# Patient Record
Sex: MALE | Race: ASIAN | NOT HISPANIC OR LATINO | URBAN - METROPOLITAN AREA
[De-identification: names, ages, dates, MRNs, and addresses within clinical notes are randomized per-mention and may not be internally consistent; named-entity substitution may affect disease eponyms.]

---

## 2020-01-01 ENCOUNTER — INPATIENT (INPATIENT)
Facility: HOSPITAL | Age: 80
LOS: 17 days | End: 2020-06-22
Attending: INTERNAL MEDICINE | Admitting: INTERNAL MEDICINE
Payer: COMMERCIAL

## 2020-01-01 VITALS
DIASTOLIC BLOOD PRESSURE: 55 MMHG | HEIGHT: 67 IN | HEART RATE: 70 BPM | RESPIRATION RATE: 18 BRPM | TEMPERATURE: 97 F | OXYGEN SATURATION: 100 % | SYSTOLIC BLOOD PRESSURE: 116 MMHG | WEIGHT: 126.1 LBS

## 2020-01-01 VITALS — HEART RATE: 68 BPM

## 2020-01-01 DIAGNOSIS — M72.6 NECROTIZING FASCIITIS: ICD-10-CM

## 2020-01-01 DIAGNOSIS — Z95.810 PRESENCE OF AUTOMATIC (IMPLANTABLE) CARDIAC DEFIBRILLATOR: Chronic | ICD-10-CM

## 2020-01-01 DIAGNOSIS — Z95.1 PRESENCE OF AORTOCORONARY BYPASS GRAFT: Chronic | ICD-10-CM

## 2020-01-01 DIAGNOSIS — S98.131A COMPLETE TRAUMATIC AMPUTATION OF ONE RIGHT LESSER TOE, INITIAL ENCOUNTER: Chronic | ICD-10-CM

## 2020-01-01 DIAGNOSIS — Z95.9 PRESENCE OF CARDIAC AND VASCULAR IMPLANT AND GRAFT, UNSPECIFIED: Chronic | ICD-10-CM

## 2020-01-01 LAB
-  AMIKACIN: SIGNIFICANT CHANGE UP
-  AMOXICILLIN/CLAVULANIC ACID: SIGNIFICANT CHANGE UP
-  AMOXICILLIN/CLAVULANIC ACID: SIGNIFICANT CHANGE UP
-  AMPICILLIN/SULBACTAM: SIGNIFICANT CHANGE UP
-  AMPICILLIN: SIGNIFICANT CHANGE UP
-  AZTREONAM: SIGNIFICANT CHANGE UP
-  CANDIDA ALBICANS: SIGNIFICANT CHANGE UP
-  CANDIDA GLABRATA: SIGNIFICANT CHANGE UP
-  CANDIDA KRUSEI: SIGNIFICANT CHANGE UP
-  CANDIDA PARAPSILOSIS: SIGNIFICANT CHANGE UP
-  CANDIDA TROPICALIS: SIGNIFICANT CHANGE UP
-  CEFAZOLIN: SIGNIFICANT CHANGE UP
-  CEFEPIME: SIGNIFICANT CHANGE UP
-  CEFOXITIN: SIGNIFICANT CHANGE UP
-  CEFTRIAXONE: SIGNIFICANT CHANGE UP
-  CIPROFLOXACIN: SIGNIFICANT CHANGE UP
-  CLINDAMYCIN: SIGNIFICANT CHANGE UP
-  COAGULASE NEGATIVE STAPHYLOCOCCUS: SIGNIFICANT CHANGE UP
-  ERTAPENEM: SIGNIFICANT CHANGE UP
-  GENTAMICIN: SIGNIFICANT CHANGE UP
-  IMIPENEM: SIGNIFICANT CHANGE UP
-  K. PNEUMONIAE GROUP: SIGNIFICANT CHANGE UP
-  KPC RESISTANCE GENE: SIGNIFICANT CHANGE UP
-  LEVOFLOXACIN: SIGNIFICANT CHANGE UP
-  MEROPENEM: SIGNIFICANT CHANGE UP
-  PENICILLIN: SIGNIFICANT CHANGE UP
-  PIPERACILLIN/TAZOBACTAM: SIGNIFICANT CHANGE UP
-  STREPTOCOCCUS SP. (NOT GRP A, B OR S PNEUMONIAE): SIGNIFICANT CHANGE UP
-  TOBRAMYCIN: SIGNIFICANT CHANGE UP
-  TRIMETHOPRIM/SULFAMETHOXAZOLE: SIGNIFICANT CHANGE UP
-  VANCOMYCIN: SIGNIFICANT CHANGE UP
A BAUMANNII DNA SPEC QL NAA+PROBE: SIGNIFICANT CHANGE UP
A1C WITH ESTIMATED AVERAGE GLUCOSE RESULT: 10.4 % — HIGH (ref 4–5.6)
A1C WITH ESTIMATED AVERAGE GLUCOSE RESULT: 10.6 % — HIGH (ref 4–5.6)
ALBUMIN SERPL ELPH-MCNC: 1.7 G/DL — LOW (ref 3.5–5.2)
ALBUMIN SERPL ELPH-MCNC: 2 G/DL — LOW (ref 3.5–5.2)
ALBUMIN SERPL ELPH-MCNC: 2 G/DL — LOW (ref 3.5–5.2)
ALBUMIN SERPL ELPH-MCNC: 2.3 G/DL — LOW (ref 3.5–5.2)
ALBUMIN SERPL ELPH-MCNC: 2.4 G/DL — LOW (ref 3.5–5.2)
ALBUMIN SERPL ELPH-MCNC: 2.5 G/DL — LOW (ref 3.5–5.2)
ALBUMIN SERPL ELPH-MCNC: 2.6 G/DL — LOW (ref 3.5–5.2)
ALBUMIN SERPL ELPH-MCNC: 2.7 G/DL — LOW (ref 3.5–5.2)
ALBUMIN SERPL ELPH-MCNC: 2.8 G/DL — LOW (ref 3.5–5.2)
ALBUMIN SERPL ELPH-MCNC: 2.9 G/DL — LOW (ref 3.5–5.2)
ALBUMIN SERPL ELPH-MCNC: 3 G/DL — LOW (ref 3.5–5.2)
ALP SERPL-CCNC: 161 U/L — HIGH (ref 30–115)
ALP SERPL-CCNC: 165 U/L — HIGH (ref 30–115)
ALP SERPL-CCNC: 176 U/L — HIGH (ref 30–115)
ALP SERPL-CCNC: 187 U/L — HIGH (ref 30–115)
ALP SERPL-CCNC: 196 U/L — HIGH (ref 30–115)
ALP SERPL-CCNC: 199 U/L — HIGH (ref 30–115)
ALP SERPL-CCNC: 206 U/L — HIGH (ref 30–115)
ALP SERPL-CCNC: 208 U/L — HIGH (ref 30–115)
ALP SERPL-CCNC: 213 U/L — HIGH (ref 30–115)
ALP SERPL-CCNC: 214 U/L — HIGH (ref 30–115)
ALP SERPL-CCNC: 215 U/L — HIGH (ref 30–115)
ALP SERPL-CCNC: 216 U/L — HIGH (ref 30–115)
ALP SERPL-CCNC: 218 U/L — HIGH (ref 30–115)
ALP SERPL-CCNC: 219 U/L — HIGH (ref 30–115)
ALP SERPL-CCNC: 225 U/L — HIGH (ref 30–115)
ALP SERPL-CCNC: 225 U/L — HIGH (ref 30–115)
ALP SERPL-CCNC: 227 U/L — HIGH (ref 30–115)
ALP SERPL-CCNC: 230 U/L — HIGH (ref 30–115)
ALP SERPL-CCNC: 231 U/L — HIGH (ref 30–115)
ALP SERPL-CCNC: 232 U/L — HIGH (ref 30–115)
ALP SERPL-CCNC: 237 U/L — HIGH (ref 30–115)
ALP SERPL-CCNC: 240 U/L — HIGH (ref 30–115)
ALP SERPL-CCNC: 276 U/L — HIGH (ref 30–115)
ALT FLD-CCNC: 10 U/L — SIGNIFICANT CHANGE UP (ref 0–41)
ALT FLD-CCNC: 11 U/L — SIGNIFICANT CHANGE UP (ref 0–41)
ALT FLD-CCNC: 14 U/L — SIGNIFICANT CHANGE UP (ref 0–41)
ALT FLD-CCNC: 14 U/L — SIGNIFICANT CHANGE UP (ref 0–41)
ALT FLD-CCNC: 15 U/L — SIGNIFICANT CHANGE UP (ref 0–41)
ALT FLD-CCNC: 15 U/L — SIGNIFICANT CHANGE UP (ref 0–41)
ALT FLD-CCNC: 16 U/L — SIGNIFICANT CHANGE UP (ref 0–41)
ALT FLD-CCNC: 17 U/L — SIGNIFICANT CHANGE UP (ref 0–41)
ALT FLD-CCNC: 18 U/L — SIGNIFICANT CHANGE UP (ref 0–41)
ALT FLD-CCNC: 19 U/L — SIGNIFICANT CHANGE UP (ref 0–41)
ALT FLD-CCNC: 20 U/L — SIGNIFICANT CHANGE UP (ref 0–41)
ALT FLD-CCNC: 21 U/L — SIGNIFICANT CHANGE UP (ref 0–41)
ALT FLD-CCNC: 21 U/L — SIGNIFICANT CHANGE UP (ref 0–41)
ALT FLD-CCNC: 24 U/L — SIGNIFICANT CHANGE UP (ref 0–41)
ALT FLD-CCNC: 7 U/L — SIGNIFICANT CHANGE UP (ref 0–41)
ALT FLD-CCNC: 8 U/L — SIGNIFICANT CHANGE UP (ref 0–41)
ALT FLD-CCNC: <5 U/L — SIGNIFICANT CHANGE UP (ref 0–41)
ALT FLD-CCNC: <5 U/L — SIGNIFICANT CHANGE UP (ref 0–41)
ANION GAP SERPL CALC-SCNC: 14 MMOL/L — SIGNIFICANT CHANGE UP (ref 7–14)
ANION GAP SERPL CALC-SCNC: 15 MMOL/L — HIGH (ref 7–14)
ANION GAP SERPL CALC-SCNC: 16 MMOL/L — HIGH (ref 7–14)
ANION GAP SERPL CALC-SCNC: 17 MMOL/L — HIGH (ref 7–14)
ANION GAP SERPL CALC-SCNC: 18 MMOL/L — HIGH (ref 7–14)
ANION GAP SERPL CALC-SCNC: 20 MMOL/L — HIGH (ref 7–14)
ANION GAP SERPL CALC-SCNC: 20 MMOL/L — HIGH (ref 7–14)
ANION GAP SERPL CALC-SCNC: 22 MMOL/L — HIGH (ref 7–14)
ANION GAP SERPL CALC-SCNC: 25 MMOL/L — HIGH (ref 7–14)
ANISOCYTOSIS BLD QL: SIGNIFICANT CHANGE UP
ANISOCYTOSIS BLD QL: SLIGHT — SIGNIFICANT CHANGE UP
APTT BLD: 30.2 SEC — SIGNIFICANT CHANGE UP (ref 27–39.2)
APTT BLD: 32.8 SEC — SIGNIFICANT CHANGE UP (ref 27–39.2)
APTT BLD: 32.8 SEC — SIGNIFICANT CHANGE UP (ref 27–39.2)
APTT BLD: 32.9 SEC — SIGNIFICANT CHANGE UP (ref 27–39.2)
APTT BLD: 33 SEC — SIGNIFICANT CHANGE UP (ref 27–39.2)
APTT BLD: 34.6 SEC — SIGNIFICANT CHANGE UP (ref 27–39.2)
APTT BLD: 40.2 SEC — HIGH (ref 27–39.2)
APTT BLD: 52.9 SEC — HIGH (ref 27–39.2)
AST SERPL-CCNC: 26 U/L — SIGNIFICANT CHANGE UP (ref 0–41)
AST SERPL-CCNC: 26 U/L — SIGNIFICANT CHANGE UP (ref 0–41)
AST SERPL-CCNC: 29 U/L — SIGNIFICANT CHANGE UP (ref 0–41)
AST SERPL-CCNC: 29 U/L — SIGNIFICANT CHANGE UP (ref 0–41)
AST SERPL-CCNC: 30 U/L — SIGNIFICANT CHANGE UP (ref 0–41)
AST SERPL-CCNC: 32 U/L — SIGNIFICANT CHANGE UP (ref 0–41)
AST SERPL-CCNC: 33 U/L — SIGNIFICANT CHANGE UP (ref 0–41)
AST SERPL-CCNC: 33 U/L — SIGNIFICANT CHANGE UP (ref 0–41)
AST SERPL-CCNC: 37 U/L — SIGNIFICANT CHANGE UP (ref 0–41)
AST SERPL-CCNC: 40 U/L — SIGNIFICANT CHANGE UP (ref 0–41)
AST SERPL-CCNC: 41 U/L — SIGNIFICANT CHANGE UP (ref 0–41)
AST SERPL-CCNC: 43 U/L — HIGH (ref 0–41)
AST SERPL-CCNC: 43 U/L — HIGH (ref 0–41)
AST SERPL-CCNC: 45 U/L — HIGH (ref 0–41)
AST SERPL-CCNC: 46 U/L — HIGH (ref 0–41)
AST SERPL-CCNC: 49 U/L — HIGH (ref 0–41)
AST SERPL-CCNC: 51 U/L — HIGH (ref 0–41)
AST SERPL-CCNC: 52 U/L — HIGH (ref 0–41)
AST SERPL-CCNC: 52 U/L — HIGH (ref 0–41)
AST SERPL-CCNC: 55 U/L — HIGH (ref 0–41)
AST SERPL-CCNC: 61 U/L — HIGH (ref 0–41)
B-OH-BUTYR SERPL-SCNC: <0.2 MMOL/L — SIGNIFICANT CHANGE UP
BASE EXCESS BLDA CALC-SCNC: -0.9 MMOL/L — SIGNIFICANT CHANGE UP (ref -2–2)
BASE EXCESS BLDV CALC-SCNC: -2.5 MMOL/L — LOW (ref -2–2)
BASOPHILS # BLD AUTO: 0 K/UL — SIGNIFICANT CHANGE UP (ref 0–0.2)
BASOPHILS # BLD AUTO: 0 K/UL — SIGNIFICANT CHANGE UP (ref 0–0.2)
BASOPHILS # BLD AUTO: 0.01 K/UL — SIGNIFICANT CHANGE UP (ref 0–0.2)
BASOPHILS # BLD AUTO: 0.01 K/UL — SIGNIFICANT CHANGE UP (ref 0–0.2)
BASOPHILS # BLD AUTO: 0.02 K/UL — SIGNIFICANT CHANGE UP (ref 0–0.2)
BASOPHILS # BLD AUTO: 0.03 K/UL — SIGNIFICANT CHANGE UP (ref 0–0.2)
BASOPHILS # BLD AUTO: 0.04 K/UL — SIGNIFICANT CHANGE UP (ref 0–0.2)
BASOPHILS # BLD AUTO: 0.04 K/UL — SIGNIFICANT CHANGE UP (ref 0–0.2)
BASOPHILS # BLD AUTO: 0.05 K/UL — SIGNIFICANT CHANGE UP (ref 0–0.2)
BASOPHILS NFR BLD AUTO: 0 % — SIGNIFICANT CHANGE UP (ref 0–1)
BASOPHILS NFR BLD AUTO: 0 % — SIGNIFICANT CHANGE UP (ref 0–1)
BASOPHILS NFR BLD AUTO: 0.1 % — SIGNIFICANT CHANGE UP (ref 0–1)
BASOPHILS NFR BLD AUTO: 0.2 % — SIGNIFICANT CHANGE UP (ref 0–1)
BASOPHILS NFR BLD AUTO: 0.3 % — SIGNIFICANT CHANGE UP (ref 0–1)
BASOPHILS NFR BLD AUTO: 0.3 % — SIGNIFICANT CHANGE UP (ref 0–1)
BILIRUB SERPL-MCNC: 0.4 MG/DL — SIGNIFICANT CHANGE UP (ref 0.2–1.2)
BILIRUB SERPL-MCNC: 0.5 MG/DL — SIGNIFICANT CHANGE UP (ref 0.2–1.2)
BILIRUB SERPL-MCNC: 0.6 MG/DL — SIGNIFICANT CHANGE UP (ref 0.2–1.2)
BILIRUB SERPL-MCNC: 0.6 MG/DL — SIGNIFICANT CHANGE UP (ref 0.2–1.2)
BILIRUB SERPL-MCNC: 0.7 MG/DL — SIGNIFICANT CHANGE UP (ref 0.2–1.2)
BLD GP AB SCN SERPL QL: SIGNIFICANT CHANGE UP
BUN SERPL-MCNC: 100 MG/DL — CRITICAL HIGH (ref 10–20)
BUN SERPL-MCNC: 100 MG/DL — CRITICAL HIGH (ref 10–20)
BUN SERPL-MCNC: 101 MG/DL — CRITICAL HIGH (ref 10–20)
BUN SERPL-MCNC: 102 MG/DL — CRITICAL HIGH (ref 10–20)
BUN SERPL-MCNC: 102 MG/DL — CRITICAL HIGH (ref 10–20)
BUN SERPL-MCNC: 103 MG/DL — CRITICAL HIGH (ref 10–20)
BUN SERPL-MCNC: 104 MG/DL — CRITICAL HIGH (ref 10–20)
BUN SERPL-MCNC: 105 MG/DL — CRITICAL HIGH (ref 10–20)
BUN SERPL-MCNC: 105 MG/DL — CRITICAL HIGH (ref 10–20)
BUN SERPL-MCNC: 106 MG/DL — CRITICAL HIGH (ref 10–20)
BUN SERPL-MCNC: 107 MG/DL — CRITICAL HIGH (ref 10–20)
BUN SERPL-MCNC: 93 MG/DL — CRITICAL HIGH (ref 10–20)
BUN SERPL-MCNC: 94 MG/DL — CRITICAL HIGH (ref 10–20)
BUN SERPL-MCNC: 97 MG/DL — CRITICAL HIGH (ref 10–20)
BUN SERPL-MCNC: 98 MG/DL — CRITICAL HIGH (ref 10–20)
BUN SERPL-MCNC: 99 MG/DL — CRITICAL HIGH (ref 10–20)
BURR CELLS BLD QL SMEAR: PRESENT — SIGNIFICANT CHANGE UP
CA-I SERPL-SCNC: 1.14 MMOL/L — SIGNIFICANT CHANGE UP (ref 1.12–1.3)
CALCIUM SERPL-MCNC: 11 MG/DL — HIGH (ref 8.5–10.1)
CALCIUM SERPL-MCNC: 6.8 MG/DL — LOW (ref 8.5–10.1)
CALCIUM SERPL-MCNC: 6.8 MG/DL — LOW (ref 8.5–10.1)
CALCIUM SERPL-MCNC: 6.9 MG/DL — LOW (ref 8.5–10.1)
CALCIUM SERPL-MCNC: 7 MG/DL — LOW (ref 8.5–10.1)
CALCIUM SERPL-MCNC: 7.1 MG/DL — LOW (ref 8.4–10.5)
CALCIUM SERPL-MCNC: 7.1 MG/DL — LOW (ref 8.5–10.1)
CALCIUM SERPL-MCNC: 7.1 MG/DL — LOW (ref 8.5–10.1)
CALCIUM SERPL-MCNC: 7.2 MG/DL — LOW (ref 8.5–10.1)
CALCIUM SERPL-MCNC: 7.3 MG/DL — LOW (ref 8.5–10.1)
CALCIUM SERPL-MCNC: 7.4 MG/DL — LOW (ref 8.5–10.1)
CALCIUM SERPL-MCNC: 7.6 MG/DL — LOW (ref 8.5–10.1)
CALCIUM SERPL-MCNC: 7.7 MG/DL — LOW (ref 8.5–10.1)
CALCIUM SERPL-MCNC: 7.8 MG/DL — LOW (ref 8.5–10.1)
CALCIUM SERPL-MCNC: 8 MG/DL — LOW (ref 8.5–10.1)
CALCIUM SERPL-MCNC: 8.1 MG/DL — LOW (ref 8.5–10.1)
CALCIUM SERPL-MCNC: 8.2 MG/DL — LOW (ref 8.5–10.1)
CALCIUM SERPL-MCNC: 8.2 MG/DL — LOW (ref 8.5–10.1)
CHLORIDE SERPL-SCNC: 86 MMOL/L — LOW (ref 98–110)
CHLORIDE SERPL-SCNC: 87 MMOL/L — LOW (ref 98–110)
CHLORIDE SERPL-SCNC: 88 MMOL/L — LOW (ref 98–110)
CHLORIDE SERPL-SCNC: 89 MMOL/L — LOW (ref 98–110)
CHLORIDE SERPL-SCNC: 90 MMOL/L — LOW (ref 98–110)
CHLORIDE SERPL-SCNC: 90 MMOL/L — LOW (ref 98–110)
CHLORIDE SERPL-SCNC: 91 MMOL/L — LOW (ref 98–110)
CHLORIDE SERPL-SCNC: 92 MMOL/L — LOW (ref 98–110)
CHLORIDE SERPL-SCNC: 92 MMOL/L — LOW (ref 98–110)
CHLORIDE SERPL-SCNC: 93 MMOL/L — LOW (ref 98–110)
CHLORIDE SERPL-SCNC: 94 MMOL/L — LOW (ref 98–110)
CHLORIDE SERPL-SCNC: 95 MMOL/L — LOW (ref 98–110)
CHLORIDE SERPL-SCNC: 95 MMOL/L — LOW (ref 98–110)
CHLORIDE SERPL-SCNC: 96 MMOL/L — LOW (ref 98–110)
CHOLEST SERPL-MCNC: 51 MG/DL — LOW (ref 100–200)
CK MB CFR SERPL CALC: 3.5 NG/ML — SIGNIFICANT CHANGE UP (ref 0.6–6.3)
CK MB CFR SERPL CALC: 4.1 NG/ML — SIGNIFICANT CHANGE UP (ref 0.6–6.3)
CK MB CFR SERPL CALC: <1 NG/ML — SIGNIFICANT CHANGE UP (ref 0.6–6.3)
CK SERPL-CCNC: 111 U/L — SIGNIFICANT CHANGE UP (ref 0–225)
CK SERPL-CCNC: 119 U/L — SIGNIFICANT CHANGE UP (ref 0–225)
CK SERPL-CCNC: 124 U/L — SIGNIFICANT CHANGE UP (ref 0–225)
CK SERPL-CCNC: 164 U/L — SIGNIFICANT CHANGE UP (ref 0–225)
CK SERPL-CCNC: 40 U/L — SIGNIFICANT CHANGE UP (ref 0–225)
CK SERPL-CCNC: 91 U/L — SIGNIFICANT CHANGE UP (ref 0–225)
CO2 SERPL-SCNC: 18 MMOL/L — SIGNIFICANT CHANGE UP (ref 17–32)
CO2 SERPL-SCNC: 18 MMOL/L — SIGNIFICANT CHANGE UP (ref 17–32)
CO2 SERPL-SCNC: 19 MMOL/L — SIGNIFICANT CHANGE UP (ref 17–32)
CO2 SERPL-SCNC: 20 MMOL/L — SIGNIFICANT CHANGE UP (ref 17–32)
CO2 SERPL-SCNC: 21 MMOL/L — SIGNIFICANT CHANGE UP (ref 17–32)
CO2 SERPL-SCNC: 22 MMOL/L — SIGNIFICANT CHANGE UP (ref 17–32)
CO2 SERPL-SCNC: 23 MMOL/L — SIGNIFICANT CHANGE UP (ref 17–32)
CORTIS AM PEAK SERPL-MCNC: 17.9 UG/DL — SIGNIFICANT CHANGE UP (ref 6–18.4)
CORTIS AM PEAK SERPL-MCNC: 23.3 UG/DL — HIGH (ref 6–18.4)
CREAT SERPL-MCNC: 3 MG/DL — HIGH (ref 0.7–1.5)
CREAT SERPL-MCNC: 3 MG/DL — HIGH (ref 0.7–1.5)
CREAT SERPL-MCNC: 3.2 MG/DL — HIGH (ref 0.7–1.5)
CREAT SERPL-MCNC: 3.3 MG/DL — HIGH (ref 0.7–1.5)
CREAT SERPL-MCNC: 3.4 MG/DL — HIGH (ref 0.7–1.5)
CREAT SERPL-MCNC: 3.5 MG/DL — HIGH (ref 0.7–1.5)
CREAT SERPL-MCNC: 3.6 MG/DL — HIGH (ref 0.7–1.5)
CREAT SERPL-MCNC: 3.7 MG/DL — HIGH (ref 0.7–1.5)
CREAT SERPL-MCNC: 3.7 MG/DL — HIGH (ref 0.7–1.5)
CREAT SERPL-MCNC: 3.9 MG/DL — HIGH (ref 0.7–1.5)
CREAT SERPL-MCNC: 3.9 MG/DL — HIGH (ref 0.7–1.5)
CREAT SERPL-MCNC: 4.1 MG/DL — CRITICAL HIGH (ref 0.7–1.5)
CREAT SERPL-MCNC: 4.1 MG/DL — CRITICAL HIGH (ref 0.7–1.5)
CREAT SERPL-MCNC: 4.3 MG/DL — CRITICAL HIGH (ref 0.7–1.5)
CREAT SERPL-MCNC: 4.5 MG/DL — CRITICAL HIGH (ref 0.7–1.5)
CREAT SERPL-MCNC: 4.5 MG/DL — CRITICAL HIGH (ref 0.7–1.5)
CREAT SERPL-MCNC: 4.6 MG/DL — CRITICAL HIGH (ref 0.7–1.5)
CREAT SERPL-MCNC: 4.8 MG/DL — CRITICAL HIGH (ref 0.7–1.5)
CREAT SERPL-MCNC: 4.9 MG/DL — CRITICAL HIGH (ref 0.7–1.5)
CRP SERPL-MCNC: 23.7 MG/DL — HIGH (ref 0–0.4)
CULTURE RESULTS: SIGNIFICANT CHANGE UP
DIGOXIN SERPL-MCNC: 1 NG/ML — SIGNIFICANT CHANGE UP (ref 0.8–2)
DIGOXIN SERPL-MCNC: 1.2 NG/ML — SIGNIFICANT CHANGE UP (ref 0.8–2)
DIGOXIN SERPL-MCNC: 1.9 NG/ML — SIGNIFICANT CHANGE UP (ref 0.8–2)
DIGOXIN SERPL-MCNC: 2.1 NG/ML — HIGH (ref 0.8–2)
E CLOAC COMP DNA BLD POS QL NAA+PROBE: SIGNIFICANT CHANGE UP
E COLI DNA BLD POS QL NAA+NON-PROBE: SIGNIFICANT CHANGE UP
ENTEROCOC DNA BLD POS QL NAA+NON-PROBE: SIGNIFICANT CHANGE UP
ENTEROCOC DNA BLD POS QL NAA+NON-PROBE: SIGNIFICANT CHANGE UP
EOSINOPHIL # BLD AUTO: 0 K/UL — SIGNIFICANT CHANGE UP (ref 0–0.7)
EOSINOPHIL # BLD AUTO: 0.02 K/UL — SIGNIFICANT CHANGE UP (ref 0–0.7)
EOSINOPHIL # BLD AUTO: 0.06 K/UL — SIGNIFICANT CHANGE UP (ref 0–0.7)
EOSINOPHIL # BLD AUTO: 0.09 K/UL — SIGNIFICANT CHANGE UP (ref 0–0.7)
EOSINOPHIL # BLD AUTO: 0.1 K/UL — SIGNIFICANT CHANGE UP (ref 0–0.7)
EOSINOPHIL # BLD AUTO: 0.11 K/UL — SIGNIFICANT CHANGE UP (ref 0–0.7)
EOSINOPHIL # BLD AUTO: 0.15 K/UL — SIGNIFICANT CHANGE UP (ref 0–0.7)
EOSINOPHIL # BLD AUTO: 0.2 K/UL — SIGNIFICANT CHANGE UP (ref 0–0.7)
EOSINOPHIL # BLD AUTO: 0.2 K/UL — SIGNIFICANT CHANGE UP (ref 0–0.7)
EOSINOPHIL # BLD AUTO: 0.21 K/UL — SIGNIFICANT CHANGE UP (ref 0–0.7)
EOSINOPHIL # BLD AUTO: 0.22 K/UL — SIGNIFICANT CHANGE UP (ref 0–0.7)
EOSINOPHIL # BLD AUTO: 0.22 K/UL — SIGNIFICANT CHANGE UP (ref 0–0.7)
EOSINOPHIL # BLD AUTO: 0.23 K/UL — SIGNIFICANT CHANGE UP (ref 0–0.7)
EOSINOPHIL # BLD AUTO: 0.24 K/UL — SIGNIFICANT CHANGE UP (ref 0–0.7)
EOSINOPHIL # BLD AUTO: 0.24 K/UL — SIGNIFICANT CHANGE UP (ref 0–0.7)
EOSINOPHIL # BLD AUTO: 0.26 K/UL — SIGNIFICANT CHANGE UP (ref 0–0.7)
EOSINOPHIL # BLD AUTO: 0.29 K/UL — SIGNIFICANT CHANGE UP (ref 0–0.7)
EOSINOPHIL # BLD AUTO: 0.3 K/UL — SIGNIFICANT CHANGE UP (ref 0–0.7)
EOSINOPHIL # BLD AUTO: 0.31 K/UL — SIGNIFICANT CHANGE UP (ref 0–0.7)
EOSINOPHIL # BLD AUTO: 0.35 K/UL — SIGNIFICANT CHANGE UP (ref 0–0.7)
EOSINOPHIL # BLD AUTO: 0.48 K/UL — SIGNIFICANT CHANGE UP (ref 0–0.7)
EOSINOPHIL NFR BLD AUTO: 0 % — SIGNIFICANT CHANGE UP (ref 0–8)
EOSINOPHIL NFR BLD AUTO: 0.1 % — SIGNIFICANT CHANGE UP (ref 0–8)
EOSINOPHIL NFR BLD AUTO: 0.4 % — SIGNIFICANT CHANGE UP (ref 0–8)
EOSINOPHIL NFR BLD AUTO: 0.4 % — SIGNIFICANT CHANGE UP (ref 0–8)
EOSINOPHIL NFR BLD AUTO: 0.5 % — SIGNIFICANT CHANGE UP (ref 0–8)
EOSINOPHIL NFR BLD AUTO: 0.6 % — SIGNIFICANT CHANGE UP (ref 0–8)
EOSINOPHIL NFR BLD AUTO: 0.7 % — SIGNIFICANT CHANGE UP (ref 0–8)
EOSINOPHIL NFR BLD AUTO: 0.9 % — SIGNIFICANT CHANGE UP (ref 0–8)
EOSINOPHIL NFR BLD AUTO: 0.9 % — SIGNIFICANT CHANGE UP (ref 0–8)
EOSINOPHIL NFR BLD AUTO: 1 % — SIGNIFICANT CHANGE UP (ref 0–8)
EOSINOPHIL NFR BLD AUTO: 1.1 % — SIGNIFICANT CHANGE UP (ref 0–8)
EOSINOPHIL NFR BLD AUTO: 1.3 % — SIGNIFICANT CHANGE UP (ref 0–8)
EOSINOPHIL NFR BLD AUTO: 1.3 % — SIGNIFICANT CHANGE UP (ref 0–8)
EOSINOPHIL NFR BLD AUTO: 1.5 % — SIGNIFICANT CHANGE UP (ref 0–8)
EOSINOPHIL NFR BLD AUTO: 1.6 % — SIGNIFICANT CHANGE UP (ref 0–8)
EOSINOPHIL NFR BLD AUTO: 1.6 % — SIGNIFICANT CHANGE UP (ref 0–8)
EOSINOPHIL NFR BLD AUTO: 1.7 % — SIGNIFICANT CHANGE UP (ref 0–8)
EOSINOPHIL NFR BLD AUTO: 1.7 % — SIGNIFICANT CHANGE UP (ref 0–8)
EOSINOPHIL NFR BLD AUTO: 1.8 % — SIGNIFICANT CHANGE UP (ref 0–8)
EOSINOPHIL NFR BLD AUTO: 2 % — SIGNIFICANT CHANGE UP (ref 0–8)
EOSINOPHIL NFR BLD AUTO: 2.7 % — SIGNIFICANT CHANGE UP (ref 0–8)
ERYTHROCYTE [SEDIMENTATION RATE] IN BLOOD: 79 MM/HR — HIGH (ref 0–10)
ESTIMATED AVERAGE GLUCOSE: 252 MG/DL — HIGH (ref 68–114)
ESTIMATED AVERAGE GLUCOSE: 258 MG/DL — HIGH (ref 68–114)
FERRITIN SERPL-MCNC: 2690 NG/ML — HIGH (ref 30–400)
GAS PNL BLDV: 130 MMOL/L — LOW (ref 136–145)
GAS PNL BLDV: SIGNIFICANT CHANGE UP
GIANT PLATELETS BLD QL SMEAR: PRESENT — SIGNIFICANT CHANGE UP
GLUCOSE BLDC GLUCOMTR-MCNC: 100 MG/DL — HIGH (ref 70–99)
GLUCOSE BLDC GLUCOMTR-MCNC: 101 MG/DL — HIGH (ref 70–99)
GLUCOSE BLDC GLUCOMTR-MCNC: 105 MG/DL — HIGH (ref 70–99)
GLUCOSE BLDC GLUCOMTR-MCNC: 108 MG/DL — HIGH (ref 70–99)
GLUCOSE BLDC GLUCOMTR-MCNC: 109 MG/DL — HIGH (ref 70–99)
GLUCOSE BLDC GLUCOMTR-MCNC: 112 MG/DL — HIGH (ref 70–99)
GLUCOSE BLDC GLUCOMTR-MCNC: 120 MG/DL — HIGH (ref 70–99)
GLUCOSE BLDC GLUCOMTR-MCNC: 121 MG/DL — HIGH (ref 70–99)
GLUCOSE BLDC GLUCOMTR-MCNC: 123 MG/DL — HIGH (ref 70–99)
GLUCOSE BLDC GLUCOMTR-MCNC: 124 MG/DL — HIGH (ref 70–99)
GLUCOSE BLDC GLUCOMTR-MCNC: 127 MG/DL — HIGH (ref 70–99)
GLUCOSE BLDC GLUCOMTR-MCNC: 128 MG/DL — HIGH (ref 70–99)
GLUCOSE BLDC GLUCOMTR-MCNC: 129 MG/DL — HIGH (ref 70–99)
GLUCOSE BLDC GLUCOMTR-MCNC: 131 MG/DL — HIGH (ref 70–99)
GLUCOSE BLDC GLUCOMTR-MCNC: 131 MG/DL — HIGH (ref 70–99)
GLUCOSE BLDC GLUCOMTR-MCNC: 140 MG/DL — HIGH (ref 70–99)
GLUCOSE BLDC GLUCOMTR-MCNC: 142 MG/DL — HIGH (ref 70–99)
GLUCOSE BLDC GLUCOMTR-MCNC: 147 MG/DL — HIGH (ref 70–99)
GLUCOSE BLDC GLUCOMTR-MCNC: 147 MG/DL — HIGH (ref 70–99)
GLUCOSE BLDC GLUCOMTR-MCNC: 149 MG/DL — HIGH (ref 70–99)
GLUCOSE BLDC GLUCOMTR-MCNC: 150 MG/DL — HIGH (ref 70–99)
GLUCOSE BLDC GLUCOMTR-MCNC: 154 MG/DL — HIGH (ref 70–99)
GLUCOSE BLDC GLUCOMTR-MCNC: 154 MG/DL — HIGH (ref 70–99)
GLUCOSE BLDC GLUCOMTR-MCNC: 155 MG/DL — HIGH (ref 70–99)
GLUCOSE BLDC GLUCOMTR-MCNC: 157 MG/DL — HIGH (ref 70–99)
GLUCOSE BLDC GLUCOMTR-MCNC: 158 MG/DL — HIGH (ref 70–99)
GLUCOSE BLDC GLUCOMTR-MCNC: 166 MG/DL — HIGH (ref 70–99)
GLUCOSE BLDC GLUCOMTR-MCNC: 166 MG/DL — HIGH (ref 70–99)
GLUCOSE BLDC GLUCOMTR-MCNC: 167 MG/DL — HIGH (ref 70–99)
GLUCOSE BLDC GLUCOMTR-MCNC: 167 MG/DL — HIGH (ref 70–99)
GLUCOSE BLDC GLUCOMTR-MCNC: 169 MG/DL — HIGH (ref 70–99)
GLUCOSE BLDC GLUCOMTR-MCNC: 170 MG/DL — HIGH (ref 70–99)
GLUCOSE BLDC GLUCOMTR-MCNC: 172 MG/DL — HIGH (ref 70–99)
GLUCOSE BLDC GLUCOMTR-MCNC: 172 MG/DL — HIGH (ref 70–99)
GLUCOSE BLDC GLUCOMTR-MCNC: 173 MG/DL — HIGH (ref 70–99)
GLUCOSE BLDC GLUCOMTR-MCNC: 179 MG/DL — HIGH (ref 70–99)
GLUCOSE BLDC GLUCOMTR-MCNC: 180 MG/DL — HIGH (ref 70–99)
GLUCOSE BLDC GLUCOMTR-MCNC: 180 MG/DL — HIGH (ref 70–99)
GLUCOSE BLDC GLUCOMTR-MCNC: 182 MG/DL — HIGH (ref 70–99)
GLUCOSE BLDC GLUCOMTR-MCNC: 184 MG/DL — HIGH (ref 70–99)
GLUCOSE BLDC GLUCOMTR-MCNC: 184 MG/DL — HIGH (ref 70–99)
GLUCOSE BLDC GLUCOMTR-MCNC: 185 MG/DL — HIGH (ref 70–99)
GLUCOSE BLDC GLUCOMTR-MCNC: 186 MG/DL — HIGH (ref 70–99)
GLUCOSE BLDC GLUCOMTR-MCNC: 186 MG/DL — HIGH (ref 70–99)
GLUCOSE BLDC GLUCOMTR-MCNC: 187 MG/DL — HIGH (ref 70–99)
GLUCOSE BLDC GLUCOMTR-MCNC: 188 MG/DL — HIGH (ref 70–99)
GLUCOSE BLDC GLUCOMTR-MCNC: 188 MG/DL — HIGH (ref 70–99)
GLUCOSE BLDC GLUCOMTR-MCNC: 190 MG/DL — HIGH (ref 70–99)
GLUCOSE BLDC GLUCOMTR-MCNC: 191 MG/DL — HIGH (ref 70–99)
GLUCOSE BLDC GLUCOMTR-MCNC: 206 MG/DL — HIGH (ref 70–99)
GLUCOSE BLDC GLUCOMTR-MCNC: 215 MG/DL — HIGH (ref 70–99)
GLUCOSE BLDC GLUCOMTR-MCNC: 216 MG/DL — HIGH (ref 70–99)
GLUCOSE BLDC GLUCOMTR-MCNC: 218 MG/DL — HIGH (ref 70–99)
GLUCOSE BLDC GLUCOMTR-MCNC: 233 MG/DL — HIGH (ref 70–99)
GLUCOSE BLDC GLUCOMTR-MCNC: 237 MG/DL — HIGH (ref 70–99)
GLUCOSE BLDC GLUCOMTR-MCNC: 239 MG/DL — HIGH (ref 70–99)
GLUCOSE BLDC GLUCOMTR-MCNC: 240 MG/DL — HIGH (ref 70–99)
GLUCOSE BLDC GLUCOMTR-MCNC: 240 MG/DL — HIGH (ref 70–99)
GLUCOSE BLDC GLUCOMTR-MCNC: 244 MG/DL — HIGH (ref 70–99)
GLUCOSE BLDC GLUCOMTR-MCNC: 254 MG/DL — HIGH (ref 70–99)
GLUCOSE BLDC GLUCOMTR-MCNC: 255 MG/DL — HIGH (ref 70–99)
GLUCOSE BLDC GLUCOMTR-MCNC: 261 MG/DL — HIGH (ref 70–99)
GLUCOSE BLDC GLUCOMTR-MCNC: 262 MG/DL — HIGH (ref 70–99)
GLUCOSE BLDC GLUCOMTR-MCNC: 269 MG/DL — HIGH (ref 70–99)
GLUCOSE BLDC GLUCOMTR-MCNC: 278 MG/DL — HIGH (ref 70–99)
GLUCOSE BLDC GLUCOMTR-MCNC: 279 MG/DL — HIGH (ref 70–99)
GLUCOSE BLDC GLUCOMTR-MCNC: 280 MG/DL — HIGH (ref 70–99)
GLUCOSE BLDC GLUCOMTR-MCNC: 284 MG/DL — HIGH (ref 70–99)
GLUCOSE BLDC GLUCOMTR-MCNC: 294 MG/DL — HIGH (ref 70–99)
GLUCOSE BLDC GLUCOMTR-MCNC: 302 MG/DL — HIGH (ref 70–99)
GLUCOSE BLDC GLUCOMTR-MCNC: 330 MG/DL — HIGH (ref 70–99)
GLUCOSE BLDC GLUCOMTR-MCNC: 333 MG/DL — HIGH (ref 70–99)
GLUCOSE BLDC GLUCOMTR-MCNC: 336 MG/DL — HIGH (ref 70–99)
GLUCOSE BLDC GLUCOMTR-MCNC: 340 MG/DL — HIGH (ref 70–99)
GLUCOSE BLDC GLUCOMTR-MCNC: 341 MG/DL — HIGH (ref 70–99)
GLUCOSE BLDC GLUCOMTR-MCNC: 365 MG/DL — HIGH (ref 70–99)
GLUCOSE BLDC GLUCOMTR-MCNC: 404 MG/DL — HIGH (ref 70–99)
GLUCOSE BLDC GLUCOMTR-MCNC: 42 MG/DL — CRITICAL LOW (ref 70–99)
GLUCOSE BLDC GLUCOMTR-MCNC: 429 MG/DL — HIGH (ref 70–99)
GLUCOSE BLDC GLUCOMTR-MCNC: 43 MG/DL — CRITICAL LOW (ref 70–99)
GLUCOSE BLDC GLUCOMTR-MCNC: 497 MG/DL — CRITICAL HIGH (ref 70–99)
GLUCOSE BLDC GLUCOMTR-MCNC: 52 MG/DL — LOW (ref 70–99)
GLUCOSE BLDC GLUCOMTR-MCNC: 56 MG/DL — LOW (ref 70–99)
GLUCOSE BLDC GLUCOMTR-MCNC: 56 MG/DL — LOW (ref 70–99)
GLUCOSE BLDC GLUCOMTR-MCNC: 58 MG/DL — LOW (ref 70–99)
GLUCOSE BLDC GLUCOMTR-MCNC: 60 MG/DL — LOW (ref 70–99)
GLUCOSE BLDC GLUCOMTR-MCNC: 61 MG/DL — LOW (ref 70–99)
GLUCOSE BLDC GLUCOMTR-MCNC: 65 MG/DL — LOW (ref 70–99)
GLUCOSE BLDC GLUCOMTR-MCNC: 69 MG/DL — LOW (ref 70–99)
GLUCOSE BLDC GLUCOMTR-MCNC: 70 MG/DL — SIGNIFICANT CHANGE UP (ref 70–99)
GLUCOSE BLDC GLUCOMTR-MCNC: 70 MG/DL — SIGNIFICANT CHANGE UP (ref 70–99)
GLUCOSE BLDC GLUCOMTR-MCNC: 73 MG/DL — SIGNIFICANT CHANGE UP (ref 70–99)
GLUCOSE BLDC GLUCOMTR-MCNC: 73 MG/DL — SIGNIFICANT CHANGE UP (ref 70–99)
GLUCOSE BLDC GLUCOMTR-MCNC: 74 MG/DL — SIGNIFICANT CHANGE UP (ref 70–99)
GLUCOSE BLDC GLUCOMTR-MCNC: 74 MG/DL — SIGNIFICANT CHANGE UP (ref 70–99)
GLUCOSE BLDC GLUCOMTR-MCNC: 75 MG/DL — SIGNIFICANT CHANGE UP (ref 70–99)
GLUCOSE BLDC GLUCOMTR-MCNC: 76 MG/DL — SIGNIFICANT CHANGE UP (ref 70–99)
GLUCOSE BLDC GLUCOMTR-MCNC: 81 MG/DL — SIGNIFICANT CHANGE UP (ref 70–99)
GLUCOSE BLDC GLUCOMTR-MCNC: 85 MG/DL — SIGNIFICANT CHANGE UP (ref 70–99)
GLUCOSE BLDC GLUCOMTR-MCNC: 89 MG/DL — SIGNIFICANT CHANGE UP (ref 70–99)
GLUCOSE BLDC GLUCOMTR-MCNC: 91 MG/DL — SIGNIFICANT CHANGE UP (ref 70–99)
GLUCOSE BLDC GLUCOMTR-MCNC: 92 MG/DL — SIGNIFICANT CHANGE UP (ref 70–99)
GLUCOSE BLDC GLUCOMTR-MCNC: 94 MG/DL — SIGNIFICANT CHANGE UP (ref 70–99)
GLUCOSE BLDC GLUCOMTR-MCNC: 97 MG/DL — SIGNIFICANT CHANGE UP (ref 70–99)
GLUCOSE BLDC GLUCOMTR-MCNC: 99 MG/DL — SIGNIFICANT CHANGE UP (ref 70–99)
GLUCOSE SERPL-MCNC: 112 MG/DL — HIGH (ref 70–99)
GLUCOSE SERPL-MCNC: 123 MG/DL — HIGH (ref 70–99)
GLUCOSE SERPL-MCNC: 123 MG/DL — HIGH (ref 70–99)
GLUCOSE SERPL-MCNC: 130 MG/DL — HIGH (ref 70–99)
GLUCOSE SERPL-MCNC: 144 MG/DL — HIGH (ref 70–99)
GLUCOSE SERPL-MCNC: 160 MG/DL — HIGH (ref 70–99)
GLUCOSE SERPL-MCNC: 161 MG/DL — HIGH (ref 70–99)
GLUCOSE SERPL-MCNC: 162 MG/DL — HIGH (ref 70–99)
GLUCOSE SERPL-MCNC: 164 MG/DL — HIGH (ref 70–99)
GLUCOSE SERPL-MCNC: 166 MG/DL — HIGH (ref 70–99)
GLUCOSE SERPL-MCNC: 182 MG/DL — HIGH (ref 70–99)
GLUCOSE SERPL-MCNC: 182 MG/DL — HIGH (ref 70–99)
GLUCOSE SERPL-MCNC: 183 MG/DL — HIGH (ref 70–99)
GLUCOSE SERPL-MCNC: 189 MG/DL — HIGH (ref 70–99)
GLUCOSE SERPL-MCNC: 191 MG/DL — HIGH (ref 70–99)
GLUCOSE SERPL-MCNC: 201 MG/DL — HIGH (ref 70–99)
GLUCOSE SERPL-MCNC: 264 MG/DL — HIGH (ref 70–99)
GLUCOSE SERPL-MCNC: 322 MG/DL — HIGH (ref 70–99)
GLUCOSE SERPL-MCNC: 35 MG/DL — CRITICAL LOW (ref 70–99)
GLUCOSE SERPL-MCNC: 373 MG/DL — HIGH (ref 70–99)
GLUCOSE SERPL-MCNC: 401 MG/DL — HIGH (ref 70–99)
GLUCOSE SERPL-MCNC: 42 MG/DL — CRITICAL LOW (ref 70–99)
GLUCOSE SERPL-MCNC: 42 MG/DL — CRITICAL LOW (ref 70–99)
GLUCOSE SERPL-MCNC: 56 MG/DL — LOW (ref 70–99)
GLUCOSE SERPL-MCNC: 60 MG/DL — LOW (ref 70–99)
GLUCOSE SERPL-MCNC: 60 MG/DL — LOW (ref 70–99)
GLUCOSE SERPL-MCNC: 70 MG/DL — SIGNIFICANT CHANGE UP (ref 70–99)
GLUCOSE SERPL-MCNC: 75 MG/DL — SIGNIFICANT CHANGE UP (ref 70–99)
GLUCOSE SERPL-MCNC: 79 MG/DL — SIGNIFICANT CHANGE UP (ref 70–99)
GP B STREP DNA BLD POS QL NAA+NON-PROBE: SIGNIFICANT CHANGE UP
GP B STREP DNA BLD POS QL NAA+NON-PROBE: SIGNIFICANT CHANGE UP
GRAM STN FLD: SIGNIFICANT CHANGE UP
HAEM INFLU DNA BLD POS QL NAA+NON-PROBE: SIGNIFICANT CHANGE UP
HCO3 BLDA-SCNC: 22 MMOL/L — LOW (ref 23–27)
HCO3 BLDV-SCNC: 22 MMOL/L — SIGNIFICANT CHANGE UP (ref 22–29)
HCT VFR BLD CALC: 20.1 % — LOW (ref 42–52)
HCT VFR BLD CALC: 21 % — LOW (ref 42–52)
HCT VFR BLD CALC: 21.1 % — LOW (ref 42–52)
HCT VFR BLD CALC: 21.3 % — LOW (ref 42–52)
HCT VFR BLD CALC: 21.7 % — LOW (ref 42–52)
HCT VFR BLD CALC: 22.1 % — LOW (ref 42–52)
HCT VFR BLD CALC: 22.1 % — LOW (ref 42–52)
HCT VFR BLD CALC: 23 % — LOW (ref 42–52)
HCT VFR BLD CALC: 23.4 % — LOW (ref 42–52)
HCT VFR BLD CALC: 23.5 % — LOW (ref 42–52)
HCT VFR BLD CALC: 23.6 % — LOW (ref 42–52)
HCT VFR BLD CALC: 23.6 % — LOW (ref 42–52)
HCT VFR BLD CALC: 23.8 % — LOW (ref 42–52)
HCT VFR BLD CALC: 23.9 % — LOW (ref 42–52)
HCT VFR BLD CALC: 24.1 % — LOW (ref 42–52)
HCT VFR BLD CALC: 24.1 % — LOW (ref 42–52)
HCT VFR BLD CALC: 24.4 % — LOW (ref 42–52)
HCT VFR BLD CALC: 24.5 % — LOW (ref 42–52)
HCT VFR BLD CALC: 25.1 % — LOW (ref 42–52)
HCT VFR BLD CALC: 26 % — LOW (ref 42–52)
HCT VFR BLD CALC: 26.2 % — LOW (ref 42–52)
HCT VFR BLD CALC: 26.5 % — LOW (ref 42–52)
HCT VFR BLD CALC: 26.6 % — LOW (ref 42–52)
HCT VFR BLD CALC: 26.7 % — LOW (ref 42–52)
HCT VFR BLD CALC: 27 % — LOW (ref 42–52)
HCT VFR BLD CALC: 27.2 % — LOW (ref 42–52)
HCT VFR BLD CALC: 29.8 % — LOW (ref 42–52)
HCT VFR BLDA CALC: 26.5 % — LOW (ref 34–44)
HDLC SERPL-MCNC: 17 MG/DL — LOW
HGB BLD CALC-MCNC: 8.6 G/DL — LOW (ref 14–18)
HGB BLD-MCNC: 6.6 G/DL — CRITICAL LOW (ref 14–18)
HGB BLD-MCNC: 7.3 G/DL — LOW (ref 14–18)
HGB BLD-MCNC: 7.5 G/DL — LOW (ref 14–18)
HGB BLD-MCNC: 7.6 G/DL — LOW (ref 14–18)
HGB BLD-MCNC: 7.6 G/DL — LOW (ref 14–18)
HGB BLD-MCNC: 7.7 G/DL — LOW (ref 14–18)
HGB BLD-MCNC: 7.9 G/DL — LOW (ref 14–18)
HGB BLD-MCNC: 8 G/DL — LOW (ref 14–18)
HGB BLD-MCNC: 8.1 G/DL — LOW (ref 14–18)
HGB BLD-MCNC: 8.1 G/DL — LOW (ref 14–18)
HGB BLD-MCNC: 8.3 G/DL — LOW (ref 14–18)
HGB BLD-MCNC: 8.4 G/DL — LOW (ref 14–18)
HGB BLD-MCNC: 8.5 G/DL — LOW (ref 14–18)
HGB BLD-MCNC: 8.5 G/DL — LOW (ref 14–18)
HGB BLD-MCNC: 8.7 G/DL — LOW (ref 14–18)
HGB BLD-MCNC: 8.8 G/DL — LOW (ref 14–18)
HGB BLD-MCNC: 8.9 G/DL — LOW (ref 14–18)
HGB BLD-MCNC: 9 G/DL — LOW (ref 14–18)
HGB BLD-MCNC: 9.1 G/DL — LOW (ref 14–18)
HGB BLD-MCNC: 9.1 G/DL — LOW (ref 14–18)
HGB BLD-MCNC: 9.4 G/DL — LOW (ref 14–18)
HGB BLD-MCNC: 9.9 G/DL — LOW (ref 14–18)
HYPOCHROMIA BLD QL: SLIGHT — SIGNIFICANT CHANGE UP
IMM GRANULOCYTES NFR BLD AUTO: 0.3 % — SIGNIFICANT CHANGE UP (ref 0.1–0.3)
IMM GRANULOCYTES NFR BLD AUTO: 0.4 % — HIGH (ref 0.1–0.3)
IMM GRANULOCYTES NFR BLD AUTO: 0.4 % — HIGH (ref 0.1–0.3)
IMM GRANULOCYTES NFR BLD AUTO: 0.5 % — HIGH (ref 0.1–0.3)
IMM GRANULOCYTES NFR BLD AUTO: 0.5 % — HIGH (ref 0.1–0.3)
IMM GRANULOCYTES NFR BLD AUTO: 0.6 % — HIGH (ref 0.1–0.3)
IMM GRANULOCYTES NFR BLD AUTO: 0.6 % — HIGH (ref 0.1–0.3)
IMM GRANULOCYTES NFR BLD AUTO: 0.7 % — HIGH (ref 0.1–0.3)
IMM GRANULOCYTES NFR BLD AUTO: 0.8 % — HIGH (ref 0.1–0.3)
IMM GRANULOCYTES NFR BLD AUTO: 0.8 % — HIGH (ref 0.1–0.3)
IMM GRANULOCYTES NFR BLD AUTO: 0.9 % — HIGH (ref 0.1–0.3)
IMM GRANULOCYTES NFR BLD AUTO: 1 % — HIGH (ref 0.1–0.3)
IMM GRANULOCYTES NFR BLD AUTO: 1 % — HIGH (ref 0.1–0.3)
IMM GRANULOCYTES NFR BLD AUTO: 1.6 % — HIGH (ref 0.1–0.3)
IMM GRANULOCYTES NFR BLD AUTO: 1.7 % — HIGH (ref 0.1–0.3)
IMM GRANULOCYTES NFR BLD AUTO: 1.8 % — HIGH (ref 0.1–0.3)
IMM GRANULOCYTES NFR BLD AUTO: 2 % — HIGH (ref 0.1–0.3)
IMM GRANULOCYTES NFR BLD AUTO: 2.2 % — HIGH (ref 0.1–0.3)
IMM GRANULOCYTES NFR BLD AUTO: 2.3 % — HIGH (ref 0.1–0.3)
INR BLD: 1.21 RATIO — SIGNIFICANT CHANGE UP (ref 0.65–1.3)
INR BLD: 1.24 RATIO — SIGNIFICANT CHANGE UP (ref 0.65–1.3)
INR BLD: 1.31 RATIO — HIGH (ref 0.65–1.3)
INR BLD: 1.37 RATIO — HIGH (ref 0.65–1.3)
INR BLD: 1.4 RATIO — HIGH (ref 0.65–1.3)
INR BLD: 1.42 RATIO — HIGH (ref 0.65–1.3)
INR BLD: 1.49 RATIO — HIGH (ref 0.65–1.3)
INR BLD: 1.54 RATIO — HIGH (ref 0.65–1.3)
IRON SATN MFR SERPL: 26 % — SIGNIFICANT CHANGE UP (ref 15–50)
IRON SATN MFR SERPL: 38 UG/DL — SIGNIFICANT CHANGE UP (ref 35–150)
K OXYTOCA DNA BLD POS QL NAA+NON-PROBE: SIGNIFICANT CHANGE UP
L MONOCYTOG DNA BLD POS QL NAA+NON-PROBE: SIGNIFICANT CHANGE UP
LACTATE BLDV-MCNC: 2.5 MMOL/L — HIGH (ref 0.5–1.6)
LACTATE SERPL-SCNC: 1.7 MMOL/L — SIGNIFICANT CHANGE UP (ref 0.7–2)
LACTATE SERPL-SCNC: 2.1 MMOL/L — HIGH (ref 0.7–2)
LACTATE SERPL-SCNC: 2.4 MMOL/L — HIGH (ref 0.7–2)
LACTATE SERPL-SCNC: 8.5 MMOL/L — CRITICAL HIGH (ref 0.7–2)
LIPID PNL WITH DIRECT LDL SERPL: 14 MG/DL — SIGNIFICANT CHANGE UP (ref 4–129)
LYMPHOCYTES # BLD AUTO: 0 % — LOW (ref 20.5–51.1)
LYMPHOCYTES # BLD AUTO: 0 K/UL — LOW (ref 1.2–3.4)
LYMPHOCYTES # BLD AUTO: 0.44 K/UL — LOW (ref 1.2–3.4)
LYMPHOCYTES # BLD AUTO: 0.53 K/UL — LOW (ref 1.2–3.4)
LYMPHOCYTES # BLD AUTO: 0.59 K/UL — LOW (ref 1.2–3.4)
LYMPHOCYTES # BLD AUTO: 0.7 K/UL — LOW (ref 1.2–3.4)
LYMPHOCYTES # BLD AUTO: 0.74 K/UL — LOW (ref 1.2–3.4)
LYMPHOCYTES # BLD AUTO: 0.75 K/UL — LOW (ref 1.2–3.4)
LYMPHOCYTES # BLD AUTO: 0.78 K/UL — LOW (ref 1.2–3.4)
LYMPHOCYTES # BLD AUTO: 0.84 K/UL — LOW (ref 1.2–3.4)
LYMPHOCYTES # BLD AUTO: 0.89 K/UL — LOW (ref 1.2–3.4)
LYMPHOCYTES # BLD AUTO: 0.96 K/UL — LOW (ref 1.2–3.4)
LYMPHOCYTES # BLD AUTO: 0.99 K/UL — LOW (ref 1.2–3.4)
LYMPHOCYTES # BLD AUTO: 0.99 K/UL — LOW (ref 1.2–3.4)
LYMPHOCYTES # BLD AUTO: 1.01 K/UL — LOW (ref 1.2–3.4)
LYMPHOCYTES # BLD AUTO: 1.08 K/UL — LOW (ref 1.2–3.4)
LYMPHOCYTES # BLD AUTO: 1.12 K/UL — LOW (ref 1.2–3.4)
LYMPHOCYTES # BLD AUTO: 1.28 K/UL — SIGNIFICANT CHANGE UP (ref 1.2–3.4)
LYMPHOCYTES # BLD AUTO: 1.64 K/UL — SIGNIFICANT CHANGE UP (ref 1.2–3.4)
LYMPHOCYTES # BLD AUTO: 1.7 % — LOW (ref 20.5–51.1)
LYMPHOCYTES # BLD AUTO: 10 % — LOW (ref 20.5–51.1)
LYMPHOCYTES # BLD AUTO: 10.6 % — LOW (ref 20.5–51.1)
LYMPHOCYTES # BLD AUTO: 11.9 % — LOW (ref 20.5–51.1)
LYMPHOCYTES # BLD AUTO: 2.28 K/UL — SIGNIFICANT CHANGE UP (ref 1.2–3.4)
LYMPHOCYTES # BLD AUTO: 2.57 K/UL — SIGNIFICANT CHANGE UP (ref 1.2–3.4)
LYMPHOCYTES # BLD AUTO: 2.7 % — LOW (ref 20.5–51.1)
LYMPHOCYTES # BLD AUTO: 3.4 % — LOW (ref 20.5–51.1)
LYMPHOCYTES # BLD AUTO: 3.5 % — LOW (ref 20.5–51.1)
LYMPHOCYTES # BLD AUTO: 3.7 % — LOW (ref 20.5–51.1)
LYMPHOCYTES # BLD AUTO: 31.4 % — SIGNIFICANT CHANGE UP (ref 20.5–51.1)
LYMPHOCYTES # BLD AUTO: 4.2 % — LOW (ref 20.5–51.1)
LYMPHOCYTES # BLD AUTO: 4.49 K/UL — HIGH (ref 1.2–3.4)
LYMPHOCYTES # BLD AUTO: 4.5 % — LOW (ref 20.5–51.1)
LYMPHOCYTES # BLD AUTO: 4.5 % — LOW (ref 20.5–51.1)
LYMPHOCYTES # BLD AUTO: 4.6 % — LOW (ref 20.5–51.1)
LYMPHOCYTES # BLD AUTO: 4.8 % — LOW (ref 20.5–51.1)
LYMPHOCYTES # BLD AUTO: 5.3 % — LOW (ref 20.5–51.1)
LYMPHOCYTES # BLD AUTO: 5.3 % — LOW (ref 20.5–51.1)
LYMPHOCYTES # BLD AUTO: 5.8 % — LOW (ref 20.5–51.1)
LYMPHOCYTES # BLD AUTO: 6.1 % — LOW (ref 20.5–51.1)
LYMPHOCYTES # BLD AUTO: 6.5 % — LOW (ref 20.5–51.1)
LYMPHOCYTES # BLD AUTO: 7.1 % — LOW (ref 20.5–51.1)
LYMPHOCYTES # BLD AUTO: 7.2 % — LOW (ref 20.5–51.1)
LYMPHOCYTES # BLD AUTO: 7.7 % — LOW (ref 20.5–51.1)
MACROCYTES BLD QL: SIGNIFICANT CHANGE UP
MACROCYTES BLD QL: SLIGHT — SIGNIFICANT CHANGE UP
MAGNESIUM SERPL-MCNC: 2.3 MG/DL — SIGNIFICANT CHANGE UP (ref 1.8–2.4)
MAGNESIUM SERPL-MCNC: 2.3 MG/DL — SIGNIFICANT CHANGE UP (ref 1.8–2.4)
MAGNESIUM SERPL-MCNC: 2.4 MG/DL — SIGNIFICANT CHANGE UP (ref 1.8–2.4)
MAGNESIUM SERPL-MCNC: 2.5 MG/DL — HIGH (ref 1.8–2.4)
MAGNESIUM SERPL-MCNC: 2.6 MG/DL — HIGH (ref 1.8–2.4)
MANUAL SMEAR VERIFICATION: SIGNIFICANT CHANGE UP
MANUAL SMEAR VERIFICATION: SIGNIFICANT CHANGE UP
MCHC RBC-ENTMCNC: 29.2 PG — SIGNIFICANT CHANGE UP (ref 27–31)
MCHC RBC-ENTMCNC: 29.6 PG — SIGNIFICANT CHANGE UP (ref 27–31)
MCHC RBC-ENTMCNC: 29.7 PG — SIGNIFICANT CHANGE UP (ref 27–31)
MCHC RBC-ENTMCNC: 29.8 PG — SIGNIFICANT CHANGE UP (ref 27–31)
MCHC RBC-ENTMCNC: 29.9 PG — SIGNIFICANT CHANGE UP (ref 27–31)
MCHC RBC-ENTMCNC: 30 PG — SIGNIFICANT CHANGE UP (ref 27–31)
MCHC RBC-ENTMCNC: 30.2 PG — SIGNIFICANT CHANGE UP (ref 27–31)
MCHC RBC-ENTMCNC: 30.2 PG — SIGNIFICANT CHANGE UP (ref 27–31)
MCHC RBC-ENTMCNC: 30.3 PG — SIGNIFICANT CHANGE UP (ref 27–31)
MCHC RBC-ENTMCNC: 30.4 PG — SIGNIFICANT CHANGE UP (ref 27–31)
MCHC RBC-ENTMCNC: 30.6 PG — SIGNIFICANT CHANGE UP (ref 27–31)
MCHC RBC-ENTMCNC: 30.7 PG — SIGNIFICANT CHANGE UP (ref 27–31)
MCHC RBC-ENTMCNC: 30.7 PG — SIGNIFICANT CHANGE UP (ref 27–31)
MCHC RBC-ENTMCNC: 30.9 PG — SIGNIFICANT CHANGE UP (ref 27–31)
MCHC RBC-ENTMCNC: 31 PG — SIGNIFICANT CHANGE UP (ref 27–31)
MCHC RBC-ENTMCNC: 31 PG — SIGNIFICANT CHANGE UP (ref 27–31)
MCHC RBC-ENTMCNC: 31.1 PG — HIGH (ref 27–31)
MCHC RBC-ENTMCNC: 31.1 PG — HIGH (ref 27–31)
MCHC RBC-ENTMCNC: 31.3 G/DL — LOW (ref 32–37)
MCHC RBC-ENTMCNC: 31.3 PG — HIGH (ref 27–31)
MCHC RBC-ENTMCNC: 31.4 PG — HIGH (ref 27–31)
MCHC RBC-ENTMCNC: 31.5 PG — HIGH (ref 27–31)
MCHC RBC-ENTMCNC: 32.4 G/DL — SIGNIFICANT CHANGE UP (ref 32–37)
MCHC RBC-ENTMCNC: 32.8 G/DL — SIGNIFICANT CHANGE UP (ref 32–37)
MCHC RBC-ENTMCNC: 33 G/DL — SIGNIFICANT CHANGE UP (ref 32–37)
MCHC RBC-ENTMCNC: 33.1 G/DL — SIGNIFICANT CHANGE UP (ref 32–37)
MCHC RBC-ENTMCNC: 33.1 G/DL — SIGNIFICANT CHANGE UP (ref 32–37)
MCHC RBC-ENTMCNC: 33.2 G/DL — SIGNIFICANT CHANGE UP (ref 32–37)
MCHC RBC-ENTMCNC: 33.5 G/DL — SIGNIFICANT CHANGE UP (ref 32–37)
MCHC RBC-ENTMCNC: 33.5 G/DL — SIGNIFICANT CHANGE UP (ref 32–37)
MCHC RBC-ENTMCNC: 33.7 G/DL — SIGNIFICANT CHANGE UP (ref 32–37)
MCHC RBC-ENTMCNC: 33.9 G/DL — SIGNIFICANT CHANGE UP (ref 32–37)
MCHC RBC-ENTMCNC: 34 G/DL — SIGNIFICANT CHANGE UP (ref 32–37)
MCHC RBC-ENTMCNC: 34.2 G/DL — SIGNIFICANT CHANGE UP (ref 32–37)
MCHC RBC-ENTMCNC: 34.3 G/DL — SIGNIFICANT CHANGE UP (ref 32–37)
MCHC RBC-ENTMCNC: 34.3 G/DL — SIGNIFICANT CHANGE UP (ref 32–37)
MCHC RBC-ENTMCNC: 34.4 G/DL — SIGNIFICANT CHANGE UP (ref 32–37)
MCHC RBC-ENTMCNC: 34.6 G/DL — SIGNIFICANT CHANGE UP (ref 32–37)
MCHC RBC-ENTMCNC: 34.6 G/DL — SIGNIFICANT CHANGE UP (ref 32–37)
MCHC RBC-ENTMCNC: 34.7 G/DL — SIGNIFICANT CHANGE UP (ref 32–37)
MCHC RBC-ENTMCNC: 34.7 G/DL — SIGNIFICANT CHANGE UP (ref 32–37)
MCHC RBC-ENTMCNC: 34.8 G/DL — SIGNIFICANT CHANGE UP (ref 32–37)
MCHC RBC-ENTMCNC: 35 G/DL — SIGNIFICANT CHANGE UP (ref 32–37)
MCHC RBC-ENTMCNC: 35.3 G/DL — SIGNIFICANT CHANGE UP (ref 32–37)
MCHC RBC-ENTMCNC: 35.3 G/DL — SIGNIFICANT CHANGE UP (ref 32–37)
MCHC RBC-ENTMCNC: 35.5 G/DL — SIGNIFICANT CHANGE UP (ref 32–37)
MCV RBC AUTO: 86.2 FL — SIGNIFICANT CHANGE UP (ref 80–94)
MCV RBC AUTO: 87 FL — SIGNIFICANT CHANGE UP (ref 80–94)
MCV RBC AUTO: 87.6 FL — SIGNIFICANT CHANGE UP (ref 80–94)
MCV RBC AUTO: 87.7 FL — SIGNIFICANT CHANGE UP (ref 80–94)
MCV RBC AUTO: 87.7 FL — SIGNIFICANT CHANGE UP (ref 80–94)
MCV RBC AUTO: 87.8 FL — SIGNIFICANT CHANGE UP (ref 80–94)
MCV RBC AUTO: 88 FL — SIGNIFICANT CHANGE UP (ref 80–94)
MCV RBC AUTO: 88.3 FL — SIGNIFICANT CHANGE UP (ref 80–94)
MCV RBC AUTO: 88.6 FL — SIGNIFICANT CHANGE UP (ref 80–94)
MCV RBC AUTO: 88.9 FL — SIGNIFICANT CHANGE UP (ref 80–94)
MCV RBC AUTO: 89.4 FL — SIGNIFICANT CHANGE UP (ref 80–94)
MCV RBC AUTO: 89.5 FL — SIGNIFICANT CHANGE UP (ref 80–94)
MCV RBC AUTO: 89.6 FL — SIGNIFICANT CHANGE UP (ref 80–94)
MCV RBC AUTO: 89.6 FL — SIGNIFICANT CHANGE UP (ref 80–94)
MCV RBC AUTO: 90.1 FL — SIGNIFICANT CHANGE UP (ref 80–94)
MCV RBC AUTO: 90.1 FL — SIGNIFICANT CHANGE UP (ref 80–94)
MCV RBC AUTO: 90.3 FL — SIGNIFICANT CHANGE UP (ref 80–94)
MCV RBC AUTO: 90.6 FL — SIGNIFICANT CHANGE UP (ref 80–94)
MCV RBC AUTO: 90.9 FL — SIGNIFICANT CHANGE UP (ref 80–94)
MCV RBC AUTO: 92.1 FL — SIGNIFICANT CHANGE UP (ref 80–94)
MCV RBC AUTO: 92.3 FL — SIGNIFICANT CHANGE UP (ref 80–94)
MCV RBC AUTO: 92.7 FL — SIGNIFICANT CHANGE UP (ref 80–94)
MCV RBC AUTO: 93.5 FL — SIGNIFICANT CHANGE UP (ref 80–94)
MCV RBC AUTO: 94.1 FL — HIGH (ref 80–94)
MCV RBC AUTO: 95.1 FL — HIGH (ref 80–94)
METHOD TYPE: SIGNIFICANT CHANGE UP
MONOCYTES # BLD AUTO: 0.16 K/UL — SIGNIFICANT CHANGE UP (ref 0.1–0.6)
MONOCYTES # BLD AUTO: 0.43 K/UL — SIGNIFICANT CHANGE UP (ref 0.1–0.6)
MONOCYTES # BLD AUTO: 0.57 K/UL — SIGNIFICANT CHANGE UP (ref 0.1–0.6)
MONOCYTES # BLD AUTO: 0.58 K/UL — SIGNIFICANT CHANGE UP (ref 0.1–0.6)
MONOCYTES # BLD AUTO: 0.69 K/UL — HIGH (ref 0.1–0.6)
MONOCYTES # BLD AUTO: 0.75 K/UL — HIGH (ref 0.1–0.6)
MONOCYTES # BLD AUTO: 0.76 K/UL — HIGH (ref 0.1–0.6)
MONOCYTES # BLD AUTO: 0.85 K/UL — HIGH (ref 0.1–0.6)
MONOCYTES # BLD AUTO: 0.9 K/UL — HIGH (ref 0.1–0.6)
MONOCYTES # BLD AUTO: 0.9 K/UL — HIGH (ref 0.1–0.6)
MONOCYTES # BLD AUTO: 0.95 K/UL — HIGH (ref 0.1–0.6)
MONOCYTES # BLD AUTO: 1.06 K/UL — HIGH (ref 0.1–0.6)
MONOCYTES # BLD AUTO: 1.08 K/UL — HIGH (ref 0.1–0.6)
MONOCYTES # BLD AUTO: 1.13 K/UL — HIGH (ref 0.1–0.6)
MONOCYTES # BLD AUTO: 1.18 K/UL — HIGH (ref 0.1–0.6)
MONOCYTES # BLD AUTO: 1.26 K/UL — HIGH (ref 0.1–0.6)
MONOCYTES # BLD AUTO: 1.38 K/UL — HIGH (ref 0.1–0.6)
MONOCYTES # BLD AUTO: 1.41 K/UL — HIGH (ref 0.1–0.6)
MONOCYTES # BLD AUTO: 1.56 K/UL — HIGH (ref 0.1–0.6)
MONOCYTES # BLD AUTO: 1.83 K/UL — HIGH (ref 0.1–0.6)
MONOCYTES # BLD AUTO: 1.86 K/UL — HIGH (ref 0.1–0.6)
MONOCYTES # BLD AUTO: 1.87 K/UL — HIGH (ref 0.1–0.6)
MONOCYTES # BLD AUTO: 1.94 K/UL — HIGH (ref 0.1–0.6)
MONOCYTES # BLD AUTO: 2.01 K/UL — HIGH (ref 0.1–0.6)
MONOCYTES # BLD AUTO: 2.31 K/UL — HIGH (ref 0.1–0.6)
MONOCYTES NFR BLD AUTO: 1.7 % — SIGNIFICANT CHANGE UP (ref 1.7–9.3)
MONOCYTES NFR BLD AUTO: 1.9 % — SIGNIFICANT CHANGE UP (ref 1.7–9.3)
MONOCYTES NFR BLD AUTO: 10.1 % — HIGH (ref 1.7–9.3)
MONOCYTES NFR BLD AUTO: 11.6 % — HIGH (ref 1.7–9.3)
MONOCYTES NFR BLD AUTO: 12.9 % — HIGH (ref 1.7–9.3)
MONOCYTES NFR BLD AUTO: 3.4 % — SIGNIFICANT CHANGE UP (ref 1.7–9.3)
MONOCYTES NFR BLD AUTO: 3.8 % — SIGNIFICANT CHANGE UP (ref 1.7–9.3)
MONOCYTES NFR BLD AUTO: 4.5 % — SIGNIFICANT CHANGE UP (ref 1.7–9.3)
MONOCYTES NFR BLD AUTO: 4.7 % — SIGNIFICANT CHANGE UP (ref 1.7–9.3)
MONOCYTES NFR BLD AUTO: 4.9 % — SIGNIFICANT CHANGE UP (ref 1.7–9.3)
MONOCYTES NFR BLD AUTO: 5 % — SIGNIFICANT CHANGE UP (ref 1.7–9.3)
MONOCYTES NFR BLD AUTO: 5.1 % — SIGNIFICANT CHANGE UP (ref 1.7–9.3)
MONOCYTES NFR BLD AUTO: 5.6 % — SIGNIFICANT CHANGE UP (ref 1.7–9.3)
MONOCYTES NFR BLD AUTO: 5.9 % — SIGNIFICANT CHANGE UP (ref 1.7–9.3)
MONOCYTES NFR BLD AUTO: 6.1 % — SIGNIFICANT CHANGE UP (ref 1.7–9.3)
MONOCYTES NFR BLD AUTO: 6.3 % — SIGNIFICANT CHANGE UP (ref 1.7–9.3)
MONOCYTES NFR BLD AUTO: 6.4 % — SIGNIFICANT CHANGE UP (ref 1.7–9.3)
MONOCYTES NFR BLD AUTO: 6.9 % — SIGNIFICANT CHANGE UP (ref 1.7–9.3)
MONOCYTES NFR BLD AUTO: 7.4 % — SIGNIFICANT CHANGE UP (ref 1.7–9.3)
MONOCYTES NFR BLD AUTO: 8 % — SIGNIFICANT CHANGE UP (ref 1.7–9.3)
MONOCYTES NFR BLD AUTO: 8.5 % — SIGNIFICANT CHANGE UP (ref 1.7–9.3)
MONOCYTES NFR BLD AUTO: 8.6 % — SIGNIFICANT CHANGE UP (ref 1.7–9.3)
MONOCYTES NFR BLD AUTO: 9.1 % — SIGNIFICANT CHANGE UP (ref 1.7–9.3)
MONOCYTES NFR BLD AUTO: 9.4 % — HIGH (ref 1.7–9.3)
MONOCYTES NFR BLD AUTO: 9.5 % — HIGH (ref 1.7–9.3)
MRSA PCR RESULT.: POSITIVE
MRSA SPEC QL CULT: SIGNIFICANT CHANGE UP
MSSA DNA SPEC QL NAA+PROBE: SIGNIFICANT CHANGE UP
N MEN ISLT CULT: SIGNIFICANT CHANGE UP
NEUTROPHILS # BLD AUTO: 10.63 K/UL — HIGH (ref 1.4–6.5)
NEUTROPHILS # BLD AUTO: 13.16 K/UL — HIGH (ref 1.4–6.5)
NEUTROPHILS # BLD AUTO: 13.35 K/UL — HIGH (ref 1.4–6.5)
NEUTROPHILS # BLD AUTO: 13.61 K/UL — HIGH (ref 1.4–6.5)
NEUTROPHILS # BLD AUTO: 13.78 K/UL — HIGH (ref 1.4–6.5)
NEUTROPHILS # BLD AUTO: 14.07 K/UL — HIGH (ref 1.4–6.5)
NEUTROPHILS # BLD AUTO: 14.55 K/UL — HIGH (ref 1.4–6.5)
NEUTROPHILS # BLD AUTO: 15.28 K/UL — HIGH (ref 1.4–6.5)
NEUTROPHILS # BLD AUTO: 15.59 K/UL — HIGH (ref 1.4–6.5)
NEUTROPHILS # BLD AUTO: 16.72 K/UL — HIGH (ref 1.4–6.5)
NEUTROPHILS # BLD AUTO: 17.04 K/UL — HIGH (ref 1.4–6.5)
NEUTROPHILS # BLD AUTO: 17.17 K/UL — HIGH (ref 1.4–6.5)
NEUTROPHILS # BLD AUTO: 17.31 K/UL — HIGH (ref 1.4–6.5)
NEUTROPHILS # BLD AUTO: 17.46 K/UL — HIGH (ref 1.4–6.5)
NEUTROPHILS # BLD AUTO: 17.89 K/UL — HIGH (ref 1.4–6.5)
NEUTROPHILS # BLD AUTO: 18.6 K/UL — HIGH (ref 1.4–6.5)
NEUTROPHILS # BLD AUTO: 18.7 K/UL — HIGH (ref 1.4–6.5)
NEUTROPHILS # BLD AUTO: 19.98 K/UL — HIGH (ref 1.4–6.5)
NEUTROPHILS # BLD AUTO: 23.38 K/UL — HIGH (ref 1.4–6.5)
NEUTROPHILS # BLD AUTO: 25.11 K/UL — HIGH (ref 1.4–6.5)
NEUTROPHILS # BLD AUTO: 7.35 K/UL — HIGH (ref 1.4–6.5)
NEUTROPHILS # BLD AUTO: 7.7 K/UL — HIGH (ref 1.4–6.5)
NEUTROPHILS # BLD AUTO: 8.63 K/UL — HIGH (ref 1.4–6.5)
NEUTROPHILS # BLD AUTO: 9.13 K/UL — HIGH (ref 1.4–6.5)
NEUTROPHILS # BLD AUTO: 9.36 K/UL — HIGH (ref 1.4–6.5)
NEUTROPHILS NFR BLD AUTO: 60.3 % — SIGNIFICANT CHANGE UP (ref 42.2–75.2)
NEUTROPHILS NFR BLD AUTO: 71.8 % — SIGNIFICANT CHANGE UP (ref 42.2–75.2)
NEUTROPHILS NFR BLD AUTO: 76.1 % — HIGH (ref 42.2–75.2)
NEUTROPHILS NFR BLD AUTO: 77.1 % — HIGH (ref 42.2–75.2)
NEUTROPHILS NFR BLD AUTO: 80.7 % — HIGH (ref 42.2–75.2)
NEUTROPHILS NFR BLD AUTO: 80.8 % — HIGH (ref 42.2–75.2)
NEUTROPHILS NFR BLD AUTO: 81.3 % — HIGH (ref 42.2–75.2)
NEUTROPHILS NFR BLD AUTO: 82.5 % — HIGH (ref 42.2–75.2)
NEUTROPHILS NFR BLD AUTO: 82.8 % — HIGH (ref 42.2–75.2)
NEUTROPHILS NFR BLD AUTO: 84.3 % — HIGH (ref 42.2–75.2)
NEUTROPHILS NFR BLD AUTO: 84.7 % — HIGH (ref 42.2–75.2)
NEUTROPHILS NFR BLD AUTO: 85.6 % — HIGH (ref 42.2–75.2)
NEUTROPHILS NFR BLD AUTO: 85.6 % — HIGH (ref 42.2–75.2)
NEUTROPHILS NFR BLD AUTO: 86.3 % — HIGH (ref 42.2–75.2)
NEUTROPHILS NFR BLD AUTO: 86.7 % — HIGH (ref 42.2–75.2)
NEUTROPHILS NFR BLD AUTO: 86.9 % — HIGH (ref 42.2–75.2)
NEUTROPHILS NFR BLD AUTO: 87.5 % — HIGH (ref 42.2–75.2)
NEUTROPHILS NFR BLD AUTO: 87.9 % — HIGH (ref 42.2–75.2)
NEUTROPHILS NFR BLD AUTO: 88.3 % — HIGH (ref 42.2–75.2)
NEUTROPHILS NFR BLD AUTO: 88.5 % — HIGH (ref 42.2–75.2)
NEUTROPHILS NFR BLD AUTO: 90.2 % — HIGH (ref 42.2–75.2)
NEUTROPHILS NFR BLD AUTO: 91.3 % — HIGH (ref 42.2–75.2)
NEUTROPHILS NFR BLD AUTO: 91.3 % — HIGH (ref 42.2–75.2)
NEUTROPHILS NFR BLD AUTO: 91.7 % — HIGH (ref 42.2–75.2)
NEUTROPHILS NFR BLD AUTO: 98.3 % — HIGH (ref 42.2–75.2)
NRBC # BLD: 0 /100 WBCS — SIGNIFICANT CHANGE UP (ref 0–0)
NT-PROBNP SERPL-SCNC: HIGH PG/ML (ref 0–300)
ORGANISM # SPEC MICROSCOPIC CNT: SIGNIFICANT CHANGE UP
OSMOLALITY SERPL: 296 MOSMOL/KG — SIGNIFICANT CHANGE UP (ref 280–301)
OSMOLALITY SERPL: 308 MOSMOL/KG — HIGH (ref 280–301)
OSMOLALITY UR: 294 MOS/KG — SIGNIFICANT CHANGE UP (ref 50–1400)
P AERUGINOSA DNA BLD POS NAA+NON-PROBE: SIGNIFICANT CHANGE UP
PCO2 BLDA: 29 MMHG — LOW (ref 38–42)
PCO2 BLDV: 35 MMHG — LOW (ref 41–51)
PH BLDA: 7.49 — HIGH (ref 7.38–7.42)
PH BLDV: 7.4 — SIGNIFICANT CHANGE UP (ref 7.26–7.43)
PHOSPHATE SERPL-MCNC: 3.2 MG/DL — SIGNIFICANT CHANGE UP (ref 2.1–4.9)
PHOSPHATE SERPL-MCNC: 4.2 MG/DL — SIGNIFICANT CHANGE UP (ref 2.1–4.9)
PHOSPHATE SERPL-MCNC: 4.3 MG/DL — SIGNIFICANT CHANGE UP (ref 2.1–4.9)
PHOSPHATE SERPL-MCNC: 4.3 MG/DL — SIGNIFICANT CHANGE UP (ref 2.1–4.9)
PHOSPHATE SERPL-MCNC: 5 MG/DL — HIGH (ref 2.1–4.9)
PHOSPHATE SERPL-MCNC: 5.1 MG/DL — HIGH (ref 2.1–4.9)
PHOSPHATE SERPL-MCNC: 6.2 MG/DL — HIGH (ref 2.1–4.9)
PHOSPHATE SERPL-MCNC: 6.7 MG/DL — HIGH (ref 2.1–4.9)
PHOSPHATE SERPL-MCNC: 6.8 MG/DL — HIGH (ref 2.1–4.9)
PHOSPHATE SERPL-MCNC: 8.1 MG/DL — HIGH (ref 2.1–4.9)
PHOSPHATE SERPL-MCNC: 8.2 MG/DL — HIGH (ref 2.1–4.9)
PLAT MORPH BLD: NORMAL — SIGNIFICANT CHANGE UP
PLAT MORPH BLD: NORMAL — SIGNIFICANT CHANGE UP
PLATELET # BLD AUTO: 122 K/UL — LOW (ref 130–400)
PLATELET # BLD AUTO: 126 K/UL — LOW (ref 130–400)
PLATELET # BLD AUTO: 129 K/UL — LOW (ref 130–400)
PLATELET # BLD AUTO: 129 K/UL — LOW (ref 130–400)
PLATELET # BLD AUTO: 131 K/UL — SIGNIFICANT CHANGE UP (ref 130–400)
PLATELET # BLD AUTO: 134 K/UL — SIGNIFICANT CHANGE UP (ref 130–400)
PLATELET # BLD AUTO: 136 K/UL — SIGNIFICANT CHANGE UP (ref 130–400)
PLATELET # BLD AUTO: 137 K/UL — SIGNIFICANT CHANGE UP (ref 130–400)
PLATELET # BLD AUTO: 137 K/UL — SIGNIFICANT CHANGE UP (ref 130–400)
PLATELET # BLD AUTO: 140 K/UL — SIGNIFICANT CHANGE UP (ref 130–400)
PLATELET # BLD AUTO: 141 K/UL — SIGNIFICANT CHANGE UP (ref 130–400)
PLATELET # BLD AUTO: 142 K/UL — SIGNIFICANT CHANGE UP (ref 130–400)
PLATELET # BLD AUTO: 151 K/UL — SIGNIFICANT CHANGE UP (ref 130–400)
PLATELET # BLD AUTO: 152 K/UL — SIGNIFICANT CHANGE UP (ref 130–400)
PLATELET # BLD AUTO: 152 K/UL — SIGNIFICANT CHANGE UP (ref 130–400)
PLATELET # BLD AUTO: 155 K/UL — SIGNIFICANT CHANGE UP (ref 130–400)
PLATELET # BLD AUTO: 161 K/UL — SIGNIFICANT CHANGE UP (ref 130–400)
PLATELET # BLD AUTO: 161 K/UL — SIGNIFICANT CHANGE UP (ref 130–400)
PLATELET # BLD AUTO: 163 K/UL — SIGNIFICANT CHANGE UP (ref 130–400)
PLATELET # BLD AUTO: 164 K/UL — SIGNIFICANT CHANGE UP (ref 130–400)
PLATELET # BLD AUTO: 165 K/UL — SIGNIFICANT CHANGE UP (ref 130–400)
PLATELET # BLD AUTO: 169 K/UL — SIGNIFICANT CHANGE UP (ref 130–400)
PLATELET # BLD AUTO: 169 K/UL — SIGNIFICANT CHANGE UP (ref 130–400)
PLATELET # BLD AUTO: 171 K/UL — SIGNIFICANT CHANGE UP (ref 130–400)
PLATELET # BLD AUTO: 192 K/UL — SIGNIFICANT CHANGE UP (ref 130–400)
PLATELET # BLD AUTO: 198 K/UL — SIGNIFICANT CHANGE UP (ref 130–400)
PLATELET # BLD AUTO: 203 K/UL — SIGNIFICANT CHANGE UP (ref 130–400)
PO2 BLDA: 112 MMHG — HIGH (ref 78–95)
PO2 BLDV: 56 MMHG — HIGH (ref 20–40)
POIKILOCYTOSIS BLD QL AUTO: SLIGHT — SIGNIFICANT CHANGE UP
POLYCHROMASIA BLD QL SMEAR: SIGNIFICANT CHANGE UP
POLYCHROMASIA BLD QL SMEAR: SLIGHT — SIGNIFICANT CHANGE UP
POTASSIUM BLDV-SCNC: 4.4 MMOL/L — SIGNIFICANT CHANGE UP (ref 3.3–5.6)
POTASSIUM SERPL-MCNC: 3.9 MMOL/L — SIGNIFICANT CHANGE UP (ref 3.5–5)
POTASSIUM SERPL-MCNC: 3.9 MMOL/L — SIGNIFICANT CHANGE UP (ref 3.5–5)
POTASSIUM SERPL-MCNC: 4.4 MMOL/L — SIGNIFICANT CHANGE UP (ref 3.5–5)
POTASSIUM SERPL-MCNC: 4.4 MMOL/L — SIGNIFICANT CHANGE UP (ref 3.5–5)
POTASSIUM SERPL-MCNC: 4.5 MMOL/L — SIGNIFICANT CHANGE UP (ref 3.5–5)
POTASSIUM SERPL-MCNC: 4.5 MMOL/L — SIGNIFICANT CHANGE UP (ref 3.5–5)
POTASSIUM SERPL-MCNC: 4.6 MMOL/L — SIGNIFICANT CHANGE UP (ref 3.5–5)
POTASSIUM SERPL-MCNC: 4.6 MMOL/L — SIGNIFICANT CHANGE UP (ref 3.5–5)
POTASSIUM SERPL-MCNC: 4.8 MMOL/L — SIGNIFICANT CHANGE UP (ref 3.5–5)
POTASSIUM SERPL-MCNC: 5 MMOL/L — SIGNIFICANT CHANGE UP (ref 3.5–5)
POTASSIUM SERPL-MCNC: 5.1 MMOL/L — HIGH (ref 3.5–5)
POTASSIUM SERPL-MCNC: 5.2 MMOL/L — HIGH (ref 3.5–5)
POTASSIUM SERPL-MCNC: 5.3 MMOL/L — HIGH (ref 3.5–5)
POTASSIUM SERPL-MCNC: 5.3 MMOL/L — HIGH (ref 3.5–5)
POTASSIUM SERPL-MCNC: 5.4 MMOL/L — HIGH (ref 3.5–5)
POTASSIUM SERPL-MCNC: 5.5 MMOL/L — HIGH (ref 3.5–5)
POTASSIUM SERPL-MCNC: 5.6 MMOL/L — HIGH (ref 3.5–5)
POTASSIUM SERPL-MCNC: 5.6 MMOL/L — HIGH (ref 3.5–5)
POTASSIUM SERPL-MCNC: 5.7 MMOL/L — HIGH (ref 3.5–5)
POTASSIUM SERPL-MCNC: 5.8 MMOL/L — HIGH (ref 3.5–5)
POTASSIUM SERPL-MCNC: 5.9 MMOL/L — HIGH (ref 3.5–5)
POTASSIUM SERPL-MCNC: 6.1 MMOL/L — CRITICAL HIGH (ref 3.5–5)
POTASSIUM SERPL-MCNC: 6.3 MMOL/L — CRITICAL HIGH (ref 3.5–5)
POTASSIUM SERPL-SCNC: 3.9 MMOL/L — SIGNIFICANT CHANGE UP (ref 3.5–5)
POTASSIUM SERPL-SCNC: 3.9 MMOL/L — SIGNIFICANT CHANGE UP (ref 3.5–5)
POTASSIUM SERPL-SCNC: 4.4 MMOL/L — SIGNIFICANT CHANGE UP (ref 3.5–5)
POTASSIUM SERPL-SCNC: 4.4 MMOL/L — SIGNIFICANT CHANGE UP (ref 3.5–5)
POTASSIUM SERPL-SCNC: 4.5 MMOL/L — SIGNIFICANT CHANGE UP (ref 3.5–5)
POTASSIUM SERPL-SCNC: 4.5 MMOL/L — SIGNIFICANT CHANGE UP (ref 3.5–5)
POTASSIUM SERPL-SCNC: 4.6 MMOL/L — SIGNIFICANT CHANGE UP (ref 3.5–5)
POTASSIUM SERPL-SCNC: 4.6 MMOL/L — SIGNIFICANT CHANGE UP (ref 3.5–5)
POTASSIUM SERPL-SCNC: 4.8 MMOL/L — SIGNIFICANT CHANGE UP (ref 3.5–5)
POTASSIUM SERPL-SCNC: 5 MMOL/L — SIGNIFICANT CHANGE UP (ref 3.5–5)
POTASSIUM SERPL-SCNC: 5.1 MMOL/L — HIGH (ref 3.5–5)
POTASSIUM SERPL-SCNC: 5.2 MMOL/L — HIGH (ref 3.5–5)
POTASSIUM SERPL-SCNC: 5.3 MMOL/L — HIGH (ref 3.5–5)
POTASSIUM SERPL-SCNC: 5.3 MMOL/L — HIGH (ref 3.5–5)
POTASSIUM SERPL-SCNC: 5.4 MMOL/L — HIGH (ref 3.5–5)
POTASSIUM SERPL-SCNC: 5.5 MMOL/L — HIGH (ref 3.5–5)
POTASSIUM SERPL-SCNC: 5.6 MMOL/L — HIGH (ref 3.5–5)
POTASSIUM SERPL-SCNC: 5.6 MMOL/L — HIGH (ref 3.5–5)
POTASSIUM SERPL-SCNC: 5.7 MMOL/L — HIGH (ref 3.5–5)
POTASSIUM SERPL-SCNC: 5.8 MMOL/L — HIGH (ref 3.5–5)
POTASSIUM SERPL-SCNC: 5.9 MMOL/L — HIGH (ref 3.5–5)
POTASSIUM SERPL-SCNC: 6.1 MMOL/L — CRITICAL HIGH (ref 3.5–5)
POTASSIUM SERPL-SCNC: 6.3 MMOL/L — CRITICAL HIGH (ref 3.5–5)
PROT SERPL-MCNC: 4.4 G/DL — LOW (ref 6–8)
PROT SERPL-MCNC: 4.8 G/DL — LOW (ref 6–8)
PROT SERPL-MCNC: 4.9 G/DL — LOW (ref 6–8)
PROT SERPL-MCNC: 5.1 G/DL — LOW (ref 6–8)
PROT SERPL-MCNC: 5.2 G/DL — LOW (ref 6–8)
PROT SERPL-MCNC: 5.3 G/DL — LOW (ref 6–8)
PROT SERPL-MCNC: 5.4 G/DL — LOW (ref 6–8)
PROT SERPL-MCNC: 5.4 G/DL — LOW (ref 6–8)
PROT SERPL-MCNC: 5.5 G/DL — LOW (ref 6–8)
PROT SERPL-MCNC: 5.7 G/DL — LOW (ref 6–8)
PROT SERPL-MCNC: 5.8 G/DL — LOW (ref 6–8)
PROT SERPL-MCNC: 5.9 G/DL — LOW (ref 6–8)
PROT SERPL-MCNC: 6 G/DL — SIGNIFICANT CHANGE UP (ref 6–8)
PROT SERPL-MCNC: 6 G/DL — SIGNIFICANT CHANGE UP (ref 6–8)
PROT SERPL-MCNC: 6.1 G/DL — SIGNIFICANT CHANGE UP (ref 6–8)
PROT SERPL-MCNC: 6.2 G/DL — SIGNIFICANT CHANGE UP (ref 6–8)
PROT SERPL-MCNC: 6.4 G/DL — SIGNIFICANT CHANGE UP (ref 6–8)
PROTHROM AB SERPL-ACNC: 13.9 SEC — HIGH (ref 9.95–12.87)
PROTHROM AB SERPL-ACNC: 14.3 SEC — HIGH (ref 9.95–12.87)
PROTHROM AB SERPL-ACNC: 15.1 SEC — HIGH (ref 9.95–12.87)
PROTHROM AB SERPL-ACNC: 15.7 SEC — HIGH (ref 9.95–12.87)
PROTHROM AB SERPL-ACNC: 16.1 SEC — HIGH (ref 9.95–12.87)
PROTHROM AB SERPL-ACNC: 16.3 SEC — HIGH (ref 9.95–12.87)
PROTHROM AB SERPL-ACNC: 17.1 SEC — HIGH (ref 9.95–12.87)
PROTHROM AB SERPL-ACNC: 17.7 SEC — HIGH (ref 9.95–12.87)
PTH-INTACT FLD-MCNC: 187 PG/ML — HIGH (ref 15–65)
RBC # BLD: 2.26 M/UL — LOW (ref 4.7–6.1)
RBC # BLD: 2.32 M/UL — LOW (ref 4.7–6.1)
RBC # BLD: 2.33 M/UL — LOW (ref 4.7–6.1)
RBC # BLD: 2.45 M/UL — LOW (ref 4.7–6.1)
RBC # BLD: 2.47 M/UL — LOW (ref 4.7–6.1)
RBC # BLD: 2.54 M/UL — LOW (ref 4.7–6.1)
RBC # BLD: 2.62 M/UL — LOW (ref 4.7–6.1)
RBC # BLD: 2.62 M/UL — LOW (ref 4.7–6.1)
RBC # BLD: 2.64 M/UL — LOW (ref 4.7–6.1)
RBC # BLD: 2.65 M/UL — LOW (ref 4.7–6.1)
RBC # BLD: 2.65 M/UL — LOW (ref 4.7–6.1)
RBC # BLD: 2.67 M/UL — LOW (ref 4.7–6.1)
RBC # BLD: 2.68 M/UL — LOW (ref 4.7–6.1)
RBC # BLD: 2.71 M/UL — LOW (ref 4.7–6.1)
RBC # BLD: 2.74 M/UL — LOW (ref 4.7–6.1)
RBC # BLD: 2.75 M/UL — LOW (ref 4.7–6.1)
RBC # BLD: 2.8 M/UL — LOW (ref 4.7–6.1)
RBC # BLD: 2.86 M/UL — LOW (ref 4.7–6.1)
RBC # BLD: 2.87 M/UL — LOW (ref 4.7–6.1)
RBC # BLD: 2.87 M/UL — LOW (ref 4.7–6.1)
RBC # BLD: 2.9 M/UL — LOW (ref 4.7–6.1)
RBC # BLD: 2.98 M/UL — LOW (ref 4.7–6.1)
RBC # BLD: 2.99 M/UL — LOW (ref 4.7–6.1)
RBC # BLD: 3.02 M/UL — LOW (ref 4.7–6.1)
RBC # BLD: 3.23 M/UL — LOW (ref 4.7–6.1)
RBC # FLD: 14.5 % — SIGNIFICANT CHANGE UP (ref 11.5–14.5)
RBC # FLD: 14.6 % — HIGH (ref 11.5–14.5)
RBC # FLD: 14.7 % — HIGH (ref 11.5–14.5)
RBC # FLD: 14.8 % — HIGH (ref 11.5–14.5)
RBC # FLD: 14.9 % — HIGH (ref 11.5–14.5)
RBC # FLD: 15 % — HIGH (ref 11.5–14.5)
RBC # FLD: 15 % — HIGH (ref 11.5–14.5)
RBC # FLD: 15.1 % — HIGH (ref 11.5–14.5)
RBC # FLD: 15.3 % — HIGH (ref 11.5–14.5)
RBC # FLD: 15.3 % — HIGH (ref 11.5–14.5)
RBC # FLD: 15.4 % — HIGH (ref 11.5–14.5)
RBC # FLD: 15.4 % — HIGH (ref 11.5–14.5)
RBC # FLD: 15.5 % — HIGH (ref 11.5–14.5)
RBC # FLD: 15.6 % — HIGH (ref 11.5–14.5)
RBC # FLD: 15.7 % — HIGH (ref 11.5–14.5)
RBC BLD AUTO: ABNORMAL
RBC BLD AUTO: ABNORMAL
S MARCESCENS DNA BLD POS NAA+NON-PROBE: SIGNIFICANT CHANGE UP
S PNEUM DNA BLD POS QL NAA+NON-PROBE: SIGNIFICANT CHANGE UP
S PYO DNA BLD POS QL NAA+NON-PROBE: SIGNIFICANT CHANGE UP
SAO2 % BLDA: 99 % — HIGH (ref 94–98)
SAO2 % BLDV: 89 % — SIGNIFICANT CHANGE UP
SARS-COV-2 RNA SPEC QL NAA+PROBE: SIGNIFICANT CHANGE UP
SODIUM SERPL-SCNC: 123 MMOL/L — LOW (ref 135–146)
SODIUM SERPL-SCNC: 124 MMOL/L — LOW (ref 135–146)
SODIUM SERPL-SCNC: 125 MMOL/L — LOW (ref 135–146)
SODIUM SERPL-SCNC: 126 MMOL/L — LOW (ref 135–146)
SODIUM SERPL-SCNC: 127 MMOL/L — LOW (ref 135–146)
SODIUM SERPL-SCNC: 127 MMOL/L — LOW (ref 135–146)
SODIUM SERPL-SCNC: 128 MMOL/L — LOW (ref 135–146)
SODIUM SERPL-SCNC: 128 MMOL/L — LOW (ref 135–146)
SODIUM SERPL-SCNC: 129 MMOL/L — LOW (ref 135–146)
SODIUM SERPL-SCNC: 130 MMOL/L — LOW (ref 135–146)
SODIUM SERPL-SCNC: 130 MMOL/L — LOW (ref 135–146)
SODIUM SERPL-SCNC: 132 MMOL/L — LOW (ref 135–146)
SODIUM SERPL-SCNC: 133 MMOL/L — LOW (ref 135–146)
SODIUM UR-SCNC: 20 MMOL/L — SIGNIFICANT CHANGE UP
SPECIMEN SOURCE: SIGNIFICANT CHANGE UP
SURGICAL PATHOLOGY STUDY: SIGNIFICANT CHANGE UP
T3 SERPL-MCNC: 45 NG/DL — LOW (ref 80–200)
T4 AB SER-ACNC: 1.6 UG/DL — LOW (ref 4.6–12)
TIBC SERPL-MCNC: 149 UG/DL — LOW (ref 220–430)
TOTAL CHOLESTEROL/HDL RATIO MEASUREMENT: 3 RATIO — LOW (ref 4–5.5)
TRIGL SERPL-MCNC: 96 MG/DL — SIGNIFICANT CHANGE UP (ref 10–149)
TROPONIN T SERPL-MCNC: 0.12 NG/ML — CRITICAL HIGH
TROPONIN T SERPL-MCNC: 0.16 NG/ML — CRITICAL HIGH
TROPONIN T SERPL-MCNC: 0.17 NG/ML — CRITICAL HIGH
TROPONIN T SERPL-MCNC: <0.01 NG/ML — SIGNIFICANT CHANGE UP
TSH SERPL-MCNC: 0.83 UIU/ML — SIGNIFICANT CHANGE UP (ref 0.27–4.2)
TSH SERPL-MCNC: 4.48 UIU/ML — HIGH (ref 0.27–4.2)
UIBC SERPL-MCNC: 111 UG/DL — SIGNIFICANT CHANGE UP (ref 110–370)
VANCOMYCIN FLD-MCNC: 13.5 UG/ML — HIGH (ref 5–10)
VANCOMYCIN FLD-MCNC: 15.3 UG/ML — HIGH (ref 5–10)
VANCOMYCIN TROUGH SERPL-MCNC: 9.6 UG/ML — SIGNIFICANT CHANGE UP (ref 5–10)
VANCOMYCIN TROUGH SERPL-MCNC: 9.8 UG/ML — SIGNIFICANT CHANGE UP (ref 5–10)
WBC # BLD: 10.72 K/UL — SIGNIFICANT CHANGE UP (ref 4.8–10.8)
WBC # BLD: 10.78 K/UL — SIGNIFICANT CHANGE UP (ref 4.8–10.8)
WBC # BLD: 11.6 K/UL — HIGH (ref 4.8–10.8)
WBC # BLD: 12.88 K/UL — HIGH (ref 4.8–10.8)
WBC # BLD: 14.32 K/UL — HIGH (ref 4.8–10.8)
WBC # BLD: 15.18 K/UL — HIGH (ref 4.8–10.8)
WBC # BLD: 15.42 K/UL — HIGH (ref 4.8–10.8)
WBC # BLD: 15.72 K/UL — HIGH (ref 4.8–10.8)
WBC # BLD: 15.79 K/UL — HIGH (ref 4.8–10.8)
WBC # BLD: 16.18 K/UL — HIGH (ref 4.8–10.8)
WBC # BLD: 16.63 K/UL — HIGH (ref 4.8–10.8)
WBC # BLD: 16.67 K/UL — HIGH (ref 4.8–10.8)
WBC # BLD: 16.78 K/UL — HIGH (ref 4.8–10.8)
WBC # BLD: 18.03 K/UL — HIGH (ref 4.8–10.8)
WBC # BLD: 18.21 K/UL — HIGH (ref 4.8–10.8)
WBC # BLD: 19.24 K/UL — HIGH (ref 4.8–10.8)
WBC # BLD: 19.88 K/UL — HIGH (ref 4.8–10.8)
WBC # BLD: 19.94 K/UL — HIGH (ref 4.8–10.8)
WBC # BLD: 21.17 K/UL — HIGH (ref 4.8–10.8)
WBC # BLD: 21.21 K/UL — HIGH (ref 4.8–10.8)
WBC # BLD: 21.26 K/UL — HIGH (ref 4.8–10.8)
WBC # BLD: 22.16 K/UL — HIGH (ref 4.8–10.8)
WBC # BLD: 22.77 K/UL — HIGH (ref 4.8–10.8)
WBC # BLD: 24.15 K/UL — HIGH (ref 4.8–10.8)
WBC # BLD: 25.54 K/UL — HIGH (ref 4.8–10.8)
WBC # BLD: 25.61 K/UL — HIGH (ref 4.8–10.8)
WBC # BLD: 8.31 K/UL — SIGNIFICANT CHANGE UP (ref 4.8–10.8)
WBC # FLD AUTO: 10.72 K/UL — SIGNIFICANT CHANGE UP (ref 4.8–10.8)
WBC # FLD AUTO: 10.78 K/UL — SIGNIFICANT CHANGE UP (ref 4.8–10.8)
WBC # FLD AUTO: 11.6 K/UL — HIGH (ref 4.8–10.8)
WBC # FLD AUTO: 12.88 K/UL — HIGH (ref 4.8–10.8)
WBC # FLD AUTO: 14.32 K/UL — HIGH (ref 4.8–10.8)
WBC # FLD AUTO: 15.18 K/UL — HIGH (ref 4.8–10.8)
WBC # FLD AUTO: 15.42 K/UL — HIGH (ref 4.8–10.8)
WBC # FLD AUTO: 15.72 K/UL — HIGH (ref 4.8–10.8)
WBC # FLD AUTO: 15.79 K/UL — HIGH (ref 4.8–10.8)
WBC # FLD AUTO: 16.18 K/UL — HIGH (ref 4.8–10.8)
WBC # FLD AUTO: 16.63 K/UL — HIGH (ref 4.8–10.8)
WBC # FLD AUTO: 16.67 K/UL — HIGH (ref 4.8–10.8)
WBC # FLD AUTO: 16.78 K/UL — HIGH (ref 4.8–10.8)
WBC # FLD AUTO: 18.03 K/UL — HIGH (ref 4.8–10.8)
WBC # FLD AUTO: 18.21 K/UL — HIGH (ref 4.8–10.8)
WBC # FLD AUTO: 19.24 K/UL — HIGH (ref 4.8–10.8)
WBC # FLD AUTO: 19.88 K/UL — HIGH (ref 4.8–10.8)
WBC # FLD AUTO: 19.94 K/UL — HIGH (ref 4.8–10.8)
WBC # FLD AUTO: 21.17 K/UL — HIGH (ref 4.8–10.8)
WBC # FLD AUTO: 21.21 K/UL — HIGH (ref 4.8–10.8)
WBC # FLD AUTO: 21.26 K/UL — HIGH (ref 4.8–10.8)
WBC # FLD AUTO: 22.16 K/UL — HIGH (ref 4.8–10.8)
WBC # FLD AUTO: 22.77 K/UL — HIGH (ref 4.8–10.8)
WBC # FLD AUTO: 24.15 K/UL — HIGH (ref 4.8–10.8)
WBC # FLD AUTO: 25.54 K/UL — HIGH (ref 4.8–10.8)
WBC # FLD AUTO: 25.61 K/UL — HIGH (ref 4.8–10.8)
WBC # FLD AUTO: 8.31 K/UL — SIGNIFICANT CHANGE UP (ref 4.8–10.8)

## 2020-01-01 PROCEDURE — 71045 X-RAY EXAM CHEST 1 VIEW: CPT | Mod: 26

## 2020-01-01 PROCEDURE — 11106 INCAL BX SKN SINGLE LES: CPT | Mod: 58,59

## 2020-01-01 PROCEDURE — 28002 TREATMENT OF FOOT INFECTION: CPT | Mod: 59,58

## 2020-01-01 PROCEDURE — 93010 ELECTROCARDIOGRAM REPORT: CPT

## 2020-01-01 PROCEDURE — 97597 DBRDMT OPN WND 1ST 20 CM/<: CPT | Mod: 59

## 2020-01-01 PROCEDURE — 71045 X-RAY EXAM CHEST 1 VIEW: CPT | Mod: 26,77

## 2020-01-01 PROCEDURE — 88304 TISSUE EXAM BY PATHOLOGIST: CPT | Mod: 26

## 2020-01-01 PROCEDURE — 99231 SBSQ HOSP IP/OBS SF/LOW 25: CPT

## 2020-01-01 PROCEDURE — 99232 SBSQ HOSP IP/OBS MODERATE 35: CPT

## 2020-01-01 PROCEDURE — 99222 1ST HOSP IP/OBS MODERATE 55: CPT

## 2020-01-01 PROCEDURE — 99233 SBSQ HOSP IP/OBS HIGH 50: CPT

## 2020-01-01 PROCEDURE — 73630 X-RAY EXAM OF FOOT: CPT | Mod: 26,LT

## 2020-01-01 PROCEDURE — 70450 CT HEAD/BRAIN W/O DYE: CPT | Mod: 26

## 2020-01-01 PROCEDURE — 28122 PARTIAL REMOVAL OF FOOT BONE: CPT | Mod: 58,59

## 2020-01-01 PROCEDURE — 36573 INSJ PICC RS&I 5 YR+: CPT

## 2020-01-01 PROCEDURE — 97597 DBRDMT OPN WND 1ST 20 CM/<: CPT | Mod: 58,59

## 2020-01-01 PROCEDURE — 95822 EEG COMA OR SLEEP ONLY: CPT | Mod: 26

## 2020-01-01 PROCEDURE — 11043 DBRDMT MUSC&/FSCA 1ST 20/<: CPT | Mod: 58,59

## 2020-01-01 PROCEDURE — 99285 EMERGENCY DEPT VISIT HI MDM: CPT

## 2020-01-01 PROCEDURE — 99497 ADVNCD CARE PLAN 30 MIN: CPT | Mod: 25

## 2020-01-01 PROCEDURE — ZZZZZ: CPT

## 2020-01-01 PROCEDURE — 28003 TREATMENT OF FOOT INFECTION: CPT

## 2020-01-01 PROCEDURE — 28810 AMPUTATION TOE & METATARSAL: CPT | Mod: 58,59

## 2020-01-01 PROCEDURE — 88311 DECALCIFY TISSUE: CPT | Mod: 26

## 2020-01-01 PROCEDURE — 99291 CRITICAL CARE FIRST HOUR: CPT

## 2020-01-01 PROCEDURE — 20240 BONE BIOPSY OPEN SUPERFICIAL: CPT | Mod: 58,59

## 2020-01-01 PROCEDURE — 93925 LOWER EXTREMITY STUDY: CPT | Mod: 26

## 2020-01-01 PROCEDURE — 93306 TTE W/DOPPLER COMPLETE: CPT | Mod: 26

## 2020-01-01 PROCEDURE — 11106 INCAL BX SKN SINGLE LES: CPT

## 2020-01-01 PROCEDURE — 13160 SEC CLSR SURG WND/DEHSN XTN: CPT | Mod: 58,59

## 2020-01-01 PROCEDURE — 11044 DBRDMT BONE 1ST 20 SQ CM/<: CPT | Mod: XS

## 2020-01-01 PROCEDURE — 99231 SBSQ HOSP IP/OBS SF/LOW 25: CPT | Mod: 25

## 2020-01-01 PROCEDURE — 11046 DBRDMT MUSC&/FSCA EA ADDL: CPT | Mod: 58,59

## 2020-01-01 PROCEDURE — 76770 US EXAM ABDO BACK WALL COMP: CPT | Mod: 26

## 2020-01-01 PROCEDURE — 99222 1ST HOSP IP/OBS MODERATE 55: CPT | Mod: 57

## 2020-01-01 PROCEDURE — 74176 CT ABD & PELVIS W/O CONTRAST: CPT | Mod: 26

## 2020-01-01 RX ORDER — DIGOXIN 250 MCG
0.12 TABLET ORAL EVERY OTHER DAY
Refills: 0 | Status: DISCONTINUED | OUTPATIENT
Start: 2020-01-01 | End: 2020-01-01

## 2020-01-01 RX ORDER — GABAPENTIN 400 MG/1
200 CAPSULE ORAL DAILY
Refills: 0 | Status: DISCONTINUED | OUTPATIENT
Start: 2020-01-01 | End: 2020-01-01

## 2020-01-01 RX ORDER — FOLIC ACID 0.8 MG
1 TABLET ORAL DAILY
Refills: 0 | Status: DISCONTINUED | OUTPATIENT
Start: 2020-01-01 | End: 2020-01-01

## 2020-01-01 RX ORDER — DEXTROSE 50 % IN WATER 50 %
25 SYRINGE (ML) INTRAVENOUS ONCE
Refills: 0 | Status: DISCONTINUED | OUTPATIENT
Start: 2020-01-01 | End: 2020-01-01

## 2020-01-01 RX ORDER — ACETAMINOPHEN 500 MG
650 TABLET ORAL EVERY 6 HOURS
Refills: 0 | Status: DISCONTINUED | OUTPATIENT
Start: 2020-01-01 | End: 2020-01-01

## 2020-01-01 RX ORDER — CARVEDILOL PHOSPHATE 80 MG/1
3.12 CAPSULE, EXTENDED RELEASE ORAL EVERY 12 HOURS
Refills: 0 | Status: DISCONTINUED | OUTPATIENT
Start: 2020-01-01 | End: 2020-01-01

## 2020-01-01 RX ORDER — DEXTROSE 10 % IN WATER 10 %
250 INTRAVENOUS SOLUTION INTRAVENOUS ONCE
Refills: 0 | Status: COMPLETED | OUTPATIENT
Start: 2020-01-01 | End: 2020-01-01

## 2020-01-01 RX ORDER — ATORVASTATIN CALCIUM 80 MG/1
80 TABLET, FILM COATED ORAL AT BEDTIME
Refills: 0 | Status: DISCONTINUED | OUTPATIENT
Start: 2020-01-01 | End: 2020-01-01

## 2020-01-01 RX ORDER — DIGOXIN 250 MCG
0.12 TABLET ORAL DAILY
Refills: 0 | Status: DISCONTINUED | OUTPATIENT
Start: 2020-01-01 | End: 2020-01-01

## 2020-01-01 RX ORDER — SPIRONOLACTONE 25 MG/1
1 TABLET, FILM COATED ORAL
Qty: 0 | Refills: 0 | DISCHARGE

## 2020-01-01 RX ORDER — INSULIN LISPRO 100/ML
2 VIAL (ML) SUBCUTANEOUS ONCE
Refills: 0 | Status: COMPLETED | OUTPATIENT
Start: 2020-01-01 | End: 2020-01-01

## 2020-01-01 RX ORDER — FUROSEMIDE 40 MG
40 TABLET ORAL DAILY
Refills: 0 | Status: DISCONTINUED | OUTPATIENT
Start: 2020-01-01 | End: 2020-01-01

## 2020-01-01 RX ORDER — DEXTROSE 50 % IN WATER 50 %
25 SYRINGE (ML) INTRAVENOUS ONCE
Refills: 0 | Status: COMPLETED | OUTPATIENT
Start: 2020-01-01 | End: 2020-01-01

## 2020-01-01 RX ORDER — INSULIN GLARGINE 100 [IU]/ML
12 INJECTION, SOLUTION SUBCUTANEOUS AT BEDTIME
Refills: 0 | Status: DISCONTINUED | OUTPATIENT
Start: 2020-01-01 | End: 2020-01-01

## 2020-01-01 RX ORDER — IPRATROPIUM/ALBUTEROL SULFATE 18-103MCG
3 AEROSOL WITH ADAPTER (GRAM) INHALATION ONCE
Refills: 0 | Status: COMPLETED | OUTPATIENT
Start: 2020-01-01 | End: 2020-01-01

## 2020-01-01 RX ORDER — SODIUM ZIRCONIUM CYCLOSILICATE 10 G/10G
10 POWDER, FOR SUSPENSION ORAL
Refills: 0 | Status: DISCONTINUED | OUTPATIENT
Start: 2020-01-01 | End: 2020-01-01

## 2020-01-01 RX ORDER — INSULIN HUMAN 100 [IU]/ML
10 INJECTION, SOLUTION SUBCUTANEOUS ONCE
Refills: 0 | Status: COMPLETED | OUTPATIENT
Start: 2020-01-01 | End: 2020-01-01

## 2020-01-01 RX ORDER — HYDROMORPHONE HYDROCHLORIDE 2 MG/ML
0.5 INJECTION INTRAMUSCULAR; INTRAVENOUS; SUBCUTANEOUS
Refills: 0 | Status: DISCONTINUED | OUTPATIENT
Start: 2020-01-01 | End: 2020-01-01

## 2020-01-01 RX ORDER — SODIUM ZIRCONIUM CYCLOSILICATE 10 G/10G
5 POWDER, FOR SUSPENSION ORAL DAILY
Refills: 0 | Status: DISCONTINUED | OUTPATIENT
Start: 2020-01-01 | End: 2020-01-01

## 2020-01-01 RX ORDER — HYDROMORPHONE HYDROCHLORIDE 2 MG/ML
0.5 INJECTION INTRAMUSCULAR; INTRAVENOUS; SUBCUTANEOUS ONCE
Refills: 0 | Status: DISCONTINUED | OUTPATIENT
Start: 2020-01-01 | End: 2020-01-01

## 2020-01-01 RX ORDER — PANTOPRAZOLE SODIUM 20 MG/1
40 TABLET, DELAYED RELEASE ORAL
Refills: 0 | Status: DISCONTINUED | OUTPATIENT
Start: 2020-01-01 | End: 2020-01-01

## 2020-01-01 RX ORDER — INSULIN LISPRO 100/ML
VIAL (ML) SUBCUTANEOUS
Refills: 0 | Status: DISCONTINUED | OUTPATIENT
Start: 2020-01-01 | End: 2020-01-01

## 2020-01-01 RX ORDER — VANCOMYCIN HCL 1 G
750 VIAL (EA) INTRAVENOUS EVERY 24 HOURS
Refills: 0 | Status: DISCONTINUED | OUTPATIENT
Start: 2020-01-01 | End: 2020-01-01

## 2020-01-01 RX ORDER — PANTOPRAZOLE SODIUM 20 MG/1
40 TABLET, DELAYED RELEASE ORAL DAILY
Refills: 0 | Status: DISCONTINUED | OUTPATIENT
Start: 2020-01-01 | End: 2020-01-01

## 2020-01-01 RX ORDER — CALCITRIOL 0.5 UG/1
0.25 CAPSULE ORAL DAILY
Refills: 0 | Status: DISCONTINUED | OUTPATIENT
Start: 2020-01-01 | End: 2020-01-01

## 2020-01-01 RX ORDER — SODIUM CHLORIDE 9 MG/ML
1000 INJECTION, SOLUTION INTRAVENOUS
Refills: 0 | Status: DISCONTINUED | OUTPATIENT
Start: 2020-01-01 | End: 2020-01-01

## 2020-01-01 RX ORDER — SODIUM CHLORIDE 9 MG/ML
1000 INJECTION INTRAMUSCULAR; INTRAVENOUS; SUBCUTANEOUS
Refills: 0 | Status: DISCONTINUED | OUTPATIENT
Start: 2020-01-01 | End: 2020-01-01

## 2020-01-01 RX ORDER — INSULIN HUMAN 100 [IU]/ML
1 INJECTION, SOLUTION SUBCUTANEOUS
Qty: 100 | Refills: 0 | Status: DISCONTINUED | OUTPATIENT
Start: 2020-01-01 | End: 2020-01-01

## 2020-01-01 RX ORDER — CALCIUM ACETATE 667 MG
667 TABLET ORAL
Refills: 0 | Status: DISCONTINUED | OUTPATIENT
Start: 2020-01-01 | End: 2020-01-01

## 2020-01-01 RX ORDER — MIDODRINE HYDROCHLORIDE 2.5 MG/1
5 TABLET ORAL THREE TIMES A DAY
Refills: 0 | Status: DISCONTINUED | OUTPATIENT
Start: 2020-01-01 | End: 2020-01-01

## 2020-01-01 RX ORDER — VANCOMYCIN HCL 1 G
VIAL (EA) INTRAVENOUS
Refills: 0 | Status: DISCONTINUED | OUTPATIENT
Start: 2020-01-01 | End: 2020-01-01

## 2020-01-01 RX ORDER — FAMOTIDINE 10 MG/ML
1 INJECTION INTRAVENOUS
Qty: 0 | Refills: 0 | DISCHARGE

## 2020-01-01 RX ORDER — INSULIN GLARGINE 100 [IU]/ML
9 INJECTION, SOLUTION SUBCUTANEOUS EVERY MORNING
Refills: 0 | Status: DISCONTINUED | OUTPATIENT
Start: 2020-01-01 | End: 2020-01-01

## 2020-01-01 RX ORDER — SODIUM BICARBONATE 1 MEQ/ML
650 SYRINGE (ML) INTRAVENOUS EVERY 6 HOURS
Refills: 0 | Status: DISCONTINUED | OUTPATIENT
Start: 2020-01-01 | End: 2020-01-01

## 2020-01-01 RX ORDER — MORPHINE SULFATE 50 MG/1
4 CAPSULE, EXTENDED RELEASE ORAL ONCE
Refills: 0 | Status: DISCONTINUED | OUTPATIENT
Start: 2020-01-01 | End: 2020-01-01

## 2020-01-01 RX ORDER — SODIUM ZIRCONIUM CYCLOSILICATE 10 G/10G
5 POWDER, FOR SUSPENSION ORAL ONCE
Refills: 0 | Status: DISCONTINUED | OUTPATIENT
Start: 2020-01-01 | End: 2020-01-01

## 2020-01-01 RX ORDER — MORPHINE SULFATE 50 MG/1
2 CAPSULE, EXTENDED RELEASE ORAL
Refills: 0 | Status: DISCONTINUED | OUTPATIENT
Start: 2020-01-01 | End: 2020-01-01

## 2020-01-01 RX ORDER — VANCOMYCIN HCL 1 G
500 VIAL (EA) INTRAVENOUS ONCE
Refills: 0 | Status: COMPLETED | OUTPATIENT
Start: 2020-01-01 | End: 2020-01-01

## 2020-01-01 RX ORDER — DAPTOMYCIN 500 MG/10ML
360 INJECTION, POWDER, LYOPHILIZED, FOR SOLUTION INTRAVENOUS
Refills: 0 | Status: DISCONTINUED | OUTPATIENT
Start: 2020-01-01 | End: 2020-01-01

## 2020-01-01 RX ORDER — SCOPALAMINE 1 MG/3D
1 PATCH, EXTENDED RELEASE TRANSDERMAL ONCE
Refills: 0 | Status: DISCONTINUED | OUTPATIENT
Start: 2020-01-01 | End: 2020-01-01

## 2020-01-01 RX ORDER — DEXTROSE 50 % IN WATER 50 %
12.5 SYRINGE (ML) INTRAVENOUS ONCE
Refills: 0 | Status: DISCONTINUED | OUTPATIENT
Start: 2020-01-01 | End: 2020-01-01

## 2020-01-01 RX ORDER — LOSARTAN POTASSIUM 100 MG/1
12.5 TABLET, FILM COATED ORAL DAILY
Refills: 0 | Status: DISCONTINUED | OUTPATIENT
Start: 2020-01-01 | End: 2020-01-01

## 2020-01-01 RX ORDER — ASPIRIN/CALCIUM CARB/MAGNESIUM 324 MG
81 TABLET ORAL DAILY
Refills: 0 | Status: DISCONTINUED | OUTPATIENT
Start: 2020-01-01 | End: 2020-01-01

## 2020-01-01 RX ORDER — DEXTROSE 50 % IN WATER 50 %
15 SYRINGE (ML) INTRAVENOUS ONCE
Refills: 0 | Status: DISCONTINUED | OUTPATIENT
Start: 2020-01-01 | End: 2020-01-01

## 2020-01-01 RX ORDER — CHLORHEXIDINE GLUCONATE 213 G/1000ML
1 SOLUTION TOPICAL
Refills: 0 | Status: DISCONTINUED | OUTPATIENT
Start: 2020-01-01 | End: 2020-01-01

## 2020-01-01 RX ORDER — METRONIDAZOLE 500 MG
500 TABLET ORAL ONCE
Refills: 0 | Status: COMPLETED | OUTPATIENT
Start: 2020-01-01 | End: 2020-01-01

## 2020-01-01 RX ORDER — MIDAZOLAM HYDROCHLORIDE 1 MG/ML
0.02 INJECTION, SOLUTION INTRAMUSCULAR; INTRAVENOUS
Qty: 100 | Refills: 0 | Status: DISCONTINUED | OUTPATIENT
Start: 2020-01-01 | End: 2020-01-01

## 2020-01-01 RX ORDER — MEROPENEM 1 G/30ML
500 INJECTION INTRAVENOUS EVERY 12 HOURS
Refills: 0 | Status: DISCONTINUED | OUTPATIENT
Start: 2020-01-01 | End: 2020-01-01

## 2020-01-01 RX ORDER — GLUCAGON INJECTION, SOLUTION 0.5 MG/.1ML
1 INJECTION, SOLUTION SUBCUTANEOUS ONCE
Refills: 0 | Status: DISCONTINUED | OUTPATIENT
Start: 2020-01-01 | End: 2020-01-01

## 2020-01-01 RX ORDER — PIPERACILLIN AND TAZOBACTAM 4; .5 G/20ML; G/20ML
3.38 INJECTION, POWDER, LYOPHILIZED, FOR SOLUTION INTRAVENOUS ONCE
Refills: 0 | Status: COMPLETED | OUTPATIENT
Start: 2020-01-01 | End: 2020-01-01

## 2020-01-01 RX ORDER — MIDAZOLAM HYDROCHLORIDE 1 MG/ML
2 INJECTION, SOLUTION INTRAMUSCULAR; INTRAVENOUS ONCE
Refills: 0 | Status: DISCONTINUED | OUTPATIENT
Start: 2020-01-01 | End: 2020-01-01

## 2020-01-01 RX ORDER — VANCOMYCIN HCL 1 G
250 VIAL (EA) INTRAVENOUS
Refills: 0 | Status: DISCONTINUED | OUTPATIENT
Start: 2020-01-01 | End: 2020-01-01

## 2020-01-01 RX ORDER — SODIUM ZIRCONIUM CYCLOSILICATE 10 G/10G
5 POWDER, FOR SUSPENSION ORAL
Refills: 0 | Status: DISCONTINUED | OUTPATIENT
Start: 2020-01-01 | End: 2020-01-01

## 2020-01-01 RX ORDER — FUROSEMIDE 40 MG
40 TABLET ORAL ONCE
Refills: 0 | Status: COMPLETED | OUTPATIENT
Start: 2020-01-01 | End: 2020-01-01

## 2020-01-01 RX ORDER — SODIUM BICARBONATE 1 MEQ/ML
0.13 SYRINGE (ML) INTRAVENOUS
Qty: 150 | Refills: 0 | Status: DISCONTINUED | OUTPATIENT
Start: 2020-01-01 | End: 2020-01-01

## 2020-01-01 RX ORDER — CALCIUM ACETATE 667 MG
1334 TABLET ORAL
Refills: 0 | Status: DISCONTINUED | OUTPATIENT
Start: 2020-01-01 | End: 2020-01-01

## 2020-01-01 RX ORDER — ONDANSETRON 8 MG/1
4 TABLET, FILM COATED ORAL ONCE
Refills: 0 | Status: DISCONTINUED | OUTPATIENT
Start: 2020-01-01 | End: 2020-01-01

## 2020-01-01 RX ORDER — VANCOMYCIN HCL 1 G
500 VIAL (EA) INTRAVENOUS EVERY 24 HOURS
Refills: 0 | Status: DISCONTINUED | OUTPATIENT
Start: 2020-01-01 | End: 2020-01-01

## 2020-01-01 RX ORDER — SODIUM HYPOCHLORITE 0.125 %
1 SOLUTION, NON-ORAL MISCELLANEOUS DAILY
Refills: 0 | Status: DISCONTINUED | OUTPATIENT
Start: 2020-01-01 | End: 2020-01-01

## 2020-01-01 RX ORDER — MORPHINE SULFATE 50 MG/1
4 CAPSULE, EXTENDED RELEASE ORAL
Refills: 0 | Status: DISCONTINUED | OUTPATIENT
Start: 2020-01-01 | End: 2020-01-01

## 2020-01-01 RX ORDER — INSULIN LISPRO 100/ML
10 VIAL (ML) SUBCUTANEOUS ONCE
Refills: 0 | Status: DISCONTINUED | OUTPATIENT
Start: 2020-01-01 | End: 2020-01-01

## 2020-01-01 RX ORDER — SODIUM ZIRCONIUM CYCLOSILICATE 10 G/10G
10 POWDER, FOR SUSPENSION ORAL ONCE
Refills: 0 | Status: COMPLETED | OUTPATIENT
Start: 2020-01-01 | End: 2020-01-01

## 2020-01-01 RX ORDER — GABAPENTIN 400 MG/1
2 CAPSULE ORAL
Qty: 0 | Refills: 0 | DISCHARGE

## 2020-01-01 RX ORDER — BUMETANIDE 0.25 MG/ML
0.1 INJECTION INTRAMUSCULAR; INTRAVENOUS
Qty: 20 | Refills: 0 | Status: DISCONTINUED | OUTPATIENT
Start: 2020-01-01 | End: 2020-01-01

## 2020-01-01 RX ORDER — SODIUM BICARBONATE 1 MEQ/ML
650 SYRINGE (ML) INTRAVENOUS EVERY 8 HOURS
Refills: 0 | Status: DISCONTINUED | OUTPATIENT
Start: 2020-01-01 | End: 2020-01-01

## 2020-01-01 RX ORDER — HEPARIN SODIUM 5000 [USP'U]/ML
5000 INJECTION INTRAVENOUS; SUBCUTANEOUS EVERY 8 HOURS
Refills: 0 | Status: DISCONTINUED | OUTPATIENT
Start: 2020-01-01 | End: 2020-01-01

## 2020-01-01 RX ORDER — CEFEPIME 1 G/1
2000 INJECTION, POWDER, FOR SOLUTION INTRAMUSCULAR; INTRAVENOUS ONCE
Refills: 0 | Status: DISCONTINUED | OUTPATIENT
Start: 2020-01-01 | End: 2020-01-01

## 2020-01-01 RX ORDER — ASPIRIN/CALCIUM CARB/MAGNESIUM 324 MG
1 TABLET ORAL
Qty: 0 | Refills: 0 | DISCHARGE

## 2020-01-01 RX ORDER — BUMETANIDE 0.25 MG/ML
2 INJECTION INTRAMUSCULAR; INTRAVENOUS ONCE
Refills: 0 | Status: COMPLETED | OUTPATIENT
Start: 2020-01-01 | End: 2020-01-01

## 2020-01-01 RX ORDER — SPIRONOLACTONE 25 MG/1
50 TABLET, FILM COATED ORAL DAILY
Refills: 0 | Status: DISCONTINUED | OUTPATIENT
Start: 2020-01-01 | End: 2020-01-01

## 2020-01-01 RX ORDER — VANCOMYCIN HCL 1 G
1000 VIAL (EA) INTRAVENOUS ONCE
Refills: 0 | Status: COMPLETED | OUTPATIENT
Start: 2020-01-01 | End: 2020-01-01

## 2020-01-01 RX ORDER — SCOPALAMINE 1 MG/3D
1 PATCH, EXTENDED RELEASE TRANSDERMAL
Refills: 0 | Status: DISCONTINUED | OUTPATIENT
Start: 2020-01-01 | End: 2020-01-01

## 2020-01-01 RX ORDER — SODIUM ZIRCONIUM CYCLOSILICATE 10 G/10G
5 POWDER, FOR SUSPENSION ORAL ONCE
Refills: 0 | Status: COMPLETED | OUTPATIENT
Start: 2020-01-01 | End: 2020-01-01

## 2020-01-01 RX ORDER — FUROSEMIDE 40 MG
80 TABLET ORAL
Refills: 0 | Status: DISCONTINUED | OUTPATIENT
Start: 2020-01-01 | End: 2020-01-01

## 2020-01-01 RX ORDER — DEXMEDETOMIDINE HYDROCHLORIDE IN 0.9% SODIUM CHLORIDE 4 UG/ML
1.5 INJECTION INTRAVENOUS
Qty: 200 | Refills: 0 | Status: DISCONTINUED | OUTPATIENT
Start: 2020-01-01 | End: 2020-01-01

## 2020-01-01 RX ORDER — INSULIN HUMAN 100 [IU]/ML
1 INJECTION, SOLUTION SUBCUTANEOUS
Qty: 50 | Refills: 0 | Status: DISCONTINUED | OUTPATIENT
Start: 2020-01-01 | End: 2020-01-01

## 2020-01-01 RX ORDER — INSULIN GLARGINE 100 [IU]/ML
12 INJECTION, SOLUTION SUBCUTANEOUS ONCE
Refills: 0 | Status: COMPLETED | OUTPATIENT
Start: 2020-01-01 | End: 2020-01-01

## 2020-01-01 RX ORDER — THROMBIN (BOVINE) 10000 UNIT
20000 KIT TOPICAL ONCE
Refills: 0 | Status: COMPLETED | OUTPATIENT
Start: 2020-01-01 | End: 2020-01-01

## 2020-01-01 RX ORDER — INSULIN LISPRO 100/ML
3 VIAL (ML) SUBCUTANEOUS
Refills: 0 | Status: DISCONTINUED | OUTPATIENT
Start: 2020-01-01 | End: 2020-01-01

## 2020-01-01 RX ORDER — DEXTROSE 10 % IN WATER 10 %
250 INTRAVENOUS SOLUTION INTRAVENOUS
Refills: 0 | Status: COMPLETED | OUTPATIENT
Start: 2020-01-01 | End: 2020-01-01

## 2020-01-01 RX ORDER — SODIUM CHLORIDE 9 MG/ML
500 INJECTION, SOLUTION INTRAVENOUS ONCE
Refills: 0 | Status: COMPLETED | OUTPATIENT
Start: 2020-01-01 | End: 2020-01-01

## 2020-01-01 RX ORDER — FUROSEMIDE 40 MG
80 TABLET ORAL DAILY
Refills: 0 | Status: DISCONTINUED | OUTPATIENT
Start: 2020-01-01 | End: 2020-01-01

## 2020-01-01 RX ORDER — INSULIN LISPRO 100/ML
4 VIAL (ML) SUBCUTANEOUS
Refills: 0 | Status: DISCONTINUED | OUTPATIENT
Start: 2020-01-01 | End: 2020-01-01

## 2020-01-01 RX ORDER — BUMETANIDE 0.25 MG/ML
1 INJECTION INTRAMUSCULAR; INTRAVENOUS ONCE
Refills: 0 | Status: DISCONTINUED | OUTPATIENT
Start: 2020-01-01 | End: 2020-01-01

## 2020-01-01 RX ORDER — ATORVASTATIN CALCIUM 80 MG/1
1 TABLET, FILM COATED ORAL
Qty: 0 | Refills: 0 | DISCHARGE

## 2020-01-01 RX ORDER — FENTANYL CITRATE 50 UG/ML
0.5 INJECTION INTRAVENOUS
Qty: 2500 | Refills: 0 | Status: DISCONTINUED | OUTPATIENT
Start: 2020-01-01 | End: 2020-01-01

## 2020-01-01 RX ORDER — FOLIC ACID 0.8 MG
1 TABLET ORAL
Qty: 0 | Refills: 0 | DISCHARGE

## 2020-01-01 RX ORDER — DAPTOMYCIN 500 MG/10ML
250 INJECTION, POWDER, LYOPHILIZED, FOR SOLUTION INTRAVENOUS
Refills: 0 | Status: DISCONTINUED | OUTPATIENT
Start: 2020-01-01 | End: 2020-01-01

## 2020-01-01 RX ORDER — CHLORHEXIDINE GLUCONATE 213 G/1000ML
15 SOLUTION TOPICAL
Refills: 0 | Status: DISCONTINUED | OUTPATIENT
Start: 2020-01-01 | End: 2020-01-01

## 2020-01-01 RX ORDER — INSULIN GLARGINE 100 [IU]/ML
12 INJECTION, SOLUTION SUBCUTANEOUS EVERY MORNING
Refills: 0 | Status: DISCONTINUED | OUTPATIENT
Start: 2020-01-01 | End: 2020-01-01

## 2020-01-01 RX ORDER — SODIUM CHLORIDE 9 MG/ML
500 INJECTION INTRAMUSCULAR; INTRAVENOUS; SUBCUTANEOUS
Refills: 0 | Status: DISCONTINUED | OUTPATIENT
Start: 2020-01-01 | End: 2020-01-01

## 2020-01-01 RX ORDER — BUMETANIDE 0.25 MG/ML
1 INJECTION INTRAMUSCULAR; INTRAVENOUS
Qty: 20 | Refills: 0 | Status: DISCONTINUED | OUTPATIENT
Start: 2020-01-01 | End: 2020-01-01

## 2020-01-01 RX ORDER — MORPHINE SULFATE 50 MG/1
2 CAPSULE, EXTENDED RELEASE ORAL ONCE
Refills: 0 | Status: DISCONTINUED | OUTPATIENT
Start: 2020-01-01 | End: 2020-01-01

## 2020-01-01 RX ORDER — SODIUM CHLORIDE 9 MG/ML
150 INJECTION, SOLUTION INTRAVENOUS
Refills: 0 | Status: DISCONTINUED | OUTPATIENT
Start: 2020-01-01 | End: 2020-01-01

## 2020-01-01 RX ORDER — LOSARTAN POTASSIUM 100 MG/1
0.5 TABLET, FILM COATED ORAL
Qty: 0 | Refills: 0 | DISCHARGE

## 2020-01-01 RX ORDER — VANCOMYCIN HCL 1 G
750 VIAL (EA) INTRAVENOUS
Refills: 0 | Status: CANCELLED | OUTPATIENT
Start: 2020-01-01 | End: 2020-01-01

## 2020-01-01 RX ORDER — SITAGLIPTIN 50 MG/1
1 TABLET, FILM COATED ORAL
Qty: 0 | Refills: 0 | DISCHARGE

## 2020-01-01 RX ORDER — REPAGLINIDE 1 MG/1
4 TABLET ORAL
Qty: 0 | Refills: 0 | DISCHARGE

## 2020-01-01 RX ORDER — NOREPINEPHRINE BITARTRATE/D5W 8 MG/250ML
0.05 PLASTIC BAG, INJECTION (ML) INTRAVENOUS
Qty: 8 | Refills: 0 | Status: DISCONTINUED | OUTPATIENT
Start: 2020-01-01 | End: 2020-01-01

## 2020-01-01 RX ORDER — SODIUM HYPOCHLORITE 0.125 %
1 SOLUTION, NON-ORAL MISCELLANEOUS THREE TIMES A DAY
Refills: 0 | Status: DISCONTINUED | OUTPATIENT
Start: 2020-01-01 | End: 2020-01-01

## 2020-01-01 RX ORDER — INSULIN LISPRO 100/ML
10 VIAL (ML) SUBCUTANEOUS ONCE
Refills: 0 | Status: COMPLETED | OUTPATIENT
Start: 2020-01-01 | End: 2020-01-01

## 2020-01-01 RX ORDER — DIGOXIN 250 MCG
0 TABLET ORAL
Qty: 0 | Refills: 0 | DISCHARGE

## 2020-01-01 RX ORDER — FERROUS SULFATE 325(65) MG
1 TABLET ORAL
Qty: 0 | Refills: 0 | DISCHARGE

## 2020-01-01 RX ORDER — INSULIN LISPRO 100/ML
5 VIAL (ML) SUBCUTANEOUS ONCE
Refills: 0 | Status: COMPLETED | OUTPATIENT
Start: 2020-01-01 | End: 2020-01-01

## 2020-01-01 RX ADMIN — Medication 650 MILLIGRAM(S): at 11:40

## 2020-01-01 RX ADMIN — Medication 5 UNIT(S): at 21:38

## 2020-01-01 RX ADMIN — CARVEDILOL PHOSPHATE 3.12 MILLIGRAM(S): 80 CAPSULE, EXTENDED RELEASE ORAL at 17:17

## 2020-01-01 RX ADMIN — CARVEDILOL PHOSPHATE 3.12 MILLIGRAM(S): 80 CAPSULE, EXTENDED RELEASE ORAL at 05:43

## 2020-01-01 RX ADMIN — Medication 0.12 MILLIGRAM(S): at 12:52

## 2020-01-01 RX ADMIN — MIDODRINE HYDROCHLORIDE 5 MILLIGRAM(S): 2.5 TABLET ORAL at 06:00

## 2020-01-01 RX ADMIN — PANTOPRAZOLE SODIUM 40 MILLIGRAM(S): 20 TABLET, DELAYED RELEASE ORAL at 12:09

## 2020-01-01 RX ADMIN — PANTOPRAZOLE SODIUM 40 MILLIGRAM(S): 20 TABLET, DELAYED RELEASE ORAL at 05:56

## 2020-01-01 RX ADMIN — GABAPENTIN 200 MILLIGRAM(S): 400 CAPSULE ORAL at 12:04

## 2020-01-01 RX ADMIN — Medication 650 MILLIGRAM(S): at 06:47

## 2020-01-01 RX ADMIN — Medication 3 UNIT(S): at 08:04

## 2020-01-01 RX ADMIN — MEROPENEM 100 MILLIGRAM(S): 1 INJECTION INTRAVENOUS at 17:46

## 2020-01-01 RX ADMIN — Medication 0.12 MILLIGRAM(S): at 06:41

## 2020-01-01 RX ADMIN — CARVEDILOL PHOSPHATE 3.12 MILLIGRAM(S): 80 CAPSULE, EXTENDED RELEASE ORAL at 06:45

## 2020-01-01 RX ADMIN — Medication 3 UNIT(S): at 11:39

## 2020-01-01 RX ADMIN — MEROPENEM 100 MILLIGRAM(S): 1 INJECTION INTRAVENOUS at 06:45

## 2020-01-01 RX ADMIN — GABAPENTIN 200 MILLIGRAM(S): 400 CAPSULE ORAL at 21:33

## 2020-01-01 RX ADMIN — Medication 650 MILLIGRAM(S): at 05:08

## 2020-01-01 RX ADMIN — Medication 100 MILLIGRAM(S): at 22:04

## 2020-01-01 RX ADMIN — Medication 650 MILLIGRAM(S): at 19:36

## 2020-01-01 RX ADMIN — Medication 667 MILLIGRAM(S): at 08:02

## 2020-01-01 RX ADMIN — Medication 1 MILLIGRAM(S): at 12:48

## 2020-01-01 RX ADMIN — CARVEDILOL PHOSPHATE 3.12 MILLIGRAM(S): 80 CAPSULE, EXTENDED RELEASE ORAL at 17:34

## 2020-01-01 RX ADMIN — CARVEDILOL PHOSPHATE 3.12 MILLIGRAM(S): 80 CAPSULE, EXTENDED RELEASE ORAL at 17:33

## 2020-01-01 RX ADMIN — Medication 1 MILLIGRAM(S): at 12:38

## 2020-01-01 RX ADMIN — Medication 1000 MILLILITER(S): at 08:07

## 2020-01-01 RX ADMIN — MIDODRINE HYDROCHLORIDE 5 MILLIGRAM(S): 2.5 TABLET ORAL at 17:34

## 2020-01-01 RX ADMIN — INSULIN GLARGINE 9 UNIT(S): 100 INJECTION, SOLUTION SUBCUTANEOUS at 08:04

## 2020-01-01 RX ADMIN — MORPHINE SULFATE 2 MILLIGRAM(S): 50 CAPSULE, EXTENDED RELEASE ORAL at 15:19

## 2020-01-01 RX ADMIN — Medication 650 MILLIGRAM(S): at 17:37

## 2020-01-01 RX ADMIN — Medication 1 APPLICATION(S): at 06:46

## 2020-01-01 RX ADMIN — Medication 650 MILLIGRAM(S): at 17:06

## 2020-01-01 RX ADMIN — MIDODRINE HYDROCHLORIDE 5 MILLIGRAM(S): 2.5 TABLET ORAL at 17:04

## 2020-01-01 RX ADMIN — BUMETANIDE 5 MG/HR: 0.25 INJECTION INTRAMUSCULAR; INTRAVENOUS at 05:09

## 2020-01-01 RX ADMIN — SCOPALAMINE 1 PATCH: 1 PATCH, EXTENDED RELEASE TRANSDERMAL at 21:02

## 2020-01-01 RX ADMIN — HEPARIN SODIUM 5000 UNIT(S): 5000 INJECTION INTRAVENOUS; SUBCUTANEOUS at 15:23

## 2020-01-01 RX ADMIN — MIDODRINE HYDROCHLORIDE 5 MILLIGRAM(S): 2.5 TABLET ORAL at 12:48

## 2020-01-01 RX ADMIN — Medication 650 MILLIGRAM(S): at 23:43

## 2020-01-01 RX ADMIN — SODIUM CHLORIDE 50 MILLILITER(S): 9 INJECTION, SOLUTION INTRAVENOUS at 04:17

## 2020-01-01 RX ADMIN — Medication 650 MILLIGRAM(S): at 00:23

## 2020-01-01 RX ADMIN — ATORVASTATIN CALCIUM 80 MILLIGRAM(S): 80 TABLET, FILM COATED ORAL at 21:54

## 2020-01-01 RX ADMIN — Medication 25 MILLILITER(S): at 02:01

## 2020-01-01 RX ADMIN — SODIUM ZIRCONIUM CYCLOSILICATE 5 GRAM(S): 10 POWDER, FOR SUSPENSION ORAL at 20:30

## 2020-01-01 RX ADMIN — Medication 1 MILLIGRAM(S): at 12:04

## 2020-01-01 RX ADMIN — Medication 1334 MILLIGRAM(S): at 12:27

## 2020-01-01 RX ADMIN — Medication 650 MILLIGRAM(S): at 02:23

## 2020-01-01 RX ADMIN — SODIUM ZIRCONIUM CYCLOSILICATE 10 GRAM(S): 10 POWDER, FOR SUSPENSION ORAL at 05:22

## 2020-01-01 RX ADMIN — Medication 650 MILLIGRAM(S): at 05:18

## 2020-01-01 RX ADMIN — CALCITRIOL 0.25 MICROGRAM(S): 0.5 CAPSULE ORAL at 12:51

## 2020-01-01 RX ADMIN — HEPARIN SODIUM 5000 UNIT(S): 5000 INJECTION INTRAVENOUS; SUBCUTANEOUS at 05:56

## 2020-01-01 RX ADMIN — Medication 650 MILLIGRAM(S): at 05:09

## 2020-01-01 RX ADMIN — MEROPENEM 100 MILLIGRAM(S): 1 INJECTION INTRAVENOUS at 17:11

## 2020-01-01 RX ADMIN — DAPTOMYCIN 110 MILLIGRAM(S): 500 INJECTION, POWDER, LYOPHILIZED, FOR SOLUTION INTRAVENOUS at 22:19

## 2020-01-01 RX ADMIN — Medication 650 MILLIGRAM(S): at 05:40

## 2020-01-01 RX ADMIN — ATORVASTATIN CALCIUM 80 MILLIGRAM(S): 80 TABLET, FILM COATED ORAL at 21:56

## 2020-01-01 RX ADMIN — Medication 1334 MILLIGRAM(S): at 17:04

## 2020-01-01 RX ADMIN — Medication 81 MILLIGRAM(S): at 21:33

## 2020-01-01 RX ADMIN — HEPARIN SODIUM 5000 UNIT(S): 5000 INJECTION INTRAVENOUS; SUBCUTANEOUS at 00:08

## 2020-01-01 RX ADMIN — Medication 1334 MILLIGRAM(S): at 17:11

## 2020-01-01 RX ADMIN — MEROPENEM 100 MILLIGRAM(S): 1 INJECTION INTRAVENOUS at 17:19

## 2020-01-01 RX ADMIN — MEROPENEM 100 MILLIGRAM(S): 1 INJECTION INTRAVENOUS at 06:12

## 2020-01-01 RX ADMIN — HEPARIN SODIUM 5000 UNIT(S): 5000 INJECTION INTRAVENOUS; SUBCUTANEOUS at 21:06

## 2020-01-01 RX ADMIN — Medication 4 UNIT(S): at 15:31

## 2020-01-01 RX ADMIN — HEPARIN SODIUM 5000 UNIT(S): 5000 INJECTION INTRAVENOUS; SUBCUTANEOUS at 15:03

## 2020-01-01 RX ADMIN — Medication 0.12 MILLIGRAM(S): at 05:56

## 2020-01-01 RX ADMIN — Medication 2: at 12:30

## 2020-01-01 RX ADMIN — Medication 81 MILLIGRAM(S): at 17:59

## 2020-01-01 RX ADMIN — Medication 1 APPLICATION(S): at 17:34

## 2020-01-01 RX ADMIN — Medication 6: at 15:31

## 2020-01-01 RX ADMIN — PANTOPRAZOLE SODIUM 40 MILLIGRAM(S): 20 TABLET, DELAYED RELEASE ORAL at 18:02

## 2020-01-01 RX ADMIN — HEPARIN SODIUM 5000 UNIT(S): 5000 INJECTION INTRAVENOUS; SUBCUTANEOUS at 13:56

## 2020-01-01 RX ADMIN — CALCITRIOL 0.25 MICROGRAM(S): 0.5 CAPSULE ORAL at 12:20

## 2020-01-01 RX ADMIN — MORPHINE SULFATE 4 MILLIGRAM(S): 50 CAPSULE, EXTENDED RELEASE ORAL at 20:12

## 2020-01-01 RX ADMIN — Medication 2: at 08:02

## 2020-01-01 RX ADMIN — MIDODRINE HYDROCHLORIDE 5 MILLIGRAM(S): 2.5 TABLET ORAL at 18:02

## 2020-01-01 RX ADMIN — CHLORHEXIDINE GLUCONATE 1 APPLICATION(S): 213 SOLUTION TOPICAL at 06:24

## 2020-01-01 RX ADMIN — Medication 1 APPLICATION(S): at 00:12

## 2020-01-01 RX ADMIN — PANTOPRAZOLE SODIUM 40 MILLIGRAM(S): 20 TABLET, DELAYED RELEASE ORAL at 06:47

## 2020-01-01 RX ADMIN — Medication 650 MILLIGRAM(S): at 06:04

## 2020-01-01 RX ADMIN — HEPARIN SODIUM 5000 UNIT(S): 5000 INJECTION INTRAVENOUS; SUBCUTANEOUS at 06:27

## 2020-01-01 RX ADMIN — INSULIN GLARGINE 12 UNIT(S): 100 INJECTION, SOLUTION SUBCUTANEOUS at 07:44

## 2020-01-01 RX ADMIN — MORPHINE SULFATE 4 MILLIGRAM(S): 50 CAPSULE, EXTENDED RELEASE ORAL at 00:38

## 2020-01-01 RX ADMIN — HEPARIN SODIUM 5000 UNIT(S): 5000 INJECTION INTRAVENOUS; SUBCUTANEOUS at 05:57

## 2020-01-01 RX ADMIN — SODIUM CHLORIDE 50 MILLILITER(S): 9 INJECTION, SOLUTION INTRAVENOUS at 15:05

## 2020-01-01 RX ADMIN — Medication 2 UNIT(S): at 22:35

## 2020-01-01 RX ADMIN — Medication 650 MILLIGRAM(S): at 01:15

## 2020-01-01 RX ADMIN — Medication 30 MILLILITER(S): at 18:29

## 2020-01-01 RX ADMIN — ATORVASTATIN CALCIUM 80 MILLIGRAM(S): 80 TABLET, FILM COATED ORAL at 21:06

## 2020-01-01 RX ADMIN — Medication 2: at 10:09

## 2020-01-01 RX ADMIN — MEROPENEM 100 MILLIGRAM(S): 1 INJECTION INTRAVENOUS at 17:22

## 2020-01-01 RX ADMIN — MEROPENEM 100 MILLIGRAM(S): 1 INJECTION INTRAVENOUS at 06:28

## 2020-01-01 RX ADMIN — SODIUM ZIRCONIUM CYCLOSILICATE 5 GRAM(S): 10 POWDER, FOR SUSPENSION ORAL at 14:09

## 2020-01-01 RX ADMIN — HEPARIN SODIUM 5000 UNIT(S): 5000 INJECTION INTRAVENOUS; SUBCUTANEOUS at 06:44

## 2020-01-01 RX ADMIN — Medication 40 MILLIGRAM(S): at 06:04

## 2020-01-01 RX ADMIN — PANTOPRAZOLE SODIUM 40 MILLIGRAM(S): 20 TABLET, DELAYED RELEASE ORAL at 05:52

## 2020-01-01 RX ADMIN — Medication 100 MILLIGRAM(S): at 05:49

## 2020-01-01 RX ADMIN — CHLORHEXIDINE GLUCONATE 1 APPLICATION(S): 213 SOLUTION TOPICAL at 05:08

## 2020-01-01 RX ADMIN — PANTOPRAZOLE SODIUM 40 MILLIGRAM(S): 20 TABLET, DELAYED RELEASE ORAL at 06:12

## 2020-01-01 RX ADMIN — Medication 1 MILLIGRAM(S): at 12:29

## 2020-01-01 RX ADMIN — MEROPENEM 100 MILLIGRAM(S): 1 INJECTION INTRAVENOUS at 21:06

## 2020-01-01 RX ADMIN — CARVEDILOL PHOSPHATE 3.12 MILLIGRAM(S): 80 CAPSULE, EXTENDED RELEASE ORAL at 05:21

## 2020-01-01 RX ADMIN — Medication 650 MILLIGRAM(S): at 16:22

## 2020-01-01 RX ADMIN — CALCITRIOL 0.25 MICROGRAM(S): 0.5 CAPSULE ORAL at 12:27

## 2020-01-01 RX ADMIN — HEPARIN SODIUM 5000 UNIT(S): 5000 INJECTION INTRAVENOUS; SUBCUTANEOUS at 22:28

## 2020-01-01 RX ADMIN — PANTOPRAZOLE SODIUM 40 MILLIGRAM(S): 20 TABLET, DELAYED RELEASE ORAL at 06:40

## 2020-01-01 RX ADMIN — MEROPENEM 100 MILLIGRAM(S): 1 INJECTION INTRAVENOUS at 05:57

## 2020-01-01 RX ADMIN — BUMETANIDE 2 MILLIGRAM(S): 0.25 INJECTION INTRAMUSCULAR; INTRAVENOUS at 11:58

## 2020-01-01 RX ADMIN — ATORVASTATIN CALCIUM 80 MILLIGRAM(S): 80 TABLET, FILM COATED ORAL at 21:14

## 2020-01-01 RX ADMIN — MEROPENEM 100 MILLIGRAM(S): 1 INJECTION INTRAVENOUS at 18:06

## 2020-01-01 RX ADMIN — Medication 4: at 07:44

## 2020-01-01 RX ADMIN — Medication 81 MILLIGRAM(S): at 12:47

## 2020-01-01 RX ADMIN — Medication 1 MILLIGRAM(S): at 12:20

## 2020-01-01 RX ADMIN — CALCITRIOL 0.25 MICROGRAM(S): 0.5 CAPSULE ORAL at 12:35

## 2020-01-01 RX ADMIN — Medication 81 MILLIGRAM(S): at 12:07

## 2020-01-01 RX ADMIN — CHLORHEXIDINE GLUCONATE 1 APPLICATION(S): 213 SOLUTION TOPICAL at 05:57

## 2020-01-01 RX ADMIN — CHLORHEXIDINE GLUCONATE 15 MILLILITER(S): 213 SOLUTION TOPICAL at 18:18

## 2020-01-01 RX ADMIN — MIDODRINE HYDROCHLORIDE 5 MILLIGRAM(S): 2.5 TABLET ORAL at 17:33

## 2020-01-01 RX ADMIN — CHLORHEXIDINE GLUCONATE 15 MILLILITER(S): 213 SOLUTION TOPICAL at 05:44

## 2020-01-01 RX ADMIN — CHLORHEXIDINE GLUCONATE 1 APPLICATION(S): 213 SOLUTION TOPICAL at 09:02

## 2020-01-01 RX ADMIN — HEPARIN SODIUM 5000 UNIT(S): 5000 INJECTION INTRAVENOUS; SUBCUTANEOUS at 21:46

## 2020-01-01 RX ADMIN — GABAPENTIN 200 MILLIGRAM(S): 400 CAPSULE ORAL at 18:01

## 2020-01-01 RX ADMIN — MIDODRINE HYDROCHLORIDE 5 MILLIGRAM(S): 2.5 TABLET ORAL at 06:05

## 2020-01-01 RX ADMIN — GABAPENTIN 200 MILLIGRAM(S): 400 CAPSULE ORAL at 14:09

## 2020-01-01 RX ADMIN — HEPARIN SODIUM 5000 UNIT(S): 5000 INJECTION INTRAVENOUS; SUBCUTANEOUS at 14:07

## 2020-01-01 RX ADMIN — Medication 2: at 08:07

## 2020-01-01 RX ADMIN — SODIUM ZIRCONIUM CYCLOSILICATE 5 GRAM(S): 10 POWDER, FOR SUSPENSION ORAL at 17:37

## 2020-01-01 RX ADMIN — Medication 650 MILLIGRAM(S): at 00:09

## 2020-01-01 RX ADMIN — Medication 100 MILLIGRAM(S): at 14:17

## 2020-01-01 RX ADMIN — CARVEDILOL PHOSPHATE 3.12 MILLIGRAM(S): 80 CAPSULE, EXTENDED RELEASE ORAL at 05:01

## 2020-01-01 RX ADMIN — SODIUM ZIRCONIUM CYCLOSILICATE 10 GRAM(S): 10 POWDER, FOR SUSPENSION ORAL at 06:00

## 2020-01-01 RX ADMIN — Medication 650 MILLIGRAM(S): at 17:34

## 2020-01-01 RX ADMIN — HEPARIN SODIUM 5000 UNIT(S): 5000 INJECTION INTRAVENOUS; SUBCUTANEOUS at 21:57

## 2020-01-01 RX ADMIN — Medication 81 MILLIGRAM(S): at 12:31

## 2020-01-01 RX ADMIN — CALCITRIOL 0.25 MICROGRAM(S): 0.5 CAPSULE ORAL at 12:09

## 2020-01-01 RX ADMIN — SODIUM ZIRCONIUM CYCLOSILICATE 10 GRAM(S): 10 POWDER, FOR SUSPENSION ORAL at 12:04

## 2020-01-01 RX ADMIN — Medication 40 MILLIGRAM(S): at 11:40

## 2020-01-01 RX ADMIN — CALCITRIOL 0.25 MICROGRAM(S): 0.5 CAPSULE ORAL at 11:59

## 2020-01-01 RX ADMIN — HEPARIN SODIUM 5000 UNIT(S): 5000 INJECTION INTRAVENOUS; SUBCUTANEOUS at 21:22

## 2020-01-01 RX ADMIN — Medication 650 MILLIGRAM(S): at 06:11

## 2020-01-01 RX ADMIN — HEPARIN SODIUM 5000 UNIT(S): 5000 INJECTION INTRAVENOUS; SUBCUTANEOUS at 21:00

## 2020-01-01 RX ADMIN — MEROPENEM 100 MILLIGRAM(S): 1 INJECTION INTRAVENOUS at 05:56

## 2020-01-01 RX ADMIN — Medication 650 MILLIGRAM(S): at 18:18

## 2020-01-01 RX ADMIN — CHLORHEXIDINE GLUCONATE 15 MILLILITER(S): 213 SOLUTION TOPICAL at 05:09

## 2020-01-01 RX ADMIN — PANTOPRAZOLE SODIUM 40 MILLIGRAM(S): 20 TABLET, DELAYED RELEASE ORAL at 21:34

## 2020-01-01 RX ADMIN — HEPARIN SODIUM 5000 UNIT(S): 5000 INJECTION INTRAVENOUS; SUBCUTANEOUS at 21:35

## 2020-01-01 RX ADMIN — Medication 1334 MILLIGRAM(S): at 12:35

## 2020-01-01 RX ADMIN — Medication 81 MILLIGRAM(S): at 12:38

## 2020-01-01 RX ADMIN — MIDODRINE HYDROCHLORIDE 5 MILLIGRAM(S): 2.5 TABLET ORAL at 12:52

## 2020-01-01 RX ADMIN — Medication 667 MILLIGRAM(S): at 11:40

## 2020-01-01 RX ADMIN — PANTOPRAZOLE SODIUM 40 MILLIGRAM(S): 20 TABLET, DELAYED RELEASE ORAL at 14:09

## 2020-01-01 RX ADMIN — Medication 650 MILLIGRAM(S): at 23:26

## 2020-01-01 RX ADMIN — Medication 650 MILLIGRAM(S): at 05:57

## 2020-01-01 RX ADMIN — DAPTOMYCIN 110 MILLIGRAM(S): 500 INJECTION, POWDER, LYOPHILIZED, FOR SOLUTION INTRAVENOUS at 20:48

## 2020-01-01 RX ADMIN — Medication 650 MILLIGRAM(S): at 23:05

## 2020-01-01 RX ADMIN — MORPHINE SULFATE 4 MILLIGRAM(S): 50 CAPSULE, EXTENDED RELEASE ORAL at 21:07

## 2020-01-01 RX ADMIN — ATORVASTATIN CALCIUM 80 MILLIGRAM(S): 80 TABLET, FILM COATED ORAL at 21:49

## 2020-01-01 RX ADMIN — SODIUM ZIRCONIUM CYCLOSILICATE 10 GRAM(S): 10 POWDER, FOR SUSPENSION ORAL at 08:07

## 2020-01-01 RX ADMIN — Medication 667 MILLIGRAM(S): at 21:07

## 2020-01-01 RX ADMIN — Medication 3 MILLILITER(S): at 20:37

## 2020-01-01 RX ADMIN — Medication 1 APPLICATION(S): at 12:07

## 2020-01-01 RX ADMIN — Medication 1334 MILLIGRAM(S): at 17:33

## 2020-01-01 RX ADMIN — SPIRONOLACTONE 50 MILLIGRAM(S): 25 TABLET, FILM COATED ORAL at 06:41

## 2020-01-01 RX ADMIN — Medication 1334 MILLIGRAM(S): at 12:03

## 2020-01-01 RX ADMIN — HEPARIN SODIUM 5000 UNIT(S): 5000 INJECTION INTRAVENOUS; SUBCUTANEOUS at 05:09

## 2020-01-01 RX ADMIN — Medication 650 MILLIGRAM(S): at 12:20

## 2020-01-01 RX ADMIN — Medication 650 MILLIGRAM(S): at 01:46

## 2020-01-01 RX ADMIN — ATORVASTATIN CALCIUM 80 MILLIGRAM(S): 80 TABLET, FILM COATED ORAL at 22:17

## 2020-01-01 RX ADMIN — LOSARTAN POTASSIUM 12.5 MILLIGRAM(S): 100 TABLET, FILM COATED ORAL at 21:33

## 2020-01-01 RX ADMIN — Medication 650 MILLIGRAM(S): at 12:35

## 2020-01-01 RX ADMIN — DAPTOMYCIN 114.4 MILLIGRAM(S): 500 INJECTION, POWDER, LYOPHILIZED, FOR SOLUTION INTRAVENOUS at 16:00

## 2020-01-01 RX ADMIN — MEROPENEM 100 MILLIGRAM(S): 1 INJECTION INTRAVENOUS at 05:09

## 2020-01-01 RX ADMIN — Medication 650 MILLIGRAM(S): at 18:08

## 2020-01-01 RX ADMIN — ATORVASTATIN CALCIUM 80 MILLIGRAM(S): 80 TABLET, FILM COATED ORAL at 21:00

## 2020-01-01 RX ADMIN — Medication 81 MILLIGRAM(S): at 12:20

## 2020-01-01 RX ADMIN — GABAPENTIN 200 MILLIGRAM(S): 400 CAPSULE ORAL at 12:20

## 2020-01-01 RX ADMIN — MIDAZOLAM HYDROCHLORIDE 1.36 MG/KG/HR: 1 INJECTION, SOLUTION INTRAMUSCULAR; INTRAVENOUS at 05:45

## 2020-01-01 RX ADMIN — GABAPENTIN 200 MILLIGRAM(S): 400 CAPSULE ORAL at 12:29

## 2020-01-01 RX ADMIN — MEROPENEM 100 MILLIGRAM(S): 1 INJECTION INTRAVENOUS at 05:55

## 2020-01-01 RX ADMIN — Medication 81 MILLIGRAM(S): at 12:11

## 2020-01-01 RX ADMIN — MIDODRINE HYDROCHLORIDE 5 MILLIGRAM(S): 2.5 TABLET ORAL at 06:11

## 2020-01-01 RX ADMIN — Medication 40 MILLIGRAM(S): at 21:39

## 2020-01-01 RX ADMIN — Medication 1 MILLIGRAM(S): at 12:52

## 2020-01-01 RX ADMIN — CHLORHEXIDINE GLUCONATE 1 APPLICATION(S): 213 SOLUTION TOPICAL at 05:45

## 2020-01-01 RX ADMIN — GABAPENTIN 200 MILLIGRAM(S): 400 CAPSULE ORAL at 12:10

## 2020-01-01 RX ADMIN — SODIUM ZIRCONIUM CYCLOSILICATE 5 GRAM(S): 10 POWDER, FOR SUSPENSION ORAL at 11:25

## 2020-01-01 RX ADMIN — MORPHINE SULFATE 2 MILLIGRAM(S): 50 CAPSULE, EXTENDED RELEASE ORAL at 22:46

## 2020-01-01 RX ADMIN — Medication 1500 MILLILITER(S): at 06:12

## 2020-01-01 RX ADMIN — INSULIN HUMAN 10 UNIT(S): 100 INJECTION, SOLUTION SUBCUTANEOUS at 20:41

## 2020-01-01 RX ADMIN — MEROPENEM 100 MILLIGRAM(S): 1 INJECTION INTRAVENOUS at 05:08

## 2020-01-01 RX ADMIN — MEROPENEM 100 MILLIGRAM(S): 1 INJECTION INTRAVENOUS at 18:18

## 2020-01-01 RX ADMIN — Medication 650 MILLIGRAM(S): at 00:27

## 2020-01-01 RX ADMIN — MEROPENEM 100 MILLIGRAM(S): 1 INJECTION INTRAVENOUS at 17:38

## 2020-01-01 RX ADMIN — MEROPENEM 100 MILLIGRAM(S): 1 INJECTION INTRAVENOUS at 05:18

## 2020-01-01 RX ADMIN — CHLORHEXIDINE GLUCONATE 15 MILLILITER(S): 213 SOLUTION TOPICAL at 05:08

## 2020-01-01 RX ADMIN — Medication 3 UNIT(S): at 08:02

## 2020-01-01 RX ADMIN — Medication 650 MILLIGRAM(S): at 05:43

## 2020-01-01 RX ADMIN — Medication 1 MILLIGRAM(S): at 11:45

## 2020-01-01 RX ADMIN — CARVEDILOL PHOSPHATE 3.12 MILLIGRAM(S): 80 CAPSULE, EXTENDED RELEASE ORAL at 17:05

## 2020-01-01 RX ADMIN — Medication 1334 MILLIGRAM(S): at 17:34

## 2020-01-01 RX ADMIN — INSULIN HUMAN 10 UNIT(S): 100 INJECTION, SOLUTION SUBCUTANEOUS at 05:52

## 2020-01-01 RX ADMIN — Medication 650 MILLIGRAM(S): at 17:11

## 2020-01-01 RX ADMIN — Medication 1334 MILLIGRAM(S): at 12:52

## 2020-01-01 RX ADMIN — Medication 1 MILLIGRAM(S): at 12:34

## 2020-01-01 RX ADMIN — SPIRONOLACTONE 50 MILLIGRAM(S): 25 TABLET, FILM COATED ORAL at 05:56

## 2020-01-01 RX ADMIN — Medication 81 MILLIGRAM(S): at 12:09

## 2020-01-01 RX ADMIN — Medication 1 MILLIGRAM(S): at 11:59

## 2020-01-01 RX ADMIN — SODIUM ZIRCONIUM CYCLOSILICATE 10 GRAM(S): 10 POWDER, FOR SUSPENSION ORAL at 08:36

## 2020-01-01 RX ADMIN — CARVEDILOL PHOSPHATE 3.12 MILLIGRAM(S): 80 CAPSULE, EXTENDED RELEASE ORAL at 21:08

## 2020-01-01 RX ADMIN — Medication 650 MILLIGRAM(S): at 18:03

## 2020-01-01 RX ADMIN — CALCITRIOL 0.25 MICROGRAM(S): 0.5 CAPSULE ORAL at 12:10

## 2020-01-01 RX ADMIN — MIDAZOLAM HYDROCHLORIDE 2 MILLIGRAM(S): 1 INJECTION, SOLUTION INTRAMUSCULAR; INTRAVENOUS at 17:06

## 2020-01-01 RX ADMIN — INSULIN GLARGINE 9 UNIT(S): 100 INJECTION, SOLUTION SUBCUTANEOUS at 09:03

## 2020-01-01 RX ADMIN — ATORVASTATIN CALCIUM 80 MILLIGRAM(S): 80 TABLET, FILM COATED ORAL at 21:32

## 2020-01-01 RX ADMIN — Medication 650 MILLIGRAM(S): at 00:04

## 2020-01-01 RX ADMIN — Medication 1000 MILLILITER(S): at 12:04

## 2020-01-01 RX ADMIN — Medication 1334 MILLIGRAM(S): at 17:05

## 2020-01-01 RX ADMIN — Medication 650 MILLIGRAM(S): at 15:30

## 2020-01-01 RX ADMIN — Medication 650 MILLIGRAM(S): at 05:22

## 2020-01-01 RX ADMIN — Medication 2: at 12:02

## 2020-01-01 RX ADMIN — Medication 0.12 MILLIGRAM(S): at 12:28

## 2020-01-01 RX ADMIN — MEROPENEM 100 MILLIGRAM(S): 1 INJECTION INTRAVENOUS at 23:23

## 2020-01-01 RX ADMIN — GABAPENTIN 200 MILLIGRAM(S): 400 CAPSULE ORAL at 12:34

## 2020-01-01 RX ADMIN — INSULIN HUMAN 10 UNIT(S): 100 INJECTION, SOLUTION SUBCUTANEOUS at 22:03

## 2020-01-01 RX ADMIN — Medication 8: at 17:04

## 2020-01-01 RX ADMIN — Medication 3 UNIT(S): at 12:47

## 2020-01-01 RX ADMIN — Medication 100 MILLIGRAM(S): at 22:29

## 2020-01-01 RX ADMIN — Medication 4 UNIT(S): at 15:34

## 2020-01-01 RX ADMIN — Medication 3: at 11:25

## 2020-01-01 RX ADMIN — HEPARIN SODIUM 5000 UNIT(S): 5000 INJECTION INTRAVENOUS; SUBCUTANEOUS at 22:17

## 2020-01-01 RX ADMIN — CHLORHEXIDINE GLUCONATE 1 APPLICATION(S): 213 SOLUTION TOPICAL at 05:52

## 2020-01-01 RX ADMIN — Medication 650 MILLIGRAM(S): at 11:26

## 2020-01-01 RX ADMIN — MORPHINE SULFATE 2 MILLIGRAM(S): 50 CAPSULE, EXTENDED RELEASE ORAL at 01:15

## 2020-01-01 RX ADMIN — Medication 100 MILLIGRAM(S): at 21:00

## 2020-01-01 RX ADMIN — MIDODRINE HYDROCHLORIDE 5 MILLIGRAM(S): 2.5 TABLET ORAL at 06:45

## 2020-01-01 RX ADMIN — SODIUM ZIRCONIUM CYCLOSILICATE 10 GRAM(S): 10 POWDER, FOR SUSPENSION ORAL at 20:08

## 2020-01-01 RX ADMIN — DAPTOMYCIN 110 MILLIGRAM(S): 500 INJECTION, POWDER, LYOPHILIZED, FOR SOLUTION INTRAVENOUS at 21:45

## 2020-01-01 RX ADMIN — Medication 650 MILLIGRAM(S): at 12:48

## 2020-01-01 RX ADMIN — Medication 1 MILLIGRAM(S): at 18:00

## 2020-01-01 RX ADMIN — SPIRONOLACTONE 50 MILLIGRAM(S): 25 TABLET, FILM COATED ORAL at 21:34

## 2020-01-01 RX ADMIN — Medication 100 MILLIGRAM(S): at 16:43

## 2020-01-01 RX ADMIN — PANTOPRAZOLE SODIUM 40 MILLIGRAM(S): 20 TABLET, DELAYED RELEASE ORAL at 06:11

## 2020-01-01 RX ADMIN — HEPARIN SODIUM 5000 UNIT(S): 5000 INJECTION INTRAVENOUS; SUBCUTANEOUS at 13:36

## 2020-01-01 RX ADMIN — HEPARIN SODIUM 5000 UNIT(S): 5000 INJECTION INTRAVENOUS; SUBCUTANEOUS at 06:11

## 2020-01-01 RX ADMIN — ATORVASTATIN CALCIUM 80 MILLIGRAM(S): 80 TABLET, FILM COATED ORAL at 00:09

## 2020-01-01 RX ADMIN — Medication 25 MILLILITER(S): at 06:41

## 2020-01-01 RX ADMIN — HEPARIN SODIUM 5000 UNIT(S): 5000 INJECTION INTRAVENOUS; SUBCUTANEOUS at 06:05

## 2020-01-01 RX ADMIN — INSULIN GLARGINE 9 UNIT(S): 100 INJECTION, SOLUTION SUBCUTANEOUS at 08:06

## 2020-01-01 RX ADMIN — SODIUM ZIRCONIUM CYCLOSILICATE 10 GRAM(S): 10 POWDER, FOR SUSPENSION ORAL at 06:04

## 2020-01-01 RX ADMIN — MIDODRINE HYDROCHLORIDE 5 MILLIGRAM(S): 2.5 TABLET ORAL at 05:01

## 2020-01-01 RX ADMIN — MEROPENEM 100 MILLIGRAM(S): 1 INJECTION INTRAVENOUS at 17:37

## 2020-01-01 RX ADMIN — SODIUM ZIRCONIUM CYCLOSILICATE 10 GRAM(S): 10 POWDER, FOR SUSPENSION ORAL at 12:11

## 2020-01-01 RX ADMIN — Medication 1 APPLICATION(S): at 18:04

## 2020-01-01 RX ADMIN — MIDODRINE HYDROCHLORIDE 5 MILLIGRAM(S): 2.5 TABLET ORAL at 17:17

## 2020-01-01 RX ADMIN — Medication 0.12 MILLIGRAM(S): at 12:04

## 2020-01-01 RX ADMIN — GABAPENTIN 200 MILLIGRAM(S): 400 CAPSULE ORAL at 12:54

## 2020-01-01 RX ADMIN — MEROPENEM 100 MILLIGRAM(S): 1 INJECTION INTRAVENOUS at 05:50

## 2020-01-01 RX ADMIN — Medication 650 MILLIGRAM(S): at 17:25

## 2020-01-01 RX ADMIN — HYDROMORPHONE HYDROCHLORIDE 0.5 MILLIGRAM(S): 2 INJECTION INTRAMUSCULAR; INTRAVENOUS; SUBCUTANEOUS at 23:33

## 2020-01-01 RX ADMIN — MORPHINE SULFATE 2 MILLIGRAM(S): 50 CAPSULE, EXTENDED RELEASE ORAL at 18:56

## 2020-01-01 RX ADMIN — Medication 2: at 08:04

## 2020-01-01 RX ADMIN — Medication 40 MILLIGRAM(S): at 05:56

## 2020-01-01 RX ADMIN — Medication 100 MILLIGRAM(S): at 04:22

## 2020-01-01 RX ADMIN — Medication 650 MILLIGRAM(S): at 12:30

## 2020-01-01 RX ADMIN — ATORVASTATIN CALCIUM 80 MILLIGRAM(S): 80 TABLET, FILM COATED ORAL at 21:33

## 2020-01-01 RX ADMIN — Medication 650 MILLIGRAM(S): at 03:17

## 2020-01-01 RX ADMIN — MEROPENEM 100 MILLIGRAM(S): 1 INJECTION INTRAVENOUS at 05:00

## 2020-01-01 RX ADMIN — SODIUM ZIRCONIUM CYCLOSILICATE 10 GRAM(S): 10 POWDER, FOR SUSPENSION ORAL at 17:34

## 2020-01-01 RX ADMIN — CARVEDILOL PHOSPHATE 3.12 MILLIGRAM(S): 80 CAPSULE, EXTENDED RELEASE ORAL at 18:03

## 2020-01-01 RX ADMIN — Medication 650 MILLIGRAM(S): at 14:09

## 2020-01-01 RX ADMIN — Medication 650 MILLIGRAM(S): at 12:21

## 2020-01-01 RX ADMIN — Medication 650 MILLIGRAM(S): at 23:24

## 2020-01-01 RX ADMIN — Medication 1 MILLIGRAM(S): at 12:11

## 2020-01-01 RX ADMIN — Medication 40 MILLIGRAM(S): at 20:16

## 2020-01-01 RX ADMIN — Medication 650 MILLIGRAM(S): at 23:33

## 2020-01-01 RX ADMIN — SODIUM ZIRCONIUM CYCLOSILICATE 5 GRAM(S): 10 POWDER, FOR SUSPENSION ORAL at 05:56

## 2020-01-01 RX ADMIN — MIDODRINE HYDROCHLORIDE 5 MILLIGRAM(S): 2.5 TABLET ORAL at 21:08

## 2020-01-01 RX ADMIN — Medication 250 MILLIGRAM(S): at 18:44

## 2020-01-01 RX ADMIN — INSULIN HUMAN 10 UNIT(S): 100 INJECTION, SOLUTION SUBCUTANEOUS at 06:12

## 2020-01-01 RX ADMIN — CARVEDILOL PHOSPHATE 3.12 MILLIGRAM(S): 80 CAPSULE, EXTENDED RELEASE ORAL at 05:59

## 2020-01-01 RX ADMIN — Medication 100 MILLIGRAM(S): at 05:45

## 2020-01-01 RX ADMIN — Medication 100 MILLIGRAM(S): at 15:15

## 2020-01-01 RX ADMIN — HEPARIN SODIUM 5000 UNIT(S): 5000 INJECTION INTRAVENOUS; SUBCUTANEOUS at 21:34

## 2020-01-01 RX ADMIN — HEPARIN SODIUM 5000 UNIT(S): 5000 INJECTION INTRAVENOUS; SUBCUTANEOUS at 14:17

## 2020-01-01 RX ADMIN — CALCITRIOL 0.25 MICROGRAM(S): 0.5 CAPSULE ORAL at 12:47

## 2020-01-01 RX ADMIN — CHLORHEXIDINE GLUCONATE 1 APPLICATION(S): 213 SOLUTION TOPICAL at 06:47

## 2020-01-01 RX ADMIN — Medication 650 MILLIGRAM(S): at 12:04

## 2020-01-01 RX ADMIN — Medication 667 MILLIGRAM(S): at 12:05

## 2020-01-01 RX ADMIN — Medication 0.12 MILLIGRAM(S): at 12:31

## 2020-01-01 RX ADMIN — PANTOPRAZOLE SODIUM 40 MILLIGRAM(S): 20 TABLET, DELAYED RELEASE ORAL at 11:26

## 2020-01-01 RX ADMIN — Medication 667 MILLIGRAM(S): at 17:33

## 2020-01-01 RX ADMIN — HEPARIN SODIUM 5000 UNIT(S): 5000 INJECTION INTRAVENOUS; SUBCUTANEOUS at 05:39

## 2020-01-01 RX ADMIN — Medication 80 MILLIGRAM(S): at 06:30

## 2020-01-01 RX ADMIN — Medication 100 MILLIGRAM(S): at 15:22

## 2020-01-01 RX ADMIN — Medication 3 UNIT(S): at 12:02

## 2020-01-01 RX ADMIN — HEPARIN SODIUM 5000 UNIT(S): 5000 INJECTION INTRAVENOUS; SUBCUTANEOUS at 22:04

## 2020-01-01 RX ADMIN — SODIUM ZIRCONIUM CYCLOSILICATE 10 GRAM(S): 10 POWDER, FOR SUSPENSION ORAL at 09:02

## 2020-01-01 RX ADMIN — SODIUM ZIRCONIUM CYCLOSILICATE 5 GRAM(S): 10 POWDER, FOR SUSPENSION ORAL at 08:43

## 2020-01-01 RX ADMIN — SPIRONOLACTONE 50 MILLIGRAM(S): 25 TABLET, FILM COATED ORAL at 12:38

## 2020-01-01 RX ADMIN — Medication 0.12 MILLIGRAM(S): at 18:00

## 2020-01-01 RX ADMIN — MIDODRINE HYDROCHLORIDE 5 MILLIGRAM(S): 2.5 TABLET ORAL at 17:35

## 2020-01-01 RX ADMIN — CHLORHEXIDINE GLUCONATE 1 APPLICATION(S): 213 SOLUTION TOPICAL at 06:12

## 2020-01-01 RX ADMIN — PANTOPRAZOLE SODIUM 40 MILLIGRAM(S): 20 TABLET, DELAYED RELEASE ORAL at 12:00

## 2020-01-01 RX ADMIN — Medication 100 MILLIGRAM(S): at 21:32

## 2020-01-01 RX ADMIN — SODIUM ZIRCONIUM CYCLOSILICATE 10 GRAM(S): 10 POWDER, FOR SUSPENSION ORAL at 17:06

## 2020-01-01 RX ADMIN — HEPARIN SODIUM 5000 UNIT(S): 5000 INJECTION INTRAVENOUS; SUBCUTANEOUS at 05:22

## 2020-01-01 RX ADMIN — SODIUM ZIRCONIUM CYCLOSILICATE 5 GRAM(S): 10 POWDER, FOR SUSPENSION ORAL at 20:19

## 2020-01-01 RX ADMIN — Medication 81 MILLIGRAM(S): at 11:45

## 2020-01-01 RX ADMIN — HYDROMORPHONE HYDROCHLORIDE 0.5 MILLIGRAM(S): 2 INJECTION INTRAMUSCULAR; INTRAVENOUS; SUBCUTANEOUS at 03:46

## 2020-01-01 RX ADMIN — Medication 6: at 12:22

## 2020-01-01 RX ADMIN — FENTANYL CITRATE 3.39 MICROGRAM(S)/KG/HR: 50 INJECTION INTRAVENOUS at 05:46

## 2020-01-01 RX ADMIN — GABAPENTIN 200 MILLIGRAM(S): 400 CAPSULE ORAL at 12:48

## 2020-01-01 RX ADMIN — Medication 1334 MILLIGRAM(S): at 08:08

## 2020-01-01 RX ADMIN — Medication 100 MILLIGRAM(S): at 06:53

## 2020-01-01 RX ADMIN — Medication 20000 INTERNATIONAL UNIT(S): at 14:06

## 2020-01-01 RX ADMIN — MIDODRINE HYDROCHLORIDE 5 MILLIGRAM(S): 2.5 TABLET ORAL at 12:04

## 2020-01-01 RX ADMIN — MEROPENEM 100 MILLIGRAM(S): 1 INJECTION INTRAVENOUS at 17:48

## 2020-01-01 RX ADMIN — Medication 650 MILLIGRAM(S): at 21:04

## 2020-01-01 RX ADMIN — PANTOPRAZOLE SODIUM 40 MILLIGRAM(S): 20 TABLET, DELAYED RELEASE ORAL at 05:01

## 2020-01-01 RX ADMIN — MEROPENEM 100 MILLIGRAM(S): 1 INJECTION INTRAVENOUS at 12:39

## 2020-01-01 RX ADMIN — MEROPENEM 100 MILLIGRAM(S): 1 INJECTION INTRAVENOUS at 06:02

## 2020-01-01 RX ADMIN — CHLORHEXIDINE GLUCONATE 15 MILLILITER(S): 213 SOLUTION TOPICAL at 17:19

## 2020-01-01 RX ADMIN — MORPHINE SULFATE 2 MILLIGRAM(S): 50 CAPSULE, EXTENDED RELEASE ORAL at 16:29

## 2020-01-01 RX ADMIN — Medication 0.12 MILLIGRAM(S): at 21:34

## 2020-01-01 RX ADMIN — MEROPENEM 100 MILLIGRAM(S): 1 INJECTION INTRAVENOUS at 05:43

## 2020-01-01 RX ADMIN — Medication 650 MILLIGRAM(S): at 12:00

## 2020-01-01 RX ADMIN — Medication 6: at 17:09

## 2020-01-01 RX ADMIN — Medication 12: at 15:34

## 2020-01-01 RX ADMIN — Medication 1 MILLIGRAM(S): at 11:25

## 2020-01-01 RX ADMIN — Medication 80 MILLIGRAM(S): at 15:15

## 2020-01-01 RX ADMIN — Medication 81 MILLIGRAM(S): at 12:54

## 2020-01-01 RX ADMIN — Medication 650 MILLIGRAM(S): at 12:09

## 2020-01-01 RX ADMIN — Medication 6.36 MICROGRAM(S)/KG/MIN: at 05:46

## 2020-01-01 RX ADMIN — Medication 80 MILLIGRAM(S): at 08:56

## 2020-01-01 RX ADMIN — Medication 1 MILLIGRAM(S): at 14:09

## 2020-01-01 RX ADMIN — Medication 40 MILLIGRAM(S): at 06:38

## 2020-01-01 RX ADMIN — CARVEDILOL PHOSPHATE 3.12 MILLIGRAM(S): 80 CAPSULE, EXTENDED RELEASE ORAL at 05:45

## 2020-01-01 RX ADMIN — ATORVASTATIN CALCIUM 80 MILLIGRAM(S): 80 TABLET, FILM COATED ORAL at 23:26

## 2020-01-01 RX ADMIN — Medication 81 MILLIGRAM(S): at 11:42

## 2020-01-01 RX ADMIN — GABAPENTIN 200 MILLIGRAM(S): 400 CAPSULE ORAL at 11:40

## 2020-01-01 RX ADMIN — SODIUM ZIRCONIUM CYCLOSILICATE 10 GRAM(S): 10 POWDER, FOR SUSPENSION ORAL at 05:43

## 2020-01-01 RX ADMIN — Medication 100 MILLIGRAM(S): at 06:46

## 2020-01-01 RX ADMIN — Medication 81 MILLIGRAM(S): at 12:35

## 2020-01-01 RX ADMIN — MEROPENEM 100 MILLIGRAM(S): 1 INJECTION INTRAVENOUS at 05:39

## 2020-01-01 RX ADMIN — Medication 81 MILLIGRAM(S): at 11:27

## 2020-01-01 RX ADMIN — LOSARTAN POTASSIUM 12.5 MILLIGRAM(S): 100 TABLET, FILM COATED ORAL at 05:56

## 2020-01-01 RX ADMIN — HEPARIN SODIUM 5000 UNIT(S): 5000 INJECTION INTRAVENOUS; SUBCUTANEOUS at 21:14

## 2020-01-01 RX ADMIN — Medication 40 MILLIGRAM(S): at 06:45

## 2020-01-01 RX ADMIN — MIDODRINE HYDROCHLORIDE 5 MILLIGRAM(S): 2.5 TABLET ORAL at 05:45

## 2020-01-01 RX ADMIN — INSULIN HUMAN 10 UNIT(S): 100 INJECTION, SOLUTION SUBCUTANEOUS at 08:07

## 2020-01-01 RX ADMIN — Medication 1 MILLIGRAM(S): at 12:31

## 2020-01-01 RX ADMIN — Medication 1334 MILLIGRAM(S): at 17:25

## 2020-01-01 RX ADMIN — Medication 100 MILLIGRAM(S): at 14:03

## 2020-01-01 RX ADMIN — Medication 2: at 05:19

## 2020-01-01 RX ADMIN — Medication 1334 MILLIGRAM(S): at 12:20

## 2020-01-01 RX ADMIN — Medication 1 MILLIGRAM(S): at 21:33

## 2020-01-01 RX ADMIN — SODIUM CHLORIDE 50 MILLILITER(S): 9 INJECTION INTRAMUSCULAR; INTRAVENOUS; SUBCUTANEOUS at 11:30

## 2020-01-01 RX ADMIN — Medication 1 MILLIGRAM(S): at 11:27

## 2020-01-01 RX ADMIN — PANTOPRAZOLE SODIUM 40 MILLIGRAM(S): 20 TABLET, DELAYED RELEASE ORAL at 11:40

## 2020-01-01 RX ADMIN — PIPERACILLIN AND TAZOBACTAM 200 GRAM(S): 4; .5 INJECTION, POWDER, LYOPHILIZED, FOR SOLUTION INTRAVENOUS at 06:36

## 2020-01-01 RX ADMIN — MEROPENEM 100 MILLIGRAM(S): 1 INJECTION INTRAVENOUS at 17:07

## 2020-01-01 RX ADMIN — MIDODRINE HYDROCHLORIDE 5 MILLIGRAM(S): 2.5 TABLET ORAL at 12:30

## 2020-01-01 RX ADMIN — Medication 650 MILLIGRAM(S): at 17:19

## 2020-01-01 RX ADMIN — CHLORHEXIDINE GLUCONATE 1 APPLICATION(S): 213 SOLUTION TOPICAL at 21:32

## 2020-01-01 RX ADMIN — HEPARIN SODIUM 5000 UNIT(S): 5000 INJECTION INTRAVENOUS; SUBCUTANEOUS at 05:01

## 2020-01-01 RX ADMIN — MIDODRINE HYDROCHLORIDE 5 MILLIGRAM(S): 2.5 TABLET ORAL at 16:34

## 2020-01-01 RX ADMIN — SODIUM ZIRCONIUM CYCLOSILICATE 5 GRAM(S): 10 POWDER, FOR SUSPENSION ORAL at 05:46

## 2020-01-01 RX ADMIN — Medication 1 APPLICATION(S): at 14:04

## 2020-01-01 RX ADMIN — SODIUM ZIRCONIUM CYCLOSILICATE 10 GRAM(S): 10 POWDER, FOR SUSPENSION ORAL at 21:14

## 2020-01-01 RX ADMIN — Medication 650 MILLIGRAM(S): at 01:05

## 2020-01-01 RX ADMIN — Medication 3 UNIT(S): at 05:19

## 2020-01-01 RX ADMIN — HEPARIN SODIUM 5000 UNIT(S): 5000 INJECTION INTRAVENOUS; SUBCUTANEOUS at 06:38

## 2020-01-01 RX ADMIN — INSULIN GLARGINE 12 UNIT(S): 100 INJECTION, SOLUTION SUBCUTANEOUS at 18:22

## 2020-01-01 RX ADMIN — PANTOPRAZOLE SODIUM 40 MILLIGRAM(S): 20 TABLET, DELAYED RELEASE ORAL at 08:07

## 2020-01-01 RX ADMIN — INSULIN GLARGINE 12 UNIT(S): 100 INJECTION, SOLUTION SUBCUTANEOUS at 09:05

## 2020-01-01 RX ADMIN — HEPARIN SODIUM 5000 UNIT(S): 5000 INJECTION INTRAVENOUS; SUBCUTANEOUS at 15:29

## 2020-01-01 RX ADMIN — Medication 650 MILLIGRAM(S): at 05:45

## 2020-01-01 RX ADMIN — SODIUM ZIRCONIUM CYCLOSILICATE 10 GRAM(S): 10 POWDER, FOR SUSPENSION ORAL at 17:33

## 2020-01-01 RX ADMIN — MIDODRINE HYDROCHLORIDE 5 MILLIGRAM(S): 2.5 TABLET ORAL at 12:09

## 2020-01-01 RX ADMIN — GABAPENTIN 200 MILLIGRAM(S): 400 CAPSULE ORAL at 12:31

## 2020-01-01 RX ADMIN — Medication 500 MILLILITER(S): at 05:58

## 2020-01-01 RX ADMIN — Medication 0.12 MILLIGRAM(S): at 14:09

## 2020-01-01 RX ADMIN — Medication 250 MILLIGRAM(S): at 08:25

## 2020-01-01 RX ADMIN — HEPARIN SODIUM 5000 UNIT(S): 5000 INJECTION INTRAVENOUS; SUBCUTANEOUS at 14:28

## 2020-01-01 RX ADMIN — GABAPENTIN 200 MILLIGRAM(S): 400 CAPSULE ORAL at 11:46

## 2020-01-01 RX ADMIN — ATORVASTATIN CALCIUM 80 MILLIGRAM(S): 80 TABLET, FILM COATED ORAL at 21:22

## 2020-01-01 RX ADMIN — MORPHINE SULFATE 2 MILLIGRAM(S): 50 CAPSULE, EXTENDED RELEASE ORAL at 17:53

## 2020-01-01 RX ADMIN — Medication 650 MILLIGRAM(S): at 12:31

## 2020-01-01 RX ADMIN — CARVEDILOL PHOSPHATE 3.12 MILLIGRAM(S): 80 CAPSULE, EXTENDED RELEASE ORAL at 06:11

## 2020-01-01 RX ADMIN — Medication 40 MILLIGRAM(S): at 05:01

## 2020-01-01 RX ADMIN — SODIUM CHLORIDE 25 MILLILITER(S): 9 INJECTION INTRAMUSCULAR; INTRAVENOUS; SUBCUTANEOUS at 12:32

## 2020-01-01 RX ADMIN — HEPARIN SODIUM 5000 UNIT(S): 5000 INJECTION INTRAVENOUS; SUBCUTANEOUS at 14:09

## 2020-01-01 RX ADMIN — HEPARIN SODIUM 5000 UNIT(S): 5000 INJECTION INTRAVENOUS; SUBCUTANEOUS at 13:37

## 2020-01-01 RX ADMIN — ATORVASTATIN CALCIUM 80 MILLIGRAM(S): 80 TABLET, FILM COATED ORAL at 22:28

## 2020-01-01 RX ADMIN — Medication 1500 MILLILITER(S): at 22:03

## 2020-01-01 RX ADMIN — PANTOPRAZOLE SODIUM 40 MILLIGRAM(S): 20 TABLET, DELAYED RELEASE ORAL at 06:05

## 2020-01-01 RX ADMIN — MIDODRINE HYDROCHLORIDE 5 MILLIGRAM(S): 2.5 TABLET ORAL at 05:22

## 2020-01-01 RX ADMIN — SODIUM ZIRCONIUM CYCLOSILICATE 10 GRAM(S): 10 POWDER, FOR SUSPENSION ORAL at 17:05

## 2020-01-01 RX ADMIN — Medication 40 MILLIGRAM(S): at 05:45

## 2020-01-01 RX ADMIN — Medication 81 MILLIGRAM(S): at 12:28

## 2020-01-01 RX ADMIN — GABAPENTIN 200 MILLIGRAM(S): 400 CAPSULE ORAL at 23:08

## 2020-01-01 RX ADMIN — PANTOPRAZOLE SODIUM 40 MILLIGRAM(S): 20 TABLET, DELAYED RELEASE ORAL at 06:06

## 2020-01-01 RX ADMIN — PANTOPRAZOLE SODIUM 40 MILLIGRAM(S): 20 TABLET, DELAYED RELEASE ORAL at 05:45

## 2020-01-01 RX ADMIN — Medication 40 MILLIGRAM(S): at 21:45

## 2020-01-01 RX ADMIN — Medication 0.12 MILLIGRAM(S): at 12:38

## 2020-01-01 RX ADMIN — Medication 1 MILLIGRAM(S): at 12:09

## 2020-01-01 RX ADMIN — Medication 650 MILLIGRAM(S): at 00:54

## 2020-01-01 RX ADMIN — Medication 650 MILLIGRAM(S): at 05:59

## 2020-01-01 RX ADMIN — HEPARIN SODIUM 5000 UNIT(S): 5000 INJECTION INTRAVENOUS; SUBCUTANEOUS at 17:35

## 2020-01-01 RX ADMIN — MIDODRINE HYDROCHLORIDE 5 MILLIGRAM(S): 2.5 TABLET ORAL at 12:35

## 2020-01-01 RX ADMIN — Medication 650 MILLIGRAM(S): at 18:10

## 2020-01-01 RX ADMIN — CHLORHEXIDINE GLUCONATE 1 APPLICATION(S): 213 SOLUTION TOPICAL at 06:43

## 2020-01-01 RX ADMIN — Medication 100 MILLIGRAM(S): at 05:58

## 2020-01-01 RX ADMIN — Medication 40 MILLIGRAM(S): at 12:47

## 2020-01-01 RX ADMIN — Medication 650 MILLIGRAM(S): at 21:06

## 2020-01-01 RX ADMIN — ATORVASTATIN CALCIUM 80 MILLIGRAM(S): 80 TABLET, FILM COATED ORAL at 22:04

## 2020-01-01 RX ADMIN — MEROPENEM 100 MILLIGRAM(S): 1 INJECTION INTRAVENOUS at 17:21

## 2020-01-01 RX ADMIN — Medication 81 MILLIGRAM(S): at 14:09

## 2020-01-01 RX ADMIN — MEROPENEM 100 MILLIGRAM(S): 1 INJECTION INTRAVENOUS at 17:42

## 2020-01-01 RX ADMIN — MEROPENEM 100 MILLIGRAM(S): 1 INJECTION INTRAVENOUS at 06:05

## 2020-01-01 RX ADMIN — Medication 10 UNIT(S): at 16:28

## 2020-01-01 RX ADMIN — Medication 1 MILLIGRAM(S): at 11:40

## 2020-01-01 RX ADMIN — ATORVASTATIN CALCIUM 80 MILLIGRAM(S): 80 TABLET, FILM COATED ORAL at 22:24

## 2020-01-01 RX ADMIN — Medication 650 MILLIGRAM(S): at 17:05

## 2020-01-01 RX ADMIN — Medication 667 MILLIGRAM(S): at 08:06

## 2020-01-01 RX ADMIN — Medication 650 MILLIGRAM(S): at 05:01

## 2020-01-01 RX ADMIN — SODIUM ZIRCONIUM CYCLOSILICATE 10 GRAM(S): 10 POWDER, FOR SUSPENSION ORAL at 21:05

## 2020-01-01 RX ADMIN — ATORVASTATIN CALCIUM 80 MILLIGRAM(S): 80 TABLET, FILM COATED ORAL at 21:46

## 2020-01-01 RX ADMIN — Medication 500 MILLILITER(S): at 06:45

## 2020-01-01 RX ADMIN — Medication 1000 MILLILITER(S): at 04:52

## 2020-01-01 RX ADMIN — CHLORHEXIDINE GLUCONATE 1 APPLICATION(S): 213 SOLUTION TOPICAL at 08:22

## 2020-01-01 RX ADMIN — Medication 650 MILLIGRAM(S): at 12:52

## 2020-01-01 RX ADMIN — CALCITRIOL 0.25 MICROGRAM(S): 0.5 CAPSULE ORAL at 17:59

## 2020-01-01 RX ADMIN — CHLORHEXIDINE GLUCONATE 1 APPLICATION(S): 213 SOLUTION TOPICAL at 06:03

## 2020-01-01 RX ADMIN — CARVEDILOL PHOSPHATE 3.12 MILLIGRAM(S): 80 CAPSULE, EXTENDED RELEASE ORAL at 17:25

## 2020-01-01 RX ADMIN — MEROPENEM 100 MILLIGRAM(S): 1 INJECTION INTRAVENOUS at 17:24

## 2020-01-01 RX ADMIN — Medication 60 MEQ/KG/HR: at 05:58

## 2020-01-01 RX ADMIN — DAPTOMYCIN 110 MILLIGRAM(S): 500 INJECTION, POWDER, LYOPHILIZED, FOR SOLUTION INTRAVENOUS at 00:09

## 2020-01-01 RX ADMIN — CHLORHEXIDINE GLUCONATE 1 APPLICATION(S): 213 SOLUTION TOPICAL at 06:07

## 2020-01-01 RX ADMIN — MEROPENEM 100 MILLIGRAM(S): 1 INJECTION INTRAVENOUS at 17:27

## 2020-01-01 RX ADMIN — MEROPENEM 100 MILLIGRAM(S): 1 INJECTION INTRAVENOUS at 18:10

## 2020-01-01 RX ADMIN — LOSARTAN POTASSIUM 12.5 MILLIGRAM(S): 100 TABLET, FILM COATED ORAL at 06:40

## 2020-01-01 RX ADMIN — Medication 81 MILLIGRAM(S): at 11:41

## 2020-01-01 RX ADMIN — HEPARIN SODIUM 5000 UNIT(S): 5000 INJECTION INTRAVENOUS; SUBCUTANEOUS at 05:45

## 2020-01-01 RX ADMIN — Medication 1334 MILLIGRAM(S): at 12:09

## 2020-01-01 RX ADMIN — Medication 1334 MILLIGRAM(S): at 18:03

## 2020-01-01 RX ADMIN — Medication 1 APPLICATION(S): at 12:50

## 2020-01-01 RX ADMIN — CALCITRIOL 0.25 MICROGRAM(S): 0.5 CAPSULE ORAL at 11:27

## 2020-01-01 RX ADMIN — Medication 1 APPLICATION(S): at 11:46

## 2020-01-01 RX ADMIN — HEPARIN SODIUM 5000 UNIT(S): 5000 INJECTION INTRAVENOUS; SUBCUTANEOUS at 06:00

## 2020-01-01 RX ADMIN — Medication 650 MILLIGRAM(S): at 17:33

## 2020-01-01 RX ADMIN — Medication 1334 MILLIGRAM(S): at 12:48

## 2020-01-01 RX ADMIN — Medication 81 MILLIGRAM(S): at 11:59

## 2020-01-01 RX ADMIN — CHLORHEXIDINE GLUCONATE 1 APPLICATION(S): 213 SOLUTION TOPICAL at 08:07

## 2020-01-01 RX ADMIN — HEPARIN SODIUM 5000 UNIT(S): 5000 INJECTION INTRAVENOUS; SUBCUTANEOUS at 23:26

## 2020-01-01 RX ADMIN — Medication 3 UNIT(S): at 17:32

## 2020-01-01 RX ADMIN — MORPHINE SULFATE 2 MILLIGRAM(S): 50 CAPSULE, EXTENDED RELEASE ORAL at 21:07

## 2020-01-01 RX ADMIN — Medication 4 UNIT(S): at 11:45

## 2020-01-01 RX ADMIN — MIDODRINE HYDROCHLORIDE 5 MILLIGRAM(S): 2.5 TABLET ORAL at 17:06

## 2020-01-01 RX ADMIN — SODIUM CHLORIDE 250 MILLILITER(S): 9 INJECTION, SOLUTION INTRAVENOUS at 06:12

## 2020-01-01 RX ADMIN — Medication 40 MILLIGRAM(S): at 18:01

## 2020-01-01 RX ADMIN — BUMETANIDE 5 MG/HR: 0.25 INJECTION INTRAMUSCULAR; INTRAVENOUS at 12:00

## 2020-01-01 RX ADMIN — Medication 6.36 MICROGRAM(S)/KG/MIN: at 05:09

## 2020-01-01 RX ADMIN — Medication 650 MILLIGRAM(S): at 14:03

## 2020-01-01 RX ADMIN — SODIUM ZIRCONIUM CYCLOSILICATE 10 GRAM(S): 10 POWDER, FOR SUSPENSION ORAL at 20:25

## 2020-01-01 RX ADMIN — SODIUM ZIRCONIUM CYCLOSILICATE 10 GRAM(S): 10 POWDER, FOR SUSPENSION ORAL at 20:15

## 2020-06-04 NOTE — H&P ADULT - ASSESSMENT
80 M with PMHx of anemia, ascites 2/2 CHF, CHF s/p biventricular AICD, CKD, CAD s/p CABGx5 on ASA, CKD, DM s/p R big toe amputation, HLD, HTN, hypothyroidism, PAD s/p R femoral stent who presents with altered mental status x 1 day and worsening left foot DFU.    # Severe sepsis secondary to DFU of the L hallux   - s/p R hallux amputation previously   - podiatry following for possible emergent debridement   - f/u xray of the foot   - NPO   - IVF- light due to HFrEF  - check bcx  - ID eval  - c/w Clinda, Vanco, and Unasyn  - check PAD due to h/o poor blood flow in LLE     # DM   - Check hba1c  - check lipid profile   - FS, start insulin regimen     # MICHAEL on CKD   - monitor BMP  - baseline  - check ua / urine electrolytes + pr:cr   - ivf: IV NS @ slow rate   - nephro eval if no improvement    # HFrEF w/ BiVICD  - Check TTE   - strict Is and Os     # CAD s/p CABGx5  - on ASA    Diet: NPO  DVT ppx; hep SQ 5KU  GI ppx: PTX  Dispo: MICU 80 M with PMHx of anemia, ascites 2/2 CHF, CHF s/p biventricular AICD, CKD, CAD s/p CABGx5 on ASA, CKD, DM s/p R big toe amputation, HLD, HTN, hypothyroidism, PAD s/p R femoral stent who presents with altered mental status x 1 day and worsening left foot DFU.    # Severe sepsis likely secondary to DFU of the L hallux   - s/p R hallux amputation previously   - lactate elevated 2.5, WBC 19.88 with left shift   - podiatry following for possible emergent debridement   - f/u xray of the foot   - NPO   - IVF- light due to HFrEF  - check bcx, ucx, sputum cx   - ID eval  - c/w Clinda, Vanco, and Zosyn  - check PAD due to h/o poor blood flow in LLE    # DM   - Check hba1c  - check lipid profile   - FS, start insulin regimen     # MICHAEL on CKD   - monitor BMP  - baseline  - check ua / urine electrolytes + pr:cr   - ivf: IV NS @ slow rate   - nephro eval if no improvement    # HFrEF w/ BiVICD  - Check TTE   - strict Is and Os     # CAD s/p CABGx5  - on ASA    Diet: NPO  DVT ppx; hep SQ 5KU  GI ppx: PTX  Dispo: MICU 80 M with PMHx of anemia, ascites 2/2 CHF, CHF s/p biventricular AICD, CKD, CAD s/p CABGx5 on ASA, CKD, DM s/p R big toe amputation, HLD, HTN, hypothyroidism, PAD s/p R femoral stent who presents with altered mental status x 1 day and worsening left foot DFU.    # Severe sepsis likely secondary to DFU of the L hallux   - s/p R hallux amputation previously   - lactate elevated 2.5, WBC 19.88 with left shift   - podiatry following for possible emergent debridement   - f/u xray of the foot   - NPO   - IVF- light due to HFrEF  - check bcx, ucx, sputum cx   - ID eval  - c/w Clinda, Vanco, and Zosyn  - check PAD due to h/o poor blood flow in LLE    # DM   - Check hba1c  - check lipid profile   - FS, start insulin regimen     # MICHAEL on CKD   - monitor BMP  - baseline  - check ua / urine electrolytes + pr:cr   - ivf: IV NS @ slow rate   - nephro eval if no improvement    # Type II NSTEMI vs CKD  - trop 0.17  - trend Mynor  - ECG without ischemic changes  - trend ECG     # HFrEF w/ BiVentricular aicd  - Check TTE   - strict Is and Os   - BNP >70k    # CAD s/p CABGx5  - on ASA    Diet: NPO  DVT ppx; hep SQ 5KU  GI ppx: PTX  Dispo: MICU 80 M with PMHx of anemia, ascites 2/2 CHF, CHF s/p biventricular AICD, CKD, CAD s/p CABGx5 on ASA, CKD, DM s/p R big toe amputation, HLD, HTN, hypothyroidism, PAD s/p R femoral stent who presents with altered mental status x 1 day and worsening left foot DFU.    # Severe sepsis likely secondary to DFU of the L hallux   - s/p R hallux amputation previously   - lactate elevated 2.5, WBC 19.88 with left shift   - CXR b/l lung opacities and small pleural effusions  - podiatry following for possible emergent debridement   - f/u xray of the foot   - NPO   - IVF- light due to HFrEF  - check bcx, ucx, sputum cx   - ID eval  - c/w Clinda, Vanco, and Zosyn  - check PAD due to h/o poor blood flow in LLE  - s/p bedside debridement with podiatry   - needs cardiac and med clearance for OR. ALSO COVID swab result pending. ED was told cannot send STAT COVID for OR pts. podiatry cannot operate without COVID result.    # DM   - Check hba1c  - check lipid profile   - FS, start insulin regimen     # MICHAEL on CKD   - monitor BMP  - baseline ct b/w 1.8-2.2  - check ua / urine electrolytes + pr:cr   - ivf: IV NS @ slow rate   - nephro eval if no improvement    # Type II NSTEMI   - trop 0.17  - trend Mynor  - ECG without ischemic changes  - trend ECG     # HFrEF w/ BiVentricular AICD, h/o CHB  - TTE from [facility] from 2/2020 EF 20%, severe TR, akinetic apex, inf spetum, mid and apical anterior septum. Mild-mod MR, + ascites from CHF  - rpt TTE, daughter states pt has been forgetful, may not have not been taking meds  - 8 lb weight gain in 4 days   - strict Is and Os   - BNP >70k  - daughter states one of the pacemaker leads is stuck in tricuspid valve   - cards c/s for clearance to OR  - pt will need diuresis, he is fluid overloaded on exam     # CAD s/p CABGx5  - on ASA, statin    Diet: NPO  DVT ppx; hep SQ 5KU  GI ppx: PTX  Dispo: MICU

## 2020-06-04 NOTE — ED ADULT TRIAGE NOTE - CHIEF COMPLAINT QUOTE
81 yo m presents to ED with daughter for worsening AMS for multiple hours and fever. Patient has diabetic foot ulcer on left foot. was given tylenol at 2245   100.2 then given motrin 81 yo m presents to ED with daughter for worsening AMS for multiple hours and fever. Patient has diabetic foot ulcer on left foot.  PMH - CAD with quintuple bypass, DM, HTN, HLD, CHF(EF 15%) high triglycerides, hypothyroid. PVD, biventricular pacemaker.  Left great toe amputation, Stent placement in left leg.  Patient had debridement last Wednesday, discoloration was noted to travel to anterior portion of foot.   was given tylenol at 2245   100.2 then given motrin

## 2020-06-04 NOTE — CONSULT NOTE ADULT - SUBJECTIVE AND OBJECTIVE BOX
VASCULAR SURGERY CONSULT NOTE      HPI:  80 M with PMHx of anemia, ascites 2/2 CHF, CHF s/p biventricular AICD, CKD, CAD s/p CABGx5 on ASA, CKD, DM s/p R big toe amputation, HLD, HTN, hypothyroidism, PAD s/p R femoral stent who presents with altered mental status x 1 day and worsening left foot DFU. The pt's daughter states that he saw his L hallux which looked worse, and necrotic from the prior week. He was also forgetful. She checked his temp at home, it was 100.2F, so she gave him tylenol. She states the pt had been taking augmentin for his DFU for some time. She thinks he forgot to take his meds for several days due to the infection.   She also states, the pt was recently dx with HFrEF, biventricular aicd placed february 2020. She has noticed an 8 lb weight gain in the past 4 days.   No fever. No vomiting. No chills. No back pain. No abd pain. No neck stiffness. No abd pain.     In the ED: VS were stable. The pt states he took tylenol prior to coming in to the ED.  xray of the foot showed Subcutaneous gas, podiatry was c/s. Pt was given clindamycin, zosyn, vanco. Labs notable for WBC 19 with L shift, plt 126, BUN/Cr 94/3.2, trop 0.17, BNP >70k, lactate 2.5. (04 Jun 2020 05:39)        PAST MEDICAL & SURGICAL HISTORY:  Chronic kidney disease  Anemia  Ascites  PAD (peripheral artery disease)  Hyperlipidemia  Hypothyroidism  Hypertension  Coronary artery disease  CHF (congestive heart failure)  Diabetes mellitus  Biventricular ICD (implantable cardioverter-defibrillator) in place  Amputation of toe of right foot  S/P arterial stent  S/P CABG x 5    Byetta (Stomach Upset)  linezolid (Rash; Urticaria; Flushing)  melatonin (Other)    Home Medications:  aspirin 81 mg oral tablet: 1 tab(s) orally once a day (04 Jun 2020 07:05)  atorvastatin 80 mg oral tablet: 1 tab(s) orally once a day (04 Jun 2020 07:10)  Digitek 125 mcg (0.125 mg) oral tablet: orally every other day (at bedtime) (04 Jun 2020 07:10)  famotidine 10 mg oral tablet: 1 tab(s) orally once (at bedtime) (04 Jun 2020 07:11)  ferrous sulfate 325 mg (65 mg elemental iron) oral tablet: 1 tab(s) orally every other day (at bedtime) (04 Jun 2020 07:09)  folic acid 1 mg oral tablet: 1 tab(s) orally once a day (04 Jun 2020 07:06)  gabapentin 100 mg oral tablet: 2 tab(s) orally once a day (at bedtime) (04 Jun 2020 07:11)  Januvia 50 mg oral tablet: 1 tab(s) orally once a day (04 Jun 2020 07:04)  losartan 25 mg oral tablet: 0.5 tab(s) orally once a day (04 Jun 2020 07:05)  spironolactone 50 mg oral tablet: 1 tab(s) orally 2 times a day (04 Jun 2020 07:05)  torsemide 100 mg oral tablet: 1 tab(s) orally once a day (04 Jun 2020 07:05)    No permtinent family history of PVD    REVIEW OF SYSTEMS:  GENERAL:                                         negative  SKIN:                                                 see HPI  OPTHALMOLOGIC:                          negative  ENMT:                                               negative  RESPIRATORY AND THORAX:        see HPI  CARDIOVASCULAR:                         see HPI  GASTROINTESTINAL:                       negative  NEPHROLOGY:                                  negative  MUSCULOSKELETAL:                       negative  NEUROLOGIC:                                   negative  PSYCHIATRIC:                                    negative  HEMATOLOGY/LYMPHATICS:         negative  ENDOCRINE:                                   see HPI  ALLERGIC/IMMUNOLOGIC:            negative    12 point ROS otherwise normal except as stated in HPI    PHYSICAL EXAM  Vital Signs Last 24 Hrs  T(C): 35.1 (04 Jun 2020 09:22), Max: 36.2 (04 Jun 2020 03:40)  T(F): 95.2 (04 Jun 2020 09:00), Max: 97.1 (04 Jun 2020 03:40)  HR: 68 (04 Jun 2020 10:00) (68 - 70)  BP: 113/64 (04 Jun 2020 10:00) (105/67 - 116/66)  BP(mean): 83 (04 Jun 2020 10:00) (83 - 86)  RR: 15 (04 Jun 2020 09:22) (11 - 18)  SpO2: 100% (04 Jun 2020 10:00) (99% - 100%)    Appearance: Normal	  HEENT:   Normal oral mucosa, PERRL, EOMI	  Neck: Supple, - JVD;   Cardiovascular: Normal S1 S2, No JVD, No murmurs,   Respiratory: Lungs clear to auscultation, No Rales, Rhonchi, Wheezing	  Gastrointestinal:  Soft, Non-tender, positive BS	  Skin: No rashes, No ecchymoses, No cyanosis  Extremities: Normal range of motion, No clubbing, cyanosis or edema  Derm: hematoma intact left dorsal medial forefoot with erythema/edema dorsal/plantar left foot  ulceration left submet 1 dried blood, no clinical signs of infection at this time.  ulceration left posterior lateral heel, stable ulcer  Vascular: Dorsalis Pedis and Posterior Tibial pulses 0/4.  Capillary re-fill time less then 3 seconds digits 1-5 bilateral.  B/L feet cold to touc  Neurologic: Non-focal  Psychiatry: A & O x 3, Mood & affect appropriate      PULSES:  Femoral:  Popliteal:  Dorsal Pedal: Right palpable, left faint palpable  Posterior Tibial: right palpable, left dopplerable  Capillary:    MEDICATIONS:   MEDICATIONS  (STANDING):  aspirin enteric coated 81 milliGRAM(s) Oral daily  atorvastatin 80 milliGRAM(s) Oral at bedtime  chlorhexidine 4% Liquid 1 Application(s) Topical <User Schedule>  clindamycin IVPB 600 milliGRAM(s) IV Intermittent every 8 hours  dextrose 5%. 1000 milliLiter(s) (50 mL/Hr) IV Continuous <Continuous>  dextrose 50% Injectable 12.5 Gram(s) IV Push once  digoxin     Tablet 0.125 milliGRAM(s) Oral daily  folic acid 1 milliGRAM(s) Oral daily  gabapentin 200 milliGRAM(s) Oral daily  heparin   Injectable 5000 Unit(s) SubCutaneous every 8 hours  insulin lispro (HumaLOG) corrective regimen sliding scale   SubCutaneous three times a day before meals  insulin regular Infusion 1 Unit(s)/Hr (1 mL/Hr) IV Continuous <Continuous>  losartan 12.5 milliGRAM(s) Oral daily  meropenem  IVPB 500 milliGRAM(s) IV Intermittent every 12 hours  pantoprazole   Suspension 40 milliGRAM(s) Oral before breakfast  sodium chloride 0.9%. 500 milliLiter(s) (20 mL/Hr) IV Continuous <Continuous>  spironolactone 50 milliGRAM(s) Oral daily    MEDICATIONS  (PRN):  dextrose 40% Gel 15 Gram(s) Oral once PRN Blood Glucose LESS THAN 70 milliGRAM(s)/deciliter  glucagon  Injectable 1 milliGRAM(s) IntraMuscular once PRN Glucose LESS THAN 70 milligrams/deciliter      LAB/STUDIES:                        8.9    19.88 )-----------( 129      ( 04 Jun 2020 04:12 )             27.0     06-04    130<L>  |  93<L>  |  94<HH>  ----------------------------<  401<H>  4.6   |  19  |  3.2<H>    Ca    8.2<L>      04 Jun 2020 04:12    TPro  6.4  /  Alb  3.0<L>  /  TBili  0.6  /  DBili  x   /  AST  30  /  ALT  19  /  AlkPhos  240<H>  06-04    PT/INR - ( 04 Jun 2020 04:12 )   PT: 17.70 sec;   INR: 1.54 ratio         PTT - ( 04 Jun 2020 04:12 )  PTT:33.0 sec  LIVER FUNCTIONS - ( 04 Jun 2020 04:12 )  Alb: 3.0 g/dL / Pro: 6.4 g/dL / ALK PHOS: 240 U/L / ALT: 19 U/L / AST: 30 U/L / GGT: x               CARDIAC MARKERS ( 04 Jun 2020 04:12 )  x     / 0.17 ng/mL / x     / x     / x            IMAGING:  < from: VA Duplex Lower Extrem Arterial, Bilat (06.04.20 @ 09:16) >  The Bilateral common femoral, deep femoral, superficial femoral, popliteal, anterior tibial, posterior tibial and peroneal arteries were visualized.      There is no evidence of significant arterial occlusive disease or arterial occlusions in the bilateral lower extremities.    Impression:    Normal arterial flow in bilateral lower extremities.

## 2020-06-04 NOTE — CONSULT NOTE ADULT - ASSESSMENT
IMPRESSION:    Sepsis present on admission   LLE DFU / Nec fas?  HO HFREF  HO DM< and PVD    PLAN:    CNS:  no depressants     HEENT: Oral care    PULMONARY:  HOB @ 45 degrees.  Aspiration precautions     CARDIOVASCULAR:  I=O.  Avoid volume overload     GI: GI prophylaxis.  NPO.     RENAL:  Follow up lytes.  Correct as needed.  Renal eval     INFECTIOUS DISEASE: Follow up cultures.  Marcelo / Vanc renally dosed.  Clindamycin 600 mg Q8.  FU with ID     HEMATOLOGICAL:  DVT prophylaxis.  Vascular eval.      ENDOCRINE:  Follow up FS.  Insulin protocol if needed.    MUSCULOSKELETAL:  Burn and Podiatry eval     MICu for now     Prognosis guarded

## 2020-06-04 NOTE — CONSULT NOTE ADULT - ASSESSMENT
ASSESSMENT  80 M with PMHx of anemia, ascites 2/2 CHF, CHF s/p biventricular AICD, CKD, CAD s/p CABGx5 on ASA, CKD, DM s/p R great toe amputation, HLD, HTN, hypothyroidism, PAD s/p R femoral stent who presents with altered mental status x 1 day and worsening left foot DFU. Was on augmentin as outpatient     IMPRESSION  #    WBC 19  #Elevated trop/BNP  #Lactic acidosis Blood Gas Venous - Lactate: 2.5 mmoL/L (06-04-20 @ 04:51)  #Hyponatremia   #CXR Bilateral lung opacities, right greater than left. No pneumothorax.  #DM   #Abx allergy: linezolid (Rash; Urticaria; Flushing)    RECOMMENDATIONS  This is a preliminary incomplete pended note, all final recommendations to follow after interview and examination of the patient.     Spectra [id number] ASSESSMENT  80 M with PMHx of anemia, ascites 2/2 CHF, CHF s/p biventricular AICD, CKD, CAD s/p CABGx5 on ASA, CKD, DM s/p R great toe amputation, HLD, HTN, hypothyroidism, PAD s/p R femoral stent who presents with altered mental status x 1 day and worsening left foot DFU. Was on augmentin as outpatient     IMPRESSION  #Necrotizing L foot infection s/p bedside I&D   < from: Xray Foot AP + Lateral + Oblique, Left (06.04.20 @ 04:59) >  Subcutaneous emphysema at the first digit MTP joint. Findings concerning for necrotizing soft tissue infection.    WBC 19  #Elevated trop/BNP  #Lactic acidosis Blood Gas Venous - Lactate: 2.5 mmoL/L (06-04-20 @ 04:51)  #Hyponatremia   #CXR Bilateral lung opacities, right greater than left. No pneumothorax.  #DM   #Abx allergy: linezolid (Rash; Urticaria; Flushing)    RECOMMENDATIONS  - CHANGE cefepime 2g q24h IV & flagyl 500mg q8h IV  - HOLD Vanc and check random level in AM  - Clinda 900mg q8h IV   - Appreciate Podiatry consult- recommend CT  - ESR, CRP     Spectra [id number]

## 2020-06-04 NOTE — H&P ADULT - NSHPPHYSICALEXAM_GEN_ALL_CORE
GENERAL: No acute distress, well-developed  HEAD:  Atraumatic, Normocephalic  EYES: EOMI, PERRLA, conjunctiva and sclera clear  NECK: Supple, no lymphadenopathy, no JVD  CHEST/LUNG: CTAB; No wheezes, rales, or rhonchi  HEART: paced rhythm No murmurs, rubs, or gallops  ABDOMEN: Soft, slight tenderness in RUQ, distended; normal bowel sounds, no organomegaly  EXTREMITIES:  R hallux amputation, L hallux lateral aspect with eschar, No clubbing, cyanosis, 3+ pitting edema b/l   NEUROLOGY: A&O x 3, slow in question answer   SKIN: No rashes or lesions

## 2020-06-04 NOTE — CONSULT NOTE ADULT - SUBJECTIVE AND OBJECTIVE BOX
WILL VIEYRA  80y, Male  Allergy: Byetta (Stomach Upset)  linezolid (Rash; Urticaria; Flushing)  melatonin (Other)      CHIEF COMPLAINT: necrotizing fascitis (04 Jun 2020 05:39)      LOS      HPI:  80 M with PMHx of anemia, ascites 2/2 CHF, CHF s/p biventricular AICD, CKD, CAD s/p CABGx5 on ASA, CKD, DM s/p R great toe amputation, HLD, HTN, hypothyroidism, PAD s/p R femoral stent who presents with altered mental status x 1 day and worsening left foot DFU. The pt's daughter states that he saw his L hallux which looked worse, and necrotic from the prior week. He was also forgetful. She checked his temp at home, it was 100.2F, so she gave him tylenol. She states the pt had been taking augmentin for his DFU for some time. She thinks he forgot to take his meds for several days due to the infection.   She also states, the pt was recently dx with HFrEF, biventricular aicd placed february 2020. She has noticed an 8 lb weight gain in the past 4 days.   No fever. No vomiting. No chills. No back pain. No abd pain. No neck stiffness. No abd pain.     In the ED: VS were stable. The pt states he took tylenol prior to coming in to the ED.  xray of the foot showed Subcutaneous gas, podiatry was c/s. Pt was given clindamycin, zosyn, vanco. Labs notable for WBC 19 with L shift, plt 126, BUN/Cr 94/3.2, trop 0.17, BNP >70k, lactate 2.5. (04 Jun 2020 05:39)      INFECTIOUS DISEASE HISTORY:    PAST MEDICAL & SURGICAL HISTORY:  Chronic kidney disease  Anemia  Ascites  PAD (peripheral artery disease)  Hyperlipidemia  Hypothyroidism  Hypertension  Coronary artery disease  CHF (congestive heart failure)  Diabetes mellitus  Biventricular ICD (implantable cardioverter-defibrillator) in place  Amputation of toe of right foot  S/P arterial stent  S/P CABG x 5      FAMILY HISTORY      SOCIAL HISTORY  Social History:  lives with wife  [employer]   Denies EtOH, smoking and drug use (04 Jun 2020 05:39)        ROS      VITALS:  T(F): 95.8, Max: 97.1 (06-04-20 @ 03:40)  HR: 69  BP: 110/70  RR: 12Vital Signs Last 24 Hrs  T(C): 35.4 (04 Jun 2020 07:00), Max: 36.2 (04 Jun 2020 03:40)  T(F): 95.8 (04 Jun 2020 07:00), Max: 97.1 (04 Jun 2020 03:40)  HR: 69 (04 Jun 2020 07:00) (69 - 70)  BP: 110/70 (04 Jun 2020 07:00) (110/70 - 116/55)  BP(mean): 84 (04 Jun 2020 07:00) (84 - 84)  RR: 12 (04 Jun 2020 07:00) (12 - 18)  SpO2: 100% (04 Jun 2020 03:40) (100% - 100%)    PHYSICAL EXAM:  **    TESTS & MEASUREMENTS:                        8.9    19.88 )-----------( 129      ( 04 Jun 2020 04:12 )             27.0     06-04    130<L>  |  93<L>  |  94<HH>  ----------------------------<  401<H>  4.6   |  19  |  3.2<H>    Ca    8.2<L>      04 Jun 2020 04:12    TPro  6.4  /  Alb  3.0<L>  /  TBili  0.6  /  DBili  x   /  AST  30  /  ALT  19  /  AlkPhos  240<H>  06-04    eGFR if Non African American: 17 mL/min/1.73M2 (06-04-20 @ 04:12)  eGFR if African American: 20 mL/min/1.73M2 (06-04-20 @ 04:12)    LIVER FUNCTIONS - ( 04 Jun 2020 04:12 )  Alb: 3.0 g/dL / Pro: 6.4 g/dL / ALK PHOS: 240 U/L / ALT: 19 U/L / AST: 30 U/L / GGT: x                 Blood Gas Venous - Lactate: 2.5 mmoL/L (06-04-20 @ 04:51)      INFECTIOUS DISEASES TESTING      RADIOLOGY & ADDITIONAL TESTS:  I have personally reviewed the last Chest xray  CXR  Xray Chest 1 View AP/PA:   EXAM:  XR CHEST FRONTAL 1V            PROCEDURE DATE:  06/04/2020            INTERPRETATION:  Clinical History / Reason for exam: Sepsis    Comparison : Chest radiograph None.    Technique/Positioning: AP portable.    Findings:    Support devices: None.    Cardiac/mediastinum/hilum: Cardiomegaly with biventricular AICD/pacemaker.    Lung parenchyma/Pleura: Right greater than left opacities and small effusions. No pneumothorax    Skeleton/soft tissues: Unremarkable.    Impression:      Bilateral lung opacities, right greater than left. No pneumothorax.                  QUAN MARINA M.D., ATTENDING RADIOLOGIST  This document has been electronically signed. Jun 4 2020  5:40AM             (06-04-20 @ 04:59)      CT      CARDIOLOGY TESTING  12 Lead ECG:   Ventricular Rate 73 BPM    Atrial Rate 73 BPM    P-R Interval 218 ms    QRS Duration 148 ms    Q-T Interval 430 ms    QTC Calculation(Bezet) 473 ms    R Axis 249 degrees    T Axis 45 degrees    Diagnosis Line AV dual-paced rhythm with prolonged AV conduction with occasional Premature  ventricular complexes  Biventricular pacemaker detected  Abnormal ECG    Confirmed by Satya Peralta ([id number]) on 6/4/2020 6:55:11 AM (06-04-20 @ 04:33)      MEDICATIONS  aspirin enteric coated 81  atorvastatin 80  chlorhexidine 4% Liquid 1  clindamycin IVPB 900  dextrose 5%. 1000  dextrose 50% Injectable 12.5  digoxin     Tablet 0.125  folic acid 1  gabapentin 200  heparin   Injectable 5000  insulin lispro (HumaLOG) corrective regimen sliding scale   losartan 12.5  pantoprazole   Suspension 40  sodium chloride 0.9%. 500  spironolactone 50  torsemide 100  vancomycin  IVPB 1000      Weight  Weight (kg): 60.3 (06-04-20 @ 07:00)    ANTIBIOTICS:  clindamycin IVPB 900 milliGRAM(s) IV Intermittent every 8 hours  vancomycin  IVPB 1000 milliGRAM(s) IV Intermittent once      ALLERGIES:  Byetta (Stomach Upset)  linezolid (Rash; Urticaria; Flushing)  melatonin (Other) WILL VIEYRA  80y, Male  Allergy: Byetta (Stomach Upset)  linezolid (Rash; Urticaria; Flushing)  melatonin (Other)      CHIEF COMPLAINT: necrotizing fascitis (04 Jun 2020 05:39)      LOS      HPI:  80 M with PMHx of anemia, ascites 2/2 CHF, CHF s/p biventricular AICD, CKD, CAD s/p CABGx5 on ASA, CKD, DM s/p R great toe amputation, HLD, HTN, hypothyroidism, PAD s/p R femoral stent who presents with altered mental status x 1 day and worsening left foot DFU. The pt's daughter states that he saw his L hallux which looked worse, and necrotic from the prior week. He was also forgetful. She checked his temp at home, it was 100.2F, so she gave him tylenol. She states the pt had been taking augmentin for his DFU for some time. She thinks he forgot to take his meds for several days due to the infection.   She also states, the pt was recently dx with HFrEF, biventricular aicd placed february 2020. She has noticed an 8 lb weight gain in the past 4 days.   No fever. No vomiting. No chills. No back pain. No abd pain. No neck stiffness. No abd pain.     In the ED: VS were stable. The pt states he took tylenol prior to coming in to the ED.  xray of the foot showed Subcutaneous gas, podiatry was c/s. Pt was given clindamycin, zosyn, vanco. Labs notable for WBC 19 with L shift, plt 126, BUN/Cr 94/3.2, trop 0.17, BNP >70k, lactate 2.5. (04 Jun 2020 05:39)      INFECTIOUS DISEASE HISTORY:  pt denies fever, chills  no abx allergies    PAST MEDICAL & SURGICAL HISTORY:  Chronic kidney disease  Anemia  Ascites  PAD (peripheral artery disease)  Hyperlipidemia  Hypothyroidism  Hypertension  Coronary artery disease  CHF (congestive heart failure)  Diabetes mellitus  Biventricular ICD (implantable cardioverter-defibrillator) in place  Amputation of toe of right foot  S/P arterial stent  S/P CABG x 5      FAMILY HISTORY      SOCIAL HISTORY  Social History:  lives with wife  [employer]   Denies EtOH, smoking and drug use (04 Jun 2020 05:39)        ROS      VITALS:  T(F): 95.8, Max: 97.1 (06-04-20 @ 03:40)  HR: 69  BP: 110/70  RR: 12Vital Signs Last 24 Hrs  T(C): 35.4 (04 Jun 2020 07:00), Max: 36.2 (04 Jun 2020 03:40)  T(F): 95.8 (04 Jun 2020 07:00), Max: 97.1 (04 Jun 2020 03:40)  HR: 69 (04 Jun 2020 07:00) (69 - 70)  BP: 110/70 (04 Jun 2020 07:00) (110/70 - 116/55)  BP(mean): 84 (04 Jun 2020 07:00) (84 - 84)  RR: 12 (04 Jun 2020 07:00) (12 - 18)  SpO2: 100% (04 Jun 2020 03:40) (100% - 100%)    PHYSICAL EXAM:  Gen: Thin appearing M NAD, resting in bed  HEENT: Normocephalic, atraumatic  Neck: supple, no lymphadenopathy  CV: Regular rate & regular rhythm  Lungs: decreased BS at bases, no fremitus  Abdomen: Soft, BS present  Ext: hematoma L foot, ulcer L submet, no purulence  ulceration left posterior lateral heel  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    TESTS & MEASUREMENTS:                        8.9    19.88 )-----------( 129      ( 04 Jun 2020 04:12 )             27.0     06-04    130<L>  |  93<L>  |  94<HH>  ----------------------------<  401<H>  4.6   |  19  |  3.2<H>    Ca    8.2<L>      04 Jun 2020 04:12    TPro  6.4  /  Alb  3.0<L>  /  TBili  0.6  /  DBili  x   /  AST  30  /  ALT  19  /  AlkPhos  240<H>  06-04    eGFR if Non African American: 17 mL/min/1.73M2 (06-04-20 @ 04:12)  eGFR if African American: 20 mL/min/1.73M2 (06-04-20 @ 04:12)    LIVER FUNCTIONS - ( 04 Jun 2020 04:12 )  Alb: 3.0 g/dL / Pro: 6.4 g/dL / ALK PHOS: 240 U/L / ALT: 19 U/L / AST: 30 U/L / GGT: x                 Blood Gas Venous - Lactate: 2.5 mmoL/L (06-04-20 @ 04:51)      INFECTIOUS DISEASES TESTING      RADIOLOGY & ADDITIONAL TESTS:  I have personally reviewed the last Chest xray  CXR  Xray Chest 1 View AP/PA:   EXAM:  XR CHEST FRONTAL 1V            PROCEDURE DATE:  06/04/2020            INTERPRETATION:  Clinical History / Reason for exam: Sepsis    Comparison : Chest radiograph None.    Technique/Positioning: AP portable.    Findings:    Support devices: None.    Cardiac/mediastinum/hilum: Cardiomegaly with biventricular AICD/pacemaker.    Lung parenchyma/Pleura: Right greater than left opacities and small effusions. No pneumothorax    Skeleton/soft tissues: Unremarkable.    Impression:      Bilateral lung opacities, right greater than left. No pneumothorax.    QUAN MARINA M.D., ATTENDING RADIOLOGIST  This document has been electronically signed. Jun 4 2020  5:40AM           (06-04-20 @ 04:59)      CT      CARDIOLOGY TESTING  12 Lead ECG:   Ventricular Rate 73 BPM    Atrial Rate 73 BPM    P-R Interval 218 ms    QRS Duration 148 ms    Q-T Interval 430 ms    QTC Calculation(Bezet) 473 ms    R Axis 249 degrees    T Axis 45 degrees    Diagnosis Line AV dual-paced rhythm with prolonged AV conduction with occasional Premature  ventricular complexes  Biventricular pacemaker detected  Abnormal ECG    Confirmed by Satya Peralta ([id number]) on 6/4/2020 6:55:11 AM (06-04-20 @ 04:33)      MEDICATIONS  aspirin enteric coated 81  atorvastatin 80  chlorhexidine 4% Liquid 1  clindamycin IVPB 900  dextrose 5%. 1000  dextrose 50% Injectable 12.5  digoxin     Tablet 0.125  folic acid 1  gabapentin 200  heparin   Injectable 5000  insulin lispro (HumaLOG) corrective regimen sliding scale   losartan 12.5  pantoprazole   Suspension 40  sodium chloride 0.9%. 500  spironolactone 50  torsemide 100  vancomycin  IVPB 1000      Weight  Weight (kg): 60.3 (06-04-20 @ 07:00)    ANTIBIOTICS:  clindamycin IVPB 900 milliGRAM(s) IV Intermittent every 8 hours  vancomycin  IVPB 1000 milliGRAM(s) IV Intermittent once      ALLERGIES:  Byetta (Stomach Upset)  linezolid (Rash; Urticaria; Flushing)  melatonin (Other)

## 2020-06-04 NOTE — H&P ADULT - HISTORY OF PRESENT ILLNESS
80 M with PMHx of anemia, ascites 2/2 CHF, CHF s/p biventricular AICD, CKD, CAD s/p CABGx5 on ASA, CKD, DM s/p R big toe amputation, HLD, HTN, hypothyroidism, PAD s/p R femoral stent who presents with altered mental status x 1 day and worsening left foot DFU. No fever. No vomiting. No chills. No back pain. No abd pain. No neck stiffness. No abd pain.     In the ED: VS were stable. The pt states he took tylenol prior to coming in to the ED.  xray of the foot showed Subcutaneous gas, podiatry was c/s. Pt was given clindamycin, zosyn, vanco. Labs notable for WBC 19 with L shift, plt 126, BUN/Cr 94/3.2, trop 0.17, BNP >70k, lactate 2.5. 80 M with PMHx of anemia, ascites 2/2 CHF, CHF s/p biventricular AICD, CKD, CAD s/p CABGx5 on ASA, CKD, DM s/p R big toe amputation, HLD, HTN, hypothyroidism, PAD s/p R femoral stent who presents with altered mental status x 1 day and worsening left foot DFU. The pt's daughter states that he saw his L hallux which looked worse, and necrotic from the prior week. He was also forgetful. She checked his temp at home, it was 100.2F, so she gave him tylenol. She states the pt had been taking augmentin for his DFU for some time. She thinks he forgot to take his meds for several days due to the infection.   She also states, the pt was recently dx with HFrEF, biventricular aicd placed february 2020. She has noticed an 8 lb weight gain in the past 4 days.   No fever. No vomiting. No chills. No back pain. No abd pain. No neck stiffness. No abd pain.     In the ED: VS were stable. The pt states he took tylenol prior to coming in to the ED.  xray of the foot showed Subcutaneous gas, podiatry was c/s. Pt was given clindamycin, zosyn, vanco. Labs notable for WBC 19 with L shift, plt 126, BUN/Cr 94/3.2, trop 0.17, BNP >70k, lactate 2.5.

## 2020-06-04 NOTE — ED ADULT NURSE NOTE - PMH
Anemia    Ascites    CHF (congestive heart failure)    Chronic kidney disease    Coronary artery disease    Diabetes mellitus    Hyperlipidemia    Hypertension    Hypothyroidism    PAD (peripheral artery disease)

## 2020-06-04 NOTE — H&P ADULT - NSHPLABSRESULTS_GEN_ALL_CORE
Labs:      06-04    130<L>  |  93<L>  |  94<HH>  ----------------------------<  401<H>  4.6   |  19  |  3.2<H>    Ca    8.2<L>      04 Jun 2020 04:12    TPro  6.4  /  Alb  3.0<L>  /  TBili  0.6  /  DBili  x   /  AST  30  /  ALT  19  /  AlkPhos  240<H>  06-04      PT/INR - ( 04 Jun 2020 04:12 )   PT: 17.70 sec;   INR: 1.54 ratio         PTT - ( 04 Jun 2020 04:12 )  PTT:33.0 sec    CARDIAC MARKERS ( 04 Jun 2020 04:12 )  x     / 0.17 ng/mL / x     / x     / x            LIVER FUNCTIONS - ( 04 Jun 2020 04:12 )  Alb: 3.0 g/dL / Pro: 6.4 g/dL / ALK PHOS: 240 U/L / ALT: 19 U/L / AST: 30 U/L / GGT: x           < from: Xray Chest 1 View AP/PA (06.04.20 @ 04:59) >    Impression:      Bilaterallung opacities, right greater than left. No pneumothorax.      < end of copied text >

## 2020-06-04 NOTE — ED PROVIDER NOTE - OBJECTIVE STATEMENT
79 yo M with PMHx of anemia, ascites 2/2 CHF, CHF s/p biventricular AICD, CKD, CAD s/p CABGx5 on ASA, CKD, DM s/p R big toe amputation, HLD, HTN, hypothyroidism, PAD s/p R femoral stent who presents with altered mental status x 1 day. Per family, pt was not acting normally earlier today and had oral temp 100.2 after taking tylenol. Has L diabetic foot ulcer which was debrided last wk but since then has developed worsening redness and swelling to L foot extending to dorsum of foot. No cp, sob, cough, abd pain, dysuria, diarrhea. Follows at [facility]. 79 yo M with PMHx of anemia, ascites 2/2 CHF, CHF s/p biventricular AICD, CKD, CAD s/p CABGx5 on ASA, CKD, DM s/p R big toe amputation, HLD, HTN, hypothyroidism, PAD s/p R femoral stent who presents with altered mental status x 1 day. Per family, pt was not acting normally earlier today and had oral temp 100.2 measured after taking tylenol. Has L diabetic foot ulcer which was debrided last wk but since then has developed worsening redness and swelling to L foot extending to dorsum of foot. No cp, sob, cough, abd pain, dysuria, diarrhea.     PMD: Dr. Clemons (Dai)  Podiatry: Dr. Wang (Dai; [facility])

## 2020-06-04 NOTE — ED ADULT NURSE NOTE - CHIEF COMPLAINT QUOTE
81 yo m presents to ED with daughter for worsening AMS for multiple hours and fever. Patient has diabetic foot ulcer on left foot. was given tylenol at 2245   100.2 then given motrin

## 2020-06-04 NOTE — ED PROVIDER NOTE - PROGRESS NOTE DETAILS
TC TC: 81 yo M with PMHx of anemia, ascites 2/2 CHF, CHF s/p biventricular AICD, CKD, CAD s/p CABGx5 on ASA, CKD, DM s/p R big toe amputation, HLD, HTN, hypothyroidism, PAD s/p R femoral stent who presents with altered mental status, fever, and worsening diabetic foot ulcer. Here in ED, vitals wnl. A&Ox3. Ordered labs, ekg, xrays, urine. Given empiric cefepime, vancomycin, flagyl for foot. Will reassess. TC: Placed call for podiatry. TC: Subcutaneous gas seen on xray. Spoke with podiatry Dr. Mancini who will come see pt. Pt already received flagyl. Abx switched to clindamycin, zosyn, vanco. TC: Subcutaneous gas seen on xray. Spoke with podiatry Dr. Mancini who will come see pt. Pt already received flagyl. Abx switched to clindamycin, zosyn, vanco. Labs notable for WBC 19 with L shift, plt 126, BUN/Cr 94/3.2, trop 0.17, BNP >70k, lactate 2.5. Pt without any cp, no ischemic changes on ekg, elevated trop likely 2/2 demand ischemic vs MICHAEL. Family updated. TC: Spoke with ICU fellow Dr. Ascencio who approved pt to ICU. TC: L great toe with worsening discoloration. 2nd call placed for podiatry.

## 2020-06-04 NOTE — CHART NOTE - NSCHARTNOTEFT_GEN_A_CORE
podiatry    add on sx cancelled today  sx scheduled for tomorrow , exc dbx st b left foot  f/u cardiac clearance  d/c NPO, please feed the patient  pre op labs  optimization  npo MN  f/u w attending    spectra [phone]

## 2020-06-04 NOTE — ED PROVIDER NOTE - NS ED ROS FT
GEN:  + fever, no chills  NEURO:  no headache, no dizziness  ENT: no sore throat, no runny nose  CV:  no chest pain, no palpitations  RESP:  no sob, no cough  GI:  no nausea, no vomiting, no abdominal pain, no diarrhea  :  no dysuria  MSK:  no joint pain, + edema  SKIN:  + infection, no bruising  HEME: no hematochezia, no melena

## 2020-06-04 NOTE — H&P ADULT - NSICDXPASTMEDICALHX_GEN_ALL_CORE_FT
PAST MEDICAL HISTORY:  Anemia     Ascites     CHF (congestive heart failure)     Chronic kidney disease     Coronary artery disease     Diabetes mellitus     Hyperlipidemia     Hypertension     Hypothyroidism     PAD (peripheral artery disease)

## 2020-06-04 NOTE — H&P ADULT - NSICDXPASTSURGICALHX_GEN_ALL_CORE_FT
PAST SURGICAL HISTORY:  Amputation of toe of right foot     Biventricular ICD (implantable cardioverter-defibrillator) in place     S/P arterial stent     S/P CABG x 5

## 2020-06-04 NOTE — CONSULT NOTE ADULT - SUBJECTIVE AND OBJECTIVE BOX
PODIATRY CONSULT   WILL VIEYRA is a pleasant well-nourished, well developed 80y Male in no acute distress, alert awake, and oriented to person, place and time.   Patient is a 80y old  Male who presents with a chief complaint of necrotizing fascitis (04 Jun 2020 08:16)    HPI:  80 M with PMHx of anemia, ascites 2/2 CHF, CHF s/p biventricular AICD, CKD, CAD s/p CABGx5 on ASA, CKD, DM s/p R big toe amputation, HLD, HTN, hypothyroidism, PAD s/p R femoral stent who presents with altered mental status x 1 day and worsening left foot DFU. The pt's daughter states that he saw his L hallux which looked worse, and necrotic from the prior week. He was also forgetful. She checked his temp at home, it was 100.2F, so she gave him tylenol. She states the pt had been taking augmentin for his DFU for some time. She thinks he forgot to take his meds for several days due to the infection.   She also states, the pt was recently dx with HFrEF, biventricular aicd placed february 2020. She has noticed an 8 lb weight gain in the past 4 days.   No fever. No vomiting. No chills. No back pain. No abd pain. No neck stiffness. No abd pain.     In the ED: VS were stable. The pt states he took tylenol prior to coming in to the ED.  xray of the foot showed Subcutaneous gas, podiatry was c/s. Pt was given clindamycin, zosyn, vanco. Labs notable for WBC 19 with L shift, plt 126, BUN/Cr 94/3.2, trop 0.17, BNP >70k, lactate 2.5. (04 Jun 2020 05:39)    NECROTIZING FASCIITIS DIABETIC FOOT ULCER SEPSIS ELEVATED TROPONIN MICHAEL  Chronic kidney disease  Anemia  Ascites  PAD (peripheral artery disease)  Hyperlipidemia  Hypothyroidism  Hypertension  Coronary artery disease  CHF (congestive heart failure)  Diabetes mellitus      PMH: NECROTIZING FASCIITIS DIABETIC FOOT ULCER SEPSIS ELEVATED TROPONIN MICHAEL  Chronic kidney disease  Anemia  Ascites  PAD (peripheral artery disease)  Hyperlipidemia  Hypothyroidism  Hypertension  Coronary artery disease  CHF (congestive heart failure)  Diabetes mellitus    PSH: Biventricular ICD (implantable cardioverter-defibrillator) in place  Amputation of toe of right foot  S/P arterial stent  S/P CABG x 5    Medication clindamycin IVPB 900 milliGRAM(s) IV Intermittent every 8 hours    Allergy: Byetta (Stomach Upset)  linezolid (Rash; Urticaria; Flushing)  melatonin (Other)        Labs:                        8.9    19.88 )-----------( 129      ( 04 Jun 2020 04:12 )             27.0     PT/INR - ( 04 Jun 2020 04:12 )   PT: 17.70 sec;   INR: 1.54 ratio         PTT - ( 04 Jun 2020 04:12 )  PTT:33.0 sec  06-04    130<L>  |  93<L>  |  94<HH>  ----------------------------<  401<H>  4.6   |  19  |  3.2<H>    Ca    8.2<L>      04 Jun 2020 04:12    TPro  6.4  /  Alb  3.0<L>  /  TBili  0.6  /  DBili  x   /  AST  30  /  ALT  19  /  AlkPhos  240<H>  06-04    CARDIAC MARKERS ( 04 Jun 2020 04:12 )  x     / 0.17 ng/mL / x     / x     / x          REVIEW OF SYSTEMS:    CONSTITUTIONAL: No weakness, fevers or chills  EYES/ENT: No visual changes;  No vertigo or throat pain   NECK: No pain or stiffness  RESPIRATORY: No cough, wheezing, hemoptysis; No shortness of breath  CARDIOVASCULAR: No chest pain or palpitations  GASTROINTESTINAL: No abdominal or epigastric pain. No nausea, vomiting, or hematemesis; No diarrhea or constipation. No melena or hematochezia.  GENITOURINARY: No dysuria, frequency or hematuria  NEUROLOGICAL: No numbness or weakness  SKIN: No itching, burning, rashes, or lesions   All other review of systems is negative unless indicated below.    O:   Derm: hematoma intact left dorsal medial forefoot with erythema/edema dorsal/plantar left foot  ulceration left submet 1 dried blood, no clinical signs of infection at this time.  ulceration left posterior lateral heel, stable ulcer  Vascular: Dorsalis Pedis and Posterior Tibial pulses 0/4.  Capillary re-fill time less then 3 seconds digits 1-5 bilateral.  B/L feet cold to touch  Neuro: Protective sensation diminished to the level of the digits bilateral.  MSK: 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 healed.         Assessment and Plan:   hematoma/infection left foot  hx of amp R 1st ray  Chronic kidney disease  Anemia  Ascites  PAD   Hyperlipidemia  Hypothyroidism  Hypertension  Coronary artery disease  CHF  Diabetes mellitus    s/p bedside I&D opened 6/4    Chart reviewed and Patient evaluated  Discussed diagnosis and treatment with patient  bedside I&d performed, 3 cc of serous fluid noted with devitalized soft tissue left foot  Wound flush with normal saline  Applied betadine packing with dry sterile dressing  wound culture obtained left foot  xray reviewed pending report  recommend CT left foot  consult ID  consult vascular  repeat WBC  req medical clearance and or stratification  pending COVID test, pt will likely go to the OR tomorrow once we have the COVID result   sx tentatively booked tomorrow for exc dbx st bone left foot  NPO MN  pre op labs  optimization  f/u with attending    spectra [phone] PODIATRY CONSULT   WILL VIEYRA is a pleasant well-nourished, well developed 80y Male in no acute distress, alert awake, and oriented to person, place and time.   Patient is a 80y old  Male who presents with a chief complaint of necrotizing fascitis (04 Jun 2020 08:16)    HPI:  80 M with PMHx of anemia, ascites 2/2 CHF, CHF s/p biventricular AICD, CKD, CAD s/p CABGx5 on ASA, CKD, DM s/p R big toe amputation, HLD, HTN, hypothyroidism, PAD s/p R femoral stent who presents with altered mental status x 1 day and worsening left foot DFU. The pt's daughter states that he saw his L hallux which looked worse, and necrotic from the prior week. He was also forgetful. She checked his temp at home, it was 100.2F, so she gave him tylenol. She states the pt had been taking augmentin for his DFU for some time. She thinks he forgot to take his meds for several days due to the infection.   She also states, the pt was recently dx with HFrEF, biventricular aicd placed february 2020. She has noticed an 8 lb weight gain in the past 4 days.   No fever. No vomiting. No chills. No back pain. No abd pain. No neck stiffness. No abd pain.     In the ED: VS were stable. The pt states he took tylenol prior to coming in to the ED.  xray of the foot showed Subcutaneous gas, podiatry was c/s. Pt was given clindamycin, zosyn, vanco. Labs notable for WBC 19 with L shift, plt 126, BUN/Cr 94/3.2, trop 0.17, BNP >70k, lactate 2.5. (04 Jun 2020 05:39)    NECROTIZING FASCIITIS DIABETIC FOOT ULCER SEPSIS ELEVATED TROPONIN MICHAEL  Chronic kidney disease  Anemia  Ascites  PAD (peripheral artery disease)  Hyperlipidemia  Hypothyroidism  Hypertension  Coronary artery disease  CHF (congestive heart failure)  Diabetes mellitus      PMH: NECROTIZING FASCIITIS DIABETIC FOOT ULCER SEPSIS ELEVATED TROPONIN MICHAEL  Chronic kidney disease  Anemia  Ascites  PAD (peripheral artery disease)  Hyperlipidemia  Hypothyroidism  Hypertension  Coronary artery disease  CHF (congestive heart failure)  Diabetes mellitus    PSH: Biventricular ICD (implantable cardioverter-defibrillator) in place  Amputation of toe of right foot  S/P arterial stent  S/P CABG x 5    Medication clindamycin IVPB 900 milliGRAM(s) IV Intermittent every 8 hours    Allergy: Byetta (Stomach Upset)  linezolid (Rash; Urticaria; Flushing)  melatonin (Other)        Labs:                        8.9    19.88 )-----------( 129      ( 04 Jun 2020 04:12 )             27.0     PT/INR - ( 04 Jun 2020 04:12 )   PT: 17.70 sec;   INR: 1.54 ratio         PTT - ( 04 Jun 2020 04:12 )  PTT:33.0 sec  06-04    130<L>  |  93<L>  |  94<HH>  ----------------------------<  401<H>  4.6   |  19  |  3.2<H>    Ca    8.2<L>      04 Jun 2020 04:12    TPro  6.4  /  Alb  3.0<L>  /  TBili  0.6  /  DBili  x   /  AST  30  /  ALT  19  /  AlkPhos  240<H>  06-04    CARDIAC MARKERS ( 04 Jun 2020 04:12 )  x     / 0.17 ng/mL / x     / x     / x          REVIEW OF SYSTEMS:    CONSTITUTIONAL: No weakness, fevers or chills  EYES/ENT: No visual changes;  No vertigo or throat pain   NECK: No pain or stiffness  RESPIRATORY: No cough, wheezing, hemoptysis; No shortness of breath  CARDIOVASCULAR: No chest pain or palpitations  GASTROINTESTINAL: No abdominal or epigastric pain. No nausea, vomiting, or hematemesis; No diarrhea or constipation. No melena or hematochezia.  GENITOURINARY: No dysuria, frequency or hematuria  NEUROLOGICAL: No numbness or weakness  SKIN: No itching, burning, rashes, or lesions   All other review of systems is negative unless indicated below.    O:   Derm: hematoma intact left dorsal medial forefoot with erythema/edema dorsal/plantar left foot  ulceration left submet 1 dried blood, no clinical signs of infection at this time.  ulceration left posterior lateral heel, stable ulcer  Vascular: Dorsalis Pedis and Posterior Tibial pulses 0/4.  Capillary re-fill time less then 3 seconds digits 1-5 bilateral.  B/L feet cold to touch  Neuro: Protective sensation diminished to the level of the digits bilateral.  MSK: 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 healed.         Assessment and Plan:   hematoma/infection left foot  hx of amp R 1st ray  Chronic kidney disease  Anemia  Ascites  PAD   Hyperlipidemia  Hypothyroidism  Hypertension  Coronary artery disease  CHF  Diabetes mellitus    s/p bedside I&D opened 6/4    Chart reviewed and Patient evaluated  Discussed diagnosis and treatment with patient  bedside I&d performed, 3 cc of serous fluid noted with devitalized soft tissue left foot  Wound flush with normal saline  Applied betadine packing with dry sterile dressing  wound culture obtained left foot  xray reviewed pending report  recommend CT left foot  consult ID  consult vascular  repeat WBC  req medical clearance and or stratification  cont NPO, waiting for  cardio clearance   pending COVID test  sx tentatively booked tomorrow for exc dbx st bone left foot  NPO MN  pre op labs  optimization  f/u with attending    spectra [phone]

## 2020-06-04 NOTE — ED ADULT NURSE NOTE - OBJECTIVE STATEMENT
PMH - CAD with quintuple bypass, DM, HTN, HLD, CHF(EF 15%) high triglycerides, hypothyroid. PVD, biventricular pacemaker.  Left great toe amputation, Stent placement in left leg.    Patient had debridement last Wednesday, discoloration was noted to travel to anterior portion of foot.

## 2020-06-04 NOTE — ED PROVIDER NOTE - PSH
Amputation of toe of right foot    Biventricular ICD (implantable cardioverter-defibrillator) in place    S/P arterial stent    S/P CABG x 5

## 2020-06-04 NOTE — ED PROVIDER NOTE - CARE PLAN
Principal Discharge DX:	Necrotizing fasciitis  Secondary Diagnosis:	Diabetic foot ulcer  Secondary Diagnosis:	Sepsis  Secondary Diagnosis:	Elevated troponin  Secondary Diagnosis:	MICHAEL (acute kidney injury)

## 2020-06-04 NOTE — CONSULT NOTE ADULT - ASSESSMENT
ATTENDING ATTESTATION TO FOLLOW     ASSESSMENT AND PLAN     Severe sepsis due to nec fasc / DFU  HFrEF due to ischemic cardiomyopathy s/p AICD - In volume overload   NSTEMI   Anemia   MICHAEL on CKD     Recommendations   - Needs IV lasix for diuresis. Avoid volume overload   - Continue medical therapy with ARB and spironolactone   - Trend cardiac enzymes. Continue with aspirin and statin  - Consider adding a low dose beta blocker when volume status more optimized   - Keep Hb > 8  - Nec fasc / DFU care per ID and podiatry    ATTENDING ATTESTATION TO FOLLOW ATTENDING ATTESTATION TO FOLLOW     ASSESSMENT AND PLAN     Severe sepsis due to nec fasc / DFU  HFrEF due to ischemic cardiomyopathy s/p AICD - In volume overload   NSTEMI   Anemia   MICHAEL on CKD     Recommendations   - Needs IV lasix for diuresis. Avoid volume overload   - Check 2D echo   - Continue medical therapy with ARB and spironolactone   - Trend cardiac enzymes. Continue with aspirin and statin  - Consider adding a low dose beta blocker when volume status more optimized   - Keep Hb > 8  - Nec fasc / DFU care per ID and podiatry  - RCRI = 4 - Performs with METS < 4  - If patient to undergo local anesthesia - Would be moderate risk for low risk procedure   - If patient to undergo general anesthesia would be high risk for low risk procedure     ATTENDING ATTESTATION TO FOLLOW ATTENDING ATTESTATION TO FOLLOW     ASSESSMENT AND PLAN     Severe sepsis due to nec fasc / DFU  HFrEF due to ischemic cardiomyopathy s/p AICD - In volume overload   NSTEMI   Anemia   MICHAEL on CKD     Recommendations   - Needs IV lasix for diuresis. Avoid volume overload   - Check 2D echo   - Continue medical therapy with ARB and spironolactone   - Trend cardiac enzymes. Continue with aspirin and statin  - Consider adding a low dose beta blocker when volume status more optimized and BP tolerates   - Keep Hb > 8  - Nec fasc / DFU care per ID and podiatry  - RCRI = 4 - Performs with METS < 4  - If patient to undergo local anesthesia - Would be moderate risk for low risk procedure   - If patient to undergo general anesthesia would be high risk for low risk procedure     ATTENDING ATTESTATION TO FOLLOW ATTENDING ATTESTATION TO FOLLOW     ASSESSMENT AND PLAN     Severe sepsis due to nec fasc / DFU  HFrEF due to ischemic cardiomyopathy s/p AICD - Subacute decompensation with volume overload   Component of both right sided and left sided failure (bilateral pleural effusions, ascites and LE edema)   Elevated troponins - Due to MICHAEL plus demand ischemia in the setting of sepsis and volume overload   Anemia   MICHAEL on CKD     Recommendations   - Needs aggressive IV diuresis. Avoid volume overload   - Check 2D echo   - Continue medical therapy with ARB and spironolactone   - Continue with aspirin and statin  - After surgery and once volume status optimized consider adding a low dose beta blocker   - Keep Hb > 8  - Nec fasc / DFU care per ID and podiatry  - RCRI = 4 - Performs with METS < 4  - If patient to undergo local anesthesia - Would be high risk for low risk procedure     ATTENDING ATTESTATION TO FOLLOW ASSESSMENT AND PLAN     Severe sepsis due to nec fasc / DFU  HFrEF due to ischemic cardiomyopathy s/p AICD - Subacute decompensation with volume overload   Component of both right sided and left sided failure (bilateral pleural effusions, ascites and LE edema)   Elevated troponins - Due to MICHAEL plus demand ischemia in the setting of sepsis and volume overload   Anemia   MICHAEL on CKD (creatinine baseline 2.9-3.2)     Recommendations   - Needs aggressive IV diuresis. Can start with IV lasix 40 BID. Avoid volume overload   - Check 2D echo   - Continue medical therapy with ARB and spironolactone   - Continue with aspirin and statin  - After surgery and once volume status optimized consider adding a low dose beta blocker (carvedilol 3.125 BID)   - Keep Hb > 8  - Nec fasc / DFU care per ID and podiatry  - RCRI = 4 - Performs with METS < 4  - High risk patient for low risk procedure ASSESSMENT AND PLAN     Severe sepsis due to nec fasc / DFU  HFrEF due to ischemic cardiomyopathy s/p AICD - Subacute decompensation with volume overload   Component of both right sided and left sided failure (bilateral pleural effusions, ascites and LE edema)   Elevated troponins - Due to MICHAEL plus demand ischemia in the setting of sepsis and volume overload   Anemia   MICHAEL on CKD (creatinine baseline 2.9-3.2)   [facility] records reviewed. Echo 02/2020 - EF 20%, severe TR. PPM lead impinging on the tricuspid valve. Evaluated by CT surgery and EP, decision was made to manage medically, rather than extract the pacemaker lead.    Recommendations   - Needs aggressive IV diuresis. Can start with IV lasix 40 QD, increase as necessary. Avoid volume overload   - Continue medical therapy with ARB and spironolactone   - Continue with aspirin and statin  - Add a low dose beta blocker (carvedilol 3.125 BID) if BP tolerates  - Keep Hb > 8  - [facility] fasc / DFU care per ID and podiatry  - RCRI = 4 - Performs with METS < 4  - High risk patient going for low risk procedure - no acute cardiac contraindications.  - Close post-op monitoring

## 2020-06-04 NOTE — ED PROVIDER NOTE - ATTENDING CONTRIBUTION TO CARE
I personally evaluated the patient. I reviewed the Resident’s or Physician Assistant’s note (as assigned above), and agree with the findings and plan except as documented in my note.    81 yo M with PMHx of anemia, ascites 2/2 CHF, CHF s/p biventricular AICD, CKD, CAD s/p CABGx5 on ASA, CKD, DM s/p R big toe amputation, HLD, HTN, hypothyroidism, PAD s/p R femoral stent who presents with altered mental status x 1 day and worsening left foot DFU. No fever. No vomiting. No chills. No back pain. No abd pain. No neck stiffness. No abd pain.     CONSTITUTIONAL: NAD, slightly confused. AxOx1  SKIN: skin exam is warm and dry, no acute rash.  HEAD: Normocephalic; atraumatic.  EYES: PERRL, 3 mm bilateral, no nystagmus, EOM intact; conjunctiva and sclera clear.  ENT: No nasal discharge; airway clear.  NECK: Supple; non tender.+ full passive ROM in all directions. No JVD  CARD: S1, S2 normal; no murmurs, gallops, or rubs. Regular rate and rhythm. + Symmetric Strong Pulses  RESP: No wheezes, rales or rhonchi. Good air movement bilaterally  ABD: soft; non-distended; non-tender. No Rebound, No Gaurding, No signs of peritnitis, No CVA tenderness  EXT: evidence of gangrene over r medial foot. + DFU on the bottom of the foot.     Plan- blood cultures, labs, iv abx, podiatry consult, reassess

## 2020-06-04 NOTE — CONSULT NOTE ADULT - SUBJECTIVE AND OBJECTIVE BOX
Date of Admission: 06-04-20    CHIEF COMPLAINT: Nec Fasc     HISTORY OF PRESENT ILLNESS:     80 M with PMHx of anemia, ascites 2/2 CHF, CHF s/p biventricular AICD, CKD, CAD s/p CABGx5 on ASA, CKD, DM s/p R big toe amputation, HLD, HTN, hypothyroidism, PAD s/p R femoral stent who presents with altered mental status x 1 day and worsening left foot DFU. The pt's daughter states that he saw his L hallux which looked worse, and necrotic from the prior week. He was also forgetful. She checked his temp at home, it was 100.2F, so she gave him tylenol. She states the pt had been taking augmentin for his DFU for some time. She thinks he forgot to take his meds for several days due to the infection.   She also states, the pt was recently dx with HFrEF, biventricular aicd placed february 2020. She has noticed an 8 lb weight gain in the past 4 days.   No fever. No vomiting. No chills. No back pain. No abd pain. No neck stiffness. No abd pain.     In the ED: VS were stable. The pt states he took tylenol prior to coming in to the ED.  xray of the foot showed Subcutaneous gas, podiatry was c/s. Pt was given clindamycin, zosyn, vanco. Labs notable for WBC 19 with L shift, plt 126, BUN/Cr 94/3.2, trop 0.17, BNP >70k, lactate 2.5. The patient was admitted for severe sepsis secondary to nec fasc / DFU of the LLE     Cardiac history:   CABG x5 since 2011. Recent diagnosis of HFrEF 2/2020 EF 20% s/p AICD placement, severe TR, akinetic apex, inf spetum, mid and apical anterior septum. Mild-mod MR, + ascites from CHF.   Over the past few days the patient has been complaining of worsening shortness of breath on exertion, LE swelling and weight gain of 8 lbs in 4 days. Possibly non compliant to medications due to forgetfulness per the daughter.     PAST MEDICAL & SURGICAL HISTORY  Chronic kidney disease  Anemia  Ascites  PAD (peripheral artery disease)  Hyperlipidemia  Hypothyroidism  Hypertension  Coronary artery disease  CHF (congestive heart failure)  Diabetes mellitus  Biventricular ICD (implantable cardioverter-defibrillator) in place  Amputation of toe of right foot  S/P arterial stent  S/P CABG x 5    SOCIAL HISTORY:  [X]smoker  Alcohol: no  Drug: No    ROS:  Negative except as mentioned in HPI    ALLERGIES:  Byetta (Stomach Upset)  linezolid (Rash; Urticaria; Flushing)  melatonin (Other)      MEDICATIONS:  MEDICATIONS  (STANDING):  aspirin enteric coated 81 milliGRAM(s) Oral daily  atorvastatin 80 milliGRAM(s) Oral at bedtime  chlorhexidine 4% Liquid 1 Application(s) Topical <User Schedule>  clindamycin IVPB 600 milliGRAM(s) IV Intermittent every 8 hours  dextrose 5%. 1000 milliLiter(s) (50 mL/Hr) IV Continuous <Continuous>  dextrose 50% Injectable 12.5 Gram(s) IV Push once  digoxin     Tablet 0.125 milliGRAM(s) Oral daily  folic acid 1 milliGRAM(s) Oral daily  gabapentin 200 milliGRAM(s) Oral daily  heparin   Injectable 5000 Unit(s) SubCutaneous every 8 hours  insulin lispro (HumaLOG) corrective regimen sliding scale   SubCutaneous three times a day before meals  insulin regular Infusion 1 Unit(s)/Hr (1 mL/Hr) IV Continuous <Continuous>  losartan 12.5 milliGRAM(s) Oral daily  meropenem  IVPB 500 milliGRAM(s) IV Intermittent every 12 hours  pantoprazole   Suspension 40 milliGRAM(s) Oral before breakfast  sodium chloride 0.9%. 500 milliLiter(s) (20 mL/Hr) IV Continuous <Continuous>  spironolactone 50 milliGRAM(s) Oral daily    MEDICATIONS  (PRN):  dextrose 40% Gel 15 Gram(s) Oral once PRN Blood Glucose LESS THAN 70 milliGRAM(s)/deciliter  glucagon  Injectable 1 milliGRAM(s) IntraMuscular once PRN Glucose LESS THAN 70 milligrams/deciliter      HOME MEDICATIONS:  Home Medications:  aspirin 81 mg oral tablet: 1 tab(s) orally once a day (04 Jun 2020 07:05)  atorvastatin 80 mg oral tablet: 1 tab(s) orally once a day (04 Jun 2020 07:10)  Digitek 125 mcg (0.125 mg) oral tablet: orally every other day (at bedtime) (04 Jun 2020 07:10)  famotidine 10 mg oral tablet: 1 tab(s) orally once (at bedtime) (04 Jun 2020 07:11)  ferrous sulfate 325 mg (65 mg elemental iron) oral tablet: 1 tab(s) orally every other day (at bedtime) (04 Jun 2020 07:09)  folic acid 1 mg oral tablet: 1 tab(s) orally once a day (04 Jun 2020 07:06)  gabapentin 100 mg oral tablet: 2 tab(s) orally once a day (at bedtime) (04 Jun 2020 07:11)  Januvia 50 mg oral tablet: 1 tab(s) orally once a day (04 Jun 2020 07:04)  losartan 25 mg oral tablet: 0.5 tab(s) orally once a day (04 Jun 2020 07:05)  spironolactone 50 mg oral tablet: 1 tab(s) orally 2 times a day (04 Jun 2020 07:05)  torsemide 100 mg oral tablet: 1 tab(s) orally once a day (04 Jun 2020 07:05)      VITALS:   T(F): 95.2 (06-04 @ 09:00), Max: 97.1 (06-04 @ 03:40)  HR: 68 (06-04 @ 10:00) (68 - 70)  BP: 113/64 (06-04 @ 10:00) (105/67 - 116/66)  BP(mean): 83 (06-04 @ 10:00) (83 - 86)  RR: 15 (06-04 @ 09:22) (11 - 18)  SpO2: 100% (06-04 @ 10:00) (99% - 100%)    I&O's Summary    04 Jun 2020 07:01  -  04 Jun 2020 11:10  --------------------------------------------------------  IN: 42 mL / OUT: 0 mL / NET: 42 mL        PHYSICAL EXAM:  GENERAL: No acute distress, well-developed  NECK: Supple, no lymphadenopathy, n JVD  CHEST/LUNG: CTAB; No wheezes, rales, or rhonchi  HEART: paced rhythm No murmurs, rubs, or gallops  ABDOMEN: Soft, slight tenderness in RUQ, distended; normal bowel sounds, no organomegaly  EXTREMITIES:  R hallux amputation, L hallux lateral aspect with eschar, No clubbing, cyanosis, 3+ pitting edema b/l   NEUROLOGY: A&O x 3, slow in question answer   SKIN: No rashes or lesions    LABS:                        8.9    19.88 )-----------( 129      ( 04 Jun 2020 04:12 )             27.0     06-04    130<L>  |  93<L>  |  94<HH>  ----------------------------<  401<H>  4.6   |  19  |  3.2<H>    Ca    8.2<L>      04 Jun 2020 04:12    TPro  6.4  /  Alb  3.0<L>  /  TBili  0.6  /  DBili  x   /  AST  30  /  ALT  19  /  AlkPhos  240<H>  06-04    PT/INR - ( 04 Jun 2020 04:12 )   PT: 17.70 sec;   INR: 1.54 ratio         PTT - ( 04 Jun 2020 04:12 )  PTT:33.0 sec  Troponin T, Serum: 0.17 ng/mL <HH> (06-04-20 @ 04:12)    Troponin 0.17, CKMB --, CK --/ 06-04-20 @ 04:12    Serum Pro-Brain Natriuretic Peptide: >22385 pg/mL (06-04-20 @ 04:12)    Hemoglobin A1C   Thyroid    RADIOLOGY:  -CXR:  < from: Xray Chest 1 View AP/PA (06.04.20 @ 04:59) >  Impression:      Bilaterallung opacities, right greater than left. No pneumothorax.    ECG:    < from: 12 Lead ECG (06.04.20 @ 04:33) >  Diagnosis Line AV dual-paced rhythm with prolonged AV conduction with occasional Premature  ventricular complexes  Biventricular pacemaker detected  Abnormal ECG    TELEMETRY EVENTS: Date of Admission: 06-04-20    CHIEF COMPLAINT: Nec Fasc     HISTORY OF PRESENT ILLNESS:     80 M with PMHx of anemia, ascites 2/2 CHF, CHF s/p biventricular AICD, CKD, CAD s/p CABGx5 on ASA, CKD, DM s/p R big toe amputation, HLD, HTN, hypothyroidism, PAD s/p R femoral stent who presents with altered mental status x 1 day and worsening left foot DFU. The pt's daughter states that he saw his L hallux which looked worse, and necrotic from the prior week. He was also forgetful. She checked his temp at home, it was 100.2F, so she gave him tylenol. She states the pt had been taking augmentin for his DFU for some time. She thinks he forgot to take his meds for several days due to the infection.   She also states, the pt was recently dx with HFrEF, biventricular aicd placed february 2020. She has noticed an 8 lb weight gain in the past 4 days.   No fever. No vomiting. No chills. No back pain. No abd pain. No neck stiffness. No abd pain.     In the ED: VS were stable. The pt states he took tylenol prior to coming in to the ED.  xray of the foot showed Subcutaneous gas, podiatry was c/s. Pt was given clindamycin, zosyn, vanco. Labs notable for WBC 19 with L shift, plt 126, BUN/Cr 94/3.2, trop 0.17, BNP >70k, lactate 2.5. The patient was admitted for severe sepsis secondary to nec fasc / DFU of the LLE     Cardiac history:   CABG x5 since 2011. Recent diagnosis of HFrEF 2/2020 EF 20% s/p AICD placement, severe TR, akinetic apex, inf spetum, mid and apical anterior septum. Mild-mod MR, + ascites from CHF.   Over the past few days the patient has been complaining of worsening shortness of breath on exertion, LE swelling and weight gain of 8 lbs in 4 days. Possibly non compliant to medications due to forgetfulness per the daughter.     PAST MEDICAL & SURGICAL HISTORY  Chronic kidney disease  Anemia  Ascites  PAD (peripheral artery disease)  Hyperlipidemia  Hypothyroidism  Hypertension  Coronary artery disease  CHF (congestive heart failure)  Diabetes mellitus  Biventricular ICD (implantable cardioverter-defibrillator) in place  Amputation of toe of right foot  S/P arterial stent  S/P CABG x 5    SOCIAL HISTORY:  [X]smoker  Alcohol: no  Drug: No    ROS:  Negative except as mentioned in HPI    ALLERGIES:  Byetta (Stomach Upset)  linezolid (Rash; Urticaria; Flushing)  melatonin (Other)      MEDICATIONS:  MEDICATIONS  (STANDING):  aspirin enteric coated 81 milliGRAM(s) Oral daily  atorvastatin 80 milliGRAM(s) Oral at bedtime  chlorhexidine 4% Liquid 1 Application(s) Topical <User Schedule>  clindamycin IVPB 600 milliGRAM(s) IV Intermittent every 8 hours  dextrose 5%. 1000 milliLiter(s) (50 mL/Hr) IV Continuous <Continuous>  dextrose 50% Injectable 12.5 Gram(s) IV Push once  digoxin     Tablet 0.125 milliGRAM(s) Oral daily  folic acid 1 milliGRAM(s) Oral daily  gabapentin 200 milliGRAM(s) Oral daily  heparin   Injectable 5000 Unit(s) SubCutaneous every 8 hours  insulin lispro (HumaLOG) corrective regimen sliding scale   SubCutaneous three times a day before meals  insulin regular Infusion 1 Unit(s)/Hr (1 mL/Hr) IV Continuous <Continuous>  losartan 12.5 milliGRAM(s) Oral daily  meropenem  IVPB 500 milliGRAM(s) IV Intermittent every 12 hours  pantoprazole   Suspension 40 milliGRAM(s) Oral before breakfast  sodium chloride 0.9%. 500 milliLiter(s) (20 mL/Hr) IV Continuous <Continuous>  spironolactone 50 milliGRAM(s) Oral daily    MEDICATIONS  (PRN):  dextrose 40% Gel 15 Gram(s) Oral once PRN Blood Glucose LESS THAN 70 milliGRAM(s)/deciliter  glucagon  Injectable 1 milliGRAM(s) IntraMuscular once PRN Glucose LESS THAN 70 milligrams/deciliter      HOME MEDICATIONS:  Home Medications:  aspirin 81 mg oral tablet: 1 tab(s) orally once a day (04 Jun 2020 07:05)  atorvastatin 80 mg oral tablet: 1 tab(s) orally once a day (04 Jun 2020 07:10)  Digitek 125 mcg (0.125 mg) oral tablet: orally every other day (at bedtime) (04 Jun 2020 07:10)  famotidine 10 mg oral tablet: 1 tab(s) orally once (at bedtime) (04 Jun 2020 07:11)  ferrous sulfate 325 mg (65 mg elemental iron) oral tablet: 1 tab(s) orally every other day (at bedtime) (04 Jun 2020 07:09)  folic acid 1 mg oral tablet: 1 tab(s) orally once a day (04 Jun 2020 07:06)  gabapentin 100 mg oral tablet: 2 tab(s) orally once a day (at bedtime) (04 Jun 2020 07:11)  Januvia 50 mg oral tablet: 1 tab(s) orally once a day (04 Jun 2020 07:04)  losartan 25 mg oral tablet: 0.5 tab(s) orally once a day (04 Jun 2020 07:05)  spironolactone 50 mg oral tablet: 1 tab(s) orally 2 times a day (04 Jun 2020 07:05)  torsemide 100 mg oral tablet: 1 tab(s) orally once a day (04 Jun 2020 07:05)      VITALS:   T(F): 95.2 (06-04 @ 09:00), Max: 97.1 (06-04 @ 03:40)  HR: 68 (06-04 @ 10:00) (68 - 70)  BP: 113/64 (06-04 @ 10:00) (105/67 - 116/66)  BP(mean): 83 (06-04 @ 10:00) (83 - 86)  RR: 15 (06-04 @ 09:22) (11 - 18)  SpO2: 100% (06-04 @ 10:00) (99% - 100%)    I&O's Summary    04 Jun 2020 07:01  -  04 Jun 2020 11:10  --------------------------------------------------------  IN: 42 mL / OUT: 0 mL / NET: 42 mL        PHYSICAL EXAM:  GENERAL: No acute distress, well-developed  NECK: Supple, no lymphadenopathy, n JVD  CHEST/LUNG: CTAB; No wheezes, rales, or rhonchi  HEART: paced rhythm No murmurs, rubs, or gallops  ABDOMEN: Soft, slight tenderness in RUQ, distended; normal bowel sounds, no organomegaly  EXTREMITIES:  R hallux amputation, L hallux lateral aspect with eschar, No clubbing, cyanosis, 3+ pitting edema b/l   NEUROLOGY: A&O x 3, slow in question answer   SKIN: No rashes or lesions    LABS:                        8.9    19.88 )-----------( 129      ( 04 Jun 2020 04:12 )             27.0     06-04    130<L>  |  93<L>  |  94<HH>  ----------------------------<  401<H>  4.6   |  19  |  3.2<H>    Ca    8.2<L>      04 Jun 2020 04:12    TPro  6.4  /  Alb  3.0<L>  /  TBili  0.6  /  DBili  x   /  AST  30  /  ALT  19  /  AlkPhos  240<H>  06-04    PT/INR - ( 04 Jun 2020 04:12 )   PT: 17.70 sec;   INR: 1.54 ratio         PTT - ( 04 Jun 2020 04:12 )  PTT:33.0 sec  Troponin T, Serum: 0.17 ng/mL <HH> (06-04-20 @ 04:12)    Troponin 0.17, CKMB --, CK --/ 06-04-20 @ 04:12    Serum Pro-Brain Natriuretic Peptide: >62622 pg/mL (06-04-20 @ 04:12)    Hemoglobin A1C   Thyroid    RADIOLOGY:  -CXR:  < from: Xray Chest 1 View AP/PA (06.04.20 @ 04:59) >  Impression:      Bilaterallung opacities, right greater than left. No pneumothorax.    ECG:    < from: 12 Lead ECG (06.04.20 @ 04:33) >  Diagnosis Line AV dual-paced rhythm with prolonged AV conduction with occasional Premature  ventricular complexes  Biventricular pacemaker detected  Abnormal ECG    TELEMETRY EVENTS:    No events Date of Admission: 06-04-20    CHIEF COMPLAINT: Nec Fasc     HISTORY OF PRESENT ILLNESS:     80 M with PMHx of anemia, ascites 2/2 CHF, CHF s/p biventricular AICD, CKD, CAD s/p CABGx5 on ASA, CKD, DM s/p R big toe amputation, HLD, HTN, hypothyroidism, PAD s/p R femoral stent who presents with altered mental status x 1 day and worsening left foot DFU. The pt's daughter states that he saw his L hallux which looked worse, and necrotic from the prior week. He was also forgetful. She checked his temp at home, it was 100.2F, so she gave him tylenol. She states the pt had been taking augmentin for his DFU for some time. She thinks he forgot to take his meds for several days due to the infection.   She also states, the pt was recently dx with HFrEF, biventricular aicd placed february 2020. She has noticed an 8 lb weight gain in the past 4 days.   No fever. No vomiting. No chills. No back pain. No abd pain. No neck stiffness. No abd pain.     In the ED: VS were stable. The pt states he took tylenol prior to coming in to the ED.  xray of the foot showed Subcutaneous gas, podiatry was c/s. Pt was given clindamycin, zosyn, vanco. Labs notable for WBC 19 with L shift, plt 126, BUN/Cr 94/3.2, trop 0.17, BNP >70k, lactate 2.5. The patient was admitted for severe sepsis secondary to nec fasc / DFU of the LLE     Cardiac history:   CABG x5 since 2006 (quadruple in 2006 and again in 2011)   In 12/2019 the patient was found to be hypotensive in a routine podiatry visit. He was sent to [facility] where he was found to be in cardiogenic shock was started in bumex and lasix drips without response and required dobumatine and milrinone drips. Cath done showed possible occlusion of some of his grafts (daughter is not sure) not amenable to intervention and echo showed HFrEF EF 20% severe TR, akinetic apex, inf spetum, mid and apical anterior septum. Mild-mod MR, + ascites from CHF. This was complicated by MICHAEL without dialysis. He was discharged at that point on GDMT and digoxin however due to persistent low EF and symptoms he required AICD placement in 2/2020.   He was discharged on Digoxin every other day with break fridays and saturdays, initially was on Losartan 200 then decreased to 12.5 then stopped due to hyperkalemia. He also did not tolerate entresto due to recurrent hyperkalemia at the lower doses. He was on carvedilol 25 BID but was stopped due to hypotension but per the daughter, lower doses were never tried. He was also on Bumex 60 and 80 alternating every other day and aldactone 25 every other day as well as metolazone as needed. He normally follows with Dr. Clemons in [facility].   Over the past few days the patient has been complaining of worsening shortness of breath on exertion, LE swelling and weight gain of 8 lbs in 4 days. Possibly non compliant to medications due to forgetfulness per the daughter.     PAST MEDICAL & SURGICAL HISTORY  Chronic kidney disease  Anemia  Ascites  PAD (peripheral artery disease)  Hyperlipidemia  Hypothyroidism  Hypertension  Coronary artery disease  CHF (congestive heart failure)  Diabetes mellitus  Biventricular ICD (implantable cardioverter-defibrillator) in place  Amputation of toe of right foot  S/P arterial stent  S/P CABG x 5    SOCIAL HISTORY:  [X]smoker  Alcohol: no  Drug: No    ROS:  Negative except as mentioned in HPI    ALLERGIES:  Byetta (Stomach Upset)  linezolid (Rash; Urticaria; Flushing)  melatonin (Other)      MEDICATIONS:  MEDICATIONS  (STANDING):  aspirin enteric coated 81 milliGRAM(s) Oral daily  atorvastatin 80 milliGRAM(s) Oral at bedtime  chlorhexidine 4% Liquid 1 Application(s) Topical <User Schedule>  clindamycin IVPB 600 milliGRAM(s) IV Intermittent every 8 hours  dextrose 5%. 1000 milliLiter(s) (50 mL/Hr) IV Continuous <Continuous>  dextrose 50% Injectable 12.5 Gram(s) IV Push once  digoxin     Tablet 0.125 milliGRAM(s) Oral daily  folic acid 1 milliGRAM(s) Oral daily  gabapentin 200 milliGRAM(s) Oral daily  heparin   Injectable 5000 Unit(s) SubCutaneous every 8 hours  insulin lispro (HumaLOG) corrective regimen sliding scale   SubCutaneous three times a day before meals  insulin regular Infusion 1 Unit(s)/Hr (1 mL/Hr) IV Continuous <Continuous>  losartan 12.5 milliGRAM(s) Oral daily  meropenem  IVPB 500 milliGRAM(s) IV Intermittent every 12 hours  pantoprazole   Suspension 40 milliGRAM(s) Oral before breakfast  sodium chloride 0.9%. 500 milliLiter(s) (20 mL/Hr) IV Continuous <Continuous>  spironolactone 50 milliGRAM(s) Oral daily    MEDICATIONS  (PRN):  dextrose 40% Gel 15 Gram(s) Oral once PRN Blood Glucose LESS THAN 70 milliGRAM(s)/deciliter  glucagon  Injectable 1 milliGRAM(s) IntraMuscular once PRN Glucose LESS THAN 70 milligrams/deciliter      HOME MEDICATIONS:  Home Medications:  aspirin 81 mg oral tablet: 1 tab(s) orally once a day (04 Jun 2020 07:05)  atorvastatin 80 mg oral tablet: 1 tab(s) orally once a day (04 Jun 2020 07:10)  Digitek 125 mcg (0.125 mg) oral tablet: orally every other day (at bedtime) (04 Jun 2020 07:10)  famotidine 10 mg oral tablet: 1 tab(s) orally once (at bedtime) (04 Jun 2020 07:11)  ferrous sulfate 325 mg (65 mg elemental iron) oral tablet: 1 tab(s) orally every other day (at bedtime) (04 Jun 2020 07:09)  folic acid 1 mg oral tablet: 1 tab(s) orally once a day (04 Jun 2020 07:06)  gabapentin 100 mg oral tablet: 2 tab(s) orally once a day (at bedtime) (04 Jun 2020 07:11)  Januvia 50 mg oral tablet: 1 tab(s) orally once a day (04 Jun 2020 07:04)  losartan 25 mg oral tablet: 0.5 tab(s) orally once a day (04 Jun 2020 07:05)  spironolactone 50 mg oral tablet: 1 tab(s) orally 2 times a day (04 Jun 2020 07:05)  torsemide 100 mg oral tablet: 1 tab(s) orally once a day (04 Jun 2020 07:05)      VITALS:   T(F): 95.2 (06-04 @ 09:00), Max: 97.1 (06-04 @ 03:40)  HR: 68 (06-04 @ 10:00) (68 - 70)  BP: 113/64 (06-04 @ 10:00) (105/67 - 116/66)  BP(mean): 83 (06-04 @ 10:00) (83 - 86)  RR: 15 (06-04 @ 09:22) (11 - 18)  SpO2: 100% (06-04 @ 10:00) (99% - 100%)    I&O's Summary    04 Jun 2020 07:01  -  04 Jun 2020 11:10  --------------------------------------------------------  IN: 42 mL / OUT: 0 mL / NET: 42 mL        PHYSICAL EXAM:  GENERAL: No acute distress, well-developed  NECK: Supple, no lymphadenopathy, + JVD  CHEST/LUNG: CTAB; No wheezes, rales, or rhonchi  HEART: paced rhythm No murmurs, rubs, or gallops  ABDOMEN: Soft, slight tenderness in RUQ, distended; normal bowel sounds, no organomegaly  EXTREMITIES:  R hallux amputation, L hallux lateral aspect with eschar, No clubbing, cyanosis, 3+ pitting edema b/l   NEUROLOGY: A&O x 3, slow in question answer   SKIN: No rashes or lesions    LABS:                        8.9    19.88 )-----------( 129      ( 04 Jun 2020 04:12 )             27.0     06-04    130<L>  |  93<L>  |  94<HH>  ----------------------------<  401<H>  4.6   |  19  |  3.2<H>    Ca    8.2<L>      04 Jun 2020 04:12    TPro  6.4  /  Alb  3.0<L>  /  TBili  0.6  /  DBili  x   /  AST  30  /  ALT  19  /  AlkPhos  240<H>  06-04    PT/INR - ( 04 Jun 2020 04:12 )   PT: 17.70 sec;   INR: 1.54 ratio         PTT - ( 04 Jun 2020 04:12 )  PTT:33.0 sec  Troponin T, Serum: 0.17 ng/mL <HH> (06-04-20 @ 04:12)    Troponin 0.17, CKMB --, CK --/ 06-04-20 @ 04:12    Serum Pro-Brain Natriuretic Peptide: >92631 pg/mL (06-04-20 @ 04:12)    Hemoglobin A1C   Thyroid    RADIOLOGY:  -CXR:  < from: Xray Chest 1 View AP/PA (06.04.20 @ 04:59) >  Impression:      Bilaterallung opacities, right greater than left. No pneumothorax.    ECG:    < from: 12 Lead ECG (06.04.20 @ 04:33) >  Diagnosis Line AV dual-paced rhythm with prolonged AV conduction with occasional Premature  ventricular complexes  Biventricular pacemaker detected  Abnormal ECG    TELEMETRY EVENTS:    No events

## 2020-06-04 NOTE — ED PROVIDER NOTE - ADMIT DISPOSITION PRESENT ON ADMISSION SEPSIS Q1 - RE-EVALUATED PATIENT FLUID AND VITAL SIGNS
[facility]  [address] [id number] [phone]            Poli Freed Sundraffi   [address] [facility] [address]        2017    To the parents of Poli :    The results of Poli's recent [place] metabolic screen  Were Normal.    FOLLOW-UP:      in 7-10 days for Preventive Care visit    Please contact our office at [phone] to schedule the appointment with Dr. Hayden.    Sincerely,      Dr. Kourtney Hayden    
I have re-evaluated the patient's fluid status and reviewed vital signs. Clinical perfusion assessment was performed.

## 2020-06-04 NOTE — ED PROVIDER NOTE - PHYSICAL EXAMINATION
CONSTITUTIONAL: well developed, nontoxic appearing, in no acute distress, speaking in full sentences  SKIN: warm, dry, cap refill < 2 seconds  HEENT: normocephalic, atraumatic, no conjunctival erythema, moist mucous membranes, patent airway  NECK: supple  CV:  regular rate, regular rhythm, 2+ radial pulses bilaterally  RESP: no wheezes, no rales, no rhonchi, normal work of breathing  ABD: soft, nontender, nondistended, no rebound, no guarding, minimal ascites  MSK: normal ROM, no cyanosis, ulceration to bottom of L foot with surrounding erythema and dark bullous to medial aspect of foot, 2+ DP pulse on L, 2+ pitting edema to BLE, R great toe amputation  NEURO: alert, oriented, grossly unremarkable   PSYCH: cooperative, appropriate CONSTITUTIONAL: well developed, nontoxic appearing, in no acute distress, speaking in full sentences  SKIN: warm, dry, cap refill < 2 seconds  HEENT: normocephalic, atraumatic, no conjunctival erythema, moist mucous membranes, patent airway  NECK: supple  CV:  regular rate, regular rhythm, 2+ radial pulses bilaterally  RESP: no wheezes, no rales, no rhonchi, normal work of breathing  ABD: soft, nontender, nondistended, no rebound, no guarding, minimal ascites  MSK: normal ROM, no cyanosis, ulceration to bottom of L foot with surrounding erythema, dark bullous blister to medial aspect of foot with +crepitus, 2+ DP pulse on L, 2+ pitting edema to BLE, R great toe amputation  NEURO: alert, oriented, grossly unremarkable   PSYCH: cooperative, appropriate

## 2020-06-04 NOTE — CONSULT NOTE ADULT - ASSESSMENT
80 M with PMHx of anemia, ascites 2/2 CHF, CHF s/p biventricular AICD, CKD, CAD s/p CABGx5 on ASA, CKD, DM s/p R big toe amputation, HLD, HTN, hypothyroidism, PAD s/p R femoral stent who presents with altered mental status x 1 day and worsening left foot DFU, nec fasc  Pt will need debridement  Vascular surgery called to evaluate for underlying PAD. Pt has hx Right leg angiogram with angioplasty and ?stent  Art dplx evaluated  Proceed with debridement    will f/u post op    SPECTRA [id number]

## 2020-06-04 NOTE — CONSULT NOTE ADULT - SUBJECTIVE AND OBJECTIVE BOX
Patient is a 80y old  Male who presents with a chief complaint of necrotizing fascitis (04 Jun 2020 08:56)      HPI:  80 M with PMHx of anemia, ascites 2/2 CHF, CHF s/p biventricular AICD, CKD, CAD s/p CABGx5 on ASA, CKD, DM s/p R big toe amputation, HLD, HTN, hypothyroidism, PAD s/p R femoral stent who presents with altered mental status x 1 day and worsening left foot DFU. The pt's daughter states that he saw his L hallux which looked worse, and necrotic from the prior week. He was also forgetful. She checked his temp at home, it was 100.2F, so she gave him tylenol. She states the pt had been taking augmentin for his DFU for some time. She thinks he forgot to take his meds for several days due to the infection.   She also states, the pt was recently dx with HFrEF, biventricular aicd placed february 2020. She has noticed an 8 lb weight gain in the past 4 days.   No fever. No vomiting. No chills. No back pain. No abd pain. No neck stiffness. No abd pain.     In the ED: VS were stable. The pt states he took tylenol prior to coming in to the ED.  xray of the foot showed Subcutaneous gas, podiatry was c/s. Pt was given clindamycin, zosyn, vanco. Labs notable for WBC 19 with L shift, plt 126, BUN/Cr 94/3.2, trop 0.17, BNP >70k, lactate 2.5. (04 Jun 2020 05:39)      PAST MEDICAL & SURGICAL HISTORY:  Chronic kidney disease  Anemia  Ascites  PAD (peripheral artery disease)  Hyperlipidemia  Hypothyroidism  Hypertension  Coronary artery disease  CHF (congestive heart failure)  Diabetes mellitus  Biventricular ICD (implantable cardioverter-defibrillator) in place  Amputation of toe of right foot  S/P arterial stent  S/P CABG x 5      SOCIAL HX:   Smoking                         ETOH                            Other    FAMILY HISTORY:  :  No known cardiovacular family hisotry     Review Of Systems:     All ROS are negative except per HPI       Allergies    Byetta (Stomach Upset)  linezolid (Rash; Urticaria; Flushing)  melatonin (Other)    Intolerances          PHYSICAL EXAM    ICU Vital Signs Last 24 Hrs  T(C): 35.1 (04 Jun 2020 09:00), Max: 36.2 (04 Jun 2020 03:40)  T(F): 95.2 (04 Jun 2020 09:00), Max: 97.1 (04 Jun 2020 03:40)  HR: 70 (04 Jun 2020 09:00) (69 - 70)  BP: 105/67 (04 Jun 2020 09:00) (105/67 - 116/55)  BP(mean): 84 (04 Jun 2020 07:00) (84 - 84)  ABP: --  ABP(mean): --  RR: 15 (04 Jun 2020 09:00) (12 - 18)  SpO2: 100% (04 Jun 2020 09:00) (100% - 100%)      CONSTITUTIONAL:  Well nourished.  NAD    ENT:   Airway patent,   Mouth with normal mucosa.   No thrush    EYES:   pupils equal,   round and reactive to light.    CARDIAC:   Normal rate,   Regular rhythm.    Heart sounds S1, S2.   No edema      Vascular:   normal systolic impulse  no bruits    RESPIRATORY:   No wheezing   Normal chest expansion  No use of accessory muscles    GASTROINTESTINAL:  Abdomen soft   Non-tender,   No guarding,   + BS    GENITOURINARY  normal genitalia for sex  no edema    MUSCULOSKELETAL:   Range of motion is not limited,  Nno clubbing, cyanosis    NEUROLOGICAL:   Alert and oriented   No motor or sensory deficits.      SKIN:   Skin normal color for race,   Warm and dry.  LLE ulcer   No evidence of rash.    PSYCHIATRIC:   Normal mood and affect.   No apparent risk to self or others.    HEME LYMPH:   No cervical  lymphadenopathy.  No inguinal lymphadenopathy              LABS:                          8.9    19.88 )-----------( 129      ( 04 Jun 2020 04:12 )             27.0                                               06-04    130<L>  |  93<L>  |  94<HH>  ----------------------------<  401<H>  4.6   |  19  |  3.2<H>    Ca    8.2<L>      04 Jun 2020 04:12    TPro  6.4  /  Alb  3.0<L>  /  TBili  0.6  /  DBili  x   /  AST  30  /  ALT  19  /  AlkPhos  240<H>  06-04      PT/INR - ( 04 Jun 2020 04:12 )   PT: 17.70 sec;   INR: 1.54 ratio         PTT - ( 04 Jun 2020 04:12 )  PTT:33.0 sec                                           CARDIAC MARKERS ( 04 Jun 2020 04:12 )  x     / 0.17 ng/mL / x     / x     / x                                                LIVER FUNCTIONS - ( 04 Jun 2020 04:12 )  Alb: 3.0 g/dL / Pro: 6.4 g/dL / ALK PHOS: 240 U/L / ALT: 19 U/L / AST: 30 U/L / GGT: x                                                                                                                                       X-Rays reviewed:                                                                                    ECHO    CXR interpreted by me:  Enlarged heart.  right effusions and atelectasis     MEDICATIONS  (STANDING):  aspirin enteric coated 81 milliGRAM(s) Oral daily  atorvastatin 80 milliGRAM(s) Oral at bedtime  chlorhexidine 4% Liquid 1 Application(s) Topical <User Schedule>  clindamycin IVPB 900 milliGRAM(s) IV Intermittent every 8 hours  dextrose 5%. 1000 milliLiter(s) (50 mL/Hr) IV Continuous <Continuous>  dextrose 50% Injectable 12.5 Gram(s) IV Push once  digoxin     Tablet 0.125 milliGRAM(s) Oral daily  folic acid 1 milliGRAM(s) Oral daily  gabapentin 200 milliGRAM(s) Oral daily  heparin   Injectable 5000 Unit(s) SubCutaneous every 8 hours  insulin lispro (HumaLOG) corrective regimen sliding scale   SubCutaneous three times a day before meals  losartan 12.5 milliGRAM(s) Oral daily  pantoprazole   Suspension 40 milliGRAM(s) Oral before breakfast  sodium chloride 0.9%. 500 milliLiter(s) (20 mL/Hr) IV Continuous <Continuous>  spironolactone 50 milliGRAM(s) Oral daily  torsemide 100 milliGRAM(s) Oral daily    MEDICATIONS  (PRN):  dextrose 40% Gel 15 Gram(s) Oral once PRN Blood Glucose LESS THAN 70 milliGRAM(s)/deciliter  glucagon  Injectable 1 milliGRAM(s) IntraMuscular once PRN Glucose LESS THAN 70 milligrams/deciliter

## 2020-06-05 NOTE — CHART NOTE - NSCHARTNOTEFT_GEN_A_CORE
PACU ANESTHESIA ADMISSION NOTE      Procedure: Deep dissection for foot infection  Pulsed lavage with suction  Incision and debridement of foot    Post op diagnosis:      __x__  Patent Airway    _x___  Full return of protective reflexes    __x__  Full recovery from anesthesia / back to baseline status    Vitals:  T(C): 97.6 F  HR: 82  BP: 126/60  RR: 20  SpO2: 98%    Mental Status:  _x___ Awake   __x___ Alert   _____ Drowsy   _____ Sedated    Nausea/Vomiting:  __x__ NO  ______Yes,   See Post - Op Orders          Pain Scale (0-10):  __0___    Treatment: _x___ None    ____ See Post - Op/PCA Orders    Post - Operative Fluids:   ____ Oral   __x__ See Post - Op Orders    Plan: Discharge:   ____Home       _____Floor     __x___Critical Care    _____  Other:_________________    Comments: uneventful anesthesia course no complications. Vitals stable. Pt transferred to PACU, report given to ICU team, patient stable for transfer

## 2020-06-05 NOTE — PRE-ANESTHESIA EVALUATION ADULT - NSANTHADDINFOFT_GEN_ALL_CORE
After Visit Summary   7/26/2018    Ashli Gamboa    MRN: [id number]           Patient Information     Date Of Birth          1979        Visit Information        Provider Department      7/26/2018 2:20 PM Sam Schmidt MD [facility]        Today's Diagnoses     Achlorhydria, gastric    -  1       Follow-ups after your visit        Follow-up notes from your care team     Return in about 1 year (around 7/26/2019).      Your next 10 appointments already scheduled     Sep 17, 2018  4:30 PM CDT   SHORT with Sabas Patel MD   [facility]    [address] [address] [phone]              Who to contact     Please call your clinic at [phone] to:    Ask questions about your health    Make or cancel appointments    Discuss your medicines    Learn about your test results    Speak to your doctor            Additional Information About Your Visit        [employer] Information     [employer] gives you secure access to your electronic health record. If you see a primary care provider, you can also send messages to your care team and make appointments. If you have questions, please call your primary care clinic.  If you do not have a primary care provider, please call [phone] and they will assist you.      [employer] is an electronic gateway that provides easy, online access to your medical records. With [employer], you can request a clinic appointment, read your test results, renew a prescription or communicate with your care team.     To access your existing account, please contact your [facility] or call [phone] for assistance.        Care EveryWhere ID     This is your Care EveryWhere ID. This could be used by other organizations to access your [place] medical records  [id number]        Your Vitals Were     Pulse Temperature Height Pulse  "Oximetry BMI (Body Mass Index)       70 98.7  F (37.1  C) (Oral) 5' 4.02\" 99% 23.28 kg/m2        Blood Pressure from Last 3 Encounters:   07/26/18 116/73   06/08/18 104/64   04/12/18 126/76    Weight from Last 3 Encounters:   07/26/18 135 lb 11.2 oz   07/09/18 138 lb   06/08/18 140 lb 6.4 oz               Primary Care Provider Office Phone # Fax #    Karena Mo, APRN [facility] [phone] [address] [facility] [address]        Equal Access to Services     SHILA OLMSTEAD : Hanna Wong, wacarine lamb, pasquale sorensenmada sung, lynne warren . So [facility] [phone].    ATENCIÓN: Si habla español, tiene a flynn disposición servicios gratuitos de asistencia lingüística. Llame al [phone].    We comply with applicable federal civil rights laws and [place] laws. We do not discriminate on the basis of race, color, national origin, age, disability, sex, sexual orientation, or gender identity.            Thank you!     Thank you for choosing [facility]  for your care. Our goal is always to provide you with excellent care. Hearing back from our patients is one way we can continue to improve our services. Please take a few minutes to complete the written survey that you may receive in the mail after your visit with us. Thank you!             Your Updated Medication List - Protect others around you: Learn how to safely use, store and throw away your medicines at www.disposemymeds.org.          This list is accurate as of 7/26/18 11:59 PM.  Always use your most recent med list.                   Brand Name Dispense Instructions for use Diagnosis    buPROPion 300 MG 24 hr tablet    WELLBUTRIN XL    90 tablet    Take 1 tablet (300 mg) by mouth every morning    Recurrent major depressive disorder, in partial remission (H)       DANDELION ROOT PO      Take 400 mg by mouth daily        etonogestrel-ethinyl estradiol 0.12-0.015 " MG/24HR vaginal ring    NUVARING    4 each    Place 1 each vaginally every 21 days    Endometriosis       MULTIPLE VITAMIN PO      Take 1 patch by mouth daily PatchMD - Multivitamin Patch        TOPAMAX PO      Take 100 mg by mouth 2 times daily        vitamin B complex with vitamin C Tabs tablet      Take 1 tablet by mouth daily           risks, benefits, alternatives discussed with the patient and he is agreeable to the plan risks, benefits, alternatives discussed with the patient and he is agreeable to the plan. After discussion with podiatry team, they would like to attempt the case under straight local, and if the patient is not tolerating the procedure, the anesthesia team will provide anesthesia as necessary

## 2020-06-05 NOTE — CONSULT NOTE ADULT - ASSESSMENT
81 y/o male presents worsening left foot DFU. Burn team consulted for evaluation of left foot wound.  - Pt went to OR today with podiatry for debridement of left foot   - no further recommendation   - f/u with podiatry 81 y/o male presents worsening left foot DFU. Burn team consulted for evaluation of left foot wound.  - Pt seen prior to  OR today with podiatry for debridement of left foot   - no further recommendation   - f/u with podiatry

## 2020-06-05 NOTE — PROGRESS NOTE ADULT - SUBJECTIVE AND OBJECTIVE BOX
WILL VIEYRA  80y, Male  Allergy: Byetta (Stomach Upset)  linezolid (Rash; Urticaria; Flushing)  melatonin (Other)      LOS  1d    CHIEF COMPLAINT: necrotizing fascitis (04 Jun 2020 11:56)      INTERVAL EVENTS/HPI  - No acute events overnight, 6/4 BCX with GBS and unID'ed GNR  - T(F): , Max: 98.9 (06-05-20 @ 08:00)  - Tolerating medication  - WBC Count: 21.21 (06-05-20 @ 04:14)  WBC Count: 19.24 (06-04-20 @ 11:12)  - Creatinine, Serum: 3.0 (06-05-20 @ 04:14)  Creatinine, Serum: 3.2 (06-04-20 @ 04:12)       ROS  General: Denies rigors, nightsweats  HEENT: Denies headache, rhinorrhea, sore throat, eye pain  CV: Denies CP, palpitations  PULM: Denies wheezing, hemoptysis  GI: Denies hematemesis, hematochezia, melena  : Denies discharge, hematuria  MSK: Denies arthralgias, myalgias  SKIN: Denies rash, lesions  NEURO: Denies paresthesias, weakness  PSYCH: Denies depression, anxiety    VITALS:  T(F): 98.9, Max: 98.9 (06-05-20 @ 08:00)  HR: 74  BP: 93/45  RR: 16Vital Signs Last 24 Hrs  T(C): 37.2 (05 Jun 2020 08:00), Max: 37.2 (05 Jun 2020 08:00)  T(F): 98.9 (05 Jun 2020 08:00), Max: 98.9 (05 Jun 2020 08:00)  HR: 74 (05 Jun 2020 06:00) (50 - 74)  BP: 93/45 (05 Jun 2020 05:00) (86/61 - 121/63)  BP(mean): 68 (05 Jun 2020 05:00) (52 - 96)  RR: 16 (05 Jun 2020 06:00) (9 - 27)  SpO2: 99% (05 Jun 2020 06:00) (95% - 100%)    PHYSICAL EXAM:  Gen: Thin appearing M NAD, resting in bed  HEENT: Normocephalic, atraumatic  Neck: supple, no lymphadenopathy  CV: Regular rate & regular rhythm  Lungs: decreased BS at bases, no fremitus  Abdomen: Soft, BS present  Ext: hematoma L foot, ulcer L submet, no purulence  ulceration left posterior lateral heel  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    FH: Non-contributory  Social Hx: Non-contributory    TESTS & MEASUREMENTS:                        9.9    21.21 )-----------( 161      ( 05 Jun 2020 04:14 )             29.8     06-05    133<L>  |  96<L>  |  93<HH>  ----------------------------<  70  4.6   |  22  |  3.0<H>    Ca    8.1<L>      05 Jun 2020 04:14  Phos  3.2     06-04  Mg     2.4     06-05    TPro  6.1  /  Alb  2.8<L>  /  TBili  0.7  /  DBili  x   /  AST  29  /  ALT  18  /  AlkPhos  215<H>  06-05    eGFR if Non African American: 19 mL/min/1.73M2 (06-05-20 @ 04:14)  eGFR if African American: 22 mL/min/1.73M2 (06-05-20 @ 04:14)    LIVER FUNCTIONS - ( 05 Jun 2020 04:14 )  Alb: 2.8 g/dL / Pro: 6.1 g/dL / ALK PHOS: 215 U/L / ALT: 18 U/L / AST: 29 U/L / GGT: x               Culture - Blood (collected 06-04-20 @ 04:12)  Source: .Blood Blood-Peripheral  Gram Stain (06-04-20 @ 22:09):    Growth in anaerobic bottle: Gram Negative Rods  Preliminary Report (06-04-20 @ 22:09):    Growth in anaerobic bottle: Gram Negative Rods    "Due to technical problems, Proteus sp. will Not be reported as part of    the BCID panel until further notice"    ***Blood Panel PCR results on this specimen are available    approximately 3 hours after the Gram stain result.***    Gram stain, PCR, and/or culture results may not always    correspond due to difference in methodologies.    ************************************************************    This PCR assay was performed using [employer].    The following targets are tested for: Enterococcus,    vancomycin resistant enterococci, Listeria monocytogenes,    coagulase negative staphylococci, S. aureus,    methicillin resistant S. aureus, Streptococcus agalactiae    (Group B), S. pneumoniae, S. pyogenes (Group A),    Acinetobacter baumannii, Enterobacter cloacae, E. coli,    Klebsiella oxytoca, K. pneumoniae, Proteus sp.,    Serratia marcescens, Haemophilus influenzae,    Neisseria meningitidis, Pseudomonas aeruginosa, Candida    albicans, C. glabrata, C krusei, C parapsilosis,    C. tropicalis and the KPC resistance gene.  Organism: Blood Culture PCR (06-04-20 @ 23:44)  Organism: Blood Culture PCR (06-04-20 @ 23:44)      -  Acinetobacter baumanii: Nondet      -  Candida albicans: Nondet      -  Candida glabrata: Nondet      -  Candida krusei: Nondet      -  Candida parapsilosis: Nondet      -  Candida tropicalis: Nondet      -  Coagulase negative Staphylococcus: Nondet      -  Enterobacter cloacae complex: Nondet      -  Enterococcus species: Nondet      -  Escherichia coli: Nondet      -  Haemophilus influenzae: Nondet      -  Klebsiella oxytoca: Nondet      -  Klebsiella pneumoniae: Nondet      -  Listeria monocytogenes: Nondet      -  Methicillin resistant Staphylococcus aureus (MRSA): Nondet      -  Multidrug (KPC pos) resistant organism: Nondet      -  Neisseria meningitidis: Nondet      -  Pseudomonas aeruginosa: Nondet      -  Serratia marcescens: Nondet      -  Staphylococcus aureus: Nondet      -  Streptococcus agalactiae (Group B): Nondet      -  Streptococcus pneumoniae: Nondet      -  Streptococcus pyogenes (Group A): Nondet      -  Streptococcus sp. (Not Grp A, B or S pneumoniae): Nondet      -  Vancomycin resistant Enterococcus sp.: Nondet      Method Type: PCR    Culture - Blood (collected 06-04-20 @ 04:12)  Source: .Blood Blood-Peripheral  Gram Stain (06-04-20 @ 19:00):    Growth in anaerobic bottle: Gram positive cocci in pairs  Preliminary Report (06-04-20 @ 19:01):    Growth in anaerobic bottle: Gram positive cocci in pairs    "Due to technical problems, Proteus sp. will Not be reported as part of    the BCID panel until further notice"    ***Blood Panel PCR results on this specimen are available    approximately 3 hours after the Gram stain result.***    Gram stain, PCR, and/or culture results may not always    correspond due to difference in methodologies.    ************************************************************    This PCR assay was performed using [employer].    The following targets are tested for: Enterococcus,    vancomycin resistant enterococci, Listeria monocytogenes,    coagulase negative staphylococci, S. aureus,    methicillin resistant S. aureus, Streptococcus agalactiae    (Group B), S. pneumoniae, S. pyogenes (Group A),    Acinetobacter baumannii, Enterobacter cloacae, E. coli,    Klebsiella oxytoca, K. pneumoniae, Proteus sp.,    Serratia marcescens, Haemophilus influenzae,    Neisseria meningitidis, Pseudomonas aeruginosa, Candida    albicans, C. glabrata, C krusei, C parapsilosis,    C. tropicalis and the KPC resistance gene.  Organism: Blood Culture PCR (06-04-20 @ 20:35)  Organism: Blood Culture PCR (06-04-20 @ 20:35)      -  Streptococcus agalactiae (Group B): Detec      Method Type: PCR        Lactate, Blood: 2.1 mmol/L (06-04-20 @ 11:12)  Blood Gas Venous - Lactate: 2.5 mmoL/L (06-04-20 @ 04:51)      INFECTIOUS DISEASES TESTING  COVID-19 PCR: NotDetec (06-04-20 @ 05:27)      INFLAMMATORY MARKERS  Sedimentation Rate, Erythrocyte: 79 mm/Hr (06-05-20 @ 04:14)      RADIOLOGY & ADDITIONAL TESTS:  I have personally reviewed the last available Chest xray  CXR  Xray Chest 1 View AP/PA:   EXAM:  XR CHEST FRONTAL 1V            PROCEDURE DATE:  06/04/2020            INTERPRETATION:  Clinical History / Reason for exam: Sepsis    Comparison : Chest radiograph None.    Technique/Positioning: AP portable.    Findings:    Support devices: None.    Cardiac/mediastinum/hilum: Cardiomegaly with biventricular AICD/pacemaker.    Lung parenchyma/Pleura: Right greater than left opacities and small effusions. No pneumothorax    Skeleton/soft tissues: Unremarkable.    Impression:      Bilaterallung opacities, right greater than left. No pneumothorax.                  QUAN MARINA M.D., ATTENDING RADIOLOGIST  This document has been electronically signed. Jun 4 2020  5:40AM             (06-04-20 @ 04:59)      CT      CARDIOLOGY TESTING  Transthoracic Echocardiogram:    EXAM:  2-D ECHO (TTE) COMPLETE        PROCEDURE DATE:  06/04/2020      INTERPRETATION:  REPORT:    TRANSTHORACIC ECHOCARDIOGRAM REPORT         Patient Name:   WILL VIEYRA Accession #: [id number]  Medical Rec #:  [id number]       Height:      66.0in 167.6 cm  YOB: 1940        Weight:      132.0 lb 59.87 kg  Patient Age:    80 years        BSA:         1.68 m²  Patient Gender: M               BP:          118/65 mmHg       Date of Exam:        6/4/2020 12:04:28 PM  Referring Physician: [id number] STEFFANIE MAR  Sonographer:         Mami Simpson  Reading Physician:   Satya Martinez M.D.    Procedure:     2D Echo/Doppler/Color Doppler Complete.  Indications:   R07.9 - Chest Pain, unspecified  Diagnosis:     Chest pain, unspecified - R07.9  Study Details: Technically suboptimal study.         Summary:   1. Left ventricular ejection fraction, by visual estimation, is <20%.   2. Spectral Doppler shows impaired relaxation pattern of left ventricular myocardial filling (Grade I diastolic dysfunction).   3. Mitral annular calcification.   4. Moderate tricuspid regurgitation.   5. Estimated pulmonary artery systolic pressure is 37.6 mmHg assuming a right atrial pressure of 10 mmHg, which is consistent with borderline pulmonary hypertension.    PHYSICIAN INTERPRETATION:  Left Ventricle: The left ventricular internal cavity size is mildly increased. Left ventricular wall thickness is normal. Left ventricular ejection fraction, by visual estimation, is <20%. Spectral Doppler shows impaired relaxation pattern of left ventricular myocardial filling (Grade I diastolic dysfunction).  Right Ventricle: Normal right ventricular size and function.  Left Atrium: Mildly enlarged left atrium.  Right Atrium: Mildly enlarged right atrium.  Pericardium: There is no evidence of pericardial effusion.  Mitral Valve: Structurally normal mitral valve, with normal leaflet excursion. There is mitral annular calcification.  Tricuspid Valve: Structurally normal tricuspid valve, with normal leaflet excursion. Moderate tricuspid regurgitation is visualized. Estimated pulmonary artery systolic pressure is 37.6 mmHg assuming a right atrial pressure of 10 mmHg, which is consistent with borderline pulmonary hypertension.  Aortic Valve: The aortic valve was not well visualized. The aortic valve is trileaflet. No evidence of aortic stenosis. No evidence of aortic valve regurgitation is seen.  Aorta: The aortic root is normal in size and structure.  Venous: The inferior vena cava was normal sized, with respiratory size variation less than 50%, consistent with elevated right atrial pressure.  Additional Comments: A pacer wire is visualized in the right ventricle.       2D AND M-MODE MEASUREMENTS (normal ranges within parentheses):  Left Ventricle:                  Normal   Aorta/Left Atrium:             Normal  IVSd (2D):              1.05 cm (0.7-1.1) AoV Cusp Separation: 1.81 cm (1.5-2.6)  LVPWd (2D):             0.96 cm (0.7-1.1) Left Atrium (Mmode): 4.07 cm (1.9-4.0)  LVIDd (2D):             4.86 cm (3.4-5.7)  LVIDs (2D):             4.18 cm  LV FS (2D):             14.0 %   (>25%)  Relative Wall Thickness  0.39    (<0.42)    SPECTRAL DOPPLER ANALYSIS:  LV DIASTOLIC FUNCTION:  MV Peak E: 0.86 m/s Decel Time: 219 msec  MV Peak A: 0.84 m/s  E/A Ratio: 1.03    Aortic Valve:  AoV VMax:    1.15 m/s  AoV Pk Grad: 5.3 mmHg    LVOT Vmax: 0.60 m/s  LVOT VTI:  0.12 m    Mitral Valve:  MV P1/2 Time: 63.54 msec  MV Area, PHT: 3.46 cm²    Tricuspid Valve and PA/RV Systolic Pressure: TR Max Velocity: 2.63 m/s RA Pressure: 10 mmHg RVSP/PASP: 37.6 mmHg       [id number] Satya Martinez M.D., Electronically signed on 6/4/2020 at 1:53:11 PM         *** Final ***                    SATYA MARTINEZ MD  This document has been electronically signed. Jun 4 2020 12:04PM             (06-04-20 @ 12:04)  12 Lead ECG:   Ventricular Rate 73 BPM    Atrial Rate 73 BPM    P-R Interval 218 ms    QRS Duration 148 ms    Q-T Interval 430 ms    QTC Calculation(Bezet) 473 ms    R Axis 249 degrees    T Axis 45 degrees    Diagnosis Line AV dual-paced rhythm with prolonged AV conduction with occasional Premature  ventricular complexes  Biventricular pacemaker detected  Abnormal ECG    Confirmed by Satya Martinez ([id number]) on 6/4/2020 6:55:11 AM (06-04-20 @ 04:33)      MEDICATIONS  aspirin enteric coated 81  atorvastatin 80  chlorhexidine 4% Liquid 1  clindamycin IVPB 600  dextrose 5%. 1000  dextrose 50% Injectable 12.5  digoxin     Tablet 0.125  folic acid 1  furosemide   Injectable 40  gabapentin 200  heparin   Injectable 5000  insulin lispro (HumaLOG) corrective regimen sliding scale   insulin regular Infusion 1  losartan 12.5  meropenem  IVPB 500  pantoprazole   Suspension 40  sodium chloride 0.9%. 500  spironolactone 50      WEIGHT  Weight (kg): 60.3 (06-04-20 @ 09:22)  Creatinine, Serum: 3.0 mg/dL (06-05-20 @ 04:14)      ANTIBIOTICS:  clindamycin IVPB 600 milliGRAM(s) IV Intermittent every 8 hours  meropenem  IVPB 500 milliGRAM(s) IV Intermittent every 12 hours      All available historical records have been reviewed

## 2020-06-05 NOTE — PROGRESS NOTE ADULT - ASSESSMENT
C/w medical therapy  C/w antibiotics  Surgery today for wound debridement  C/w lasix - can change to PO tomorrow, if stable  Avoid fluid overload  Outpatient f/u with Dr. Clemons

## 2020-06-05 NOTE — PROGRESS NOTE ADULT - SUBJECTIVE AND OBJECTIVE BOX
Patient is a 80y old  Male who presents with a chief complaint of necrotizing fascitis (05 Jun 2020 09:03)    HPI:  80 M with PMHx of anemia, ascites 2/2 CHF, CHF s/p biventricular AICD, CKD, CAD s/p CABGx5 on ASA, CKD, DM s/p R big toe amputation, HLD, HTN, hypothyroidism, PAD s/p R femoral stent who presents with altered mental status x 1 day and worsening left foot DFU. The pt's daughter states that he saw his L hallux which looked worse, and necrotic from the prior week. He was also forgetful. She checked his temp at home, it was 100.2F, so she gave him tylenol. She states the pt had been taking augmentin for his DFU for some time. She thinks he forgot to take his meds for several days due to the infection.   She also states, the pt was recently dx with HFrEF, biventricular aicd placed february 2020. She has noticed an 8 lb weight gain in the past 4 days.   No fever. No vomiting. No chills. No back pain. No abd pain. No neck stiffness. No abd pain.     In the ED: VS were stable. The pt states he took tylenol prior to coming in to the ED.  xray of the foot showed Subcutaneous gas, podiatry was c/s. Pt was given clindamycin, zosyn, vanco. Labs notable for WBC 19 with L shift, plt 126, BUN/Cr 94/3.2, trop 0.17, BNP >70k, lactate 2.5. (04 Jun 2020 05:39)      SUBJ:  Patient seen and examined. No events overnight. Off pressors. Plan for foot surgery today.      MEDICATIONS  (STANDING):  aspirin enteric coated 81 milliGRAM(s) Oral daily  atorvastatin 80 milliGRAM(s) Oral at bedtime  chlorhexidine 4% Liquid 1 Application(s) Topical <User Schedule>  clindamycin IVPB 600 milliGRAM(s) IV Intermittent every 8 hours  dextrose 5%. 1000 milliLiter(s) (50 mL/Hr) IV Continuous <Continuous>  dextrose 50% Injectable 12.5 Gram(s) IV Push once  digoxin     Tablet 0.125 milliGRAM(s) Oral daily  folic acid 1 milliGRAM(s) Oral daily  furosemide   Injectable 40 milliGRAM(s) IV Push daily  gabapentin 200 milliGRAM(s) Oral daily  heparin   Injectable 5000 Unit(s) SubCutaneous every 8 hours  insulin lispro (HumaLOG) corrective regimen sliding scale   SubCutaneous three times a day before meals  insulin regular Infusion 1 Unit(s)/Hr (1 mL/Hr) IV Continuous <Continuous>  losartan 12.5 milliGRAM(s) Oral daily  meropenem  IVPB 500 milliGRAM(s) IV Intermittent every 12 hours  pantoprazole   Suspension 40 milliGRAM(s) Oral before breakfast  sodium chloride 0.9%. 500 milliLiter(s) (20 mL/Hr) IV Continuous <Continuous>  spironolactone 50 milliGRAM(s) Oral daily    MEDICATIONS  (PRN):  acetaminophen   Tablet .. 650 milliGRAM(s) Oral every 6 hours PRN Mild Pain (1 - 3)  dextrose 40% Gel 15 Gram(s) Oral once PRN Blood Glucose LESS THAN 70 milliGRAM(s)/deciliter  glucagon  Injectable 1 milliGRAM(s) IntraMuscular once PRN Glucose LESS THAN 70 milligrams/deciliter            Vital Signs Last 24 Hrs  T(C): 37.2 (05 Jun 2020 08:00), Max: 37.2 (05 Jun 2020 08:00)  T(F): 98.9 (05 Jun 2020 08:00), Max: 98.9 (05 Jun 2020 08:00)  HR: 72 (05 Jun 2020 08:00) (50 - 74)  BP: 86/47 (05 Jun 2020 08:00) (86/47 - 121/63)  BP(mean): 59 (05 Jun 2020 08:00) (52 - 96)  RR: 29 (05 Jun 2020 08:00) (9 - 29)  SpO2: 98% (05 Jun 2020 08:00) (95% - 100%)      PHYSICAL EXAM:    GEN: AAO, NAD  HEENT: NC/AT  Neck: No JVD  CV: Reg, S1-S2,   Lungs: CTAB  Abd: Soft, non-tender  Ext: dressing in place      I&O's Summary    04 Jun 2020 07:01  -  05 Jun 2020 07:00  --------------------------------------------------------  IN: 522 mL / OUT: 0 mL / NET: 522 mL    	      TTE:  < from: Transthoracic Echocardiogram (06.04.20 @ 12:04) >    Summary:   1. Left ventricular ejection fraction, by visual estimation, is <20%.   2. Spectral Doppler shows impaired relaxation pattern of left ventricular myocardial filling (Grade I diastolic dysfunction).   3. Mitral annular calcification.   4. Moderate tricuspid regurgitation.   5. Estimated pulmonary artery systolic pressure is 37.6 mmHg assuming a right atrial pressure of 10 mmHg, which is consistent with borderline pulmonary hypertension.    < end of copied text >      LABS:                        9.9    21.21 )-----------( 161      ( 05 Jun 2020 04:14 )             29.8     06-05    133<L>  |  96<L>  |  93<HH>  ----------------------------<  70  4.6   |  22  |  3.0<H>    Ca    8.1<L>      05 Jun 2020 04:14  Phos  3.2     06-04  Mg     2.4     06-05    TPro  6.1  /  Alb  2.8<L>  /  TBili  0.7  /  DBili  x   /  AST  29  /  ALT  18  /  AlkPhos  215<H>  06-05    CARDIAC MARKERS ( 04 Jun 2020 11:12 )  x     / 0.12 ng/mL / 40 U/L / x     / 3.5 ng/mL  CARDIAC MARKERS ( 04 Jun 2020 04:12 )  x     / 0.17 ng/mL / x     / x     / x          PT/INR - ( 05 Jun 2020 04:14 )   PT: 17.10 sec;   INR: 1.49 ratio         PTT - ( 05 Jun 2020 04:14 )  PTT:32.8 sec      BNP  RADIOLOGY & ADDITIONAL STUDIES:      IMPRESSION AND PLAN:

## 2020-06-05 NOTE — PROGRESS NOTE ADULT - ASSESSMENT
ASSESSMENT  80 M with PMHx of anemia, ascites 2/2 CHF, CHF s/p biventricular AICD, CKD, CAD s/p CABGx5 on ASA, CKD, DM s/p R great toe amputation, HLD, HTN, hypothyroidism, PAD s/p R femoral stent who presents with altered mental status x 1 day and worsening left foot DFU. Was on augmentin as outpatient     IMPRESSION  #Polymicrobial bacteremia secondary to Necrotizing L foot infection s/p bedside I&D     6/4 BCX with GBS and unID'ed GNR  < from: Xray Foot AP + Lateral + Oblique, Left (06.04.20 @ 04:59) >Subcutaneous emphysema at the first digit MTP joint. Findings concerning for necrotizing soft tissue infection.  #Elevated trop/BNP  #Lactic acidosis Blood Gas Venous - Lactate: 2.5 mmoL/L (06-04-20 @ 04:51)  #Hyponatremia   #CXR Bilateral lung opacities, right greater than left. No pneumothorax.  #DM   #Abx allergy: linezolid (Rash; Urticaria; Flushing)    RECOMMENDATIONS  - Needs OR debridement  - f/u GNR in BCX  - Repeat BCX   - Marcelo 500mg q12h IV  - Clinda 900mg q8h IV   - Hold further Vanc dosing  - Appreciate Podiatry/Vascular consult    Spectra [id number]

## 2020-06-05 NOTE — PROGRESS NOTE ADULT - SUBJECTIVE AND OBJECTIVE BOX
Patient is a 80y old  Male who presents with a chief complaint of necrotizing fascitis (05 Jun 2020 08:22)        Over Night Events:  Off pressors.  For debridement today         ROS:     All ROS are negative except HPI         PHYSICAL EXAM    ICU Vital Signs Last 24 Hrs  T(C): 37.2 (05 Jun 2020 08:00), Max: 37.2 (05 Jun 2020 08:00)  T(F): 98.9 (05 Jun 2020 08:00), Max: 98.9 (05 Jun 2020 08:00)  HR: 72 (05 Jun 2020 08:00) (50 - 74)  BP: 86/47 (05 Jun 2020 08:00) (86/47 - 121/63)  BP(mean): 59 (05 Jun 2020 08:00) (52 - 96)  ABP: --  ABP(mean): --  RR: 29 (05 Jun 2020 08:00) (9 - 29)  SpO2: 98% (05 Jun 2020 08:00) (95% - 100%)      CONSTITUTIONAL:  Well nourished.  NAD    ENT:   Airway patent,   Mouth with normal mucosa.   No thrush    EYES:   Pupils equal,   Round and reactive to light.    CARDIAC:   Normal rate,   Regular rhythm.    No edema      Vascular:  Normal systolic impulse  No Carotid bruits    RESPIRATORY:   No wheezing  Bilateral BS  Normal chest expansion  Not tachypneic,  No use of accessory muscles    GASTROINTESTINAL:  Abdomen soft,   Non-tender,   No guarding,   + BS    MUSCULOSKELETAL:   Range of motion is not limited,  No clubbing, cyanosis    NEUROLOGICAL:   Alert and oriented   No motor  deficits.    SKIN:   Skin normal color for race,   Warm and dry   LLE erythema and ulcer    No evidence of rash.    PSYCHIATRIC:   Normal mood and affect.   No apparent risk to self or others.    HEMATOLOGICAL:  No cervical  lymphadenopathy.  no inguinal lymphadenopathy      06-04-20 @ 07:01  -  06-05-20 @ 07:00  --------------------------------------------------------  IN:    insulin regular Infusion: 32 mL    IV PiggyBack: 450 mL    sodium chloride 0.9%.: 40 mL  Total IN: 522 mL    OUT:  Total OUT: 0 mL    Total NET: 522 mL          LABS:                            9.9    21.21 )-----------( 161      ( 05 Jun 2020 04:14 )             29.8                     9.9    21.21 )-----------( 161      ( 06-05 @ 04:14 )             29.8                8.5    19.24 )-----------( 126      ( 06-04 @ 11:12 )             27.2                8.9    19.88 )-----------( 129      ( 06-04 @ 04:12 )             27.0                                              06-05    133<L>  |  96<L>  |  93<HH>  ----------------------------<  70  4.6   |  22  |  3.0<H>    05 Jun 2020 04:14    133<L>  |  96<L>  |  93<HH>  ----------------------------<  70     4.6     |  22     |  3.0<H>  04 Jun 2020 04:12    130<L>  |  93<L>  |  94<HH>  ----------------------------<  401<H>  4.6     |  19     |  3.2<H>    Ca    8.1<L>      05 Jun 2020 04:14  Ca    8.2<L>      04 Jun 2020 04:12  Phos  3.2       04 Jun 2020 11:12  Mg     2.4       05 Jun 2020 04:14  Mg     2.5<H>     04 Jun 2020 11:12    TPro  6.1    /  Alb  2.8<L>  /  TBili  0.7    /  DBili  x      /  AST  29     /  ALT  18     /  AlkPhos  215<H>  05 Jun 2020 04:14  TPro  6.4    /  Alb  3.0<L>  /  TBili  0.6    /  DBili  x      /  AST  30     /  ALT  19     /  AlkPhos  240<H>  04 Jun 2020 04:12      Ca    8.1<L>      05 Jun 2020 04:14  Phos  3.2     06-04  Mg     2.4     06-05    TPro  6.1  /  Alb  2.8<L>  /  TBili  0.7  /  DBili  x   /  AST  29  /  ALT  18  /  AlkPhos  215<H>  06-05      PT/INR - ( 05 Jun 2020 04:14 )   PT: 17.10 sec;   INR: 1.49 ratio         PTT - ( 05 Jun 2020 04:14 )  PTT:32.8 sec                                           CARDIAC MARKERS ( 04 Jun 2020 11:12 )  x     / 0.12 ng/mL / 40 U/L / x     / 3.5 ng/mL  CARDIAC MARKERS ( 04 Jun 2020 04:12 )  x     / 0.17 ng/mL / x     / x     / x                                                LIVER FUNCTIONS - ( 05 Jun 2020 04:14 )  Alb: 2.8 g/dL / Pro: 6.1 g/dL / ALK PHOS: 215 U/L / ALT: 18 U/L / AST: 29 U/L / GGT: x                                                  Culture - Blood (collected 04 Jun 2020 04:12)  Source: .Blood Blood-Peripheral  Gram Stain (04 Jun 2020 22:09):    Growth in anaerobic bottle: Gram Negative Rods  Preliminary Report (04 Jun 2020 22:09):    Growth in anaerobic bottle: Gram Negative Rods    "Due to technical problems, Proteus sp. will Not be reported as part of    the BCID panel until further notice"    ***Blood Panel PCR results on this specimen are available    approximately 3 hours after the Gram stain result.***    Gram stain, PCR, and/or culture results may not always    correspond due to difference in methodologies.    ************************************************************    This PCR assay was performed using "[employer].    The following targets are tested for: Enterococcus,    vancomycin resistant enterococci, Listeria monocytogenes,    coagulase negative staphylococci, S. aureus,    methicillin resistant S. aureus, Streptococcus agalactiae    (Group B), S. pneumoniae, S. pyogenes (Group A),    Acinetobacter baumannii, Enterobacter cloacae, E. coli,    Klebsiella oxytoca, K. pneumoniae, Proteus sp.,    Serratia marcescens, Haemophilus influenzae,    Neisseria meningitidis, Pseudomonas aeruginosa, Candida    albicans, C. glabrata, C krusei, C parapsilosis,    C. tropicalis and the KPC resistance gene.  Organism: Blood Culture PCR (04 Jun 2020 23:44)  Organism: Blood Culture PCR (04 Jun 2020 23:44)    Culture - Blood (collected 04 Jun 2020 04:12)  Source: .Blood Blood-Peripheral  Gram Stain (04 Jun 2020 19:00):    Growth in anaerobic bottle: Gram positive cocci in pairs  Preliminary Report (04 Jun 2020 19:01):    Growth in anaerobic bottle: Gram positive cocci in pairs    "Due to technical problems, Proteus sp. will Not be reported as part of    the BCID panel until further notice"    ***Blood Panel PCR results on this specimen are available    approximately 3 hours after the Gram stain result.***    Gram stain, PCR, and/or culture results may not always    correspond due to difference in methodologies.    ************************************************************    This PCR assay was performed using "[employer].    The following targets are tested for: Enterococcus,    vancomycin resistant enterococci, Listeria monocytogenes,    coagulase negative staphylococci, S. aureus,    methicillin resistant S. aureus, Streptococcus agalactiae    (Group B), S. pneumoniae, S. pyogenes (Group A),    Acinetobacter baumannii, Enterobacter cloacae, E. coli,    Klebsiella oxytoca, K. pneumoniae, Proteus sp.,    Serratia marcescens, Haemophilus influenzae,    Neisseria meningitidis, Pseudomonas aeruginosa, Candida    albicans, C. glabrata, C krusei, C parapsilosis,    C. tropicalis and the KPC resistance gene.  Organism: Blood Culture PCR (04 Jun 2020 20:35)  Organism: Blood Culture PCR (04 Jun 2020 20:35)                                                                                           MEDICATIONS  (STANDING):  aspirin enteric coated 81 milliGRAM(s) Oral daily  atorvastatin 80 milliGRAM(s) Oral at bedtime  chlorhexidine 4% Liquid 1 Application(s) Topical <User Schedule>  clindamycin IVPB 600 milliGRAM(s) IV Intermittent every 8 hours  dextrose 5%. 1000 milliLiter(s) (50 mL/Hr) IV Continuous <Continuous>  dextrose 50% Injectable 12.5 Gram(s) IV Push once  digoxin     Tablet 0.125 milliGRAM(s) Oral daily  folic acid 1 milliGRAM(s) Oral daily  furosemide   Injectable 40 milliGRAM(s) IV Push daily  gabapentin 200 milliGRAM(s) Oral daily  heparin   Injectable 5000 Unit(s) SubCutaneous every 8 hours  insulin lispro (HumaLOG) corrective regimen sliding scale   SubCutaneous three times a day before meals  insulin regular Infusion 1 Unit(s)/Hr (1 mL/Hr) IV Continuous <Continuous>  losartan 12.5 milliGRAM(s) Oral daily  meropenem  IVPB 500 milliGRAM(s) IV Intermittent every 12 hours  pantoprazole   Suspension 40 milliGRAM(s) Oral before breakfast  sodium chloride 0.9%. 500 milliLiter(s) (20 mL/Hr) IV Continuous <Continuous>  spironolactone 50 milliGRAM(s) Oral daily    MEDICATIONS  (PRN):  acetaminophen   Tablet .. 650 milliGRAM(s) Oral every 6 hours PRN Mild Pain (1 - 3)  dextrose 40% Gel 15 Gram(s) Oral once PRN Blood Glucose LESS THAN 70 milliGRAM(s)/deciliter  glucagon  Injectable 1 milliGRAM(s) IntraMuscular once PRN Glucose LESS THAN 70 milligrams/deciliter      New X-rays reviewed:                                                                                  ECHO    CXR interpreted by me:

## 2020-06-05 NOTE — CONSULT NOTE ADULT - SUBJECTIVE AND OBJECTIVE BOX
80 M with PMHx of anemia, ascites 2/2 CHF, CHF s/p biventricular AICD, CKD, CAD s/p CABGx5 on ASA, CKD, DM s/p R big toe amputation, HLD, HTN, hypothyroidism, PAD s/p R femoral stent who presents with altered mental status x 1 day and worsening left foot DFU. Burn team consulted for evaluation of left foot wound.     PAST MEDICAL & SURGICAL HISTORY:  Chronic kidney disease  Anemia  Ascites  PAD (peripheral artery disease)  Hyperlipidemia  Hypothyroidism  Hypertension  Coronary artery disease  CHF (congestive heart failure)  Diabetes mellitus  Biventricular ICD (implantable cardioverter-defibrillator) in place  Amputation of toe of right foot  S/P arterial stent  S/P CABG x 5    PHYSICAL EXAM:  GENERAL: NAD, well-developed  HEAD:  Atraumatic, Normocephalic  wound: [id number] ulcerating wound to medial anterior dorsum of left foot. Mild edema and erythema noted. No mucopurulent drainage or foul odor noted. 80 M with PMHx of anemia, ascites 2/2 CHF, CHF s/p biventricular AICD, CKD, CAD s/p CABGx5 on ASA, CKD, DM s/p R big toe amputation, HLD, HTN, hypothyroidism, PAD s/p R femoral stent who presents with altered mental status x 1 day and worsening left foot DFU.     Burn team consulted for evaluation of left foot wound.     PAST MEDICAL & SURGICAL HISTORY:  Chronic kidney disease  Anemia  Ascites  PAD (peripheral artery disease)  Hyperlipidemia  Hypothyroidism  Hypertension  Coronary artery disease  CHF (congestive heart failure)  Diabetes mellitus  Biventricular ICD (implantable cardioverter-defibrillator) in place  Amputation of toe of right foot  S/P arterial stent  S/P CABG x 5      PHYSICAL EXAM:  GENERAL: NAD, well-developed  HEAD:  Atraumatic, Normocephalic  wound: [id number] -  full thickness necrotic wound with gray black skin discoloration- distal medial dorsum of foot/baseof great toe anterior dorsum of left foot.   Mild edema and erythema noted..   ulcer of heel/hindfoot with yellow exudate - chronic appearance

## 2020-06-05 NOTE — PROGRESS NOTE ADULT - ASSESSMENT
IMPRESSION:    Sepsis present on admission   LLE DFU / Nec fas?  Polymicrobial bacteremia   HO HFREF  HO DM< and PVD    PLAN:    CNS:  no depressants     HEENT: Oral care    PULMONARY:  HOB @ 45 degrees.  Aspiration precautions     CARDIOVASCULAR:  I=O.  Avoid volume overload     GI: GI prophylaxis.  NPO.     RENAL:  Follow up lytes.  Correct as needed.  Renal eval     INFECTIOUS DISEASE: Follow up cultures.  Marcelo / Vanc renally dosed.  Clindamycin 600 mg Q8.  FU with ID     HEMATOLOGICAL:  DVT prophylaxis.      ENDOCRINE:  Follow up FS.  Insulin protocol if needed.    MUSCULOSKELETAL:  Burn and Podiatry FU     MICu for now     Prognosis guarded

## 2020-06-05 NOTE — PROGRESS NOTE ADULT - ASSESSMENT
80 M with PMHx of anemia, ascites 2/2 CHF, CHF s/p biventricular AICD, CKD, CAD s/p CABGx5 on ASA, CKD, DM s/p R great toe amputation, HLD, HTN, hypothyroidism, PAD s/p R femoral stent who presents with altered mental status x 1 day and worsening left foot DFU. Was on augmentin as outpatient       # #Polymicrobial bacteremia secondary to necrotizing L foot infection   - s/p bedside I&D  - Vancomycin 750mg IV q48, Clindamycin 600mg IV q8, and Meropenem 500mg IV q12   - Blood cultures positive for GBS, GNR  - Follow-up Blood cultures daily  - OR for debridement today   - Follow-up burn and vascular   - Arterial Duplex with normal flow, EF = <20%     # DM   - Check A1C   - check lipid profile   - FS, start insulin regimen     # MICHAEL on CKD  - Baseline cr 1.8-2.2, 3.0 this AM     - Follow-up with Nephrology     # Type II NSTEMI   - trop 0.17  - EKG without ischemic changes    # HFrEF w/ BiVentricular AICD, h/o CHB  - TTE from [facility] from 2/2020 EF 20%, severe TR, akinetic apex, inf spetum, mid and apical anterior septum. Mild-mod MR, + ascites from CHF. Now EF <20%   - strict Is and Os   - BNP >70k  - Needs Coreg 3.325 and Lasix IV 40mg daily when BP can tolerate     # CAD s/p CABGx5  - on ASA, statin    Diet: NPO  DVT ppx; Heparin SubQ  GI ppx: PTX  Dispo: MICU

## 2020-06-05 NOTE — PROGRESS NOTE ADULT - SUBJECTIVE AND OBJECTIVE BOX
WILL VIEYRA 80y Male    HPI / INTERVAL HISTORY:  Patient seen and examined at bedside. No acute events overnight.       OBJECTIVE:  VITAL SIGNS:  ICU Vital Signs Last 24 Hrs  T(C): 37.2 (05 Jun 2020 10:12), Max: 37.2 (05 Jun 2020 08:00)  T(F): 98.9 (05 Jun 2020 10:12), Max: 98.9 (05 Jun 2020 08:00)  HR: 72 (05 Jun 2020 10:12) (50 - 74)  BP: 92/44 (05 Jun 2020 10:12) (72/41 - 121/63)  BP(mean): 51 (05 Jun 2020 09:00) (51 - 96)  ABP: --  ABP(mean): --  RR: 20 (05 Jun 2020 10:12) (9 - 36)  SpO2: 98% (05 Jun 2020 10:12) (95% - 100%)        06-04 @ 07:01  -  06-05 @ 07:00  --------------------------------------------------------  IN: 522 mL / OUT: 0 mL / NET: 522 mL      CAPILLARY BLOOD GLUCOSE      POCT Blood Glucose.: 89 mg/dL (05 Jun 2020 07:38)      PHYSICAL EXAM:  Gen: NAD  HEENT: NC/AT; PERRL, anicteric sclera  Neck: supple, no JVD  Resp:  Bilateral breath sounds; no wheezes, rales or rhonchi  Cardiovasc: S1S2 normal; RRR  GI: soft, nondistended, nontender; +BS  Extr: warm, well-perfused, no LE edema  Skin: normal color, LLE has ascending erythema   Neuro: A&O, no tremors     LABS:                        9.9    21.21 )-----------( 161      ( 05 Jun 2020 04:14 )             29.8     06-05    133<L>  |  96<L>  |  93<HH>  ----------------------------<  70  4.6   |  22  |  3.0<H>    Ca    8.1<L>      05 Jun 2020 04:14  Phos  3.2     06-04  Mg     2.4     06-05    TPro  6.1  /  Alb  2.8<L>  /  TBili  0.7  /  DBili  x   /  AST  29  /  ALT  18  /  AlkPhos  215<H>  06-05    LIVER FUNCTIONS - ( 05 Jun 2020 04:14 )  Alb: 2.8 g/dL / Pro: 6.1 g/dL / ALK PHOS: 215 U/L / ALT: 18 U/L / AST: 29 U/L / GGT: x           PT/INR - ( 05 Jun 2020 04:14 )   PT: 17.10 sec;   INR: 1.49 ratio         PTT - ( 05 Jun 2020 04:14 )  PTT:32.8 sec    CARDIAC MARKERS ( 04 Jun 2020 11:12 )  x     / 0.12 ng/mL / 40 U/L / x     / 3.5 ng/mL  CARDIAC MARKERS ( 04 Jun 2020 04:12 )  x     / 0.17 ng/mL / x     / x     / x            06-04-20 @ 07:01  -  06-05-20 @ 07:00  --------------------------------------------------------  IN: 522 mL / OUT: 0 mL / NET: 522 mL            RADIOLOGY & ADDITIONAL TESTS:       MEDICATIONS:  acetaminophen   Tablet .. 650 milliGRAM(s) Oral every 6 hours PRN  aspirin enteric coated 81 milliGRAM(s) Oral daily  atorvastatin 80 milliGRAM(s) Oral at bedtime  chlorhexidine 4% Liquid 1 Application(s) Topical <User Schedule>  clindamycin IVPB 600 milliGRAM(s) IV Intermittent every 8 hours  dextrose 40% Gel 15 Gram(s) Oral once PRN  dextrose 5%. 1000 milliLiter(s) IV Continuous <Continuous>  dextrose 50% Injectable 12.5 Gram(s) IV Push once  digoxin     Tablet 0.125 milliGRAM(s) Oral daily  folic acid 1 milliGRAM(s) Oral daily  furosemide   Injectable 40 milliGRAM(s) IV Push daily  gabapentin 200 milliGRAM(s) Oral daily  glucagon  Injectable 1 milliGRAM(s) IntraMuscular once PRN  heparin   Injectable 5000 Unit(s) SubCutaneous every 8 hours  insulin lispro (HumaLOG) corrective regimen sliding scale   SubCutaneous three times a day before meals  insulin regular Infusion 1 Unit(s)/Hr IV Continuous <Continuous>  losartan 12.5 milliGRAM(s) Oral daily  meropenem  IVPB 500 milliGRAM(s) IV Intermittent every 12 hours  pantoprazole   Suspension 40 milliGRAM(s) Oral before breakfast  sodium chloride 0.9%. 500 milliLiter(s) IV Continuous <Continuous>  spironolactone 50 milliGRAM(s) Oral daily      ALLERGIES:  Byetta (Stomach Upset)  linezolid (Rash; Urticaria; Flushing)  melatonin (Other)

## 2020-06-06 NOTE — PROGRESS NOTE ADULT - ASSESSMENT
IMPRESSION:    Sepsis present on admission   LLE DFU / Nec fas?  Polymicrobial bacteremia   HO HFREF  HO DM< and PVD    PLAN:    CNS:  no depressants     HEENT: Oral care    PULMONARY:  HOB @ 45 degrees.  Aspiration precautions     CARDIOVASCULAR:  I=O.  Avoid volume overload     GI: GI prophylaxis.  NPO.     RENAL:  Follow up lytes.  Correct as needed.  Renal eval     INFECTIOUS DISEASE: Follow up cultures.  Marcelo / Vanc renally dosed.  Clindamycin 600 mg Q8.  FU with ID     HEMATOLOGICAL:  DVT prophylaxis.      ENDOCRINE:  Follow up FS.  Insulin protocol if needed.    MUSCULOSKELETAL:  Burn and Podiatry FU     MICU for now     Prognosis guarded IMPRESSION:    Sepsis present on admission  G-ve bacteremia   LLE DFU / Nec fas?  Polymicrobial bacteremia   HO HFREF  HO DM and PVD    PLAN:    CNS:  no depressants     HEENT: Oral care    PULMONARY:  HOB @ 45 degrees.  Aspiration precautions     CARDIOVASCULAR:  I=O.  Avoid volume overload     GI: GI prophylaxis.  NPO.     RENAL:  Follow up lytes.  Correct as needed.  Renal eval     INFECTIOUS DISEASE: Follow up cultures.  Marcelo / Vanc renally dosed.  Clindamycin 600 mg Q8.  FU with ID. F/U sensitivities     HEMATOLOGICAL:  DVT prophylaxis.      ENDOCRINE:  Follow up FS.  Insulin protocol if needed.    MUSCULOSKELETAL:  Burn and Podiatry FU     MICU for now     Prognosis guarded IMPRESSION:    Sepsis present on admission  G-ve bacteremia   LLE DFU / Nec fas?  Polymicrobial bacteremia   HO HFREF  HO DM and PVD    PLAN:    CNS:  no depressants     HEENT: Oral care    PULMONARY:  HOB @ 45 degrees.  Aspiration precautions . Repeat CXR    CARDIOVASCULAR:  I=O.  Avoid volume overload     GI: GI prophylaxis.  NPO.     RENAL:  Follow up lytes.  Correct as needed.  Renal eval     INFECTIOUS DISEASE: Follow up cultures.  Marcelo / Vanc renally dosed.  Clindamycin 600 mg Q8.  FU with ID. F/U sensitivities     HEMATOLOGICAL:  DVT prophylaxis.      ENDOCRINE:  Follow up FS.  Insulin protocol if needed.    MUSCULOSKELETAL:  Burn and Podiatry FU     MICU for now     Prognosis guarded IMPRESSION:    Sepsis present on admission  G-ve bacteremia   LLE DFU / Nec fas? sp debridement  Polymicrobial bacteremia   HO HFREF ( less than 20%)  HO DM and PVD  Renal failure  hyperk on aldactone/ dig    PLAN:    CNS:  no depressants     HEENT: Oral care    PULMONARY:  HOB @ 45 degrees.  Aspiration precautions . Repeat CXR    CARDIOVASCULAR:  I=O.  Avoid volume overload keep , check dig level, hold aladactone, keep iv lasix    GI: GI prophylaxis.  feeding    RENAL:  Follow up lytes.  Correct as needed.  Renal eval     INFECTIOUS DISEASE: Follow up cultures.  Marcelo/ CLINDA  600 mg Q8.  FU with ID. F/U sensitivities     HEMATOLOGICAL:  DVT prophylaxis.      ENDOCRINE:  Follow up FS.  Insulin protocol if needed.    MUSCULOSKELETAL:  Burn and Podiatry FU     MICU for now     Prognosis guarded

## 2020-06-06 NOTE — PROGRESS NOTE ADULT - SUBJECTIVE AND OBJECTIVE BOX
Patient is a 80y old  Male who presents with a chief complaint of necrotizing fascitis (05 Jun 2020 16:52)        Over Night Events:        ALLERGIC/IMMUNOLOGIC:   No active allergic or immunologic issues        PHYSICAL EXAM    ICU Vital Signs Last 24 Hrs  T(C): 36.3 (06 Jun 2020 00:00), Max: 37.2 (05 Jun 2020 08:00)  T(F): 97.4 (06 Jun 2020 00:00), Max: 98.9 (05 Jun 2020 08:00)  HR: 68 (06 Jun 2020 07:00) (68 - 79)  BP: 116/53 (06 Jun 2020 07:00) (72/41 - 132/67)  BP(mean): 72 (06 Jun 2020 07:00) (51 - 94)  RR: 13 (06 Jun 2020 07:00) (11 - 36)  SpO2: 97% (06 Jun 2020 07:00) (95% - 100%)      CONSTITUTIONAL:   Ill appearing.  Well nourished.  NAD    ENT:   Airway patent,   Mouth with normal mucosa.   No thrush    EYES:   Pupils equal,   Round and reactive to light.    CARDIAC:   Normal rate,   Regular rhythm.    No edema      Vascular:  Normal systolic impulse  No Carotid bruits    RESPIRATORY:   No wheezing  Bilateral BS  Normal chest expansion  Not tachypneic,  No use of accessory muscles    GASTROINTESTINAL:  Abdomen soft,   Non-tender,   No guarding,   + BS    GENITOURINARY  normal genitalia for sex  no edema    MUSCULOSKELETAL:   Range of motion is not limited,  No muscle or joint tenderness  No clubbing, cyanosis    NEUROLOGICAL:   Alert and oriented   No motor  deficits.  pertinent DTRs normal    SKIN:   Skin normal color for race,   Warm and dry and intact.   No evidence of rash.    PSYCHIATRIC:   Normal mood and affect.   No apparent risk to self or others.    HEME LYMPH:   No splenomegaly.  No cervical  lymphadenopathy.  no inguinal lymphadenopathy      06-05-20 @ 07:01  -  06-06-20 @ 07:00  --------------------------------------------------------  IN:    IV PiggyBack: 200 mL    lactated ringers.: 50 mL    Oral Fluid: 100 mL  Total IN: 350 mL    OUT:    Voided: 700 mL  Total OUT: 700 mL    Total NET: -350 mL          LABS:                            8.8    18.21 )-----------( 165      ( 06 Jun 2020 05:22 )             26.6                                               06-06    132<L>  |  94<L>  |  99<HH>  ----------------------------<  322<H>  5.8<H>   |  20  |  3.0<H>    Ca    8.2<L>      06 Jun 2020 05:22  Phos  3.2     06-04  Mg     2.5     06-06    TPro  6.0  /  Alb  2.7<L>  /  TBili  0.6  /  DBili  x   /  AST  29  /  ALT  18  /  AlkPhos  196<H>  06-06      PT/INR - ( 05 Jun 2020 04:14 )   PT: 17.10 sec;   INR: 1.49 ratio         PTT - ( 05 Jun 2020 04:14 )  PTT:32.8 sec                                           CARDIAC MARKERS ( 04 Jun 2020 11:12 )  x     / 0.12 ng/mL / 40 U/L / x     / 3.5 ng/mL                                            LIVER FUNCTIONS - ( 06 Jun 2020 05:22 )  Alb: 2.7 g/dL / Pro: 6.0 g/dL / ALK PHOS: 196 U/L / ALT: 18 U/L / AST: 29 U/L / GGT: x                                                  Culture - Abscess with Gram Stain (collected 04 Jun 2020 07:47)  Source: .Abscess left foot  Preliminary Report (05 Jun 2020 18:53):    Numerous Escherichia coli    Numerous Proteus mirabilis    Culture - Blood (collected 04 Jun 2020 04:12)  Source: .Blood Blood-Peripheral  Gram Stain (04 Jun 2020 22:09):    Growth in anaerobic bottle: Gram Negative Rods  Preliminary Report (05 Jun 2020 21:16):    Growth in anaerobic bottle: Proteus mirabilis    "Due to technical problems, Proteus sp. will Not be reported as part of    the BCID panel until further notice"    ***Blood Panel PCR results on this specimen are available    approximately 3 hours after theGram stain result.***    Gram stain, PCR, and/or culture results may not always    correspond due to difference in methodologies.    ************************************************************    This PCR assay was performed using [employer].    The following targets are tested for: Enterococcus,    vancomycin resistant enterococci, Listeria monocytogenes,    coagulase negative staphylococci, S. aureus,    methicillin resistant S. aureus, Streptococcus agalactiae    (Group B), S. pneumoniae, S. pyogenes (Group A),    Acinetobacter baumannii, Enterobacter cloacae, E. coli,    Klebsiella oxytoca, K. pneumoniae, Proteus sp.,    Serratia marcescens, Haemophilus influenzae,    Neisseria meningitidis, Pseudomonas aeruginosa, Candida    albicans, C. glabrata, C krusei,C parapsilosis,    C. tropicalis and the KPC resistance gene.  Organism: Blood Culture PCR  Proteus mirabilis (05 Jun 2020 21:15)  Organism: Proteus mirabilis (05 Jun 2020 21:15)  Organism: Blood Culture PCR (04 Jun 2020 23:44)    Culture - Blood (collected 04 Jun 2020 04:12)  Source: .Blood Blood-Peripheral  Gram Stain (05 Jun 2020 21:20):    Growth in anaerobic bottle: Gram positive cocci in pairs    Growth in aerobic bottle: Gram Negative Rods  Preliminary Report (05 Jun 2020 23:04):    Growth in anaerobic bottle: Streptococcus agalactiae (Group B)    Growth in aerobic bottle: Gram Negative Rods    "Due to technical problems, Proteus sp. will Not be reported as part of    the BCID panel until further notice"    ***Blood Panel PCR results on this specimen are available    approximately 3 hours after the Gram stain result.***    Gram stain, PCR, and/or culture results may not always    correspond due to difference in methodologies.    ************************************************************    This PCR assay was performed using [employer].    The following targets are tested for: Enterococcus,    vancomycin resistant enterococci, Listeria monocytogenes,    coagulase negative staphylococci, S. aureus,    methicillin resistant S. aureus, Streptococcus agalactiae    (Group B), S. pneumoniae, S. pyogenes (Group A),    Acinetobacter baumannii, Enterobacter cloacae, E. coli,    Klebsiella oxytoca, K. pneumoniae, Proteus sp.,    Serratia marcescens, Haemophilus influenzae,    Neisseria meningitidis, Pseudomonas aeruginosa, Candida    albicans, C. glabrata, C krusei, C parapsilosis,    C. tropicalis and the KPC resistance gene.  Organism: Blood Culture PCR (04 Jun 2020 20:35)  Organism: Blood Culture PCR (04 Jun 2020 20:35)                                                                                           MEDICATIONS  (STANDING):  aspirin enteric coated 81 milliGRAM(s) Oral daily  atorvastatin 80 milliGRAM(s) Oral at bedtime  chlorhexidine 4% Liquid 1 Application(s) Topical <User Schedule>  clindamycin IVPB 900 milliGRAM(s) IV Intermittent every 8 hours  dextrose 5%. 1000 milliLiter(s) (50 mL/Hr) IV Continuous <Continuous>  dextrose 50% Injectable 12.5 Gram(s) IV Push once  digoxin     Tablet 0.125 milliGRAM(s) Oral daily  folic acid 1 milliGRAM(s) Oral daily  furosemide   Injectable 40 milliGRAM(s) IV Push daily  gabapentin 200 milliGRAM(s) Oral daily  heparin   Injectable 5000 Unit(s) SubCutaneous every 8 hours  insulin lispro (HumaLOG) corrective regimen sliding scale   SubCutaneous three times a day before meals  insulin regular Infusion 1 Unit(s)/Hr (1 mL/Hr) IV Continuous <Continuous>  meropenem  IVPB 500 milliGRAM(s) IV Intermittent every 12 hours  pantoprazole   Suspension 40 milliGRAM(s) Oral before breakfast  sodium chloride 0.9%. 500 milliLiter(s) (20 mL/Hr) IV Continuous <Continuous>  sodium zirconium cyclosilicate 5 Gram(s) Oral once  spironolactone 50 milliGRAM(s) Oral daily  vancomycin  IVPB 750 milliGRAM(s) IV Intermittent every 24 hours    MEDICATIONS  (PRN):  acetaminophen   Tablet .. 650 milliGRAM(s) Oral every 6 hours PRN Mild Pain (1 - 3)  dextrose 40% Gel 15 Gram(s) Oral once PRN Blood Glucose LESS THAN 70 milliGRAM(s)/deciliter  glucagon  Injectable 1 milliGRAM(s) IntraMuscular once PRN Glucose LESS THAN 70 milligrams/deciliter Patient is a 80y old  Male who presents with a chief complaint of necrotizing fascitis (05 Jun 2020 16:52)        Over Night Events: s/p debridement    ALLERGIC/IMMUNOLOGIC:   No active allergic or immunologic issues    PHYSICAL EXAM    ICU Vital Signs Last 24 Hrs  T(C): 36.3 (06 Jun 2020 00:00), Max: 37.2 (05 Jun 2020 08:00)  T(F): 97.4 (06 Jun 2020 00:00), Max: 98.9 (05 Jun 2020 08:00)  HR: 68 (06 Jun 2020 07:00) (68 - 79)  BP: 116/53 (06 Jun 2020 07:00) (72/41 - 132/67)  BP(mean): 72 (06 Jun 2020 07:00) (51 - 94)  RR: 13 (06 Jun 2020 07:00) (11 - 36)  SpO2: 97% (06 Jun 2020 07:00) (95% - 100%)      CONSTITUTIONAL:  Well nourished.  NAD    ENT:   Airway patent,   Mouth with normal mucosa.   No thrush    EYES:   Pupils equal,   Round and reactive to light.    CARDIAC:   Normal rate,   Regular rhythm.    No edema    Vascular:  Normal systolic impulse  No Carotid bruits    RESPIRATORY:   No wheezing  Bilateral BS  Normal chest expansion  Not tachypneic,  No use of accessory muscles    GASTROINTESTINAL:  Abdomen soft,   Non-tender,   No guarding,   + BS    GENITOURINARY  normal genitalia for sex  no edema    MUSCULOSKELETAL:   Range of motion is not limited,  No muscle or joint tenderness  No clubbing, cyanosis    NEUROLOGICAL:   Alert and oriented   No motor  deficits.  pertinent DTRs normal    SKIN:   Skin normal color for race,   Warm and dry and intact.   No evidence of rash.    PSYCHIATRIC:   Normal mood and affect.   No apparent risk to self or others.    HEME LYMPH:   No splenomegaly.  No cervical  lymphadenopathy.  no inguinal lymphadenopathy      06-05-20 @ 07:01  -  06-06-20 @ 07:00  --------------------------------------------------------  IN:    IV PiggyBack: 200 mL    lactated ringers.: 50 mL    Oral Fluid: 100 mL  Total IN: 350 mL    OUT:    Voided: 700 mL  Total OUT: 700 mL    Total NET: -350 mL      LABS:                        8.8    18.21 )-----------( 165      ( 06 Jun 2020 05:22 )             26.6                                               06-06    132<L>  |  94<L>  |  99<HH>  ----------------------------<  322<H>  5.8<H>   |  20  |  3.0<H>    Ca    8.2<L>      06 Jun 2020 05:22  Phos  3.2     06-04  Mg     2.5     06-06    TPro  6.0  /  Alb  2.7<L>  /  TBili  0.6  /  DBili  x   /  AST  29  /  ALT  18  /  AlkPhos  196<H>  06-06      PT/INR - ( 05 Jun 2020 04:14 )   PT: 17.10 sec;   INR: 1.49 ratio         PTT - ( 05 Jun 2020 04:14 )  PTT:32.8 sec                                           CARDIAC MARKERS ( 04 Jun 2020 11:12 )  x     / 0.12 ng/mL / 40 U/L / x     / 3.5 ng/mL                                            LIVER FUNCTIONS - ( 06 Jun 2020 05:22 )  Alb: 2.7 g/dL / Pro: 6.0 g/dL / ALK PHOS: 196 U/L / ALT: 18 U/L / AST: 29 U/L / GGT: x                                                  Culture - Abscess with Gram Stain (collected 04 Jun 2020 07:47)  Source: .Abscess left foot  Preliminary Report (05 Jun 2020 18:53):    Numerous Escherichia coli    Numerous Proteus mirabilis    Culture - Blood (collected 04 Jun 2020 04:12)  Source: .Blood Blood-Peripheral  Gram Stain (04 Jun 2020 22:09):    Growth in anaerobic bottle: Gram Negative Rods  Preliminary Report (05 Jun 2020 21:16):    Growth in anaerobic bottle: Proteus mirabilis    "Due to technical problems, Proteus sp. will Not be reported as part of    the BCID panel until further notice"    ***Blood Panel PCR results on this specimen are available    approximately 3 hours after theGram stain result.***    Gram stain, PCR, and/or culture results may not always    correspond due to difference in methodologies.    ************************************************************    This PCR assay was performed using [employer].    The following targets are tested for: Enterococcus,    vancomycin resistant enterococci, Listeria monocytogenes,    coagulase negative staphylococci, S. aureus,    methicillin resistant S. aureus, Streptococcus agalactiae    (Group B), S. pneumoniae, S. pyogenes (Group A),    Acinetobacter baumannii, Enterobacter cloacae, E. coli,    Klebsiella oxytoca, K. pneumoniae, Proteus sp.,    Serratia marcescens, Haemophilus influenzae,    Neisseria meningitidis, Pseudomonas aeruginosa, Candida    albicans, C. glabrata, C krusei,C parapsilosis,    C. tropicalis and the KPC resistance gene.  Organism: Blood Culture PCR  Proteus mirabilis (05 Jun 2020 21:15)  Organism: Proteus mirabilis (05 Jun 2020 21:15)  Organism: Blood Culture PCR (04 Jun 2020 23:44)    Culture - Blood (collected 04 Jun 2020 04:12)  Source: .Blood Blood-Peripheral  Gram Stain (05 Jun 2020 21:20):    Growth in anaerobic bottle: Gram positive cocci in pairs    Growth in aerobic bottle: Gram Negative Rods  Preliminary Report (05 Jun 2020 23:04):    Growth in anaerobic bottle: Streptococcus agalactiae (Group B)    Growth in aerobic bottle: Gram Negative Rods    "Due to technical problems, Proteus sp. will Not be reported as part of    the BCID panel until further notice"    ***Blood Panel PCR results on this specimen are available    approximately 3 hours after the Gram stain result.***    Gram stain, PCR, and/or culture results may not always    correspond due to difference in methodologies.    ************************************************************    This PCR assay was performed using [employer].    The following targets are tested for: Enterococcus,    vancomycin resistant enterococci, Listeria monocytogenes,    coagulase negative staphylococci, S. aureus,    methicillin resistant S. aureus, Streptococcus agalactiae    (Group B), S. pneumoniae, S. pyogenes (Group A),    Acinetobacter baumannii, Enterobacter cloacae, E. coli,    Klebsiella oxytoca, K. pneumoniae, Proteus sp.,    Serratia marcescens, Haemophilus influenzae,    Neisseria meningitidis, Pseudomonas aeruginosa, Candida    albicans, C. glabrata, C krusei, C parapsilosis,    C. tropicalis and the KPC resistance gene.  Organism: Blood Culture PCR (04 Jun 2020 20:35)  Organism: Blood Culture PCR (04 Jun 2020 20:35)                                                                                           MEDICATIONS  (STANDING):  aspirin enteric coated 81 milliGRAM(s) Oral daily  atorvastatin 80 milliGRAM(s) Oral at bedtime  chlorhexidine 4% Liquid 1 Application(s) Topical <User Schedule>  clindamycin IVPB 900 milliGRAM(s) IV Intermittent every 8 hours  dextrose 5%. 1000 milliLiter(s) (50 mL/Hr) IV Continuous <Continuous>  dextrose 50% Injectable 12.5 Gram(s) IV Push once  digoxin     Tablet 0.125 milliGRAM(s) Oral daily  folic acid 1 milliGRAM(s) Oral daily  furosemide   Injectable 40 milliGRAM(s) IV Push daily  gabapentin 200 milliGRAM(s) Oral daily  heparin   Injectable 5000 Unit(s) SubCutaneous every 8 hours  insulin lispro (HumaLOG) corrective regimen sliding scale   SubCutaneous three times a day before meals  insulin regular Infusion 1 Unit(s)/Hr (1 mL/Hr) IV Continuous <Continuous>  meropenem  IVPB 500 milliGRAM(s) IV Intermittent every 12 hours  pantoprazole   Suspension 40 milliGRAM(s) Oral before breakfast  sodium chloride 0.9%. 500 milliLiter(s) (20 mL/Hr) IV Continuous <Continuous>  sodium zirconium cyclosilicate 5 Gram(s) Oral once  spironolactone 50 milliGRAM(s) Oral daily  vancomycin  IVPB 750 milliGRAM(s) IV Intermittent every 24 hours    MEDICATIONS  (PRN):  acetaminophen   Tablet .. 650 milliGRAM(s) Oral every 6 hours PRN Mild Pain (1 - 3)  dextrose 40% Gel 15 Gram(s) Oral once PRN Blood Glucose LESS THAN 70 milliGRAM(s)/deciliter  glucagon  Injectable 1 milliGRAM(s) IntraMuscular once PRN Glucose LESS THAN 70 milligrams/deciliter Patient is a 80y old  Male who presents with a chief complaint of necrotizing fascitis (05 Jun 2020 16:52)        Over Night Events: s/p debridement, no pressors      PHYSICAL EXAM    ICU Vital Signs Last 24 Hrs  T(C): 36.3 (06 Jun 2020 00:00), Max: 37.2 (05 Jun 2020 08:00)  T(F): 97.4 (06 Jun 2020 00:00), Max: 98.9 (05 Jun 2020 08:00)  HR: 68 (06 Jun 2020 07:00) (68 - 79)  BP: 116/53 (06 Jun 2020 07:00) (72/41 - 132/67)  BP(mean): 72 (06 Jun 2020 07:00) (51 - 94)  RR: 13 (06 Jun 2020 07:00) (11 - 36)  SpO2: 97% (06 Jun 2020 07:00) (95% - 100%)      CONSTITUTIONAL:  ill looking    ENT:   Airway patent,   Mouth with normal mucosa.   No thrush    EYES:   Pupils equal,   Round and reactive to light.    CARDIAC:     RAMONA 2/6      RESPIRATORY:   No wheezing  Bilateral BS  Normal chest expansion  Not tachypneic,  No use of accessory muscles    GASTROINTESTINAL:  Abdomen soft,   Non-tender,   No guarding,   + BS      MUSCULOSKELETAL:   foot dressing    NEUROLOGICAL:   Alert and oriented   No motor  deficits.  pertinent DTRs normal      06-05-20 @ 07:01  -  06-06-20 @ 07:00  --------------------------------------------------------  IN:    IV PiggyBack: 200 mL    lactated ringers.: 50 mL    Oral Fluid: 100 mL  Total IN: 350 mL    OUT:    Voided: 700 mL  Total OUT: 700 mL    Total NET: -350 mL      LABS:                        8.8    18.21 )-----------( 165      ( 06 Jun 2020 05:22 )             26.6                                               06-06    132<L>  |  94<L>  |  99<HH>  ----------------------------<  322<H>  5.8<H>   |  20  |  3.0<H>    Ca    8.2<L>      06 Jun 2020 05:22  Phos  3.2     06-04  Mg     2.5     06-06    TPro  6.0  /  Alb  2.7<L>  /  TBili  0.6  /  DBili  x   /  AST  29  /  ALT  18  /  AlkPhos  196<H>  06-06      PT/INR - ( 05 Jun 2020 04:14 )   PT: 17.10 sec;   INR: 1.49 ratio         PTT - ( 05 Jun 2020 04:14 )  PTT:32.8 sec                                           CARDIAC MARKERS ( 04 Jun 2020 11:12 )  x     / 0.12 ng/mL / 40 U/L / x     / 3.5 ng/mL                                            LIVER FUNCTIONS - ( 06 Jun 2020 05:22 )  Alb: 2.7 g/dL / Pro: 6.0 g/dL / ALK PHOS: 196 U/L / ALT: 18 U/L / AST: 29 U/L / GGT: x                                                  Culture - Abscess with Gram Stain (collected 04 Jun 2020 07:47)  Source: .Abscess left foot  Preliminary Report (05 Jun 2020 18:53):    Numerous Escherichia coli    Numerous Proteus mirabilis    Culture - Blood (collected 04 Jun 2020 04:12)  Source: .Blood Blood-Peripheral  Gram Stain (04 Jun 2020 22:09):    Growth in anaerobic bottle: Gram Negative Rods  Preliminary Report (05 Jun 2020 21:16):    Growth in anaerobic bottle: Proteus mirabilis    "Due to technical problems, Proteus sp. will Not be reported as part of    the BCID panel until further notice"    ***Blood Panel PCR results on this specimen are available    approximately 3 hours after theGram stain result.***    Gram stain, PCR, and/or culture results may not always    correspond due to difference in methodologies.    ************************************************************    This PCR assay was performed using [employer]    The following targets are tested for: Enterococcus,    vancomycin resistant enterococci, Listeria monocytogenes,    coagulase negative staphylococci, S. aureus,    methicillin resistant S. aureus, Streptococcus agalactiae    (Group B), S. pneumoniae, S. pyogenes (Group A),    Acinetobacter baumannii, Enterobacter cloacae, E. coli,    Klebsiella oxytoca, K. pneumoniae, Proteus sp.,    Serratia marcescens, Haemophilus influenzae,    Neisseria meningitidis, Pseudomonas aeruginosa, Candida    albicans, C. glabrata, C krusei,C parapsilosis,    C. tropicalis and the KPC resistance gene.  Organism: Blood Culture PCR  Proteus mirabilis (05 Jun 2020 21:15)  Organism: Proteus mirabilis (05 Jun 2020 21:15)  Organism: Blood Culture PCR (04 Jun 2020 23:44)    Culture - Blood (collected 04 Jun 2020 04:12)  Source: .Blood Blood-Peripheral  Gram Stain (05 Jun 2020 21:20):    Growth in anaerobic bottle: Gram positive cocci in pairs    Growth in aerobic bottle: Gram Negative Rods  Preliminary Report (05 Jun 2020 23:04):    Growth in anaerobic bottle: Streptococcus agalactiae (Group B)    Growth in aerobic bottle: Gram Negative Rods    "Due to technical problems, Proteus sp. will Not be reported as part of    the BCID panel until further notice"    ***Blood Panel PCR results on this specimen are available    approximately 3 hours after the Gram stain result.***    Gram stain, PCR, and/or culture results may not always    correspond due to difference in methodologies.    ************************************************************    This PCR assay was performed using [employer]    The following targets are tested for: Enterococcus,    vancomycin resistant enterococci, Listeria monocytogenes,    coagulase negative staphylococci, S. aureus,    methicillin resistant S. aureus, Streptococcus agalactiae    (Group B), S. pneumoniae, S. pyogenes (Group A),    Acinetobacter baumannii, Enterobacter cloacae, E. coli,    Klebsiella oxytoca, K. pneumoniae, Proteus sp.,    Serratia marcescens, Haemophilus influenzae,    Neisseria meningitidis, Pseudomonas aeruginosa, Candida    albicans, C. glabrata, C krusei, C parapsilosis,    C. tropicalis and the KPC resistance gene.  Organism: Blood Culture PCR (04 Jun 2020 20:35)  Organism: Blood Culture PCR (04 Jun 2020 20:35)                                                                                           MEDICATIONS  (STANDING):  aspirin enteric coated 81 milliGRAM(s) Oral daily  atorvastatin 80 milliGRAM(s) Oral at bedtime  chlorhexidine 4% Liquid 1 Application(s) Topical <User Schedule>  clindamycin IVPB 900 milliGRAM(s) IV Intermittent every 8 hours  dextrose 5%. 1000 milliLiter(s) (50 mL/Hr) IV Continuous <Continuous>  dextrose 50% Injectable 12.5 Gram(s) IV Push once  digoxin     Tablet 0.125 milliGRAM(s) Oral daily  folic acid 1 milliGRAM(s) Oral daily  furosemide   Injectable 40 milliGRAM(s) IV Push daily  gabapentin 200 milliGRAM(s) Oral daily  heparin   Injectable 5000 Unit(s) SubCutaneous every 8 hours  insulin lispro (HumaLOG) corrective regimen sliding scale   SubCutaneous three times a day before meals  insulin regular Infusion 1 Unit(s)/Hr (1 mL/Hr) IV Continuous <Continuous>  meropenem  IVPB 500 milliGRAM(s) IV Intermittent every 12 hours  pantoprazole   Suspension 40 milliGRAM(s) Oral before breakfast  sodium chloride 0.9%. 500 milliLiter(s) (20 mL/Hr) IV Continuous <Continuous>  sodium zirconium cyclosilicate 5 Gram(s) Oral once  spironolactone 50 milliGRAM(s) Oral daily  vancomycin  IVPB 750 milliGRAM(s) IV Intermittent every 24 hours    MEDICATIONS  (PRN):  acetaminophen   Tablet .. 650 milliGRAM(s) Oral every 6 hours PRN Mild Pain (1 - 3)  dextrose 40% Gel 15 Gram(s) Oral once PRN Blood Glucose LESS THAN 70 milliGRAM(s)/deciliter  glucagon  Injectable 1 milliGRAM(s) IntraMuscular once PRN Glucose LESS THAN 70 milligrams/deciliter

## 2020-06-06 NOTE — PROGRESS NOTE ADULT - SUBJECTIVE AND OBJECTIVE BOX
WILL VIEYRA 80y Male  MRN#: [id number]   Hospital Day: 2d    SUBJECTIVE  Patient is a 80y old Male who presents with a chief complaint of necrotizing fascitis (06 Jun 2020 11:45)  Currently admitted to medicine with the primary diagnosis of Necrotizing fasciitis    INTERVAL HPI AND OVERNIGHT EVENTS:  Patient was examined and seen at bedside.     No acute overnight events. Patient denies chest pain, sob, fevers, rigors, abdominal pain, RLE pain.     OBJECTIVE  PAST MEDICAL & SURGICAL HISTORY  Chronic kidney disease  Anemia  Ascites  PAD (peripheral artery disease)  Hyperlipidemia  Hypothyroidism  Hypertension  Coronary artery disease  CHF (congestive heart failure)  Diabetes mellitus  Biventricular ICD (implantable cardioverter-defibrillator) in place  Amputation of toe of right foot  S/P arterial stent  S/P CABG x 5    ALLERGIES:  Byetta (Stomach Upset)  linezolid (Rash; Urticaria; Flushing)  melatonin (Other)    MEDICATIONS:  STANDING MEDICATIONS  aspirin enteric coated 81 milliGRAM(s) Oral daily  atorvastatin 80 milliGRAM(s) Oral at bedtime  chlorhexidine 4% Liquid 1 Application(s) Topical <User Schedule>  clindamycin IVPB 900 milliGRAM(s) IV Intermittent every 8 hours  Dakins Solution - 1/2 Strength 1 Application(s) Topical daily  dextrose 5%. 1000 milliLiter(s) IV Continuous <Continuous>  dextrose 5%. 1000 milliLiter(s) IV Continuous <Continuous>  dextrose 50% Injectable 12.5 Gram(s) IV Push once  dextrose 50% Injectable 25 Gram(s) IV Push once  dextrose 50% Injectable 25 Gram(s) IV Push once  folic acid 1 milliGRAM(s) Oral daily  gabapentin 200 milliGRAM(s) Oral daily  heparin   Injectable 5000 Unit(s) SubCutaneous every 8 hours  insulin glargine Injectable (LANTUS) 12 Unit(s) SubCutaneous once  insulin glargine Injectable (LANTUS) 12 Unit(s) SubCutaneous every morning  insulin lispro (HumaLOG) corrective regimen sliding scale   SubCutaneous three times a day before meals  insulin lispro Injectable (HumaLOG) 4 Unit(s) SubCutaneous three times a day before meals  meropenem  IVPB 500 milliGRAM(s) IV Intermittent every 12 hours  pantoprazole   Suspension 40 milliGRAM(s) Oral before breakfast    PRN MEDICATIONS  acetaminophen   Tablet .. 650 milliGRAM(s) Oral every 6 hours PRN  dextrose 40% Gel 15 Gram(s) Oral once PRN  glucagon  Injectable 1 milliGRAM(s) IntraMuscular once PRN  glucagon  Injectable 1 milliGRAM(s) IntraMuscular once PRN      VITAL SIGNS: Last 24 Hours  T(C): 36.7 (06 Jun 2020 12:00), Max: 36.7 (06 Jun 2020 12:00)  T(F): 98 (06 Jun 2020 12:00), Max: 98 (06 Jun 2020 12:00)  HR: 68 (06 Jun 2020 16:00) (68 - 78)  BP: 104/55 (06 Jun 2020 16:00) (90/47 - 125/61)  BP(mean): 76 (06 Jun 2020 16:00) (62 - 94)  RR: 13 (06 Jun 2020 16:00) (11 - 32)  SpO2: 99% (06 Jun 2020 16:00) (95% - 100%)    LABS:                        8.8    18.21 )-----------( 165      ( 06 Jun 2020 05:22 )             26.6     06-06    132<L>  |  94<L>  |  99<HH>  ----------------------------<  322<H>  5.8<H>   |  20  |  3.0<H>    Ca    8.2<L>      06 Jun 2020 05:22  Phos  5.1     06-06  Mg     2.5     06-06    TPro  6.0  /  Alb  2.7<L>  /  TBili  0.6  /  DBili  x   /  AST  29  /  ALT  18  /  AlkPhos  196<H>  06-06    PT/INR - ( 05 Jun 2020 04:14 )   PT: 17.10 sec;   INR: 1.49 ratio         PTT - ( 05 Jun 2020 04:14 )  PTT:32.8 sec          Culture - Abscess with Gram Stain (collected 04 Jun 2020 07:47)  Source: .Abscess left foot  Preliminary Report (05 Jun 2020 18:53):    Numerous Escherichia coli    Numerous Proteus mirabilis    Culture - Blood (collected 04 Jun 2020 04:12)  Source: .Blood Blood-Peripheral  Gram Stain (04 Jun 2020 22:09):    Growth in anaerobic bottle: Gram Negative Rods  Final Report (06 Jun 2020 16:11):    Growth in anaerobic bottle: Proteus mirabilis ESBL    "Due to technical problems, Proteus sp. will Not be reported as part of    the BCID panel until further notice"    ***Blood Panel PCR results on this specimen are available    approximately 3 hours after the Gram stain result.***    Gram stain, PCR, and/or culture results may not always    correspond due to difference in methodologies.    ************************************************************    This PCR assay was performed using [employer].    The following targets are tested for: Enterococcus,    vancomycin resistant enterococci, Listeria monocytogenes,    coagulase negative staphylococci, S. aureus,    methicillin resistant S. aureus, Streptococcus agalactiae    (Group B), S. pneumoniae, S. pyogenes (Group A),    Acinetobacter baumannii, Enterobacter cloacae, E. coli,    Klebsiella oxytoca, K. pneumoniae, Proteus sp.,    Serratia marcescens, Haemophilus influenzae,    Neisseria meningitidis, Pseudomonas aeruginosa, Candida    albicans, C. glabrata, C krusei, C parapsilosis,    C. tropicalis and the KPC resistance gene.  Organism: Blood Culture PCR  Gram Negative Rods (06 Jun 2020 16:11)  Organism: Gram Negative Rods (06 Jun 2020 16:11)  Organism: Blood Culture PCR (06 Jun 2020 16:11)    Culture - Blood (collected 04 Jun 2020 04:12)  Source: .Blood Blood-Peripheral  Gram Stain (05 Jun 2020 21:20):    Growth in anaerobic bottle: Gram positive cocci in pairs    Growth in aerobic bottle: Gram Negative Rods  Final Report (06 Jun 2020 17:01):    Growth in anaerobic bottle: Streptococcus agalactiae (Group B)    Growth in aerobic bottle: Proteus mirabilis ESBL    See previous culture [id number]    "Due to technical problems, Proteus sp. will Not be reported as part of    the BCID panel until further notice"    ***Blood Panel PCR results on this specimen are available    approximately 3 hours after the Gram stain result.***    Gram stain, PCR, and/or culture results may not always    correspond due to difference in methodologies.    ************************************************************    This PCR assay was performed using [employer].    The following targets are tested for: Enterococcus,    vancomycin resistant enterococci, Listeria monocytogenes,    coagulase negative staphylococci, S. aureus,    methicillin resistant S. aureus, Streptococcus agalactiae    (Group B), S. pneumoniae, S. pyogenes (Group A),    Acinetobacter baumannii, Enterobacter cloacae, E. coli,    Klebsiella oxytoca, K. pneumoniae, Proteus sp.,    Serratia marcescens, Haemophilus influenzae,    Neisseria meningitidis, Pseudomonas aeruginosa, Candida    albicans, C. glabrata, C krusei, C parapsilosis,    C. tropicalis and the KPC resistance gene.  Organism: Blood Culture PCR  Streptococcus agalactiae (Group B)  Streptococcus agalactiae (Group B) (06 Jun 2020 17:01)  Organism: Streptococcus agalactiae (Group B) (06 Jun 2020 17:01)  Organism: Streptococcus agalactiae (Group B) (06 Jun 2020 17:01)  Organism: Blood Culture PCR (06 Jun 2020 17:01)          RADIOLOGY  < from: Xray Chest 1 View- PORTABLE-Urgent (06.06.20 @ 12:53) >  IMPRESSION:      Bilateral opacities/edema and bilateral effusions, improved on the right and slightly worsened on the left.    < end of copied text >  < from: Xray Foot AP + Lateral + Oblique, Left (06.04.20 @ 04:59) >  IMPRESSION:    Subcutaneous emphysema at the first digit MTP joint. Findings concerning for necrotizing soft tissue infection.      Dr. Sonido Gilliam discussed preliminary findings with BANG ARANDA [phone] on 6/4/2020 9:05 AM with readback.    < end of copied text >        PHYSICAL EXAM:  CONSTITUTIONAL: No acute distress, well-developed, well-groomed, AAOx3  HEAD: Atraumatic, normocephalic  EYES: EOM intact, PERRLA, conjunctiva and sclera clear  PULMONARY: Clear to auscultation bilaterally; no wheezes, rales, or rhonchi  CARDIOVASCULAR: Regular rate and rhythm; no murmurs, rubs, or gallops  GASTROINTESTINAL: Soft, non-tender, non-distended; bowel sounds present  MUSCULOSKELETAL: LLE wrapped in kerlex.   NEUROLOGY: non-focal    ASSESSMENT & PLAN  80 M with PMHx of anemia, ascites 2/2 CHF, CHF s/p biventricular AICD, CKD, CAD s/p CABGx5 on ASA, CKD, DM s/p R great toe amputation, HLD, HTN, hypothyroidism, PAD s/p R femoral stent who presents with altered mental status x 1 day and worsening left foot DFU. Was on augmentin as outpatient     #Polymicrobial bacteremia secondary to necrotizing L foot infection   Xray of left foot demosntrates SQ emphysema of first digit MTP joint; concerning for soft tissue infection   s/p bedside I&D  s/p deep dissection of left foot by podiatry 6/5/2020   OR Sx positive for numerous E. Coli and Proteus   BCx positive for Proteus ESBL and GBS  Holding Vancomycin in setting of MICHAEL   c/w Clindamycin 600mg IV q8, and Meropenem 500mg IV q12   Burn has no further recommendations as pateint is being followed by podiatry  Podiatry: Wound care orders: Flush w/ Dakins and apply xeroform packing/DSD/ABD/Kerlix/Light ace; Pending debridement of LLE 6/8/2020; pre-op labs ordered for tomorrow evening. NPO after midnight tomorrow.   BCx in AM and daily   Arterial Duplex with normal flow, EF = <20%         # DM   FS uncontrolled   Lantus 12U given now; 4U Lispro; Please recalculate insulin needs over 24 hours and adjust regimen.   A1C: 10.6  Monitor FS     # MICHAEL on CKD  - Baseline cr 1.8-2.2, 3.0 this AM     - K+ 5.8  - Will repeat BMP at 4pm and give lokalema 5mg PO q12 if K>5.5 as per nephro   - Dig level sent for 4pm still pending   - Repeat BMP in AM     # Type II NSTEMI   - trop 0.17  - EKG without ischemic changes    # HFrEF w/ BiVentricular AICD, h/o CHB  - TTE from [facility] from 2/2020 EF 20%, severe TR, akinetic apex, inf spetum, mid and apical anterior septum. Mild-mod MR, + ascites from CHF. Now EF <20%   - strict Is and Os   - BNP >70k  - Needs Coreg 3.325 and Lasix IV 40mg daily when BP can tolerate   - Aldactone discontinued as patient's BMP showed a K+ of 5.8; Will continue the IV lasix   - Will repeat BMP at 4pm and will start lokalema 5mg PO q12 if over 5.5.   - CXR: improved opacities on right, worsened on left.     # CAD s/p CABGx5  - on ASA, statin    Diet: DASH; Carb Cnsistent.   DVT ppx; Heparin SubQ  GI ppx: PTX  Dispo: MICU

## 2020-06-06 NOTE — PROGRESS NOTE ADULT - SUBJECTIVE AND OBJECTIVE BOX
PROGRESS NOTE   Pt seen @ bedside during AM rounds. S/p Excisional Debridement soft tissue, left foot (DOS: 6/5/20).  Dressing +Clean, +Dry, +Intact. Pt. denies any recent n/f/v/c/sob. Pt. denies any other pedal complaints at this time.       Vital Signs Last 24 Hrs  T(C): 36.4 (06 Jun 2020 08:00), Max: 36.7 (05 Jun 2020 13:31)  T(F): 97.5 (06 Jun 2020 08:00), Max: 98 (05 Jun 2020 13:31)  HR: 70 (06 Jun 2020 09:00) (68 - 79)  BP: 114/56 (06 Jun 2020 09:00) (90/51 - 132/67)  BP(mean): 78 (06 Jun 2020 09:00) (53 - 94)  RR: 15 (06 Jun 2020 09:00) (11 - 33)  SpO2: 98% (06 Jun 2020 09:00) (95% - 100%)                          8.8    18.21 )-----------( 165      ( 06 Jun 2020 05:22 )             26.6               06-06    132<L>  |  94<L>  |  99<HH>  ----------------------------<  322<H>  5.8<H>   |  20  |  3.0<H>    Ca    8.2<L>      06 Jun 2020 05:22  Phos  3.2     06-04  Mg     2.5     06-06    TPro  6.0  /  Alb  2.7<L>  /  TBili  0.6  /  DBili  x   /  AST  29  /  ALT  18  /  AlkPhos  196<H>  06-06      PHYSICAL EXAM  Left LE Focused:  DERM: Wound base granulated with bone and tendon exposure, No signs of active bleeding. Coagulated blood noted at the wound base.   Vasc: DP and PT pulses are mildly diminished    Assessment:  S/p Excisional Debridement soft tissue, left foot (DOS: 6/5/20)    Plan:  Pt. seen and evaluated  Discussed treatment and condition w/ patient  Open surgical site flushed with normal saline  Applied xeroform packing/DSD/ABD/Kerlix/Light ACE Bandage wrap  Wound care orders: as stated above  Will cont. to monitor surgical site  Pt. scheduled for sx on Monday, 6/8 at 9:00am for Exc Dbx of st/b, left foot  Please optimize all pre-surgical labs prior to sx  Npo Mn on Sunday, 6/7  Discussed plan w/ Attending PROGRESS NOTE   Pt seen @ bedside during AM rounds. S/p Excisional Debridement soft tissue, left foot (DOS: 6/5/20).  Dressing +Clean, +Dry, +Intact. Pt. denies any recent n/f/v/c/sob. Pt. denies any other pedal complaints at this time.       Vital Signs Last 24 Hrs  T(C): 36.4 (06 Jun 2020 08:00), Max: 36.7 (05 Jun 2020 13:31)  T(F): 97.5 (06 Jun 2020 08:00), Max: 98 (05 Jun 2020 13:31)  HR: 70 (06 Jun 2020 09:00) (68 - 79)  BP: 114/56 (06 Jun 2020 09:00) (90/51 - 132/67)  BP(mean): 78 (06 Jun 2020 09:00) (53 - 94)  RR: 15 (06 Jun 2020 09:00) (11 - 33)  SpO2: 98% (06 Jun 2020 09:00) (95% - 100%)                          8.8    18.21 )-----------( 165      ( 06 Jun 2020 05:22 )             26.6               06-06    132<L>  |  94<L>  |  99<HH>  ----------------------------<  322<H>  5.8<H>   |  20  |  3.0<H>    Ca    8.2<L>      06 Jun 2020 05:22  Phos  3.2     06-04  Mg     2.5     06-06    TPro  6.0  /  Alb  2.7<L>  /  TBili  0.6  /  DBili  x   /  AST  29  /  ALT  18  /  AlkPhos  196<H>  06-06      PHYSICAL EXAM  Left LE Focused:  DERM: Wound base granulated with bone and tendon exposure, No signs of active bleeding. Coagulated blood noted at the wound base.   Vasc: DP and PT pulses are mildly diminished    Assessment:  S/p Excisional Debridement soft tissue, left foot (DOS: 6/5/20)    Plan:  Pt. seen and evaluated  Discussed treatment and condition w/ patient  Open surgical site flushed with normal saline  Applied xeroform packing/DSD/ABD/Kerlix/Light ACE Bandage wrap, daily  Wound care orders: as stated above  Will cont. to monitor surgical site  Pt. scheduled for sx on Monday, 6/8 at 9:00am for Exc Dbx of st/b, left foot  Please optimize all pre-surgical labs prior to sx  Npo Mn on Sunday, 6/7  Discussed plan w/ Attending PROGRESS NOTE   Pt seen @ bedside during AM rounds. S/p Excisional Debridement soft tissue, left foot (DOS: 6/5/20).  Dressing +Clean, +Dry, +Intact. Pt. denies any recent n/f/v/c/sob. Pt. denies any other pedal complaints at this time.       Vital Signs Last 24 Hrs  T(C): 36.4 (06 Jun 2020 08:00), Max: 36.7 (05 Jun 2020 13:31)  T(F): 97.5 (06 Jun 2020 08:00), Max: 98 (05 Jun 2020 13:31)  HR: 70 (06 Jun 2020 09:00) (68 - 79)  BP: 114/56 (06 Jun 2020 09:00) (90/51 - 132/67)  BP(mean): 78 (06 Jun 2020 09:00) (53 - 94)  RR: 15 (06 Jun 2020 09:00) (11 - 33)  SpO2: 98% (06 Jun 2020 09:00) (95% - 100%)                          8.8    18.21 )-----------( 165      ( 06 Jun 2020 05:22 )             26.6               06-06    132<L>  |  94<L>  |  99<HH>  ----------------------------<  322<H>  5.8<H>   |  20  |  3.0<H>    Ca    8.2<L>      06 Jun 2020 05:22  Phos  3.2     06-04  Mg     2.5     06-06    TPro  6.0  /  Alb  2.7<L>  /  TBili  0.6  /  DBili  x   /  AST  29  /  ALT  18  /  AlkPhos  196<H>  06-06      PHYSICAL EXAM  Left LE Focused:  DERM: Wound base granulated with bone and tendon exposure, No signs of active bleeding. Coagulated blood noted at the wound base.   Vasc: DP and PT pulses are mildly diminished    Assessment:  S/p Excisional Debridement soft tissue, left foot (DOS: 6/5/20)    Plan:  Pt. seen and evaluated  Discussed treatment and condition w/ patient  Open surgical site flushed with normal saline  Applied xeroform packing/DSD/ABD/Kerlix/Light ACE Bandage wrap  Wound care orders: Flush w/ Dakins and apply xeroform packing/DSD/ABD/Kerlix/Light ace bandage wrap, daily  Will cont. to monitor surgical site  Pt. scheduled for sx on Monday, 6/8 at 9:00am for Exc Dbx of st/b, left foot  Please optimize all pre-surgical labs prior to sx  Npo Mn on Sunday, 6/7  Discussed plan w/ Attending

## 2020-06-06 NOTE — CONSULT NOTE ADULT - ASSESSMENT
Sepsis / bacteremia / necrotising fascitis / MICHAEL/ stage 4 ckd Sepsis / bacteremia / necrotising fascitis / MICHAEL/ stage 4 ckd:  # ? ATN in nature   # creatinine stable  # non oliguric   # k noted, d/c aldactone , repeat k if remains  > 5.5 start lokelma 5 q 12  # check dig level  # hold lasix  # check ph level  # podiatry notes appreciated , OR again on monday   # no acute indication for RRT   # will follow

## 2020-06-06 NOTE — CHART NOTE - NSCHARTNOTEFT_GEN_A_CORE
Home med rec shows patient is taking digoxin 0.125mg every OTHER day, while in hospital he received .125mg on 6/4, .125mg x2 on 6/5, then .125mg on 6/6. Will check dig level today. Order corrected to every OTHER day. Home med rec shows patient is taking digoxin 0.125mg every OTHER day, while in hospital he received .125mg on 6/4, .125mg x2 on 6/5, then .125mg on 6/6. Will check dig level today. Order corrected to every OTHER day. Next dose 6/8 am.

## 2020-06-07 NOTE — PROGRESS NOTE ADULT - SUBJECTIVE AND OBJECTIVE BOX
Patient is a 80y old  Male who presents with a chief complaint of necrotizing fascitis (06 Jun 2020 17:31)        Over Night Events: No events overnight        ROS:  See HPI    PHYSICAL EXAM    ICU Vital Signs Last 24 Hrs  T(C): 35.5 (07 Jun 2020 08:00), Max: 38 (07 Jun 2020 02:00)  T(F): 95.9 (07 Jun 2020 08:00), Max: 100.4 (07 Jun 2020 02:00)  HR: 70 (07 Jun 2020 08:00) (68 - 70)  BP: 97/53 (07 Jun 2020 08:00) (81/42 - 117/60)  BP(mean): 61 (07 Jun 2020 08:00) (59 - 92)  RR: 19 (07 Jun 2020 08:00) (11 - 31)  SpO2: 99% (07 Jun 2020 08:00) (92% - 99%)      General: NAD  HEENT: AYDEE             Lymph Nodes: No cervical LN   Lungs: Bilateral BS  Cardiovascular: Regular   Abdomen: Soft, Positive BS  Extremities: No clubbing   Skin: Warm  Neurological: Non focal       06-06-20 @ 07:01  -  06-07-20 @ 07:00  --------------------------------------------------------  IN:    IV PiggyBack: 500 mL    Oral Fluid: 200 mL  Total IN: 700 mL    OUT:    Voided: 750 mL  Total OUT: 750 mL    Total NET: -50 mL          LABS:                            8.0    22.77 )-----------( 164      ( 07 Jun 2020 05:20 )             23.6                                               06-07    129<L>  |  95<L>  |  100<HH>  ----------------------------<  42<LL>  4.8   |  19  |  3.5<H>    Ca    7.7<L>      07 Jun 2020 05:20  Phos  4.3     06-07  Mg     2.4     06-07    TPro  5.8<L>  /  Alb  2.7<L>  /  TBili  0.7  /  DBili  x   /  AST  37  /  ALT  18  /  AlkPhos  218<H>  06-07      PT/INR - ( 07 Jun 2020 05:20 )   PT: 16.10 sec;   INR: 1.40 ratio         PTT - ( 07 Jun 2020 05:20 )  PTT:32.8 sec                                             LIVER FUNCTIONS - ( 07 Jun 2020 05:20 )  Alb: 2.7 g/dL / Pro: 5.8 g/dL / ALK PHOS: 218 U/L / ALT: 18 U/L / AST: 37 U/L / GGT: x                                                  Culture - Surgical Swab (collected 05 Jun 2020 19:00)  Source: .Surgical Swab None  Preliminary Report (06 Jun 2020 21:44):    Moderate Escherichia coli    Moderate Proteus mirabilis    Culture - Surgical Swab (collected 05 Jun 2020 19:00)  Source: .Surgical Swab None  Preliminary Report (06 Jun 2020 21:47):    Few Escherichia coli    Few Proteus mirabilis    See previous culture [id number]  Organism: Escherichia coli  Proteus mirabilis (06 Jun 2020 21:46)  Organism: Proteus mirabilis (06 Jun 2020 21:46)  Organism: Escherichia coli (06 Jun 2020 21:46)                                                                                           MEDICATIONS  (STANDING):  aspirin enteric coated 81 milliGRAM(s) Oral daily  atorvastatin 80 milliGRAM(s) Oral at bedtime  chlorhexidine 4% Liquid 1 Application(s) Topical <User Schedule>  clindamycin IVPB 900 milliGRAM(s) IV Intermittent every 8 hours  Dakins Solution - 1/2 Strength 1 Application(s) Topical daily  dextrose 5%. 1000 milliLiter(s) (50 mL/Hr) IV Continuous <Continuous>  dextrose 5%. 1000 milliLiter(s) (50 mL/Hr) IV Continuous <Continuous>  dextrose 50% Injectable 12.5 Gram(s) IV Push once  dextrose 50% Injectable 25 Gram(s) IV Push once  dextrose 50% Injectable 25 Gram(s) IV Push once  folic acid 1 milliGRAM(s) Oral daily  gabapentin 200 milliGRAM(s) Oral daily  heparin   Injectable 5000 Unit(s) SubCutaneous every 8 hours  insulin glargine Injectable (LANTUS) 12 Unit(s) SubCutaneous every morning  insulin lispro (HumaLOG) corrective regimen sliding scale   SubCutaneous three times a day before meals  insulin lispro Injectable (HumaLOG) 4 Unit(s) SubCutaneous three times a day before meals  meropenem  IVPB 500 milliGRAM(s) IV Intermittent every 12 hours  pantoprazole   Suspension 40 milliGRAM(s) Oral before breakfast    MEDICATIONS  (PRN):  acetaminophen   Tablet .. 650 milliGRAM(s) Oral every 6 hours PRN Mild Pain (1 - 3)  dextrose 40% Gel 15 Gram(s) Oral once PRN Blood Glucose LESS THAN 70 milliGRAM(s)/deciliter  glucagon  Injectable 1 milliGRAM(s) IntraMuscular once PRN Glucose LESS THAN 70 milligrams/deciliter  glucagon  Injectable 1 milliGRAM(s) IntraMuscular once PRN Glucose LESS THAN 70 milligrams/deciliter      Xrays: bilateral infiltrates                                                                                    ECHO Patient is a 80y old  Male who presents with a chief complaint of necrotizing fascitis (06 Jun 2020 17:31)        Over Night Events: No events overnight, no pressors        ROS:  See HPI    PHYSICAL EXAM    ICU Vital Signs Last 24 Hrs  T(C): 35.5 (07 Jun 2020 08:00), Max: 38 (07 Jun 2020 02:00)  T(F): 95.9 (07 Jun 2020 08:00), Max: 100.4 (07 Jun 2020 02:00)  HR: 70 (07 Jun 2020 08:00) (68 - 70)  BP: 97/53 (07 Jun 2020 08:00) (81/42 - 117/60)  BP(mean): 61 (07 Jun 2020 08:00) (59 - 92)  RR: 19 (07 Jun 2020 08:00) (11 - 31)  SpO2: 99% (07 Jun 2020 08:00) (92% - 99%)      General: NAD  HEENT: AYDEE             Lymph Nodes: No cervical LN   Lungs: Bilateral BS, dec l side  Cardiovascular: RAMONA 3/6  Abdomen: Soft, Positive BS  Extremities: No clubbing   Skin: Warm, FOOT ULCER  Neurological: Non focal       06-06-20 @ 07:01  -  06-07-20 @ 07:00  --------------------------------------------------------  IN:    IV PiggyBack: 500 mL    Oral Fluid: 200 mL  Total IN: 700 mL    OUT:    Voided: 750 mL  Total OUT: 750 mL    Total NET: -50 mL          LABS:                            8.0    22.77 )-----------( 164      ( 07 Jun 2020 05:20 )             23.6                                               06-07    129<L>  |  95<L>  |  100<HH>  ----------------------------<  42<LL>  4.8   |  19  |  3.5<H>    Ca    7.7<L>      07 Jun 2020 05:20  Phos  4.3     06-07  Mg     2.4     06-07    TPro  5.8<L>  /  Alb  2.7<L>  /  TBili  0.7  /  DBili  x   /  AST  37  /  ALT  18  /  AlkPhos  218<H>  06-07      PT/INR - ( 07 Jun 2020 05:20 )   PT: 16.10 sec;   INR: 1.40 ratio         PTT - ( 07 Jun 2020 05:20 )  PTT:32.8 sec                                             LIVER FUNCTIONS - ( 07 Jun 2020 05:20 )  Alb: 2.7 g/dL / Pro: 5.8 g/dL / ALK PHOS: 218 U/L / ALT: 18 U/L / AST: 37 U/L / GGT: x                                                  Culture - Surgical Swab (collected 05 Jun 2020 19:00)  Source: .Surgical Swab None  Preliminary Report (06 Jun 2020 21:44):    Moderate Escherichia coli    Moderate Proteus mirabilis    Culture - Surgical Swab (collected 05 Jun 2020 19:00)  Source: .Surgical Swab None  Preliminary Report (06 Jun 2020 21:47):    Few Escherichia coli    Few Proteus mirabilis    See previous culture [id number]  Organism: Escherichia coli  Proteus mirabilis (06 Jun 2020 21:46)  Organism: Proteus mirabilis (06 Jun 2020 21:46)  Organism: Escherichia coli (06 Jun 2020 21:46)                                                                                           MEDICATIONS  (STANDING):  aspirin enteric coated 81 milliGRAM(s) Oral daily  atorvastatin 80 milliGRAM(s) Oral at bedtime  chlorhexidine 4% Liquid 1 Application(s) Topical <User Schedule>  clindamycin IVPB 900 milliGRAM(s) IV Intermittent every 8 hours  Dakins Solution - 1/2 Strength 1 Application(s) Topical daily  dextrose 5%. 1000 milliLiter(s) (50 mL/Hr) IV Continuous <Continuous>  dextrose 5%. 1000 milliLiter(s) (50 mL/Hr) IV Continuous <Continuous>  dextrose 50% Injectable 12.5 Gram(s) IV Push once  dextrose 50% Injectable 25 Gram(s) IV Push once  dextrose 50% Injectable 25 Gram(s) IV Push once  folic acid 1 milliGRAM(s) Oral daily  gabapentin 200 milliGRAM(s) Oral daily  heparin   Injectable 5000 Unit(s) SubCutaneous every 8 hours  insulin glargine Injectable (LANTUS) 12 Unit(s) SubCutaneous every morning  insulin lispro (HumaLOG) corrective regimen sliding scale   SubCutaneous three times a day before meals  insulin lispro Injectable (HumaLOG) 4 Unit(s) SubCutaneous three times a day before meals  meropenem  IVPB 500 milliGRAM(s) IV Intermittent every 12 hours  pantoprazole   Suspension 40 milliGRAM(s) Oral before breakfast    MEDICATIONS  (PRN):  acetaminophen   Tablet .. 650 milliGRAM(s) Oral every 6 hours PRN Mild Pain (1 - 3)  dextrose 40% Gel 15 Gram(s) Oral once PRN Blood Glucose LESS THAN 70 milliGRAM(s)/deciliter  glucagon  Injectable 1 milliGRAM(s) IntraMuscular once PRN Glucose LESS THAN 70 milligrams/deciliter  glucagon  Injectable 1 milliGRAM(s) IntraMuscular once PRN Glucose LESS THAN 70 milligrams/deciliter      Xrays: bilateral infiltrates

## 2020-06-07 NOTE — PROGRESS NOTE ADULT - ASSESSMENT
Sepsis / bacteremia / necrotising fascitis / MICHAEL/ stage 4 ckd:  # ? ATN in nature   # creatinine up from yesterday. trend creatinine  # non oliguric  # k noted, off aldactone , repeat k improved.  # dig level noted, WNL  # now off lasix  # phos at goal. no need for binders.  low bicarb with elevated AG noted. check blood gas for acid base disturbance. start po sodium bicarb 650 mg q 8 hr.  # podiatry notes appreciated , OR again on monday   # on clindamycin and roxanna. renal dose adjustment.   # no acute indication for RRT   # will follow Sepsis / bacteremia / necrotising fascitis / MICHAEL/ stage 4 ckd:  # ? ATN in nature   # creatinine up from yesterday. trend creatinine  # hyponatremia noted, Na level stable. restrict free water intake. check serum osmolality, urine osmolality, urine Na. trend Na level.  # non oliguric  # k noted, off aldactone , repeat k improved.  # dig level noted, WNL  # now off lasix  # phos at goal. no need for binders.  low bicarb with elevated AG noted. check blood gas for acid base disturbance. start po sodium bicarb 650 mg q 8 hr.  # podiatry notes appreciated , OR again on monday   # on clindamycin and roxanna. renal dose adjustment.   # no acute indication for RRT   # will follow

## 2020-06-07 NOTE — PROGRESS NOTE ADULT - SUBJECTIVE AND OBJECTIVE BOX
Podiatry Progress Note    Subjective:   WILL VIEYRA is a pleasant well-nourished, well developed 80y Male in no acute distress, alert awake, and oriented to person, place and time.  Patient is a 80y old  Male who presents with a chief complaint of necrotizing fascitis (06 Jun 2020 17:31)      PAST MEDICAL & SURGICAL HISTORY:  Chronic kidney disease  Anemia  Ascites  PAD (peripheral artery disease)  Hyperlipidemia  Hypothyroidism  Hypertension  Coronary artery disease  CHF (congestive heart failure)  Diabetes mellitus  Biventricular ICD (implantable cardioverter-defibrillator) in place  Amputation of toe of right foot  S/P arterial stent  S/P CABG x 5       Objective:  Vital Signs Last 24 Hrs  T(C): 35.5 (07 Jun 2020 08:00), Max: 38 (07 Jun 2020 02:00)  T(F): 95.9 (07 Jun 2020 08:00), Max: 100.4 (07 Jun 2020 02:00)  HR: 70 (07 Jun 2020 08:00) (68 - 70)  BP: 97/53 (07 Jun 2020 08:00) (81/42 - 117/60)  BP(mean): 61 (07 Jun 2020 08:00) (59 - 92)  RR: 19 (07 Jun 2020 08:00) (11 - 31)  SpO2: 99% (07 Jun 2020 08:00) (92% - 99%)                        8.0    22.77 )-----------( 164      ( 07 Jun 2020 05:20 )             23.6                 06-07    129<L>  |  95<L>  |  100<HH>  ----------------------------<  42<LL>  4.8   |  19  |  3.5<H>    Ca    7.7<L>      07 Jun 2020 05:20  Phos  4.3     06-07  Mg     2.4     06-07    TPro  5.8<L>  /  Alb  2.7<L>  /  TBili  0.7  /  DBili  x   /  AST  37  /  ALT  18  /  AlkPhos  218<H>  06-07    Physical Exam; Left Lower Extremity Focused:  DERM:   Open Surgical Wound on the Left Medial Aspect; Wound base granulated with bone and tendon exposure,   No Active Bleeding or Drainage Noted;   Coagulated blood noted at the wound base.     Vasc: DP and PT pulses are mildly diminished  Neuro; Protective Sensation Diminished; Left Foot;     Assessment:  S/p Excisional Debridement soft tissue, left foot (DOS: 6/5/20)  Open Sx Wound Left Hallux; Bone and Tendon Exposed;     Plan:  Patient was seen and evaluated. All Questions and concerns were addressed and answered;   Discussed treatment and condition w/ patient  Open Wound Flushed w/ Normal Saline; Applied Xeroform / DSD / Kerlix / Light Ace Wrap  Patient Scheduled for Sx Monday 6/8 @ 9:00AM for Further Debridement; Left Foot;   NPO @ Midnight Sunday 6/7; Pre-Op Labs; Optimization;   Discussed Plan w/ Dr. Clayton     Podiatry ; Podiatry Progress Note    Subjective:   WILL VIEYRA is a pleasant well-nourished, well developed 80y Male in no acute distress, alert awake, and oriented to person, place and time.  Patient is a 80y old  Male who presents with a chief complaint of necrotizing fascitis (06 Jun 2020 17:31)      PAST MEDICAL & SURGICAL HISTORY:  Chronic kidney disease  Anemia  Ascites  PAD (peripheral artery disease)  Hyperlipidemia  Hypothyroidism  Hypertension  Coronary artery disease  CHF (congestive heart failure)  Diabetes mellitus  Biventricular ICD (implantable cardioverter-defibrillator) in place  Amputation of toe of right foot  S/P arterial stent  S/P CABG x 5       Objective:  Vital Signs Last 24 Hrs  T(C): 35.5 (07 Jun 2020 08:00), Max: 38 (07 Jun 2020 02:00)  T(F): 95.9 (07 Jun 2020 08:00), Max: 100.4 (07 Jun 2020 02:00)  HR: 70 (07 Jun 2020 08:00) (68 - 70)  BP: 97/53 (07 Jun 2020 08:00) (81/42 - 117/60)  BP(mean): 61 (07 Jun 2020 08:00) (59 - 92)  RR: 19 (07 Jun 2020 08:00) (11 - 31)  SpO2: 99% (07 Jun 2020 08:00) (92% - 99%)                        8.0    22.77 )-----------( 164      ( 07 Jun 2020 05:20 )             23.6                 06-07    129<L>  |  95<L>  |  100<HH>  ----------------------------<  42<LL>  4.8   |  19  |  3.5<H>    Ca    7.7<L>      07 Jun 2020 05:20  Phos  4.3     06-07  Mg     2.4     06-07    TPro  5.8<L>  /  Alb  2.7<L>  /  TBili  0.7  /  DBili  x   /  AST  37  /  ALT  18  /  AlkPhos  218<H>  06-07    Physical Exam; Left Lower Extremity Focused:  DERM:   Open Surgical Wound on the Left Medial Aspect; Wound base granulated with bone and tendon exposure,   No Active Bleeding or Drainage Noted;   Coagulated blood noted at the wound base.     Vasc: DP and PT pulses are mildly diminished  Neuro; Protective Sensation Diminished; Left Foot;     Assessment:  S/p Excisional Debridement soft tissue, left foot (DOS: 6/5/20)  Open Sx Wound Left Hallux; Bone and Tendon Exposed;     Plan:  Patient was seen and evaluated. All Questions and concerns were addressed and answered;   Discussed treatment and condition w/ patient  Open Wound Flushed w/ Normal Saline; Applied Xeroform / DSD / Kerlix / Light Ace Wrap  Patient Scheduled for Sx Monday 6/8 @ 9:00AM for Further Debridement; Left Foot;   NPO @ Midnight Sunday 6/7; Pre-Op Labs; Optimization; (Hemoglobin Currently @ Borderline 8.0)   Discussed Plan w/ Dr. Clayton     Podiatry ;

## 2020-06-07 NOTE — PROGRESS NOTE ADULT - ASSESSMENT
IMPRESSION:    Sepsis present on admission  G-ve bacteremia   LLE DFU / Nec fas? sp debridement  Polymicrobial bacteremia   HO HFr EF ( less than 20%)  HO DM and PVD  Renal failure  hyperk on aldactone/ dig    PLAN:    CNS:  no depressants     HEENT: Oral care    PULMONARY:  HOB @ 45 degrees.  Aspiration precautions . Repeat CXR    CARDIOVASCULAR:  I=O.  Avoid volume overload keep , check dig level, hold aladactone, keep iv lasix    GI: GI prophylaxis.  feeding    RENAL:  Follow up lytes.  Correct as needed.  Renal eval     INFECTIOUS DISEASE: Follow up cultures.  Marcelo/ CLINDA  600 mg Q8.  FU with ID. F/U sensitivities     HEMATOLOGICAL:  DVT prophylaxis.      ENDOCRINE:  Follow up FS.  Insulin protocol if needed.    MUSCULOSKELETAL:  Burn and Podiatry FU     Downgrade to tele    Prognosis guarded IMPRESSION:    Sepsis present on admission  G-ve bacteremia   LLE DFU / Nec fas? sp debridement  Polymicrobial bacteremia   HO HFr EF ( less than 20%)  HO DM and PVD  Renal failure  hyperk on aldactone/ dig    PLAN:    CNS:  no depressants     HEENT: Oral care    PULMONARY:  HOB @ 45 degrees.  Aspiration precautions . Repeat CXR    CARDIOVASCULAR:  I=O.  Avoid volume overload keep , check dig level, hold aladactone, keep iv lasix    GI: GI prophylaxis.  feeding    RENAL:  Follow up lytes.  Correct as needed.  Renal eval     INFECTIOUS DISEASE: Follow up cultures. abx per ID    HEMATOLOGICAL:  DVT prophylaxis.      ENDOCRINE:  Follow up FS.  Insulin protocol if needed.    MUSCULOSKELETAL:  Burn and Podiatry FUP    Prognosis guarded

## 2020-06-07 NOTE — PROGRESS NOTE ADULT - SUBJECTIVE AND OBJECTIVE BOX
Nephrology progress note    Patient is seen and examined, events over the last 24 h noted .    Allergies:  Byetta (Stomach Upset)  linezolid (Rash; Urticaria; Flushing)  melatonin (Other)    Hospital Medications:   MEDICATIONS  (STANDING):  aspirin enteric coated 81 milliGRAM(s) Oral daily  atorvastatin 80 milliGRAM(s) Oral at bedtime  chlorhexidine 4% Liquid 1 Application(s) Topical <User Schedule>  clindamycin IVPB 900 milliGRAM(s) IV Intermittent every 8 hours  Dakins Solution - 1/2 Strength 1 Application(s) Topical daily  dextrose 5%. 1000 milliLiter(s) (50 mL/Hr) IV Continuous <Continuous>  dextrose 5%. 1000 milliLiter(s) (50 mL/Hr) IV Continuous <Continuous>  dextrose 50% Injectable 12.5 Gram(s) IV Push once  dextrose 50% Injectable 25 Gram(s) IV Push once  dextrose 50% Injectable 25 Gram(s) IV Push once  folic acid 1 milliGRAM(s) Oral daily  gabapentin 200 milliGRAM(s) Oral daily  heparin   Injectable 5000 Unit(s) SubCutaneous every 8 hours  insulin glargine Injectable (LANTUS) 12 Unit(s) SubCutaneous every morning  insulin lispro (HumaLOG) corrective regimen sliding scale   SubCutaneous three times a day before meals  insulin lispro Injectable (HumaLOG) 4 Unit(s) SubCutaneous three times a day before meals  meropenem  IVPB 500 milliGRAM(s) IV Intermittent every 12 hours  pantoprazole   Suspension 40 milliGRAM(s) Oral before breakfast        VITALS:  T(F): 95.9 (06-07-20 @ 08:00), Max: 100.4 (06-07-20 @ 02:00)  HR: 68 (06-07-20 @ 09:00)  BP: 98/51 (06-07-20 @ 09:00)  RR: 19 (06-07-20 @ 09:00)  SpO2: 99% (06-07-20 @ 09:00)  Wt(kg): --    06-05 @ 07:01  -  06-06 @ 07:00  --------------------------------------------------------  IN: 350 mL / OUT: 700 mL / NET: -350 mL    06-06 @ 07:01  -  06-07 @ 07:00  --------------------------------------------------------  IN: 700 mL / OUT: 750 mL / NET: -50 mL    06-07 @ 07:01  -  06-07 @ 10:17  --------------------------------------------------------  IN: 0 mL / OUT: 350 mL / NET: -350 mL          PHYSICAL EXAM:  Constitutional: NAD  Respiratory: CTAB, no wheezes, rales or rhonchi  Cardiovascular: S1, S2, RRR  Gastrointestinal: BS+, soft, NT/ND  Extremities: No cyanosis or clubbing. No peripheral edema  :  No arana.   Skin: No rashes    LABS:  06-07    129<L>  |  95<L>  |  100<HH>  ----------------------------<  42<LL>  4.8   |  19  |  3.5<H>    Creatinine Trend: 3.5<--, 3.2<--, 3.2<--, 3.0<--, 3.0<--, 3.2<--    SODIUM TREND:  Sodium 129 [06-07 @ 05:20]  Sodium 129 [06-07 @ 00:54]  Sodium 128 [06-06 @ 17:06]  Sodium 132 [06-06 @ 05:22]  Sodium 133 [06-05 @ 04:14]  Sodium 130 [06-04 @ 04:12]    Ca    7.7<L>      07 Jun 2020 05:20  Phos  4.3     06-07  Mg     2.4     06-07    TPro  5.8<L>  /  Alb  2.7<L>  /  TBili  0.7  /  DBili      /  AST  37  /  ALT  18  /  AlkPhos  218<H>  06-07                          8.0    22.77 )-----------( 164      ( 07 Jun 2020 05:20 )             23.6     Digoxin Level, Serum: 1.9 ng/mL (06.06.20 @ 17:06)      Urine Studies:      RADIOLOGY & ADDITIONAL STUDIES:

## 2020-06-08 NOTE — PROGRESS NOTE ADULT - SUBJECTIVE AND OBJECTIVE BOX
OVERNIGHT EVENTS: events noted, no drips, for debridement?    Vital Signs Last 24 Hrs  T(C): 36 (08 Jun 2020 08:00), Max: 36.9 (08 Jun 2020 05:00)  T(F): 96.8 (08 Jun 2020 08:00), Max: 98.5 (08 Jun 2020 05:00)  HR: 68 (08 Jun 2020 08:00) (68 - 68)  BP: 103/55 (08 Jun 2020 08:00) (92/53 - 138/73)  BP(mean): 73 (08 Jun 2020 08:00) (59 - 112)  RR: 11 (08 Jun 2020 08:00) (10 - 33)  SpO2: 100% (08 Jun 2020 08:00) (95% - 100%)    PHYSICAL EXAMINATION:    GENERAL: comfortable    HEENT: Head is normocephalic and atraumatic. Extraocular muscles are intact. Mucous membranes are moist.    NECK: Supple.    LUNGS: dec bs both bases    HEART: Regular rate and rhythm without murmur.    ABDOMEN: Soft, nontender, and nondistended.      EXTREMITIES: Without any cyanosis, clubbing, rash, lesions or edema.    NEUROLOGIC: Grossly intact.    SKIN: No ulceration or induration present.      LABS:                        7.6    24.15 )-----------( 163      ( 08 Jun 2020 04:54 )             22.1     06-08    126<L>  |  92<L>  |  101<HH>  ----------------------------<  56<L>  5.5<H>   |  20  |  3.3<H>    Ca    7.6<L>      08 Jun 2020 04:54  Phos  5.0     06-07  Mg     2.4     06-08    TPro  5.8<L>  /  Alb  2.5<L>  /  TBili  0.4  /  DBili  x   /  AST  40  /  ALT  18  /  AlkPhos  213<H>  06-08    PT/INR - ( 07 Jun 2020 05:20 )   PT: 16.10 sec;   INR: 1.40 ratio         PTT - ( 07 Jun 2020 05:20 )  PTT:32.8 sec                      06-07-20 @ 07:01  -  06-08-20 @ 07:00  --------------------------------------------------------  IN: 750 mL / OUT: 650 mL / NET: 100 mL        MICROBIOLOGY:  Culture Results:   No growth to date. (06-06 @ 05:22)  Culture Results:   Few Escherichia coli  Few Proteus mirabilis ESBL  Few Methicillin resistant Staphylococcus aureus  See previous culture [id number] (06-05 @ 19:00)  Culture Results:   Moderate Escherichia coli  Moderate Proteus mirabilis ESBL  Few Methicillin resistant Staphylococcus aureus (06-05 @ 19:00)      MEDICATIONS  (STANDING):  aspirin enteric coated 81 milliGRAM(s) Oral daily  atorvastatin 80 milliGRAM(s) Oral at bedtime  chlorhexidine 4% Liquid 1 Application(s) Topical <User Schedule>  clindamycin IVPB 900 milliGRAM(s) IV Intermittent every 8 hours  Dakins Solution - 1/2 Strength 1 Application(s) Topical daily  dextrose 5%. 1000 milliLiter(s) (50 mL/Hr) IV Continuous <Continuous>  dextrose 5%. 1000 milliLiter(s) (50 mL/Hr) IV Continuous <Continuous>  dextrose 50% Injectable 12.5 Gram(s) IV Push once  dextrose 50% Injectable 25 Gram(s) IV Push once  dextrose 50% Injectable 25 Gram(s) IV Push once  digoxin     Tablet 0.125 milliGRAM(s) Oral every other day  folic acid 1 milliGRAM(s) Oral daily  gabapentin 200 milliGRAM(s) Oral daily  heparin   Injectable 5000 Unit(s) SubCutaneous every 8 hours  insulin glargine Injectable (LANTUS) 12 Unit(s) SubCutaneous every morning  insulin lispro (HumaLOG) corrective regimen sliding scale   SubCutaneous three times a day before meals  insulin lispro Injectable (HumaLOG) 4 Unit(s) SubCutaneous three times a day before meals  meropenem  IVPB 500 milliGRAM(s) IV Intermittent every 12 hours  pantoprazole   Suspension 40 milliGRAM(s) Oral before breakfast    MEDICATIONS  (PRN):  acetaminophen   Tablet .. 650 milliGRAM(s) Oral every 6 hours PRN Mild Pain (1 - 3)  aluminum hydroxide/magnesium hydroxide/simethicone Suspension 30 milliLiter(s) Oral every 6 hours PRN Dyspepsia  dextrose 40% Gel 15 Gram(s) Oral once PRN Blood Glucose LESS THAN 70 milliGRAM(s)/deciliter  glucagon  Injectable 1 milliGRAM(s) IntraMuscular once PRN Glucose LESS THAN 70 milligrams/deciliter  glucagon  Injectable 1 milliGRAM(s) IntraMuscular once PRN Glucose LESS THAN 70 milligrams/deciliter      RADIOLOGY & ADDITIONAL STUDIES:

## 2020-06-08 NOTE — PROGRESS NOTE ADULT - SUBJECTIVE AND OBJECTIVE BOX
24 h events notes   no distress   lying comfortable         PAST HISTORY  --------------------------------------------------------------------------------  No significant changes to PMH, PSH, FHx, SHx, unless otherwise noted    ALLERGIES & MEDICATIONS  --------------------------------------------------------------------------------  Allergies    Byetta (Stomach Upset)  linezolid (Rash; Urticaria; Flushing)  melatonin (Other)    Intolerances      Standing Inpatient Medications  aspirin enteric coated 81 milliGRAM(s) Oral daily  atorvastatin 80 milliGRAM(s) Oral at bedtime  chlorhexidine 4% Liquid 1 Application(s) Topical <User Schedule>  Dakins Solution - 1/2 Strength 1 Application(s) Topical daily  dextrose 5%. 1000 milliLiter(s) IV Continuous <Continuous>  dextrose 5%. 1000 milliLiter(s) IV Continuous <Continuous>  dextrose 50% Injectable 12.5 Gram(s) IV Push once  dextrose 50% Injectable 25 Gram(s) IV Push once  dextrose 50% Injectable 25 Gram(s) IV Push once  digoxin     Tablet 0.125 milliGRAM(s) Oral every other day  folic acid 1 milliGRAM(s) Oral daily  gabapentin 200 milliGRAM(s) Oral daily  heparin   Injectable 5000 Unit(s) SubCutaneous every 8 hours  insulin glargine Injectable (LANTUS) 12 Unit(s) SubCutaneous every morning  insulin lispro (HumaLOG) corrective regimen sliding scale   SubCutaneous three times a day before meals  insulin lispro Injectable (HumaLOG) 4 Unit(s) SubCutaneous three times a day before meals  meropenem  IVPB 500 milliGRAM(s) IV Intermittent every 12 hours  pantoprazole   Suspension 40 milliGRAM(s) Oral before breakfast  sodium bicarbonate 650 milliGRAM(s) Oral every 8 hours    PRN Inpatient Medications  acetaminophen   Tablet .. 650 milliGRAM(s) Oral every 6 hours PRN  aluminum hydroxide/magnesium hydroxide/simethicone Suspension 30 milliLiter(s) Oral every 6 hours PRN  dextrose 40% Gel 15 Gram(s) Oral once PRN  glucagon  Injectable 1 milliGRAM(s) IntraMuscular once PRN  glucagon  Injectable 1 milliGRAM(s) IntraMuscular once PRN        VITALS/PHYSICAL EXAM  --------------------------------------------------------------------------------  T(C): 36 (06-08-20 @ 08:00), Max: 36.9 (06-08-20 @ 05:00)  HR: 68 (06-08-20 @ 08:00) (68 - 68)  BP: 103/55 (06-08-20 @ 08:00) (92/53 - 138/73)  RR: 11 (06-08-20 @ 08:00) (10 - 33)  SpO2: 100% (06-08-20 @ 08:00) (95% - 100%)  Wt(kg): --  Height (cm): 167 (06-08-20 @ 06:51)  Weight (kg): 60.3 (06-08-20 @ 06:51)  BMI (kg/m2): 21.6 (06-08-20 @ 06:51)  BSA (m2): 1.68 (06-08-20 @ 06:51)      06-07-20 @ 07:01  -  06-08-20 @ 07:00  --------------------------------------------------------  IN: 750 mL / OUT: 650 mL / NET: 100 mL      Physical Exam:  	Gen: NAD  	Pulm:  decrease  BS B/L  	CV: S1S2; no rub  	Abd: +distended  	LE:  no edema      LABS/STUDIES  --------------------------------------------------------------------------------              7.6    24.15 >-----------<  163      [06-08-20 @ 04:54]              22.1     126  |  92  |  101  ----------------------------<  56      [06-08-20 @ 04:54]  5.5   |  20  |  3.3        Ca     7.6     [06-08-20 @ 04:54]      Mg     2.4     [06-08-20 @ 04:54]      Phos  5.0     [06-07-20 @ 20:37]    TPro  5.8  /  Alb  2.5  /  TBili  0.4  /  DBili  x   /  AST  40  /  ALT  18  /  AlkPhos  213  [06-08-20 @ 04:54]    PT/INR: PT 16.10, INR 1.40       [06-07-20 @ 05:20]  PTT: 32.8       [06-07-20 @ 05:20]      Creatinine Trend:  SCr 3.3 [06-08 @ 04:54]  SCr 3.2 [06-08 @ 00:21]  SCr 3.3 [06-07 @ 20:37]  SCr 3.5 [06-07 @ 05:20]  SCr 3.2 [06-07 @ 00:54] 24 h events notes   no distress   lying comfortable         PAST HISTORY  --------------------------------------------------------------------------------  No significant changes to PMH, PSH, FHx, SHx, unless otherwise noted    ALLERGIES & MEDICATIONS  --------------------------------------------------------------------------------  Allergies    Byetta (Stomach Upset)  linezolid (Rash; Urticaria; Flushing)  melatonin (Other)    Intolerances      Standing Inpatient Medications  aspirin enteric coated 81 milliGRAM(s) Oral daily  atorvastatin 80 milliGRAM(s) Oral at bedtime  chlorhexidine 4% Liquid 1 Application(s) Topical <User Schedule>  Dakins Solution - 1/2 Strength 1 Application(s) Topical daily  dextrose 5%. 1000 milliLiter(s) IV Continuous <Continuous>  dextrose 5%. 1000 milliLiter(s) IV Continuous <Continuous>  dextrose 50% Injectable 12.5 Gram(s) IV Push once  dextrose 50% Injectable 25 Gram(s) IV Push once  dextrose 50% Injectable 25 Gram(s) IV Push once  digoxin     Tablet 0.125 milliGRAM(s) Oral every other day  folic acid 1 milliGRAM(s) Oral daily  gabapentin 200 milliGRAM(s) Oral daily  heparin   Injectable 5000 Unit(s) SubCutaneous every 8 hours  insulin glargine Injectable (LANTUS) 12 Unit(s) SubCutaneous every morning  insulin lispro (HumaLOG) corrective regimen sliding scale   SubCutaneous three times a day before meals  insulin lispro Injectable (HumaLOG) 4 Unit(s) SubCutaneous three times a day before meals  meropenem  IVPB 500 milliGRAM(s) IV Intermittent every 12 hours  pantoprazole   Suspension 40 milliGRAM(s) Oral before breakfast  sodium bicarbonate 650 milliGRAM(s) Oral every 8 hours    PRN Inpatient Medications  acetaminophen   Tablet .. 650 milliGRAM(s) Oral every 6 hours PRN  aluminum hydroxide/magnesium hydroxide/simethicone Suspension 30 milliLiter(s) Oral every 6 hours PRN  dextrose 40% Gel 15 Gram(s) Oral once PRN  glucagon  Injectable 1 milliGRAM(s) IntraMuscular once PRN  glucagon  Injectable 1 milliGRAM(s) IntraMuscular once PRN        VITALS/PHYSICAL EXAM  --------------------------------------------------------------------------------  T(C): 36 (06-08-20 @ 08:00), Max: 36.9 (06-08-20 @ 05:00)  HR: 68 (06-08-20 @ 08:00) (68 - 68)  BP: 103/55 (06-08-20 @ 08:00) (92/53 - 138/73)  RR: 11 (06-08-20 @ 08:00) (10 - 33)  SpO2: 100% (06-08-20 @ 08:00) (95% - 100%)  Wt(kg): --  Height (cm): 167 (06-08-20 @ 06:51)  Weight (kg): 60.3 (06-08-20 @ 06:51)  BMI (kg/m2): 21.6 (06-08-20 @ 06:51)  BSA (m2): 1.68 (06-08-20 @ 06:51)      06-07-20 @ 07:01  -  06-08-20 @ 07:00  --------------------------------------------------------  IN: 750 mL / OUT: 650 mL / NET: 100 mL      Physical Exam:  	Gen: NAD  	Pulm:  decrease  BS B/L  	CV: S1S2; no rub  	Abd: +distended        LABS/STUDIES  --------------------------------------------------------------------------------              7.6    24.15 >-----------<  163      [06-08-20 @ 04:54]              22.1     126  |  92  |  101  ----------------------------<  56      [06-08-20 @ 04:54]  5.5   |  20  |  3.3        Ca     7.6     [06-08-20 @ 04:54]      Mg     2.4     [06-08-20 @ 04:54]      Phos  5.0     [06-07-20 @ 20:37]    TPro  5.8  /  Alb  2.5  /  TBili  0.4  /  DBili  x   /  AST  40  /  ALT  18  /  AlkPhos  213  [06-08-20 @ 04:54]    PT/INR: PT 16.10, INR 1.40       [06-07-20 @ 05:20]  PTT: 32.8       [06-07-20 @ 05:20]      Creatinine Trend:  SCr 3.3 [06-08 @ 04:54]  SCr 3.2 [06-08 @ 00:21]  SCr 3.3 [06-07 @ 20:37]  SCr 3.5 [06-07 @ 05:20]  SCr 3.2 [06-07 @ 00:54]

## 2020-06-08 NOTE — PROGRESS NOTE ADULT - ASSESSMENT
ASSESSMENT  80 M with PMHx of anemia, ascites 2/2 CHF, CHF s/p biventricular AICD, CKD, CAD s/p CABGx5 on ASA, CKD, DM s/p R great toe amputation, HLD, HTN, hypothyroidism, PAD s/p R femoral stent who presents with altered mental status x 1 day and worsening left foot DFU. Was on augmentin as outpatient     IMPRESSION  #Polymicrobial bacteremia secondary to Necrotizing L foot infection s/p bedside I&D     6/4 BCX with GBS and Proteus ESBL    WCX with MRSA (R Clinda), ESBL proteus, Ecoli  < from: Xray Foot AP + Lateral + Oblique, Left (06.04.20 @ 04:59) >Subcutaneous emphysema at the first digit MTP joint. Findings concerning for necrotizing soft tissue infection.  #Elevated trop/BNP  #Lactic acidosis Blood Gas Venous - Lactate: 2.5 mmoL/L (06-04-20 @ 04:51)  #Hyponatremia   #CXR Bilateral lung opacities, right greater than left. No pneumothorax.  #DM   #Abx allergy: linezolid (Rash; Urticaria; Flushing)    RECOMMENDATIONS  - Needs OR debridement  - D/C Clinda  - Dapto 6mg/kg q48h IV for MRSA (R Clinda, allergy to linezolid, avoid vanc given CrCl)  - Add on baseline CPK and check weekly while on Dapto   - Meropenem 500mg q12h IV   - Appreciate Podiatry/Vascular consult  This is a preliminary incomplete pended note, all final recommendations to follow after interview and examination of the patient.     Spectra [id number] ASSESSMENT  80 M with PMHx of anemia, ascites 2/2 CHF, CHF s/p biventricular AICD, CKD, CAD s/p CABGx5 on ASA, CKD, DM s/p R great toe amputation, HLD, HTN, hypothyroidism, PAD s/p R femoral stent who presents with altered mental status x 1 day and worsening left foot DFU. Was on augmentin as outpatient     IMPRESSION  #Polymicrobial bacteremia secondary to Necrotizing L foot infection s/p bedside I&D     6/4 BCX with GBS and Proteus ESBL    WCX with MRSA (R Clinda), ESBL proteus, Ecoli  < from: Xray Foot AP + Lateral + Oblique, Left (06.04.20 @ 04:59) >Subcutaneous emphysema at the first digit MTP joint. Findings concerning for necrotizing soft tissue infection.  #Elevated trop/BNP  #Lactic acidosis Blood Gas Venous - Lactate: 2.5 mmoL/L (06-04-20 @ 04:51)  #Hyponatremia   #CXR Bilateral lung opacities, right greater than left. No pneumothorax.  #DM   #Abx allergy: linezolid (Rash; Urticaria; Flushing)    RECOMMENDATIONS  - Needs OR debridement  - D/C Clinda  - Dapto 6mg/kg q48h IV for MRSA (R Clinda, allergy to linezolid, avoid vanc given CrCl)  - Add on baseline CPK and check weekly while on Dapto   - Meropenem 500mg q12h IV   - Appreciate Podiatry/Vascular consult      Spectra [id number]

## 2020-06-08 NOTE — PROGRESS NOTE ADULT - SUBJECTIVE AND OBJECTIVE BOX
WILL VIEYRA 80y Male  MRN#: [id number]   Hospital Day: 4d    SUBJECTIVE  Patient is a 80y old Male who presents with a chief complaint of necrotizing fascitis (08 Jun 2020 08:32)  Currently admitted to medicine with the primary diagnosis of Necrotizing fasciitis    INTERVAL HPI AND OVERNIGHT EVENTS:  Patient was examined and seen at bedside.     Patient denies sob, chest pain, abdominal pain.  Complains of right knee pain.     OBJECTIVE  PAST MEDICAL & SURGICAL HISTORY  Chronic kidney disease  Anemia  Ascites  PAD (peripheral artery disease)  Hyperlipidemia  Hypothyroidism  Hypertension  Coronary artery disease  CHF (congestive heart failure)  Diabetes mellitus  Biventricular ICD (implantable cardioverter-defibrillator) in place  Amputation of toe of right foot  S/P arterial stent  S/P CABG x 5    ALLERGIES:  Byetta (Stomach Upset)  linezolid (Rash; Urticaria; Flushing)  melatonin (Other)    MEDICATIONS:  STANDING MEDICATIONS    PRN MEDICATIONS      VITAL SIGNS: Last 24 Hours  T(C): 36 (08 Jun 2020 09:18), Max: 36.9 (08 Jun 2020 05:00)  T(F): 97.8 (08 Jun 2020 09:12), Max: 98.5 (08 Jun 2020 05:00)  HR: 68 (08 Jun 2020 09:18) (68 - 69)  BP: 103/55 (08 Jun 2020 09:18) (59/- - 138/73)  BP(mean): 73 (08 Jun 2020 09:18) (59 - 112)  RR: 11 (08 Jun 2020 09:18) (10 - 33)  SpO2: 100% (08 Jun 2020 09:18) (95% - 100%)    LABS:                        7.6    24.15 )-----------( 163      ( 08 Jun 2020 04:54 )             22.1     06-08    126<L>  |  92<L>  |  101<HH>  ----------------------------<  56<L>  5.5<H>   |  20  |  3.3<H>    Ca    7.6<L>      08 Jun 2020 04:54  Phos  5.0     06-07  Mg     2.4     06-08    TPro  5.8<L>  /  Alb  2.5<L>  /  TBili  0.4  /  DBili  x   /  AST  40  /  ALT  18  /  AlkPhos  213<H>  06-08    PT/INR - ( 07 Jun 2020 05:20 )   PT: 16.10 sec;   INR: 1.40 ratio         PTT - ( 07 Jun 2020 05:20 )  PTT:32.8 sec          Culture - Blood (collected 06 Jun 2020 05:22)  Source: .Blood None  Preliminary Report (07 Jun 2020 10:00):    No growth to date.    Culture - Surgical Swab (collected 05 Jun 2020 19:00)  Source: .Surgical Swab None  Preliminary Report (07 Jun 2020 17:10):    Moderate Escherichia coli    Moderate Proteus mirabilis ESBL    Few Methicillin resistant Staphylococcus aureus  Organism: Escherichia coli  Proteus mirabilis ESBL  Methicillin resistant Staphylococcus aureus (07 Jun 2020 17:08)  Organism: Methicillin resistant Staphylococcus aureus (07 Jun 2020 17:08)  Organism: Proteus mirabilis ESBL (07 Jun 2020 17:08)  Organism: Escherichia coli (07 Jun 2020 17:08)    Culture - Surgical Swab (collected 05 Jun 2020 19:00)  Source: .Surgical Swab None  Preliminary Report (07 Jun 2020 17:13):    Few Escherichia coli    Few Proteus mirabilis ESBL    Few Methicillin resistant Staphylococcus aureus    See previous culture [id number]  Organism: Escherichia coli  Proteus mirabilis ESBL (07 Jun 2020 17:12)  Organism: Proteus mirabilis ESBL (07 Jun 2020 17:12)  Organism: Escherichia coli (06 Jun 2020 21:46)          RADIOLOGY:      PHYSICAL EXAM:  CONSTITUTIONAL: No acute distress, well-developed, well-groomed, AAOx3  HEAD: Atraumatic, normocephalic  EYES: EOM intact, PERRLA, conjunctiva and sclera clear  ENT: Supple, no masses, no thyromegaly, no bruits, no JVD; moist mucous membranes  PULMONARY: Clear to auscultation bilaterally; no wheezes, rales, or rhonchi  CARDIOVASCULAR: Regular rate and rhythm; no murmurs, rubs, or gallops  GASTROINTESTINAL: Soft, non-tender, non-distended; bowel sounds present  MUSCULOSKELETAL: 2+ peripheral pulses; no clubbing, no cyanosis, no edema  NEUROLOGY: non-focal  SKIN: No rashes or lesions; warm and dry    ASSESSMENT & PLAN    RADIOLOGY  < from: Xray Chest 1 View- PORTABLE-Urgent (06.06.20 @ 12:53) >  IMPRESSION:      Bilateral opacities/edema and bilateral effusions, improved on the right and slightly worsened on the left.    < end of copied text >  < from: Xray Foot AP + Lateral + Oblique, Left (06.04.20 @ 04:59) >  IMPRESSION:    Subcutaneous emphysema at the first digit MTP joint. Findings concerning for necrotizing soft tissue infection.      Dr. Sonido Gilliam discussed preliminary findings with BANG ARANDA [phone] on 6/4/2020 9:05 AM with readback.    < end of copied text >        PHYSICAL EXAM:  CONSTITUTIONAL: No acute distress, well-developed, well-groomed, AAOx3  HEAD: Atraumatic, normocephalic  EYES: EOM intact, PERRLA, conjunctiva and sclera clear  PULMONARY: Decreased breath sounds in anterior lung fields.   CARDIOVASCULAR: Regular rate and rhythm; no murmurs, rubs, or gallops  GASTROINTESTINAL: Soft, non-tender, non-distended; bowel sounds present  MUSCULOSKELETAL: LLE wrapped in kerlex. No ascending erythema noted on LLE.   NEUROLOGY: non-focal    ASSESSMENT & PLAN  80 M with PMHx of anemia, ascites 2/2 CHF, CHF s/p biventricular AICD, CKD, CAD s/p CABGx5 on ASA, CKD, DM s/p R great toe amputation, HLD, HTN, hypothyroidism, PAD s/p R femoral stent who presents with altered mental status x 1 day and worsening left foot DFU. Was on augmentin as outpatient     #Polymicrobial bacteremia secondary to necrotizing L foot infection   Xray of left foot demosntrates SQ emphysema of first digit MTP joint; concerning for soft tissue infection   s/p bedside I&D  s/p deep dissection of left foot by podiatry 6/5/2020   OR Sx positive for numerous E. Coli and Proteus   BCx positive for Proteus ESBL and GBS  ID: Start Daptomycin 6mg/kg [id number]; DC Clindamycin. Get baseline CPK before starting and start meropenem 500mg IV q12.   Burn has no further recommendations as pateint is being followed by podiatry  Podiatry: Wound care orders: Flush w/ Dakins and apply xeroform packing/DSD/ABD/Kerlix/Light ace; Pending debridement of LLE.  Patient undergoing debridement by podiatry this AM.   BCx in AM; 6/6/2020 BCx negative.   Arterial Duplex with normal flow, EF = <20%         # DM   FS better controlled  Lantus decreased from 12U to 9U qAM   c/w Lispro 4U TID qac   Monitor Fs.   A1C: 10.6      # MICHAEL on CKD  Start lokalema 5mg Po q24 as per nephro.   Baseline cr 1.8-2.2, 3.0 this AM     K+ 5.5  Dig level 1.9 yesterday.  Repeat Dig level this AM.    Phos 6.0-->Will start Phoslo   Repeat BMP in AM     #Hyponatremia  Downtrending to 126   Mike, UOsm, Serum osmolality to be ordered  BMP in AM   likely 2/2 fluid overload  May need to restart IV lasix 40mg daily as it was d/c'd as patient washypotensive.     # Type II NSTEMI   - trop 0.17  - EKG without ischemic changes    # HFrEF w/ BiVentricular AICD, h/o CHB  - TTE from [facility] from 2/2020 EF 20%, severe TR, akinetic apex, inf spetum, mid and apical anterior septum. Mild-mod MR, + ascites from CHF. Now EF <20%   - strict Is and Os   - BNP >70k  - Needs Coreg 3.325 and Lasix IV 40mg daily when BP can tolerate   - Aldactone discontinued as patient's BMP showed a K+ of 5.8; can start IV lasix 40mg daily once BP is more tolerable.   - CXR: improved opacities on right, worsened on left.     # CAD s/p CABGx5  - on ASA, statin    Diet: DASH; Carb Cnsistent.   DVT ppx; Heparin SubQ  GI ppx: PTX  Dispo: MICU Monitoring. WILL VIEYRA 80y Male  MRN#: [id number]   Hospital Day: 4d    SUBJECTIVE  Patient is a 80y old Male who presents with a chief complaint of necrotizing fascitis (08 Jun 2020 08:32)  Currently admitted to medicine with the primary diagnosis of Necrotizing fasciitis    INTERVAL HPI AND OVERNIGHT EVENTS:  Patient was examined and seen at bedside.     Patient denies sob, chest pain, abdominal pain.  Complains of right knee pain.     OBJECTIVE  PAST MEDICAL & SURGICAL HISTORY  Chronic kidney disease  Anemia  Ascites  PAD (peripheral artery disease)  Hyperlipidemia  Hypothyroidism  Hypertension  Coronary artery disease  CHF (congestive heart failure)  Diabetes mellitus  Biventricular ICD (implantable cardioverter-defibrillator) in place  Amputation of toe of right foot  S/P arterial stent  S/P CABG x 5    ALLERGIES:  Byetta (Stomach Upset)  linezolid (Rash; Urticaria; Flushing)  melatonin (Other)    MEDICATIONS:  STANDING MEDICATIONS    PRN MEDICATIONS      VITAL SIGNS: Last 24 Hours  T(C): 36 (08 Jun 2020 09:18), Max: 36.9 (08 Jun 2020 05:00)  T(F): 97.8 (08 Jun 2020 09:12), Max: 98.5 (08 Jun 2020 05:00)  HR: 68 (08 Jun 2020 09:18) (68 - 69)  BP: 103/55 (08 Jun 2020 09:18) (59/- - 138/73)  BP(mean): 73 (08 Jun 2020 09:18) (59 - 112)  RR: 11 (08 Jun 2020 09:18) (10 - 33)  SpO2: 100% (08 Jun 2020 09:18) (95% - 100%)    LABS:                        7.6    24.15 )-----------( 163      ( 08 Jun 2020 04:54 )             22.1     06-08    126<L>  |  92<L>  |  101<HH>  ----------------------------<  56<L>  5.5<H>   |  20  |  3.3<H>    Ca    7.6<L>      08 Jun 2020 04:54  Phos  5.0     06-07  Mg     2.4     06-08    TPro  5.8<L>  /  Alb  2.5<L>  /  TBili  0.4  /  DBili  x   /  AST  40  /  ALT  18  /  AlkPhos  213<H>  06-08    PT/INR - ( 07 Jun 2020 05:20 )   PT: 16.10 sec;   INR: 1.40 ratio         PTT - ( 07 Jun 2020 05:20 )  PTT:32.8 sec          Culture - Blood (collected 06 Jun 2020 05:22)  Source: .Blood None  Preliminary Report (07 Jun 2020 10:00):    No growth to date.    Culture - Surgical Swab (collected 05 Jun 2020 19:00)  Source: .Surgical Swab None  Preliminary Report (07 Jun 2020 17:10):    Moderate Escherichia coli    Moderate Proteus mirabilis ESBL    Few Methicillin resistant Staphylococcus aureus  Organism: Escherichia coli  Proteus mirabilis ESBL  Methicillin resistant Staphylococcus aureus (07 Jun 2020 17:08)  Organism: Methicillin resistant Staphylococcus aureus (07 Jun 2020 17:08)  Organism: Proteus mirabilis ESBL (07 Jun 2020 17:08)  Organism: Escherichia coli (07 Jun 2020 17:08)    Culture - Surgical Swab (collected 05 Jun 2020 19:00)  Source: .Surgical Swab None  Preliminary Report (07 Jun 2020 17:13):    Few Escherichia coli    Few Proteus mirabilis ESBL    Few Methicillin resistant Staphylococcus aureus    See previous culture [id number]  Organism: Escherichia coli  Proteus mirabilis ESBL (07 Jun 2020 17:12)  Organism: Proteus mirabilis ESBL (07 Jun 2020 17:12)  Organism: Escherichia coli (06 Jun 2020 21:46)          RADIOLOGY:      PHYSICAL EXAM:  CONSTITUTIONAL: No acute distress, well-developed, well-groomed, AAOx3  HEAD: Atraumatic, normocephalic  EYES: EOM intact, PERRLA, conjunctiva and sclera clear  ENT: Supple, no masses, no thyromegaly, no bruits, no JVD; moist mucous membranes  PULMONARY: Clear to auscultation bilaterally; no wheezes, rales, or rhonchi  CARDIOVASCULAR: Regular rate and rhythm; no murmurs, rubs, or gallops  GASTROINTESTINAL: Soft, non-tender, non-distended; bowel sounds present  MUSCULOSKELETAL: 2+ peripheral pulses; no clubbing, no cyanosis, no edema  NEUROLOGY: non-focal  SKIN: No rashes or lesions; warm and dry    ASSESSMENT & PLAN    RADIOLOGY  < from: Xray Chest 1 View- PORTABLE-Urgent (06.06.20 @ 12:53) >  IMPRESSION:      Bilateral opacities/edema and bilateral effusions, improved on the right and slightly worsened on the left.    < end of copied text >  < from: Xray Foot AP + Lateral + Oblique, Left (06.04.20 @ 04:59) >  IMPRESSION:    Subcutaneous emphysema at the first digit MTP joint. Findings concerning for necrotizing soft tissue infection.      Dr. Sonido Gilliam discussed preliminary findings with BANG ARANDA [phone] on 6/4/2020 9:05 AM with readback.    < end of copied text >        PHYSICAL EXAM:  CONSTITUTIONAL: No acute distress, well-developed, well-groomed, AAOx3  HEAD: Atraumatic, normocephalic  EYES: EOM intact, PERRLA, conjunctiva and sclera clear  PULMONARY: Decreased breath sounds in anterior lung fields.   CARDIOVASCULAR: Regular rate and rhythm; no murmurs, rubs, or gallops  GASTROINTESTINAL: Soft, non-tender, non-distended; bowel sounds present  MUSCULOSKELETAL: LLE wrapped in kerlex. No ascending erythema noted on LLE.   NEUROLOGY: non-focal    ASSESSMENT & PLAN  80 M with PMHx of anemia, ascites 2/2 CHF, CHF s/p biventricular AICD, CKD, CAD s/p CABGx5 on ASA, CKD, DM s/p R great toe amputation, HLD, HTN, hypothyroidism, PAD s/p R femoral stent who presents with altered mental status x 1 day and worsening left foot DFU. Was on augmentin as outpatient     #Polymicrobial bacteremia secondary to necrotizing L foot infection   Xray of left foot demosntrates SQ emphysema of first digit MTP joint; concerning for soft tissue infection   s/p bedside I&D  s/p deep dissection of left foot by podiatry 6/5/2020   OR Sx positive for numerous E. Coli and Proteus   BCx positive for Proteus ESBL and GBS  ID: Start Daptomycin 6mg/kg [id number]; DC Clindamycin. Get baseline CPK before starting and start meropenem 500mg IV q12.   Burn has no further recommendations as pateint is being followed by podiatry  Podiatry: Wound care orders: Flush w/ Dakins and apply xeroform packing/DSD/ABD/Kerlix/Light ace; Pending debridement of LLE.  Patient undergoing debridement by podiatry this AM.   BCx in AM; 6/6/2020 BCx negative.   Arterial Duplex with normal flow, EF = <20%         # DM   FS better controlled  Lantus decreased from 12U to 9U qAM   c/w Lispro 4U TID qac   Monitor Fs.   A1C: 10.6      # MICHAEL on CKD  Start lokalema 5mg Po q24 as per nephro.   Baseline cr 1.8-2.2, 3.3 this AM; relatively unchanged over last 48hours   K+ 5.5  Dig level 1.9 yesterday.  Repeat Dig level this AM.    Phos 6.0-->Will start Phoslo   Repeat BMP in AM     #Hyponatremia  Downtrending to 126   Mike, UOsm, Serum osmolality to be ordered  BMP in AM   likely 2/2 fluid overload  May need to restart IV lasix 40mg daily as it was d/c'd as patient washypotensive.     # Type II NSTEMI   - trop 0.17  - EKG without ischemic changes    # HFrEF w/ BiVentricular AICD, h/o CHB  - TTE from [facility] from 2/2020 EF 20%, severe TR, akinetic apex, inf spetum, mid and apical anterior septum. Mild-mod MR, + ascites from CHF. Now EF <20%   - strict Is and Os   - BNP >70k  - Needs Coreg 3.325 and Lasix IV 40mg daily when BP can tolerate   - Aldactone discontinued as patient's BMP showed a K+ of 5.8; can start IV lasix 40mg daily once BP is more tolerable.   - CXR: improved opacities on right, worsened on left.     # CAD s/p CABGx5  - on ASA, statin    Diet: DASH; Carb Cnsistent.   DVT ppx; Heparin SubQ  GI ppx: PTX  Dispo: MICU Monitoring.

## 2020-06-08 NOTE — PROGRESS NOTE ADULT - ASSESSMENT
Sepsis / bacteremia / necrotising fascitis / MICHAEL/ stage 4 ckd:  # ? ATN in nature   # creatinine stable  # start lokelma 5 q 24    # hyponatremia noted, Na level stable, check urine, sodium osm , u sodium   # non oliguric  # k noted, off aldactone , repeat k improved.  # dig level 1.9  # phos noted, start phoslo 1/1/1  # continue  po sodium bicarb 650 mg q 8 hr.  # podiatry notes appreciated , OR again today   # on roxanna, followed by ID   # no acute indication for RRT   # will follow

## 2020-06-08 NOTE — PROGRESS NOTE ADULT - SUBJECTIVE AND OBJECTIVE BOX
WILL VIEYRA  80y, Male  Allergy: Byetta (Stomach Upset)  linezolid (Rash; Urticaria; Flushing)  melatonin (Other)      LOS  4d    CHIEF COMPLAINT: necrotizing fascitis (07 Jun 2020 10:17)      INTERVAL EVENTS/HPI  - No acute events overnight, OR planned for today  - T(F): , Max: 98.5 (06-08-20 @ 05:00)  - WBC Count: 24.15 (06-08-20 @ 04:54)  WBC Count: 25.61 (06-07-20 @ 20:37)  - Creatinine, Serum: 3.3 (06-08-20 @ 04:54)  Creatinine, Serum: 3.2 (06-08-20 @ 00:21)       ROS  General: Denies rigors, nightsweats  HEENT: Denies headache, rhinorrhea, sore throat, eye pain  CV: Denies CP, palpitations  PULM: Denies wheezing, hemoptysis  GI: Denies hematemesis, hematochezia, melena  : Denies discharge, hematuria  MSK: Denies arthralgias, myalgias  SKIN: Denies rash, lesions  NEURO: Denies paresthesias, weakness  PSYCH: Denies depression, anxiety    VITALS:  T(F): 96.8, Max: 98.5 (06-08-20 @ 05:00)  HR: 68  BP: 103/55  RR: 11Vital Signs Last 24 Hrs  T(C): 36 (08 Jun 2020 08:00), Max: 36.9 (08 Jun 2020 05:00)  T(F): 96.8 (08 Jun 2020 08:00), Max: 98.5 (08 Jun 2020 05:00)  HR: 68 (08 Jun 2020 08:00) (68 - 68)  BP: 103/55 (08 Jun 2020 08:00) (92/53 - 138/73)  BP(mean): 73 (08 Jun 2020 08:00) (59 - 112)  RR: 11 (08 Jun 2020 08:00) (10 - 33)  SpO2: 100% (08 Jun 2020 08:00) (95% - 100%)    PHYSICAL EXAM:  Gen: Thin appearing M NAD, resting in bed  HEENT: Normocephalic, atraumatic  Neck: supple, no lymphadenopathy  CV: Regular rate & regular rhythm  Lungs: decreased BS at bases, no fremitus  Abdomen: Soft, BS present  Ext: hematoma L foot, ulcer L submet, no purulence  ulceration left posterior lateral heel  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      FH: Non-contributory  Social Hx: Non-contributory    TESTS & MEASUREMENTS:                        7.6    24.15 )-----------( 163      ( 08 Jun 2020 04:54 )             22.1     06-08    126<L>  |  92<L>  |  101<HH>  ----------------------------<  56<L>  5.5<H>   |  20  |  3.3<H>    Ca    7.6<L>      08 Jun 2020 04:54  Phos  5.0     06-07  Mg     2.4     06-08    TPro  5.8<L>  /  Alb  2.5<L>  /  TBili  0.4  /  DBili  x   /  AST  40  /  ALT  18  /  AlkPhos  213<H>  06-08    eGFR if Non African American: 17 mL/min/1.73M2 (06-08-20 @ 04:54)  eGFR if African American: 20 mL/min/1.73M2 (06-08-20 @ 04:54)  eGFR if Non African American: 17 mL/min/1.73M2 (06-08-20 @ 00:21)  eGFR if : 20 mL/min/1.73M2 (06-08-20 @ 00:21)  eGFR if Non African American: 17 mL/min/1.73M2 (06-07-20 @ 20:37)  eGFR if African American: 20 mL/min/1.73M2 (06-07-20 @ 20:37)    LIVER FUNCTIONS - ( 08 Jun 2020 04:54 )  Alb: 2.5 g/dL / Pro: 5.8 g/dL / ALK PHOS: 213 U/L / ALT: 18 U/L / AST: 40 U/L / GGT: x               Culture - Blood (collected 06-06-20 @ 05:22)  Source: .Blood None  Preliminary Report (06-07-20 @ 10:00):    No growth to date.    Culture - Surgical Swab (collected 06-05-20 @ 19:00)  Source: .Surgical Swab None  Preliminary Report (06-07-20 @ 17:10):    Moderate Escherichia coli    Moderate Proteus mirabilis ESBL    Few Methicillin resistant Staphylococcus aureus  Organism: Escherichia coli  Proteus mirabilis ESBL  Methicillin resistant Staphylococcus aureus (06-07-20 @ 17:08)  Organism: Methicillin resistant Staphylococcus aureus (06-07-20 @ 17:08)      -  Ampicillin/Sulbactam: R <=8/4      -  Cefazolin: R <=4      -  Clindamycin: R >4      -  Daptomycin: S 1      -  Erythromycin: R >4      -  Gentamicin: S <=1 Should not be used as monotherapy      -  Linezolid: S 2      -  Oxacillin: R >2      -  Penicillin: R 1      -  RIF- Rifampin: S <=1 Should not be used as monotherapy      -  Tetra/Doxy: S <=1      -  Trimethoprim/Sulfamethoxazole: S <=0.5/9.5      -  Vancomycin: S 2      Method Type: RON  Organism: Proteus mirabilis ESBL (06-07-20 @ 17:08)      -  Amikacin: S <=16      -  Amoxicillin/Clavulanic Acid: S <=8/4      -  Ampicillin: R >16 These ampicillin results predict results for amoxicillin      -  Ampicillin/Sulbactam: R <=4/2 Enterobacter, Citrobacter, and Serratia may develop resistance during prolonged therapy (3-4 days)      -  Aztreonam: R <=4      -  Cefazolin: R >16 Enterobacter, Citrobacter, and Serratia may develop resistance during prolonged therapy (3-4 days)      -  Cefepime: R >16      -  Cefoxitin: S <=8      -  Ceftriaxone: R >32 Enterobacter, Citrobacter, and Serratia may develop resistance during prolonged therapy      -  Ciprofloxacin: R >2      -  Ertapenem: S <=0.5      -  Gentamicin: R >8      -  Levofloxacin: R >4      -  Meropenem: S <=1      -  Piperacillin/Tazobactam: R <=8      -  Tobramycin: R >8      -  Trimethoprim/Sulfamethoxazole: R >2/38      Method Type: RON  Organism: Escherichia coli (06-07-20 @ 17:08)      -  Amikacin: S <=16      -  Amoxicillin/Clavulanic Acid: S <=8/4      -  Ampicillin: R >16 These ampicillin results predict results for amoxicillin      -  Ampicillin/Sulbactam: I 16/8 Enterobacter, Citrobacter, and Serratia may develop resistance during prolonged therapy (3-4 days)      -  Aztreonam: S <=4      -  Cefazolin: R 8 Enterobacter, Citrobacter, and Serratia may develop resistance during prolonged therapy (3-4 days)      -  Cefepime: S <=2      -  Cefoxitin: S <=8      -  Ceftriaxone: S <=1 Enterobacter, Citrobacter, and Serratia may develop resistance during prolonged therapy      -  Ciprofloxacin: S <=0.25      -  Ertapenem: S <=0.5      -  Gentamicin: S <=2      -  Imipenem: S <=1      -  Levofloxacin: S <=0.5      -  Meropenem: S <=1      -  Piperacillin/Tazobactam: S <=8      -  Tobramycin: S <=2      -  Trimethoprim/Sulfamethoxazole: S <=0.5/9.5      Method Type: RON      Lactate, Blood: 2.1 mmol/L (06-04-20 @ 11:12)  Blood Gas Venous - Lactate: 2.5 mmoL/L (06-04-20 @ 04:51)      INFECTIOUS DISEASES TESTING  COVID-19 PCR: NotDetec (06-04-20 @ 05:27)      INFLAMMATORY MARKERS  Sedimentation Rate, Erythrocyte: 79 mm/Hr (06-05-20 @ 04:14)  C-Reactive Protein, Serum: 23.70 mg/dL (06-05-20 @ 04:14)      RADIOLOGY & ADDITIONAL TESTS:  I have personally reviewed the last available Chest xray  CXR  Xray Chest 1 View- PORTABLE-Urgent:   EXAM:  XR CHEST PORTABLE URGENT 1V            PROCEDURE DATE:  06/06/2020            INTERPRETATION:    CLINICAL HISTORY: CHF.    COMPARISON: 6/4/2020.    TECHNIQUE/POSITIONING: Frontal view of the chest.    FINDINGS:    Support devices: Left-sidedAICD with leads projecting over the ventricles and right atrium. Please note battery pack limits evaluation of the underlying lung. EKG leads overlie the chest.    Cardiac/mediastinum/hilum: Aortic calcifications. Enlarged cardiac silhouette. Post CABG.    Lung parenchyma/Pleura: Bilateral opacities/edema and bilateral effusions, improved on the right and slightly worsened on the left.    Skeleton/soft tissues: Post median sternotomy. Degenerative change in the visualized shoulders.      IMPRESSION:      Bilateral opacities/edema and bilateral effusions, improved on the right and slightly worsened on the left.                      MAKSIM KENNEDY M.D., ATTENDING RADIOLOGIST  This document has been electronically signed. Jun 6 2020  1:36PM             (06-06-20 @ 12:53)      CT      CARDIOLOGY TESTING  Transthoracic Echocardiogram:    EXAM:  2-D ECHO (TTE) COMPLETE        PROCEDURE DATE:  06/04/2020      INTERPRETATION:  REPORT:    TRANSTHORACIC ECHOCARDIOGRAM REPORT         Patient Name:   WILL VIEYRA Accession #: [id number]  Medical Rec #:  [id number]       Height:      66.0in 167.6 cm  YOB: 1940        Weight:      132.0 lb 59.87 kg  Patient Age:    80 years        BSA:         1.68 m²  Patient Gender: M               BP:          118/65 mmHg       Date of Exam:        6/4/2020 12:04:28 PM  Referring Physician: [id number] STEFFANIE MAR  Sonographer:         Mami Simpson  Reading Physician:   Satya Martinez M.D.    Procedure:     2D Echo/Doppler/Color Doppler Complete.  Indications:   R07.9 - Chest Pain, unspecified  Diagnosis:     Chest pain, unspecified - R07.9  Study Details: Technically suboptimal study.         Summary:   1. Left ventricular ejection fraction, by visual estimation, is <20%.   2. Spectral Doppler shows impaired relaxation pattern of left ventricular myocardial filling (Grade I diastolic dysfunction).   3. Mitral annular calcification.   4. Moderate tricuspid regurgitation.   5. Estimated pulmonary artery systolic pressure is 37.6 mmHg assuming a right atrial pressure of 10 mmHg, which is consistent with borderline pulmonary hypertension.    PHYSICIAN INTERPRETATION:  Left Ventricle: The left ventricular internal cavity size is mildly increased. Left ventricular wall thickness is normal. Left ventricular ejection fraction, by visual estimation, is <20%. Spectral Doppler shows impaired relaxation pattern of left ventricular myocardial filling (Grade I diastolic dysfunction).  Right Ventricle: Normal right ventricular size and function.  Left Atrium: Mildly enlarged left atrium.  Right Atrium: Mildly enlarged right atrium.  Pericardium: There is no evidence of pericardial effusion.  Mitral Valve: Structurally normal mitral valve, with normal leaflet excursion. There is mitral annular calcification.  Tricuspid Valve: Structurally normal tricuspid valve, with normal leaflet excursion. Moderate tricuspid regurgitation is visualized. Estimated pulmonary artery systolic pressure is 37.6 mmHg assuming a right atrial pressure of 10 mmHg, which is consistent with borderline pulmonary hypertension.  Aortic Valve: The aortic valve was not well visualized. The aortic valve is trileaflet. No evidence of aortic stenosis. No evidence of aortic valve regurgitation is seen.  Aorta: The aortic root is normal in size and structure.  Venous: The inferior vena cava was normal sized, with respiratory size variation less than 50%, consistent with elevated right atrial pressure.  Additional Comments: A pacer wire is visualized in the right ventricle.       2D AND M-MODE MEASUREMENTS (normal ranges within parentheses):  Left Ventricle:                  Normal   Aorta/Left Atrium:             Normal  IVSd (2D):              1.05 cm (0.7-1.1) AoV Cusp Separation: 1.81 cm (1.5-2.6)  LVPWd (2D):             0.96 cm (0.7-1.1) Left Atrium (Mmode): 4.07 cm (1.9-4.0)  LVIDd (2D):             4.86 cm (3.4-5.7)  LVIDs (2D):             4.18 cm  LV FS (2D):             14.0 %   (>25%)  Relative Wall Thickness  0.39    (<0.42)    SPECTRAL DOPPLER ANALYSIS:  LV DIASTOLIC FUNCTION:  MV Peak E: 0.86 m/s Decel Time: 219 msec  MV Peak A: 0.84 m/s  E/A Ratio: 1.03    Aortic Valve:  AoV VMax:    1.15 m/s  AoV Pk Grad: 5.3 mmHg    LVOT Vmax: 0.60 m/s  LVOT VTI:  0.12 m    Mitral Valve:  MV P1/2 Time: 63.54 msec  MV Area, PHT: 3.46 cm²    Tricuspid Valve and PA/RV Systolic Pressure: TR Max Velocity: 2.63 m/s RA Pressure: 10 mmHg RVSP/PASP: 37.6 mmHg       [id number] Satya Martinez M.D., Electronically signed on 6/4/2020 at 1:53:11 PM         *** Final ***                    SATYA MARTINEZ MD  This document has been electronically signed. Jun 4 2020 12:04PM             (06-04-20 @ 12:04)  12 Lead ECG:   Ventricular Rate 73 BPM    Atrial Rate 73 BPM    P-R Interval 218 ms    QRS Duration 148 ms    Q-T Interval 430 ms    QTC Calculation(Bezet) 473 ms    R Axis 249 degrees    T Axis 45 degrees    Diagnosis Line AV dual-paced rhythm with prolonged AV conduction with occasional Premature  ventricular complexes  Biventricular pacemaker detected  Abnormal ECG    Confirmed by Satya Martinez ([id number]) on 6/4/2020 6:55:11 AM (06-04-20 @ 04:33)      MEDICATIONS  aspirin enteric coated 81  atorvastatin 80  chlorhexidine 4% Liquid 1  clindamycin IVPB 900  Dakins Solution - 1/2 Strength 1  dextrose 5%. 1000  dextrose 5%. 1000  dextrose 50% Injectable 12.5  dextrose 50% Injectable 25  dextrose 50% Injectable 25  digoxin     Tablet 0.125  folic acid 1  gabapentin 200  heparin   Injectable 5000  insulin glargine Injectable (LANTUS) 12  insulin lispro (HumaLOG) corrective regimen sliding scale   insulin lispro Injectable (HumaLOG) 4  meropenem  IVPB 500  pantoprazole   Suspension 40      WEIGHT  Weight (kg): 60.3 (06-08-20 @ 06:51)  Creatinine, Serum: 3.3 mg/dL (06-08-20 @ 04:54)  Creatinine, Serum: 3.2 mg/dL (06-08-20 @ 00:21)  Creatinine, Serum: 3.3 mg/dL (06-07-20 @ 20:37)      ANTIBIOTICS:  clindamycin IVPB 900 milliGRAM(s) IV Intermittent every 8 hours  meropenem  IVPB 500 milliGRAM(s) IV Intermittent every 12 hours      All available historical records have been reviewed

## 2020-06-08 NOTE — PROGRESS NOTE ADULT - ASSESSMENT
IMPRESSION:    Sepsis present on admission  G-ve bacteremia   LLE DFU / Nec fas? sp debridement  Polymicrobial bacteremia   HO HFr EF ( less than 20%)  HO DM and PVD  Renal failure  hyperk on aldactone/ dig    PLAN:    CNS:  no depressants     HEENT: Oral care    PULMONARY:  HOB @ 45 degrees.  Aspiration precautions .    CARDIOVASCULAR:  I=O.  Avoid volume overload keep , check dig level, hold aladactone    GI: GI prophylaxis.  feeding    RENAL:  Follow up lytes.  Correct as needed.  Renal eval     INFECTIOUS DISEASE: Follow up cultures. abx per ID    HEMATOLOGICAL:  DVT prophylaxis.      ENDOCRINE:  Follow up FS.  Insulin protocol if needed.    MUSCULOSKELETAL:  Burn and Podiatry FUP    Prognosis guarded

## 2020-06-09 NOTE — CHART NOTE - NSCHARTNOTEFT_GEN_A_CORE
80 M with PMHx of anemia, ascites 2/2 CHF, CHF s/p biventricular AICD, CKD, CAD s/p CABGx5 on ASA, CKD, DM s/p R big toe amputation, HLD, HTN, hypothyroidism, PAD s/p R femoral stent who presents with altered mental status x 1 day and worsening left foot DFU. The pt's daughter states that he saw his L hallux which looked worse, and necrotic from the prior week. He was also forgetful. She checked his temp at home, it was 100.2F, so she gave him tylenol. She states the pt had been taking augmentin for his DFU for some time. She thinks he forgot to take his meds for several days due to the infection.   She also states, the pt was recently dx with HFrEF, biventricular aicd placed february 2020. She has noticed an 8 lb weight gain in the past 4 days.   No fever. No vomiting. No chills. No back pain. No abd pain. No neck stiffness. No abd pain.     In the ED: VS were stable. The pt states he took tylenol prior to coming in to the ED.  xray of the foot showed Subcutaneous gas, podiatry was c/s. Pt was given clindamycin, zosyn, vanco. Labs notable for WBC 19 with L shift, plt 126, BUN/Cr 94/3.2, trop 0.17, BNP >70k, lactate 2.5.    Hospital Course  Xray demonstrates Subcutaneous emphysema at the first digit MTP joint. Findings concerning for necrotizing soft tissue infection. BCx positive for Proteus ESBL and GBS on BCx 6/4/2020. Podiatry was consulted and LLE I&D was performed 6/4/2020 and subsequent tissue cultures on 6/4/2020 were positive for E.Coli, Proteus and MRSA.    Patient initially on cefepime 2g q24h IV & flagyl 500mg q8h IV and was then switched to Clinda 900mg q8h IV and Marcelo 500mg IV q12. Vancomycin was held in setting of worsening kidney function. 6/8/2020-->Patient was switched to Daptomycin 6mg/kg [id number] and Marcelo 500mg IV q12 was continued. Today is Abx course day #6. Podiatry. Prior to procedure, patient was considered high risk for cardiac complications as per cardiology. Patient is s/p two LLE debridements on 6/5/2020 and 6/8/2020. Patient to undergo another LLE debridement on 6/10/2020 and will be NPO after midnight.      Patient has had worsening renal function.  Previous baseline Cr. prior to hospitalization was 1.8.  Patient's Cr has trended between 3.0-3.5, with this AM's Cr being 3.5. Patient's Na has been downtrending to 124 (stable as per nephro) and K+ has been uptrending to a level of 5.9 this AM.  Lokalema 5mgq24 was started but was increased to 10mg PO q12 today. Patient's HCO3 was noted to be low and 650mg PO NaHCO3 was started.  Echo demonstrated an EF<20%, IV lasix was started in hospital but was held in light labile BP. Patient was on aldactone at home but was held during admission in setting of hyperkalemia.   As patient's BP is more amenable to medication today, PO Lasix 40mg daily and Coreg 3.125 mg PO daily (SBP<100 parameters) were added.  Patient stable for downgrade to medical floors.     f/u: CBC, BMP, Mag, Phos, and T+S at 4pm. Mike, UOsm and Serum Osmolality.    Patient was seen at bedside this AM.  No acute overnight events. s/p LLE debridement 6/8/2020.  Denies fevers, rigors, chest pain, abdominal pain, sob.      CBC Full  -  ( 09 Jun 2020 04:15 )  WBC Count : 16.78 K/uL  RBC Count : 2.65 M/uL  Hemoglobin : 7.9 g/dL  Hematocrit : 24.4 %  Platelet Count - Automated : 169 K/uL  Mean Cell Volume : 92.1 fL  Mean Cell Hemoglobin : 29.8 pg  Mean Cell Hemoglobin Concentration : 32.4 g/dL  Auto Neutrophil # : 14.55 K/uL  Auto Lymphocyte # : 0.75 K/uL  Auto Monocyte # : 0.85 K/uL  Auto Eosinophil # : 0.31 K/uL  Auto Basophil # : 0.02 K/uL  Auto Neutrophil % : 86.7 %  Auto Lymphocyte % : 4.5 %  Auto Monocyte % : 5.1 %  Auto Eosinophil % : 1.8 %  Auto Basophil % : 0.1 %    06-09    124<L>  |  89<L>  |  101<HH>  ----------------------------<  166<H>  5.9<H>   |  18  |  3.5<H>    Ca    7.3<L>      09 Jun 2020 04:15  Phos  5.0     06-07  Mg     2.5     06-09    TPro  5.9<L>  /  Alb  2.7<L>  /  TBili  0.5  /  DBili  x   /  AST  61<H>  /  ALT  24  /  AlkPhos  232<H>  06-09  RADIOLOGY:      PHYSICAL EXAM:  CONSTITUTIONAL: No acute distress, well-developed, well-groomed, AAOx3  HEAD: Atraumatic, normocephalic  EYES: EOM intact, PERRLA, conjunctiva and sclera clear  ENT: Supple, no masses, no thyromegaly, no bruits, no JVD; moist mucous membranes  PULMONARY: Clear to auscultation bilaterally; no wheezes, rales, or rhonchi  CARDIOVASCULAR: Regular rate and rhythm; no murmurs, rubs, or gallops  GASTROINTESTINAL: Soft, non-tender, non-distended; bowel sounds present  MUSCULOSKELETAL: 2+ peripheral pulses; no clubbing, no cyanosis, no edema  NEUROLOGY: non-focal  SKIN: No rashes or lesions; warm and dry    ASSESSMENT & PLAN    RADIOLOGY  < from: Xray Chest 1 View- PORTABLE-Urgent (06.06.20 @ 12:53) >  IMPRESSION:      Bilateral opacities/edema and bilateral effusions, improved on the right and slightly worsened on the left.    < end of copied text >  < from: Xray Foot AP + Lateral + Oblique, Left (06.04.20 @ 04:59) >  IMPRESSION:    Subcutaneous emphysema at the first digit MTP joint. Findings concerning for necrotizing soft tissue infection.      Dr. Sonido Gilliam discussed preliminary findings with BANG ARANDA [phone] on 6/4/2020 9:05 AM with readback.    < end of copied text >        PHYSICAL EXAM:  CONSTITUTIONAL: No acute distress, well-developed, well-groomed, AAOx3  HEAD: Atraumatic, normocephalic  EYES: EOM intact, PERRLA, conjunctiva and sclera clear  PULMONARY: Decreased breath sounds in anterior lung fields.   CARDIOVASCULAR: Regular rate and rhythm; no murmurs, rubs, or gallops  GASTROINTESTINAL: Soft, non-tender, non-distended; bowel sounds present  MUSCULOSKELETAL: LLE wrapped in kerlex. No ascending erythema noted on LLE.   NEUROLOGY: non-focal    ASSESSMENT & PLAN  80 M with PMHx of anemia, ascites 2/2 CHF, CHF s/p biventricular AICD, CKD, CAD s/p CABGx5 on ASA, CKD, DM s/p R great toe amputation, HLD, HTN, hypothyroidism, PAD s/p R femoral stent who presents with altered mental status x 1 day and worsening left foot DFU. Was on augmentin as outpatient     #Polymicrobial bacteremia secondary to necrotizing L foot infection   Xray of left foot demonstrates SQ emphysema of first digit MTP joint; concerning for soft tissue infection   s/p bedside I&D 6/4/2020  s/p deep dissection of left foot by podiatry 6/5/2020 and 6/8/2020  Patient pending LLE debridement 6/10/2020-->NPO after midnight w/pre-op labs ordered for 4pm today.   OR Sx positive 6/4 for numerous E. Coli and Proteus and MRSA   BCx 6/4/2020 positive for Proteus ESBL and GBS  BCx 6/6/2020 negative.   ID: c/w Daptomycin 6mg/kg [id number]. Baseline CPK level 119. Will repeat CPK 6/10 as per ID.   Burn has no further recommendations as patient is being followed by podiatry  Podiatry: Wound care orders: Flush w/ Dakins and apply xeroform packing/DSD/ABD/Kerlix/Light ace; Pending debridement of LLE.  Patient undergoing debridement by podiatry this AM.   Arterial Duplex with normal flow, EF = <20%         # DM   FS better controlled  Lantus decreased from 12U to 9U qAM   c/w Lispro 4U TID qac   Monitor Fs.   A1C: 10.6      # MICHAEL on CKD  Start lokalema 5mg Po q24 as per nephro.   Baseline cr 1.8-2.2, 3.3 this AM; relatively unchanged over last 48hours   K+ 5.9 this AM-->Lokalema 10mg PO daily q12 added today.   Dig level 1.9 yesterday.  6/8 Dig level 1.0.    Phos 5.0-->Phoslo TID started   Repeat BMP in AM     #Hyponatremia  Downtrending to 124  Mike, UOsm Ordered-->Please follow up  Serum Osmolality-->[phone].   FS well controlled-->Na corrected 125   Lipid profile added to BMP to assess for HLD induced hyponatremia.   BMP in AM   PO lasix 40mg daily added       # Type II NSTEMI   - trop 0.17  - EKG without ischemic changes    # HFrEF w/ BiVentricular AICD, h/o CHB  - TTE from [facility] from 2/2020 EF 20%, severe TR, akinetic apex, inf spetum, mid and apical anterior septum. Mild-mod MR, + ascites from CHF. Now EF <20%   - strict Is and Os   - BNP >70k  - PO Coreg 3.125 BID and PO Lasix 40mg daily   - Aldactone held as patient is hyperkalemic..   - CXR: improved opacities on right, worsened on left.     # CAD s/p CABGx5  - on ASA, statin    Diet: DASH; Carb Cnsistent.   DVT ppx; Heparin SubQ  GI ppx: PTX  Dispo: Downgrade to floor. 80 M with PMHx of anemia, ascites 2/2 CHF, CHF s/p biventricular AICD, CKD, CAD s/p CABGx5 on ASA, CKD, DM s/p R big toe amputation, HLD, HTN, hypothyroidism, PAD s/p R femoral stent who presents with altered mental status x 1 day and worsening left foot DFU. The pt's daughter states that he saw his L hallux which looked worse, and necrotic from the prior week. He was also forgetful. She checked his temp at home, it was 100.2F, so she gave him tylenol. She states the pt had been taking augmentin for his DFU for some time. She thinks he forgot to take his meds for several days due to the infection.   She also states, the pt was recently dx with HFrEF, biventricular aicd placed february 2020. She has noticed an 8 lb weight gain in the past 4 days.   No fever. No vomiting. No chills. No back pain. No abd pain. No neck stiffness. No abd pain.     In the ED: VS were stable. The pt states he took tylenol prior to coming in to the ED.  xray of the foot showed Subcutaneous gas, podiatry was c/s. Pt was given clindamycin, zosyn, vanco. Labs notable for WBC 19 with L shift, plt 126, BUN/Cr 94/3.2, trop 0.17, BNP >70k, lactate 2.5.    Hospital Course  Xray demonstrates Subcutaneous emphysema at the first digit MTP joint. Findings concerning for necrotizing soft tissue infection. BCx positive for Proteus ESBL and GBS on BCx 6/4/2020. Podiatry was consulted and LLE I&D was performed 6/4/2020 and subsequent tissue cultures on 6/4/2020 were positive for E.Coli, Proteus and MRSA.    Patient initially on cefepime 2g q24h IV & flagyl 500mg q8h IV and was then switched to Clinda 900mg q8h IV and Marcelo 500mg IV q12. Vancomycin was held in setting of worsening kidney function. 6/8/2020-->Patient was switched to Daptomycin 6mg/kg [id number] and Marcelo 500mg IV q12 was continued. Today is Abx course day #6. Podiatry. Prior to procedure, patient was considered high risk for cardiac complications as per cardiology. Patient is s/p two LLE debridements on 6/5/2020 and 6/8/2020. Patient to undergo another LLE debridement on 6/10/2020 and will be NPO after midnight.      Patient has had worsening renal function.  Previous baseline Cr. prior to hospitalization was 1.8.  Patient's Cr has trended between 3.0-3.5, with this AM's Cr being 3.5. Patient's Na has been downtrending to 124 (stable as per nephro) and K+ has been to a level of 5.9 this AM.  Lokalema 5mgq24 was started but was increased to 10mg PO q12 today. Patient's HCO3 was noted to be low and 650mg PO NaHCO3 was started.  Echo demonstrated an EF<20%, IV lasix was started in hospital but was held in light labile BP. Patient was on aldactone and losartan at home but was held during admission in setting of hyperkalemia and MICHAEL respectively. .   As patient's BP is more amenable to medication today, PO Lasix 40mg daily and Coreg 3.125 mg PO daily (SBP<100 parameters) were added.  Patient stable for downgrade to medical floors.     f/u: CBC, BMP, Mag, Phos, and T+S at 4pm. Mike, UOsm and Serum Osmolality.    Patient was seen at bedside this AM.  No acute overnight events. s/p LLE debridement 6/8/2020.  Denies fevers, rigors, chest pain, abdominal pain, sob.      CBC Full  -  ( 09 Jun 2020 04:15 )  WBC Count : 16.78 K/uL  RBC Count : 2.65 M/uL  Hemoglobin : 7.9 g/dL  Hematocrit : 24.4 %  Platelet Count - Automated : 169 K/uL  Mean Cell Volume : 92.1 fL  Mean Cell Hemoglobin : 29.8 pg  Mean Cell Hemoglobin Concentration : 32.4 g/dL  Auto Neutrophil # : 14.55 K/uL  Auto Lymphocyte # : 0.75 K/uL  Auto Monocyte # : 0.85 K/uL  Auto Eosinophil # : 0.31 K/uL  Auto Basophil # : 0.02 K/uL  Auto Neutrophil % : 86.7 %  Auto Lymphocyte % : 4.5 %  Auto Monocyte % : 5.1 %  Auto Eosinophil % : 1.8 %  Auto Basophil % : 0.1 %    06-09    124<L>  |  89<L>  |  101<HH>  ----------------------------<  166<H>  5.9<H>   |  18  |  3.5<H>    Ca    7.3<L>      09 Jun 2020 04:15  Phos  5.0     06-07  Mg     2.5     06-09    TPro  5.9<L>  /  Alb  2.7<L>  /  TBili  0.5  /  DBili  x   /  AST  61<H>  /  ALT  24  /  AlkPhos  232<H>  06-09  RADIOLOGY:      PHYSICAL EXAM:  CONSTITUTIONAL: No acute distress, well-developed, well-groomed, AAOx3  HEAD: Atraumatic, normocephalic  EYES: EOM intact, PERRLA, conjunctiva and sclera clear  ENT: Supple, no masses, no thyromegaly, no bruits, no JVD; moist mucous membranes  PULMONARY: Clear to auscultation bilaterally; no wheezes, rales, or rhonchi  CARDIOVASCULAR: Regular rate and rhythm; no murmurs, rubs, or gallops  GASTROINTESTINAL: Soft, non-tender, non-distended; bowel sounds present  MUSCULOSKELETAL: 2+ peripheral pulses; no clubbing, no cyanosis, no edema  NEUROLOGY: non-focal  SKIN: No rashes or lesions; warm and dry    ASSESSMENT & PLAN    RADIOLOGY  < from: Xray Chest 1 View- PORTABLE-Urgent (06.06.20 @ 12:53) >  IMPRESSION:      Bilateral opacities/edema and bilateral effusions, improved on the right and slightly worsened on the left.    < end of copied text >  < from: Xray Foot AP + Lateral + Oblique, Left (06.04.20 @ 04:59) >  IMPRESSION:    Subcutaneous emphysema at the first digit MTP joint. Findings concerning for necrotizing soft tissue infection.      Dr. Sonido Gilliam discussed preliminary findings with BANG ARANDA [phone] on 6/4/2020 9:05 AM with readback.    < end of copied text >        PHYSICAL EXAM:  CONSTITUTIONAL: No acute distress, well-developed, well-groomed, AAOx3  HEAD: Atraumatic, normocephalic  EYES: EOM intact, PERRLA, conjunctiva and sclera clear  PULMONARY: Decreased breath sounds in anterior lung fields.   CARDIOVASCULAR: Regular rate and rhythm; no murmurs, rubs, or gallops  GASTROINTESTINAL: Soft, non-tender, non-distended; bowel sounds present  MUSCULOSKELETAL: LLE wrapped in kerlex. No ascending erythema noted on LLE.   NEUROLOGY: non-focal    ASSESSMENT & PLAN  80 M with PMHx of anemia, ascites 2/2 CHF, CHF s/p biventricular AICD, CKD, CAD s/p CABGx5 on ASA, CKD, DM s/p R great toe amputation, HLD, HTN, hypothyroidism, PAD s/p R femoral stent who presents with altered mental status x 1 day and worsening left foot DFU. Was on augmentin as outpatient     #Polymicrobial bacteremia secondary to necrotizing L foot infection   Xray of left foot demonstrates SQ emphysema of first digit MTP joint; concerning for soft tissue infection   s/p bedside I&D 6/4/2020  s/p deep dissection of left foot by podiatry 6/5/2020 and 6/8/2020  Patient pending LLE debridement 6/10/2020-->NPO after midnight w/pre-op labs ordered for 4pm today.   OR Sx positive 6/4 for numerous E. Coli and Proteus and MRSA   BCx 6/4/2020 positive for Proteus ESBL and GBS  BCx 6/6/2020 negative.   ID: c/w Daptomycin 6mg/kg [id number]. Baseline CPK level 119. Will repeat CPK 6/10 as per ID.   Burn has no further recommendations as patient is being followed by podiatry  Podiatry: Wound care orders: Flush w/ Dakins and apply xeroform packing/DSD/ABD/Kerlix/Light ace; Pending debridement of LLE.  Patient undergoing debridement by podiatry this AM.   Arterial Duplex with normal flow, EF = <20%         # DM   FS better controlled  Lantus decreased from 12U to 9U qAM   c/w Lispro 4U TID qac   Monitor Fs.   A1C: 10.6      # MICHAEL on CKD  Start lokalema 5mg Po q24 as per nephro.   Baseline cr 1.8-2.2, 3.3 this AM; relatively unchanged over last 48hours   K+ 5.9 this AM-->Lokalema 10mg PO daily q12 added today.   Dig level 1.9 yesterday.  6/8 Dig level 1.0.    Phos 5.0-->Phoslo TID started   Repeat BMP in AM     #Hyponatremia  Downtrending to 124  Mike, UOsm Ordered-->Please follow up  Serum Osmolality-->308.   FS well controlled-->Na corrected 125   Lipid profile added to BMP to assess for HLD induced hyponatremia.   BMP in AM   PO lasix 40mg daily added       # Type II NSTEMI   - trop 0.17  - EKG without ischemic changes    # HFrEF w/ BiVentricular AICD, h/o CHB  - TTE from [facility] from 2/2020 EF 20%, severe TR, akinetic apex, inf spetum, mid and apical anterior septum. Mild-mod MR, + ascites from CHF. Now EF <20%   - strict Is and Os   - BNP >70k  - PO Coreg 3.125 BID and PO Lasix 40mg daily   - Aldactone held as patient is hyperkalemic..   - CXR: improved opacities on right, worsened on left.     # CAD s/p CABGx5  - on ASA, statin    Diet: DASH; Carb Cnsistent.   DVT ppx; Heparin SubQ  GI ppx: PTX  Dispo: Downgrade to floor.

## 2020-06-09 NOTE — PROGRESS NOTE ADULT - SUBJECTIVE AND OBJECTIVE BOX
WILL VIEYRA  80y, Male  Allergy: Byetta (Stomach Upset)  linezolid (Rash; Urticaria; Flushing)  melatonin (Other)      LOS  5d    CHIEF COMPLAINT: necrotizing fascitis (08 Jun 2020 10:20)      INTERVAL EVENTS/HPI  - No acute events overnight  - T(F): , Max: 98 (06-08-20 @ 14:00)  - WBC Count: 16.78 (06-09-20 @ 04:15) downtrending   WBC Count: 24.15 (06-08-20 @ 04:54)  - Creatinine, Serum: 3.5 (06-09-20 @ 04:15) uptrending   Creatinine, Serum: 3.3 (06-08-20 @ 04:54)       ROS  General: Denies rigors, nightsweats  HEENT: Denies headache, rhinorrhea, sore throat, eye pain  CV: Denies CP, palpitations  PULM: Denies wheezing, hemoptysis  GI: Denies hematemesis, hematochezia, melena  : Denies discharge, hematuria  MSK: Denies arthralgias, myalgias  SKIN: Denies rash, lesions  NEURO: Denies paresthesias, weakness  PSYCH: Denies depression, anxiety    VITALS:  T(F): 96.5, Max: 98 (06-08-20 @ 14:00)  HR: 68  BP: 116/60  RR: 10Vital Signs Last 24 Hrs  T(C): 35.8 (09 Jun 2020 04:00), Max: 36.7 (08 Jun 2020 14:00)  T(F): 96.5 (09 Jun 2020 04:00), Max: 98 (08 Jun 2020 14:00)  HR: 68 (09 Jun 2020 06:00) (62 - 78)  BP: 116/60 (09 Jun 2020 06:00) (97/52 - 132/60)  BP(mean): 79 (09 Jun 2020 06:00) (71 - 97)  RR: 10 (09 Jun 2020 06:00) (8 - 31)  SpO2: 99% (09 Jun 2020 06:00) (89% - 100%)    PHYSICAL EXAM:  Gen: Thin appearing M NAD, resting in bed  HEENT: Normocephalic, atraumatic  Neck: supple, no lymphadenopathy  CV: Regular rate & regular rhythm  Lungs: decreased BS at bases, no fremitus  Abdomen: Soft, BS present  Ext: L foot dressings  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      FH: Non-contributory  Social Hx: Non-contributory    TESTS & MEASUREMENTS:                        7.9    16.78 )-----------( 169      ( 09 Jun 2020 04:15 )             24.4     06-09    124<L>  |  89<L>  |  101<HH>  ----------------------------<  166<H>  5.9<H>   |  18  |  3.5<H>    Ca    7.3<L>      09 Jun 2020 04:15  Phos  5.0     06-07  Mg     2.5     06-09    TPro  5.9<L>  /  Alb  2.7<L>  /  TBili  0.5  /  DBili  x   /  AST  61<H>  /  ALT  24  /  AlkPhos  232<H>  06-09    eGFR if Non African American: 16 mL/min/1.73M2 (06-09-20 @ 04:15)  eGFR if African American: 18 mL/min/1.73M2 (06-09-20 @ 04:15)    LIVER FUNCTIONS - ( 09 Jun 2020 04:15 )  Alb: 2.7 g/dL / Pro: 5.9 g/dL / ALK PHOS: 232 U/L / ALT: 24 U/L / AST: 61 U/L / GGT: x               Culture - Tissue with Gram Stain (collected 06-08-20 @ 12:05)  Source: .Tissue None  Gram Stain (06-08-20 @ 23:26):    Rare polymorphonuclear leukocytes per low power field    Rare Gram positive cocci in pairs per oil power field    Rare Gram Negative Rods per oil power field    Culture - Blood (collected 06-07-20 @ 05:20)  Source: .Blood None  Preliminary Report (06-08-20 @ 13:02):    No growth to date.    Culture - Blood (collected 06-07-20 @ 05:20)  Source: .Blood None  Preliminary Report (06-08-20 @ 13:02):    No growth to date.    Culture - Blood (collected 06-06-20 @ 05:22)  Source: .Blood None  Preliminary Report (06-07-20 @ 10:00):    No growth to date.    Culture - Surgical Swab (collected 06-05-20 @ 19:00)  Source: .Surgical Swab None  Preliminary Report (06-07-20 @ 17:10):    Moderate Escherichia coli    Moderate Proteus mirabilis ESBL    Few Methicillin resistant Staphylococcus aureus  Organism: Escherichia coli  Proteus mirabilis ESBL  Methicillin resistant Staphylococcus aureus (06-07-20 @ 17:08)  Organism: Methicillin resistant Staphylococcus aureus (06-07-20 @ 17:08)      -  Ampicillin/Sulbactam: R <=8/4      -  Cefazolin: R <=4      -  Clindamycin: R >4      -  Daptomycin: S 1      -  Erythromycin: R >4      -  Gentamicin: S <=1 Should not be used as monotherapy      -  Linezolid: S 2      -  Oxacillin: R >2      -  Penicillin: R 1      -  RIF- Rifampin: S <=1 Should not be used as monotherapy      -  Tetra/Doxy: S <=1      -  Trimethoprim/Sulfamethoxazole: S <=0.5/9.5      -  Vancomycin: S 2      Method Type: RON  Organism: Proteus mirabilis ESBL (06-07-20 @ 17:08)      -  Amikacin: S <=16      -  Amoxicillin/Clavulanic Acid: S <=8/4      -  Ampicillin: R >16 These ampicillin results predict results for amoxicillin      -  Ampicillin/Sulbactam: R <=4/2 Enterobacter, Citrobacter, and Serratia may develop resistance during prolonged therapy (3-4 days)      -  Aztreonam: R <=4      -  Cefazolin: R >16 Enterobacter, Citrobacter, and Serratia may develop resistance during prolonged therapy (3-4 days)      -  Cefepime: R >16      -  Cefoxitin: S <=8      -  Ceftriaxone: R >32 Enterobacter, Citrobacter, and Serratia may develop resistance during prolonged therapy      -  Ciprofloxacin: R >2      -  Ertapenem: S <=0.5      -  Gentamicin: R >8      -  Levofloxacin: R >4      -  Meropenem: S <=1      -  Piperacillin/Tazobactam: R <=8      -  Tobramycin: R >8      -  Trimethoprim/Sulfamethoxazole: R >2/38      Method Type: RON  Organism: Escherichia coli (06-07-20 @ 17:08)      -  Amikacin: S <=16      -  Amoxicillin/Clavulanic Acid: S <=8/4      -  Ampicillin: R >16 These ampicillin results predict results for amoxicillin      -  Ampicillin/Sulbactam: I 16/8 Enterobacter, Citrobacter, and Serratia may develop resistance during prolonged therapy (3-4 days)      -  Aztreonam: S <=4      -  Cefazolin: R 8 Enterobacter, Citrobacter, and Serratia may develop resistance during prolonged therapy (3-4 days)      -  Cefepime: S <=2      -  Cefoxitin: S <=8      -  Ceftriaxone: S <=1 Enterobacter, Citrobacter, and Serratia may develop resistance during prolonged therapy      -  Ciprofloxacin: S <=0.25      -  Ertapenem: S <=0.5      -  Gentamicin: S <=2      -  Imipenem: S <=1      -  Levofloxacin: S <=0.5      -  Meropenem: S <=1      -  Piperacillin/Tazobactam: S <=8      -  Tobramycin: S <=2      -  Trimethoprim/Sulfamethoxazole: S <=0.5/9.5      Method Type: RON    Lactate, Blood: 2.1 mmol/L (06-04-20 @ 11:12)      INFECTIOUS DISEASES TESTING  COVID-19 PCR: NotDetec (06-04-20 @ 05:27)      INFLAMMATORY MARKERS  Sedimentation Rate, Erythrocyte: 79 mm/Hr (06-05-20 @ 04:14)  C-Reactive Protein, Serum: 23.70 mg/dL (06-05-20 @ 04:14)      RADIOLOGY & ADDITIONAL TESTS:  I have personally reviewed the last available Chest xray  CXR  Xray Chest 1 View- PORTABLE-Urgent:   EXAM:  XR CHEST PORTABLE URGENT 1V            PROCEDURE DATE:  06/06/2020            INTERPRETATION:    CLINICAL HISTORY: CHF.    COMPARISON: 6/4/2020.    TECHNIQUE/POSITIONING: Frontal view of the chest.    FINDINGS:    Support devices: Left-sidedAICD with leads projecting over the ventricles and right atrium. Please note battery pack limits evaluation of the underlying lung. EKG leads overlie the chest.    Cardiac/mediastinum/hilum: Aortic calcifications. Enlarged cardiac silhouette. Post CABG.    Lung parenchyma/Pleura: Bilateral opacities/edema and bilateral effusions, improved on the right and slightly worsened on the left.    Skeleton/soft tissues: Post median sternotomy. Degenerative change in the visualized shoulders.      IMPRESSION:      Bilateral opacities/edema and bilateral effusions, improved on the right and slightly worsened on the left.                      MAKSIM KENNEDY M.D., ATTENDING RADIOLOGIST  This document has been electronically signed. Jun 6 2020  1:36PM             (06-06-20 @ 12:53)      CT      CARDIOLOGY TESTING  Transthoracic Echocardiogram:    EXAM:  2-D ECHO (TTE) COMPLETE        PROCEDURE DATE:  06/04/2020      INTERPRETATION:  REPORT:    TRANSTHORACIC ECHOCARDIOGRAM REPORT         Patient Name:   WILL VIEYRA Accession #: [id number]  Medical Rec #:  [id number]       Height:      66.0in 167.6 cm  YOB: 1940        Weight:      132.0 lb 59.87 kg  Patient Age:    80 years        BSA:         1.68 m²  Patient Gender: M               BP:          118/65 mmHg       Date of Exam:        6/4/2020 12:04:28 PM  Referring Physician: [id number] MAR  Sonographer:         Mami Simpson  Reading Physician:   Satya Martinez M.D.    Procedure:     2D Echo/Doppler/Color Doppler Complete.  Indications:   R07.9 - Chest Pain, unspecified  Diagnosis:     Chest pain, unspecified - R07.9  Study Details: Technically suboptimal study.         Summary:   1. Left ventricular ejection fraction, by visual estimation, is <20%.   2. Spectral Doppler shows impaired relaxation pattern of left ventricular myocardial filling (Grade I diastolic dysfunction).   3. Mitral annular calcification.   4. Moderate tricuspid regurgitation.   5. Estimated pulmonary artery systolic pressure is 37.6 mmHg assuming a right atrial pressure of 10 mmHg, which is consistent with borderline pulmonary hypertension.    PHYSICIAN INTERPRETATION:  Left Ventricle: The left ventricular internal cavity size is mildly increased. Left ventricular wall thickness is normal. Left ventricular ejection fraction, by visual estimation, is <20%. Spectral Doppler shows impaired relaxation pattern of left ventricular myocardial filling (Grade I diastolic dysfunction).  Right Ventricle: Normal right ventricular size and function.  Left Atrium: Mildly enlarged left atrium.  Right Atrium: Mildly enlarged right atrium.  Pericardium: There is no evidence of pericardial effusion.  Mitral Valve: Structurally normal mitral valve, with normal leaflet excursion. There is mitral annular calcification.  Tricuspid Valve: Structurally normal tricuspid valve, with normal leaflet excursion. Moderate tricuspid regurgitation is visualized. Estimated pulmonary artery systolic pressure is 37.6 mmHg assuming a right atrial pressure of 10 mmHg, which is consistent with borderline pulmonary hypertension.  Aortic Valve: The aortic valve was not well visualized. The aortic valve is trileaflet. No evidence of aortic stenosis. No evidence of aortic valve regurgitation is seen.  Aorta: The aortic root is normal in size and structure.  Venous: The inferior vena cava was normal sized, with respiratory size variation less than 50%, consistent with elevated right atrial pressure.  Additional Comments: A pacer wire is visualized in the right ventricle.       2D AND M-MODE MEASUREMENTS (normal ranges within parentheses):  Left Ventricle:                  Normal   Aorta/Left Atrium:             Normal  IVSd (2D):              1.05 cm (0.7-1.1) AoV Cusp Separation: 1.81 cm (1.5-2.6)  LVPWd (2D):             0.96 cm (0.7-1.1) Left Atrium (Mmode): 4.07 cm (1.9-4.0)  LVIDd (2D):             4.86 cm (3.4-5.7)  LVIDs (2D):             4.18 cm  LV FS (2D):             14.0 %   (>25%)  Relative Wall Thickness  0.39    (<0.42)    SPECTRAL DOPPLER ANALYSIS:  LV DIASTOLIC FUNCTION:  MV Peak E: 0.86 m/s Decel Time: 219 msec  MV Peak A: 0.84 m/s  E/A Ratio: 1.03    Aortic Valve:  AoV VMax:    1.15 m/s  AoV Pk Grad: 5.3 mmHg    LVOT Vmax: 0.60 m/s  LVOT VTI:  0.12 m    Mitral Valve:  MV P1/2 Time: 63.54 msec  MV Area, PHT: 3.46 cm²    Tricuspid Valve and PA/RV Systolic Pressure: TR Max Velocity: 2.63 m/s RA Pressure: 10 mmHg RVSP/PASP: 37.6 mmHg       [id number] Satya Martinez M.D., Electronically signed on 6/4/2020 at 1:53:11 PM         *** Final ***                    SATYA MARTINEZ MD  This document has been electronically signed. Jun 4 2020 12:04PM             (06-04-20 @ 12:04)  12 Lead ECG:   Ventricular Rate 73 BPM    Atrial Rate 73 BPM    P-R Interval 218 ms    QRS Duration 148 ms    Q-T Interval 430 ms    QTC Calculation(Bezet) 473 ms    R Axis 249 degrees    T Axis 45 degrees    Diagnosis Line AV dual-paced rhythm with prolonged AV conduction with occasional Premature  ventricular complexes  Biventricular pacemaker detected  Abnormal ECG    Confirmed by Satya Martinez ([id number]) on 6/4/2020 6:55:11 AM (06-04-20 @ 04:33)      MEDICATIONS  aspirin enteric coated 81 daily  atorvastatin 80 at bedtime  chlorhexidine 4% Liquid 1 <User Schedule>  Dakins Solution - 1/2 Strength 1 daily  DAPTOmycin IVPB 360 every 48 hours  dextrose 50% Injectable 12.5 once  dextrose 50% Injectable 25 once  dextrose 50% Injectable 25 once  digoxin     Tablet 0.125 every other day  folic acid 1 daily  gabapentin 200 daily  heparin   Injectable 5000 every 8 hours  insulin glargine Injectable (LANTUS) 9 every morning  insulin lispro (HumaLOG) corrective regimen sliding scale  three times a day before meals  insulin lispro Injectable (HumaLOG) 3 three times a day before meals  meropenem  IVPB 500 every 12 hours  pantoprazole   Suspension 40 before breakfast  sodium bicarbonate 650 every 8 hours  sodium zirconium cyclosilicate 5 once  sodium zirconium cyclosilicate 5 daily      WEIGHT  Weight (kg): 60.3 (06-08-20 @ 09:18)  Creatinine, Serum: 3.5 mg/dL (06-09-20 @ 04:15)      ANTIBIOTICS:  DAPTOmycin IVPB 360 milliGRAM(s) IV Intermittent every 48 hours  meropenem  IVPB 500 milliGRAM(s) IV Intermittent every 12 hours      All available historical records have been reviewed WILL VIEYRA  80y, Male  Allergy: Byetta (Stomach Upset)  linezolid (Rash; Urticaria; Flushing)  melatonin (Other)      LOS  5d    CHIEF COMPLAINT: necrotizing fascitis (08 Jun 2020 10:20)      INTERVAL EVENTS/HPI  - No acute events overnight  - T(F): , Max: 98 (06-08-20 @ 14:00)  - WBC Count: 16.78 (06-09-20 @ 04:15) downtrending   WBC Count: 24.15 (06-08-20 @ 04:54)  - Creatinine, Serum: 3.5 (06-09-20 @ 04:15) uptrending   Creatinine, Serum: 3.3 (06-08-20 @ 04:54)       ROS  General: Denies rigors, nightsweats  HEENT: Denies headache, rhinorrhea, sore throat, eye pain  CV: Denies CP, palpitations  PULM: Denies wheezing, hemoptysis  GI: Denies hematemesis, hematochezia, melena  : Denies discharge, hematuria  MSK: Denies arthralgias, myalgias  SKIN: Denies rash, lesions  NEURO: Denies paresthesias, weakness  PSYCH: Denies depression, anxiety    VITALS:  T(F): 96.5, Max: 98 (06-08-20 @ 14:00)  HR: 68  BP: 116/60  RR: 10Vital Signs Last 24 Hrs  T(C): 35.8 (09 Jun 2020 04:00), Max: 36.7 (08 Jun 2020 14:00)  T(F): 96.5 (09 Jun 2020 04:00), Max: 98 (08 Jun 2020 14:00)  HR: 68 (09 Jun 2020 06:00) (62 - 78)  BP: 116/60 (09 Jun 2020 06:00) (97/52 - 132/60)  BP(mean): 79 (09 Jun 2020 06:00) (71 - 97)  RR: 10 (09 Jun 2020 06:00) (8 - 31)  SpO2: 99% (09 Jun 2020 06:00) (89% - 100%)    PHYSICAL EXAM:  Gen: Thin appearing M NAD, resting in bed, sleepy  HEENT: Normocephalic, atraumatic  Neck: supple, no lymphadenopathy  CV: Regular rate & regular rhythm  Lungs: decreased BS at bases, no fremitus  Abdomen: Soft, BS present  Ext: L foot dressings  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      FH: Non-contributory  Social Hx: Non-contributory    TESTS & MEASUREMENTS:                        7.9    16.78 )-----------( 169      ( 09 Jun 2020 04:15 )             24.4     06-09    124<L>  |  89<L>  |  101<HH>  ----------------------------<  166<H>  5.9<H>   |  18  |  3.5<H>    Ca    7.3<L>      09 Jun 2020 04:15  Phos  5.0     06-07  Mg     2.5     06-09    TPro  5.9<L>  /  Alb  2.7<L>  /  TBili  0.5  /  DBili  x   /  AST  61<H>  /  ALT  24  /  AlkPhos  232<H>  06-09    eGFR if Non African American: 16 mL/min/1.73M2 (06-09-20 @ 04:15)  eGFR if African American: 18 mL/min/1.73M2 (06-09-20 @ 04:15)    LIVER FUNCTIONS - ( 09 Jun 2020 04:15 )  Alb: 2.7 g/dL / Pro: 5.9 g/dL / ALK PHOS: 232 U/L / ALT: 24 U/L / AST: 61 U/L / GGT: x               Culture - Tissue with Gram Stain (collected 06-08-20 @ 12:05)  Source: .Tissue None  Gram Stain (06-08-20 @ 23:26):    Rare polymorphonuclear leukocytes per low power field    Rare Gram positive cocci in pairs per oil power field    Rare Gram Negative Rods per oil power field    Culture - Blood (collected 06-07-20 @ 05:20)  Source: .Blood None  Preliminary Report (06-08-20 @ 13:02):    No growth to date.    Culture - Blood (collected 06-07-20 @ 05:20)  Source: .Blood None  Preliminary Report (06-08-20 @ 13:02):    No growth to date.    Culture - Blood (collected 06-06-20 @ 05:22)  Source: .Blood None  Preliminary Report (06-07-20 @ 10:00):    No growth to date.    Culture - Surgical Swab (collected 06-05-20 @ 19:00)  Source: .Surgical Swab None  Preliminary Report (06-07-20 @ 17:10):    Moderate Escherichia coli    Moderate Proteus mirabilis ESBL    Few Methicillin resistant Staphylococcus aureus  Organism: Escherichia coli  Proteus mirabilis ESBL  Methicillin resistant Staphylococcus aureus (06-07-20 @ 17:08)  Organism: Methicillin resistant Staphylococcus aureus (06-07-20 @ 17:08)      -  Ampicillin/Sulbactam: R <=8/4      -  Cefazolin: R <=4      -  Clindamycin: R >4      -  Daptomycin: S 1      -  Erythromycin: R >4      -  Gentamicin: S <=1 Should not be used as monotherapy      -  Linezolid: S 2      -  Oxacillin: R >2      -  Penicillin: R 1      -  RIF- Rifampin: S <=1 Should not be used as monotherapy      -  Tetra/Doxy: S <=1      -  Trimethoprim/Sulfamethoxazole: S <=0.5/9.5      -  Vancomycin: S 2      Method Type: RON  Organism: Proteus mirabilis ESBL (06-07-20 @ 17:08)      -  Amikacin: S <=16      -  Amoxicillin/Clavulanic Acid: S <=8/4      -  Ampicillin: R >16 These ampicillin results predict results for amoxicillin      -  Ampicillin/Sulbactam: R <=4/2 Enterobacter, Citrobacter, and Serratia may develop resistance during prolonged therapy (3-4 days)      -  Aztreonam: R <=4      -  Cefazolin: R >16 Enterobacter, Citrobacter, and Serratia may develop resistance during prolonged therapy (3-4 days)      -  Cefepime: R >16      -  Cefoxitin: S <=8      -  Ceftriaxone: R >32 Enterobacter, Citrobacter, and Serratia may develop resistance during prolonged therapy      -  Ciprofloxacin: R >2      -  Ertapenem: S <=0.5      -  Gentamicin: R >8      -  Levofloxacin: R >4      -  Meropenem: S <=1      -  Piperacillin/Tazobactam: R <=8      -  Tobramycin: R >8      -  Trimethoprim/Sulfamethoxazole: R >2/38      Method Type: RON  Organism: Escherichia coli (06-07-20 @ 17:08)      -  Amikacin: S <=16      -  Amoxicillin/Clavulanic Acid: S <=8/4      -  Ampicillin: R >16 These ampicillin results predict results for amoxicillin      -  Ampicillin/Sulbactam: I 16/8 Enterobacter, Citrobacter, and Serratia may develop resistance during prolonged therapy (3-4 days)      -  Aztreonam: S <=4      -  Cefazolin: R 8 Enterobacter, Citrobacter, and Serratia may develop resistance during prolonged therapy (3-4 days)      -  Cefepime: S <=2      -  Cefoxitin: S <=8      -  Ceftriaxone: S <=1 Enterobacter, Citrobacter, and Serratia may develop resistance during prolonged therapy      -  Ciprofloxacin: S <=0.25      -  Ertapenem: S <=0.5      -  Gentamicin: S <=2      -  Imipenem: S <=1      -  Levofloxacin: S <=0.5      -  Meropenem: S <=1      -  Piperacillin/Tazobactam: S <=8      -  Tobramycin: S <=2      -  Trimethoprim/Sulfamethoxazole: S <=0.5/9.5      Method Type: RON    Lactate, Blood: 2.1 mmol/L (06-04-20 @ 11:12)      INFECTIOUS DISEASES TESTING  COVID-19 PCR: NotDetec (06-04-20 @ 05:27)      INFLAMMATORY MARKERS  Sedimentation Rate, Erythrocyte: 79 mm/Hr (06-05-20 @ 04:14)  C-Reactive Protein, Serum: 23.70 mg/dL (06-05-20 @ 04:14)      RADIOLOGY & ADDITIONAL TESTS:  I have personally reviewed the last available Chest xray  CXR  Xray Chest 1 View- PORTABLE-Urgent:   EXAM:  XR CHEST PORTABLE URGENT 1V            PROCEDURE DATE:  06/06/2020            INTERPRETATION:    CLINICAL HISTORY: CHF.    COMPARISON: 6/4/2020.    TECHNIQUE/POSITIONING: Frontal view of the chest.    FINDINGS:    Support devices: Left-sidedAICD with leads projecting over the ventricles and right atrium. Please note battery pack limits evaluation of the underlying lung. EKG leads overlie the chest.    Cardiac/mediastinum/hilum: Aortic calcifications. Enlarged cardiac silhouette. Post CABG.    Lung parenchyma/Pleura: Bilateral opacities/edema and bilateral effusions, improved on the right and slightly worsened on the left.    Skeleton/soft tissues: Post median sternotomy. Degenerative change in the visualized shoulders.      IMPRESSION:      Bilateral opacities/edema and bilateral effusions, improved on the right and slightly worsened on the left.                      MAKSIM KENNEDY M.D., ATTENDING RADIOLOGIST  This document has been electronically signed. Jun 6 2020  1:36PM             (06-06-20 @ 12:53)      CT      CARDIOLOGY TESTING  Transthoracic Echocardiogram:    EXAM:  2-D ECHO (TTE) COMPLETE        PROCEDURE DATE:  06/04/2020      INTERPRETATION:  REPORT:    TRANSTHORACIC ECHOCARDIOGRAM REPORT         Patient Name:   WILL VIEYRA Accession #: [id number]  Medical Rec #:  [id number]       Height:      66.0in 167.6 cm  YOB: 1940        Weight:      132.0 lb 59.87 kg  Patient Age:    80 years        BSA:         1.68 m²  Patient Gender: M               BP:          118/65 mmHg       Date of Exam:        6/4/2020 12:04:28 PM  Referring Physician: [id number] MAR  Sonographer:         Mami Simpson  Reading Physician:   Satya Martinez M.D.    Procedure:     2D Echo/Doppler/Color Doppler Complete.  Indications:   R07.9 - Chest Pain, unspecified  Diagnosis:     Chest pain, unspecified - R07.9  Study Details: Technically suboptimal study.         Summary:   1. Left ventricular ejection fraction, by visual estimation, is <20%.   2. Spectral Doppler shows impaired relaxation pattern of left ventricular myocardial filling (Grade I diastolic dysfunction).   3. Mitral annular calcification.   4. Moderate tricuspid regurgitation.   5. Estimated pulmonary artery systolic pressure is 37.6 mmHg assuming a right atrial pressure of 10 mmHg, which is consistent with borderline pulmonary hypertension.    PHYSICIAN INTERPRETATION:  Left Ventricle: The left ventricular internal cavity size is mildly increased. Left ventricular wall thickness is normal. Left ventricular ejection fraction, by visual estimation, is <20%. Spectral Doppler shows impaired relaxation pattern of left ventricular myocardial filling (Grade I diastolic dysfunction).  Right Ventricle: Normal right ventricular size and function.  Left Atrium: Mildly enlarged left atrium.  Right Atrium: Mildly enlarged right atrium.  Pericardium: There is no evidence of pericardial effusion.  Mitral Valve: Structurally normal mitral valve, with normal leaflet excursion. There is mitral annular calcification.  Tricuspid Valve: Structurally normal tricuspid valve, with normal leaflet excursion. Moderate tricuspid regurgitation is visualized. Estimated pulmonary artery systolic pressure is 37.6 mmHg assuming a right atrial pressure of 10 mmHg, which is consistent with borderline pulmonary hypertension.  Aortic Valve: The aortic valve was not well visualized. The aortic valve is trileaflet. No evidence of aortic stenosis. No evidence of aortic valve regurgitation is seen.  Aorta: The aortic root is normal in size and structure.  Venous: The inferior vena cava was normal sized, with respiratory size variation less than 50%, consistent with elevated right atrial pressure.  Additional Comments: A pacer wire is visualized in the right ventricle.       2D AND M-MODE MEASUREMENTS (normal ranges within parentheses):  Left Ventricle:                  Normal   Aorta/Left Atrium:             Normal  IVSd (2D):              1.05 cm (0.7-1.1) AoV Cusp Separation: 1.81 cm (1.5-2.6)  LVPWd (2D):             0.96 cm (0.7-1.1) Left Atrium (Mmode): 4.07 cm (1.9-4.0)  LVIDd (2D):             4.86 cm (3.4-5.7)  LVIDs (2D):             4.18 cm  LV FS (2D):             14.0 %   (>25%)  Relative Wall Thickness  0.39    (<0.42)    SPECTRAL DOPPLER ANALYSIS:  LV DIASTOLIC FUNCTION:  MV Peak E: 0.86 m/s Decel Time: 219 msec  MV Peak A: 0.84 m/s  E/A Ratio: 1.03    Aortic Valve:  AoV VMax:    1.15 m/s  AoV Pk Grad: 5.3 mmHg    LVOT Vmax: 0.60 m/s  LVOT VTI:  0.12 m    Mitral Valve:  MV P1/2 Time: 63.54 msec  MV Area, PHT: 3.46 cm²    Tricuspid Valve and PA/RV Systolic Pressure: TR Max Velocity: 2.63 m/s RA Pressure: 10 mmHg RVSP/PASP: 37.6 mmHg       [id number] Satya Martinez M.D., Electronically signed on 6/4/2020 at 1:53:11 PM         *** Final ***                    SATYA MARTINEZ MD  This document has been electronically signed. Jun 4 2020 12:04PM             (06-04-20 @ 12:04)  12 Lead ECG:   Ventricular Rate 73 BPM    Atrial Rate 73 BPM    P-R Interval 218 ms    QRS Duration 148 ms    Q-T Interval 430 ms    QTC Calculation(Bezet) 473 ms    R Axis 249 degrees    T Axis 45 degrees    Diagnosis Line AV dual-paced rhythm with prolonged AV conduction with occasional Premature  ventricular complexes  Biventricular pacemaker detected  Abnormal ECG    Confirmed by Satya Martinez ([id number]) on 6/4/2020 6:55:11 AM (06-04-20 @ 04:33)      MEDICATIONS  aspirin enteric coated 81 daily  atorvastatin 80 at bedtime  chlorhexidine 4% Liquid 1 <User Schedule>  Dakins Solution - 1/2 Strength 1 daily  DAPTOmycin IVPB 360 every 48 hours  dextrose 50% Injectable 12.5 once  dextrose 50% Injectable 25 once  dextrose 50% Injectable 25 once  digoxin     Tablet 0.125 every other day  folic acid 1 daily  gabapentin 200 daily  heparin   Injectable 5000 every 8 hours  insulin glargine Injectable (LANTUS) 9 every morning  insulin lispro (HumaLOG) corrective regimen sliding scale  three times a day before meals  insulin lispro Injectable (HumaLOG) 3 three times a day before meals  meropenem  IVPB 500 every 12 hours  pantoprazole   Suspension 40 before breakfast  sodium bicarbonate 650 every 8 hours  sodium zirconium cyclosilicate 5 once  sodium zirconium cyclosilicate 5 daily      WEIGHT  Weight (kg): 60.3 (06-08-20 @ 09:18)  Creatinine, Serum: 3.5 mg/dL (06-09-20 @ 04:15)      ANTIBIOTICS:  DAPTOmycin IVPB 360 milliGRAM(s) IV Intermittent every 48 hours  meropenem  IVPB 500 milliGRAM(s) IV Intermittent every 12 hours      All available historical records have been reviewed

## 2020-06-09 NOTE — PROGRESS NOTE ADULT - SUBJECTIVE AND OBJECTIVE BOX
Podiatry Progress Note    Subjective:   WILL VIEYRA is a pleasant well-nourished, well developed 80y Male in no acute distress, alert awake, and oriented to person, place and time.    Patient is a 80y old Male who presents with a chief complaint of necrotizing fascitis (09 Jun 2020 07:53)    PAST MEDICAL & SURGICAL HISTORY:  Chronic kidney disease  Anemia  Ascites  PAD (peripheral artery disease)  Hyperlipidemia  Hypothyroidism  Hypertension  Coronary artery disease  CHF (congestive heart failure)  Diabetes mellitus  Biventricular ICD (implantable cardioverter-defibrillator) in place  Amputation of toe of right foot  S/P arterial stent  S/P CABG x 5     Objective:  Vital Signs Last 24 Hrs  T(C): 36.2 (09 Jun 2020 08:00), Max: 36.7 (08 Jun 2020 14:00)  T(F): 97.1 (09 Jun 2020 08:00), Max: 98 (08 Jun 2020 14:00)  HR: 68 (09 Jun 2020 07:00) (62 - 78)  BP: 130/57 (09 Jun 2020 07:00) (97/52 - 132/60)  BP(mean): 82 (09 Jun 2020 07:00) (71 - 97)  RR: 11 (09 Jun 2020 07:00) (8 - 31)  SpO2: 100% (09 Jun 2020 07:00) (89% - 100%)                        7.9    16.78 )-----------( 169      ( 09 Jun 2020 04:15 )             24.4                 06-09    124<L>  |  89<L>  |  101<HH>  ----------------------------<  166<H>  5.9<H>   |  18  |  3.5<H>    Ca    7.3<L>      09 Jun 2020 04:15  Phos  5.0     06-07  Mg     2.5     06-09    TPro  5.9<L>  /  Alb  2.7<L>  /  TBili  0.5  /  DBili  x   /  AST  61<H>  /  ALT  24  /  AlkPhos  232<H>  06-09    Culture - Tissue with Gram Stain (collected 06-08-20 @ 12:05)  Source: .Tissue None  Gram Stain (06-08-20 @ 23:26):    Rare polymorphonuclear leukocytes per low power field    Rare Gram positive cocci in pairs per oil power field    Rare Gram Negative Rods per oil power field    Culture - Tissue with Gram Stain (collected 06-08-20 @ 12:05)  Source: .Tissue None  Gram Stain (06-08-20 @ 23:26):    Rare polymorphonuclear leukocytes per low power field    Rare Gram positive cocci in pairs per oil power field    Rare Gram Negative Rods per oil power field    Physical Exam - Lower Extremity Focused:   Derm:   Open Surgical Wound; Medial Aspect of 1st Ray; w/ Sx Incision Plantar Aspect; Just Proximal to the Calcaneus   Exposed Bone and Tendon; Clean Wound Margins; w/ Red Granular Wound Base   Mild Erythema of Midfoot w/ Pinpoint Bleeding at Surgical Site;   No Signs of Active Infection;     Vascular: DP and PT Pulses Diminished; Foot is Warm to Warm to the touch   Neuro: Protective Sensation Mildly Intact;     Assessment:   s/p Debridement w/ 1st Ray Resection; Left Foot; 6/8  Open Surgical Site of Medial Aspect of Left Foot; Bone Exposed;   Clean Wound Margins w/ Red Granular Wound Base;     Plan:  Chart reviewed and Patient evaluated. All Questions and Concerns Addressed and Answered  Discussed diagnosis and treatment with patient  Surgical Site Flushed w/ Normal Saline; Applied Wet to Dry Gauze / DSD / Kerlix / Ace Wrap  Surgery Scheduled for Further Debridement Wednesday 6/10 @ 11:30AM w/ Dr. Clayton  NPO @ Midnight Tuesday 6/9; Pre-Op Labs; Optimization  Discussed Plan w/ Dr. Clayton    Podiatry

## 2020-06-09 NOTE — PROGRESS NOTE ADULT - ASSESSMENT
IMPRESSION:    Sepsis present on admission  G-ve bacteremia   LLE DFU / Nec fas? sp debridement  Polymicrobial bacteremia   HO HFr EF ( less than 20%)  HO DM and PVD  Renal failure  hyperk on aldactone/ dig    PLAN:    CNS:  no depressants     HEENT: Oral care    PULMONARY:  HOB @ 45 degrees.  Aspiration precautions .    CARDIOVASCULAR:  I=O.  Avoid volume overload keep , PO LASIX    GI: GI prophylaxis.  feeding    RENAL:  Follow up lytes.  Correct as needed.  diane Cabezas    INFECTIOUS DISEASE: Follow up cultures. abx per ID    HEMATOLOGICAL:  DVT prophylaxis.      ENDOCRINE:  Follow up FS.  Insulin protocol if needed.    MUSCULOSKELETAL:  Burn and Podiatry FUP    Prognosis guarded   tele

## 2020-06-09 NOTE — PROGRESS NOTE ADULT - SUBJECTIVE AND OBJECTIVE BOX
24 h events noted   no distress        PAST HISTORY  --------------------------------------------------------------------------------  No significant changes to PMH, PSH, FHx, SHx, unless otherwise noted    ALLERGIES & MEDICATIONS  --------------------------------------------------------------------------------  Allergies    Byetta (Stomach Upset)  linezolid (Rash; Urticaria; Flushing)  melatonin (Other)    Intolerances      Standing Inpatient Medications  aspirin enteric coated 81 milliGRAM(s) Oral daily  atorvastatin 80 milliGRAM(s) Oral at bedtime  chlorhexidine 4% Liquid 1 Application(s) Topical <User Schedule>  Dakins Solution - 1/2 Strength 1 Application(s) Topical daily  DAPTOmycin IVPB 360 milliGRAM(s) IV Intermittent every 48 hours  dextrose 50% Injectable 12.5 Gram(s) IV Push once  dextrose 50% Injectable 25 Gram(s) IV Push once  dextrose 50% Injectable 25 Gram(s) IV Push once  digoxin     Tablet 0.125 milliGRAM(s) Oral every other day  folic acid 1 milliGRAM(s) Oral daily  gabapentin 200 milliGRAM(s) Oral daily  heparin   Injectable 5000 Unit(s) SubCutaneous every 8 hours  insulin glargine Injectable (LANTUS) 9 Unit(s) SubCutaneous every morning  insulin lispro (HumaLOG) corrective regimen sliding scale   SubCutaneous three times a day before meals  insulin lispro Injectable (HumaLOG) 3 Unit(s) SubCutaneous three times a day before meals  meropenem  IVPB 500 milliGRAM(s) IV Intermittent every 12 hours  pantoprazole   Suspension 40 milliGRAM(s) Oral before breakfast  sodium bicarbonate 650 milliGRAM(s) Oral every 8 hours  sodium zirconium cyclosilicate 5 Gram(s) Oral once  sodium zirconium cyclosilicate 5 Gram(s) Oral daily    PRN Inpatient Medications  acetaminophen   Tablet .. 650 milliGRAM(s) Oral every 6 hours PRN  aluminum hydroxide/magnesium hydroxide/simethicone Suspension 30 milliLiter(s) Oral every 6 hours PRN  dextrose 40% Gel 15 Gram(s) Oral once PRN  glucagon  Injectable 1 milliGRAM(s) IntraMuscular once PRN          VITALS/PHYSICAL EXAM  --------------------------------------------------------------------------------  T(C): 35.8 (06-09-20 @ 04:00), Max: 36.7 (06-08-20 @ 14:00)  HR: 68 (06-09-20 @ 07:00) (62 - 78)  BP: 130/57 (06-09-20 @ 07:00) (97/52 - 132/60)  RR: 11 (06-09-20 @ 07:00) (8 - 31)  SpO2: 100% (06-09-20 @ 07:00) (89% - 100%)  Wt(kg): --  Height (cm): 167.64 (06-08-20 @ 09:18)  Weight (kg): 60.3 (06-08-20 @ 09:18)  BMI (kg/m2): 21.5 (06-08-20 @ 09:18)  BSA (m2): 1.68 (06-08-20 @ 09:18)      06-08-20 @ 07:01  -  06-09-20 @ 07:00  --------------------------------------------------------  IN: 75 mL / OUT: 300 mL / NET: -225 mL      Physical Exam:  	Gen: NAD  	Pulm: CTA B/L  	CV:  S1S2; no rub  	Abd:  soft, nontender/nondistended  	LE:  dressing       LABS/STUDIES  --------------------------------------------------------------------------------              7.9    16.78 >-----------<  169      [06-09-20 @ 04:15]              24.4     124  |  89  |  101  ----------------------------<  166      [06-09-20 @ 04:15]  5.9   |  18  |  3.5        Ca     7.3     [06-09-20 @ 04:15]      Mg     2.5     [06-09-20 @ 04:15]      Phos  5.0     [06-07-20 @ 20:37]    TPro  5.9  /  Alb  2.7  /  TBili  0.5  /  DBili  x   /  AST  61  /  ALT  24  /  AlkPhos  232  [06-09-20 @ 04:15]              [06-09-20 @ 04:15]    Creatinine Trend:  SCr 3.5 [06-09 @ 04:15]  SCr 3.3 [06-08 @ 04:54]  SCr 3.2 [06-08 @ 00:21]  SCr 3.3 [06-07 @ 20:37]  SCr 3.5 [06-07 @ 05:20]

## 2020-06-09 NOTE — PROGRESS NOTE ADULT - ASSESSMENT
Sepsis / bacteremia / necrotising fascitis / MICHAEL/ stage 4 ckd:  # ? ATN in nature   # creatinine trending up   # check bladder scan   # increase lokelma 10 q 12  # hyponatremia noted, Na level stable, check urine, sodium osm , u sodium , cortisol level   # non oliguric  # dig level 1.0  # phos noted, start phoslo 1/1/1  # increase   po sodium bicarb 650 mg q 6 hr.  # podiatry notes appreciated   # on roxanna/ dapto followed by ID   # no acute indication for RRT   # will follow

## 2020-06-09 NOTE — PHARMACOTHERAPY INTERVENTION NOTE - COMMENTS
Increase Lokelma to 10g q12h, ordered as routine, d/w team, recommended 10g x1 order
torsemide 100mg po [id number]  -d/w medical team, will hold torsemide for now & utilize furosemide IV per pt fluid status
vancomycin 750mg IV q24h, SCr 3.2-recommended adjusting to 750mg IV q48h, start 6/6/20, pt received vanco 1g @ 8:30am

## 2020-06-09 NOTE — PROGRESS NOTE ADULT - ASSESSMENT
ASSESSMENT  80 M with PMHx of anemia, ascites 2/2 CHF, CHF s/p biventricular AICD, CKD, CAD s/p CABGx5 on ASA, CKD, DM s/p R great toe amputation, HLD, HTN, hypothyroidism, PAD s/p R femoral stent who presents with altered mental status x 1 day and worsening left foot DFU. Was on augmentin as outpatient     IMPRESSION  #Polymicrobial bacteremia secondary to Necrotizing L foot infection s/p bedside I&D     s/p Amputation of first metatarsal 08-Jun-2020 6/4 BCX with GBS and Proteus ESBL    WCX with MRSA (R Clinda), ESBL proteus, Ecoli  < from: Xray Foot AP + Lateral + Oblique, Left (06.04.20 @ 04:59) >Subcutaneous emphysema at the first digit MTP joint. Findings concerning for necrotizing soft tissue infection.  #Elevated trop/BNP  #Lactic acidosis Blood Gas Venous - Lactate: 2.5 mmoL/L (06-04-20 @ 04:51)  #Hyponatremia   #CXR Bilateral lung opacities, right greater than left. No pneumothorax.  #DM   #Abx allergy: linezolid (Rash; Urticaria; Flushing)    RECOMMENDATIONS  - f/u OR CX  - Dapto 6mg/kg q48h IV for MRSA (R Clinda, allergy to linezolid, avoid vanc given CrCl)  - Check CPK 6/10, monitor while on Dapto Creatine Kinase, Serum: 119 U/L (06.09.20 @ 04:15)   - Meropenem 500mg q12h IV   - Appreciate Podiatry/Vascular consult      Spectra [phone]

## 2020-06-09 NOTE — PROGRESS NOTE ADULT - SUBJECTIVE AND OBJECTIVE BOX
OVERNIGHT EVENTS: events noted s.p or BY PODIATRY, no evetns overnight    Vital Signs Last 24 Hrs  T(C): 36.2 (09 Jun 2020 08:00), Max: 36.7 (08 Jun 2020 14:00)  T(F): 97.1 (09 Jun 2020 08:00), Max: 98 (08 Jun 2020 14:00)  HR: 68 (09 Jun 2020 07:00) (62 - 78)  BP: 130/57 (09 Jun 2020 07:00) (97/52 - 132/60)  BP(mean): 82 (09 Jun 2020 07:00) (71 - 97)  RR: 11 (09 Jun 2020 07:00) (8 - 31)  SpO2: 100% (09 Jun 2020 07:00) (89% - 100%)    PHYSICAL EXAMINATION:    GENERAL: ill looking    HEENT: Head is normocephalic and atraumatic.     NECK: Supple.    LUNGS: dec bs both bases    HEART: RAMONA 3/6    ABDOMEN: Soft, nontender, and nondistended.      EXTREMITIES: Without any cyanosis, clubbing, rash, lesions or edema.    NEUROLOGIC: Grossly intact.    SKIN: FOOT DRESSING      LABS:                        7.9    16.78 )-----------( 169      ( 09 Jun 2020 04:15 )             24.4     06-09    124<L>  |  89<L>  |  101<HH>  ----------------------------<  166<H>  5.9<H>   |  18  |  3.5<H>    Ca    7.3<L>      09 Jun 2020 04:15  Phos  5.0     06-07  Mg     2.5     06-09    TPro  5.9<L>  /  Alb  2.7<L>  /  TBili  0.5  /  DBili  x   /  AST  61<H>  /  ALT  24  /  AlkPhos  232<H>  06-09          CARDIAC MARKERS ( 09 Jun 2020 04:15 )  x     / x     / 119 U/L / x     / x      CARDIAC MARKERS ( 08 Jun 2020 12:13 )  x     / x     / 164 U/L / x     / x                      06-08-20 @ 07:01  -  06-09-20 @ 07:00  --------------------------------------------------------  IN: 75 mL / OUT: 300 mL / NET: -225 mL        MICROBIOLOGY:  Culture Results:   No growth to date. (06-07 @ 05:20)  Culture Results:   No growth to date. (06-07 @ 05:20)  Culture Results:   No growth to date. (06-06 @ 05:22)  Culture Results:   Few Escherichia coli  Few Proteus mirabilis ESBL  Few Methicillin resistant Staphylococcus aureus  See previous culture [id number] (06-05 @ 19:00)  Culture Results:   Moderate Escherichia coli  Moderate Proteus mirabilis ESBL  Few Methicillin resistant Staphylococcus aureus (06-05 @ 19:00)      MEDICATIONS  (STANDING):  aspirin enteric coated 81 milliGRAM(s) Oral daily  atorvastatin 80 milliGRAM(s) Oral at bedtime  chlorhexidine 4% Liquid 1 Application(s) Topical <User Schedule>  Dakins Solution - 1/2 Strength 1 Application(s) Topical daily  DAPTOmycin IVPB 360 milliGRAM(s) IV Intermittent every 48 hours  dextrose 50% Injectable 12.5 Gram(s) IV Push once  dextrose 50% Injectable 25 Gram(s) IV Push once  dextrose 50% Injectable 25 Gram(s) IV Push once  digoxin     Tablet 0.125 milliGRAM(s) Oral every other day  folic acid 1 milliGRAM(s) Oral daily  gabapentin 200 milliGRAM(s) Oral daily  heparin   Injectable 5000 Unit(s) SubCutaneous every 8 hours  insulin glargine Injectable (LANTUS) 9 Unit(s) SubCutaneous every morning  insulin lispro (HumaLOG) corrective regimen sliding scale   SubCutaneous three times a day before meals  insulin lispro Injectable (HumaLOG) 3 Unit(s) SubCutaneous three times a day before meals  meropenem  IVPB 500 milliGRAM(s) IV Intermittent every 12 hours  pantoprazole   Suspension 40 milliGRAM(s) Oral before breakfast  sodium bicarbonate 650 milliGRAM(s) Oral every 8 hours  sodium zirconium cyclosilicate 5 Gram(s) Oral once  sodium zirconium cyclosilicate 5 Gram(s) Oral daily    MEDICATIONS  (PRN):  acetaminophen   Tablet .. 650 milliGRAM(s) Oral every 6 hours PRN Mild Pain (1 - 3)  aluminum hydroxide/magnesium hydroxide/simethicone Suspension 30 milliLiter(s) Oral every 6 hours PRN Dyspepsia  dextrose 40% Gel 15 Gram(s) Oral once PRN Blood Glucose LESS THAN 70 milliGRAM(s)/deciliter  glucagon  Injectable 1 milliGRAM(s) IntraMuscular once PRN Glucose LESS THAN 70 milligrams/deciliter      RADIOLOGY & ADDITIONAL STUDIES:

## 2020-06-10 NOTE — PRE-ANESTHESIA EVALUATION ADULT - NSANTHADDINFOFT_GEN_ALL_CORE
Procedure/Risks explained for deep sedation v. GA with LMA v. ETT + routine monitoring. Patient appears to understand the apparent risks considering his underlying co-morbidities and is willing to proceed.

## 2020-06-10 NOTE — PROGRESS NOTE ADULT - SUBJECTIVE AND OBJECTIVE BOX
OVERNIGHT EVENTS: events noted, transferred to [facility]    Vital Signs Last 24 Hrs  T(C): 34.9 (10 Tin 2020 11:00), Max: 36.8 (09 Jun 2020 13:41)  T(F): 94.9 (10 Tin 2020 11:00), Max: 98.3 (09 Jun 2020 13:41)  HR: 69 (10 Tin 2020 11:00) (68 - 70)  BP: 88/54 (10 Tin 2020 11:00) (84/47 - 120/55)  BP(mean): 76 (09 Jun 2020 12:00) (76 - 76)  RR: 18 (10 Tin 2020 06:01) (10 - 18)  SpO2: 98% (10 Tin 2020 11:00) (98% - 100%)    PHYSICAL EXAMINATION:    GENERAL: comfortable    HEENT: Head is normocephalic and atraumatic. Extraocular muscles are intact. Mucous membranes are moist.    NECK: Supple.    LUNGS: dec bs both bases    HEART RAMONA 3/6    ABDOMEN: Soft, nontender, and nondistended.      EXTREMITIES: Without any cyanosis, clubbing, rash, lesions or edema.    NEUROLOGIC: Grossly intact.    SKIN: No ulceration or induration present.      LABS:                        7.5    15.79 )-----------( 142      ( 10 Tin 2020 06:18 )             21.7     06-10    125<L>  |  90<L>  |  103<HH>  ----------------------------<  123<H>  6.3<HH>   |  19  |  3.6<H>    Ca    7.4<L>      10 Tin 2020 06:18  Phos  6.8     06-10  Mg     2.6     06-10    TPro  5.5<L>  /  Alb  2.6<L>  /  TBili  0.5  /  DBili  x   /  AST  55<H>  /  ALT  21  /  AlkPhos  237<H>  06-10          CARDIAC MARKERS ( 10 Tin 2020 06:18 )  x     / x     / 111 U/L / x     / x      CARDIAC MARKERS ( 09 Jun 2020 04:15 )  x     / x     / 119 U/L / x     / x      CARDIAC MARKERS ( 08 Jun 2020 12:13 )  x     / x     / 164 U/L / x     / x                      06-09-20 @ 07:01  -  06-10-20 @ 07:00  --------------------------------------------------------  IN: 480 mL / OUT: 200 mL / NET: 280 mL        MICROBIOLOGY:  Culture Results:   Few Streptococcus agalactiae (Group B)  Streptococcus agalactiae (Group B) isolated  Group B streptococci are susceptible to ampicillin,  penicillin and cefazolin, but may be resistant to  erythromycin and clindamycin.  Recommendations for intrapartum prophylaxis for Group B  streptococci are penicillin or ampicillin. (06-08 @ 12:05)  Culture Results:   Rare Proteus mirabilis  Rare Streptococcus agalactiae (Group B) Streptococcus agalactiae (Group  B) isolated  Group B streptococci are susceptible to ampicillin,  penicillin and cefazolin, but may be resistant to  erythromycin and clindamycin.  Recommendations for intrapartum prophylaxis for Group B  streptococci are penicillin or ampicillin. (06-08 @ 12:05)  Culture Results:   No growth to date. (06-08 @ 04:54)  Culture Results:   No growth to date. (06-07 @ 05:20)  Culture Results:   No growth to date. (06-07 @ 05:20)      MEDICATIONS  (STANDING):  aspirin enteric coated 81 milliGRAM(s) Oral daily  atorvastatin 80 milliGRAM(s) Oral at bedtime  calcium acetate 667 milliGRAM(s) Oral three times a day with meals  carvedilol 3.125 milliGRAM(s) Oral every 12 hours  chlorhexidine 4% Liquid 1 Application(s) Topical <User Schedule>  Dakins Solution - 1/2 Strength 1 Application(s) Topical daily  DAPTOmycin IVPB 360 milliGRAM(s) IV Intermittent every 48 hours  dextrose 50% Injectable 12.5 Gram(s) IV Push once  dextrose 50% Injectable 25 Gram(s) IV Push once  dextrose 50% Injectable 25 Gram(s) IV Push once  digoxin     Tablet 0.125 milliGRAM(s) Oral every other day  folic acid 1 milliGRAM(s) Oral daily  furosemide    Tablet 40 milliGRAM(s) Oral daily  gabapentin 200 milliGRAM(s) Oral daily  heparin   Injectable 5000 Unit(s) SubCutaneous every 8 hours  insulin glargine Injectable (LANTUS) 9 Unit(s) SubCutaneous every morning  insulin lispro (HumaLOG) corrective regimen sliding scale   SubCutaneous three times a day before meals  insulin lispro Injectable (HumaLOG) 3 Unit(s) SubCutaneous three times a day before meals  meropenem  IVPB 500 milliGRAM(s) IV Intermittent every 12 hours  pantoprazole    Tablet 40 milliGRAM(s) Oral before breakfast  sodium bicarbonate 650 milliGRAM(s) Oral every 6 hours  sodium zirconium cyclosilicate 10 Gram(s) Oral two times a day    MEDICATIONS  (PRN):  acetaminophen   Tablet .. 650 milliGRAM(s) Oral every 6 hours PRN Mild Pain (1 - 3)  aluminum hydroxide/magnesium hydroxide/simethicone Suspension 30 milliLiter(s) Oral every 6 hours PRN Dyspepsia  dextrose 40% Gel 15 Gram(s) Oral once PRN Blood Glucose LESS THAN 70 milliGRAM(s)/deciliter  glucagon  Injectable 1 milliGRAM(s) IntraMuscular once PRN Glucose LESS THAN 70 milligrams/deciliter      RADIOLOGY & ADDITIONAL STUDIES:

## 2020-06-10 NOTE — CHART NOTE - NSCHARTNOTEFT_GEN_A_CORE
PACU ANESTHESIA ADMISSION NOTE      Procedure: Delayed primary closure of foot: left foot  Amputation of first metatarsal: Left foot  Deep dissection for foot infection  Pulsed lavage with suction  Incision and debridement of foot    Post op diagnosis:  Necrotizing fasciitis of ankle and foot      ____  Intubated  TV:______       Rate: ______      FiO2: ______    __x__  Patent Airway    __x__  Full return of protective reflexes    __x__  Full recovery from anesthesia / back to baseline status    Vitals:  HR: 70  BP: 128/68  RR:17  O2 Sat: 99  Temp: 97.6    Mental Status:  __x__ Awake   __x___ Alert   _____ Drowsy   _____ Sedated    Nausea/Vomiting:  _x__ NO  ______Yes,   See Post - Op Orders          Pain Scale (0-10):  __x___    Treatment: ____ None    ____ See Post - Op/PCA Orders    Post - Operative Fluids:   ____ Oral   ____ See Post - Op Orders    Plan: Discharge:   ____Home       __x___Floor     _____Critical Care   Other:_________________    Comments: Patient had smooth intraoperative event, no anesthesia complication.

## 2020-06-10 NOTE — PROGRESS NOTE ADULT - ASSESSMENT
Sepsis / bacteremia / necrotising fascitis / MICHAEL/ stage 4 ckd:  #  ATN most likely, creat stable ~3.5 mg%,  # no Tolliver,  Is and OS inaccurate, please document  # obtain kidney and bladder sono  # increase lokelma 10 q 12  # hyponatremia worsening - on LAsix  Brandie <20, Urine Osm 294, Serum Osm 306, , cortisol Ok, check TSH  - BP is low on Midodrine 3x/d; 2D Echo EF<20% PAH  - hyponatremia mixed picture of intravascular volume depletion, poor solute intake and CHF   - Hold Lasix, NS 50 cc/hr while NPO (will increase K excretion) for debridement for NF  # dig level 1.0  #Hyperkalemia - r/o GIB, Hb 6., receiving a Unit of blood, had Lokelma and Ins and Glucossa, low K diet  # phos noted, start phoslo 1/1/1  # acidosis  po sodium bicarb 650 mg q 6 hr.  # podiatry notes appreciated   #sepsis - NF, hypotension, on roxanna/ dapto followed by ID   D/W Primary team  # no acute indication for RRT   # will follow

## 2020-06-10 NOTE — PROGRESS NOTE ADULT - SUBJECTIVE AND OBJECTIVE BOX
WILL VIEYRA  80y, Male  Allergy: Byetta (Stomach Upset)  linezolid (Rash; Urticaria; Flushing)  melatonin (Other)      LOS  6d    CHIEF COMPLAINT: necrotizing fascitis (10 Tin 2020 10:57)      INTERVAL EVENTS/HPI  - No acute events overnight  - T(F): , Max: 98.3 (06-09-20 @ 13:41)  - WBC Count: 15.79 (06-10-20 @ 06:18)  WBC Count: 19.94 (06-09-20 @ 21:40)  - Creatinine, Serum: 3.6 (06-10-20 @ 06:18)  Creatinine, Serum: 3.3 (06-09-20 @ 21:40)       ROS  General: Denies rigors, nightsweats  HEENT: Denies headache, rhinorrhea, sore throat, eye pain  CV: Denies CP, palpitations  PULM: Denies wheezing, hemoptysis  GI: Denies hematemesis, hematochezia, melena  : Denies discharge, hematuria  MSK: Denies arthralgias, myalgias  SKIN: Denies rash, lesions  NEURO: Denies paresthesias, weakness  PSYCH: Denies depression, anxiety    VITALS:  T(F): 94.9, Max: 98.3 (06-09-20 @ 13:41)  HR: 69  BP: 88/54  RR: 18Vital Signs Last 24 Hrs  T(C): 34.9 (10 Tin 2020 11:00), Max: 36.8 (09 Jun 2020 13:41)  T(F): 94.9 (10 Tin 2020 11:00), Max: 98.3 (09 Jun 2020 13:41)  HR: 69 (10 Tin 2020 11:00) (68 - 70)  BP: 88/54 (10 Tin 2020 11:00) (84/47 - 120/55)  BP(mean): 76 (09 Jun 2020 12:00) (76 - 76)  RR: 18 (10 Tin 2020 06:01) (10 - 18)  SpO2: 98% (10 Tin 2020 11:00) (98% - 100%)    PHYSICAL EXAM:  Gen: Thin appearing M NAD  HEENT: Normocephalic, atraumatic  Neck: supple, no lymphadenopathy  CV: Regular rate & regular rhythm  Lungs: decreased BS at bases, no fremitus  Abdomen: Soft, BS present  Ext: L foot dressings  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    FH: Non-contributory  Social Hx: Non-contributory    TESTS & MEASUREMENTS:                        7.5    15.79 )-----------( 142      ( 10 Tin 2020 06:18 )             21.7     06-10    125<L>  |  90<L>  |  103<HH>  ----------------------------<  123<H>  6.3<HH>   |  19  |  3.6<H>    Ca    7.4<L>      10 Tin 2020 06:18  Phos  6.8     06-10  Mg     2.6     06-10    TPro  5.5<L>  /  Alb  2.6<L>  /  TBili  0.5  /  DBili  x   /  AST  55<H>  /  ALT  21  /  AlkPhos  237<H>  06-10    eGFR if Non African American: 15 mL/min/1.73M2 (06-10-20 @ 06:18)  eGFR if African American: 18 mL/min/1.73M2 (06-10-20 @ 06:18)  eGFR if Non African American: 17 mL/min/1.73M2 (06-09-20 @ 21:40)  eGFR if : 20 mL/min/1.73M2 (06-09-20 @ 21:40)    LIVER FUNCTIONS - ( 10 Tin 2020 06:18 )  Alb: 2.6 g/dL / Pro: 5.5 g/dL / ALK PHOS: 237 U/L / ALT: 21 U/L / AST: 55 U/L / GGT: x               Culture - Tissue with Gram Stain (collected 06-08-20 @ 12:05)  Source: .Tissue None  Gram Stain (06-08-20 @ 23:26):    Rare polymorphonuclear leukocytes per low power field    Rare Gram positive cocci in pairs per oil power field    Rare Gram Negative Rods per oil power field  Preliminary Report (06-09-20 @ 18:55):    Rare Proteus mirabilis    Rare Streptococcus agalactiae (Group B) Streptococcus agalactiae (Group    B) isolated    Group B streptococci are susceptible to ampicillin,    penicillin and cefazolin, but may be resistant to    erythromycin and clindamycin.    Recommendations for intrapartum prophylaxis for Group B    streptococci are penicillin or ampicillin.  Organism: Proteus mirabilis  Streptococcus agalactiae (Group B) (06-09-20 @ 18:55)  Organism: Streptococcus agalactiae (Group B) (06-09-20 @ 18:55)      Method Type: RON  Organism: Proteus mirabilis (06-09-20 @ 18:52)      Method Type: RON    Culture - Surgical Swab (collected 06-08-20 @ 12:05)  Source: .Surgical Swab None  Preliminary Report (06-09-20 @ 19:15):    Few Streptococcus agalactiae (Group B)    Streptococcus agalactiae (Group B) isolated    Group B streptococci are susceptible to ampicillin,    penicillin and cefazolin, but may be resistant to    erythromycin and clindamycin.    Recommendations for intrapartum prophylaxis for Group B    streptococci are penicillin or ampicillin.  Organism: Streptococcus agalactiae (Group B) (06-09-20 @ 19:14)  Organism: Streptococcus agalactiae (Group B) (06-09-20 @ 19:14)      Method Type: RON  Culture - Blood (collected 06-04-20 @ 04:12)  Source: .Blood Blood-Peripheral  Gram Stain (06-05-20 @ 21:20):    Growth in anaerobic bottle: Gram positive cocci in pairs    Growth in aerobic bottle: Gram Negative Rods  Final Report (06-06-20 @ 17:01):    Growth in anaerobic bottle: Streptococcus agalactiae (Group B)    Growth in aerobic bottle: Proteus mirabilis ESBL    See previous culture [id number]    "Due to technical problems, Proteus sp. will Not be reported as part of    the BCID panel until further notice"    ***Blood Panel PCR results on this specimen are available    approximately 3 hours after the Gram stain result.***    Gram stain, PCR, and/or culture results may not always    correspond due to difference in methodologies.    ************************************************************    This PCR assay was performed using [employer].    The following targets are tested for: Enterococcus,    vancomycin resistant enterococci, Listeria monocytogenes,    coagulase negative staphylococci, S. aureus,    methicillin resistant S. aureus, Streptococcus agalactiae    (Group B), S. pneumoniae, S. pyogenes (Group A),    Acinetobacter baumannii, Enterobacter cloacae, E. coli,    Klebsiella oxytoca, K. pneumoniae, Proteus sp.,    Serratia marcescens, Haemophilus influenzae,    Neisseria meningitidis, Pseudomonas aeruginosa, Candida    albicans, C. glabrata, C krusei, C parapsilosis,    C. tropicalis and the KPC resistance gene.  Organism: Blood Culture PCR  Streptococcus agalactiae (Group B)  Streptococcus agalactiae (Group B) (06-06-20 @ 17:01)  Organism: Streptococcus agalactiae (Group B) (06-06-20 @ 17:01)      -  Ceftriaxone: S 0.064      -  Penicillin: S 0.125 Predicts results for ampicillin, amoxicillin, amoxicillin/clavulanate, ampicillin/sulbactam, 1st, 2nd and 3rd generation cephalosporins and carbapenems.      Method Type: ETEST  Organism: Streptococcus agalactiae (Group B) (06-06-20 @ 17:01)      -  Clindamycin: S      -  Levofloxacin: S      -  Vancomycin: S      Method Type: KB  Organism: Blood Culture PCR (06-06-20 @ 17:01)      -  Streptococcus agalactiae (Group B): Detec      Method Type: PCR            INFECTIOUS DISEASES TESTING  COVID-19 PCR: NotDetec (06-04-20 @ 05:27)      INFLAMMATORY MARKERS  Sedimentation Rate, Erythrocyte: 79 mm/Hr (06-05-20 @ 04:14)  C-Reactive Protein, Serum: 23.70 mg/dL (06-05-20 @ 04:14)      RADIOLOGY & ADDITIONAL TESTS:  I have personally reviewed the last available Chest xray  CXR      CT      CARDIOLOGY TESTING  Transthoracic Echocardiogram:    EXAM:  2-D ECHO (TTE) COMPLETE        PROCEDURE DATE:  06/04/2020      INTERPRETATION:  REPORT:    TRANSTHORACIC ECHOCARDIOGRAM REPORT         Patient Name:   WILL VIEYRA Accession #: [id number]  Medical Rec #:  [id number]       Height:      66.0in 167.6 cm  YOB: 1940        Weight:      132.0 lb 59.87 kg  Patient Age:    80 years        BSA:         1.68 m²  Patient Gender: M               BP:          118/65 mmHg       Date of Exam:        6/4/2020 12:04:28 PM  Referring Physician: [id number] MAR  Sonographer:         Mami Simpson  Reading Physician:   Satya Martinez M.D.    Procedure:     2D Echo/Doppler/Color Doppler Complete.  Indications:   R07.9 - Chest Pain, unspecified  Diagnosis:     Chest pain, unspecified - R07.9  Study Details: Technically suboptimal study.         Summary:   1. Left ventricular ejection fraction, by visual estimation, is <20%.   2. Spectral Doppler shows impaired relaxation pattern of left ventricular myocardial filling (Grade I diastolic dysfunction).   3. Mitral annular calcification.   4. Moderate tricuspid regurgitation.   5. Estimated pulmonary artery systolic pressure is 37.6 mmHg assuming a right atrial pressure of 10 mmHg, which is consistent with borderline pulmonary hypertension.    PHYSICIAN INTERPRETATION:  Left Ventricle: The left ventricular internal cavity size is mildly increased. Left ventricular wall thickness is normal. Left ventricular ejection fraction, by visual estimation, is <20%. Spectral Doppler shows impaired relaxation pattern of left ventricular myocardial filling (Grade I diastolic dysfunction).  Right Ventricle: Normal right ventricular size and function.  Left Atrium: Mildly enlarged left atrium.  Right Atrium: Mildly enlarged right atrium.  Pericardium: There is no evidence of pericardial effusion.  Mitral Valve: Structurally normal mitral valve, with normal leaflet excursion. There is mitral annular calcification.  Tricuspid Valve: Structurally normal tricuspid valve, with normal leaflet excursion. Moderate tricuspid regurgitation is visualized. Estimated pulmonary artery systolic pressure is 37.6 mmHg assuming a right atrial pressure of 10 mmHg, which is consistent with borderline pulmonary hypertension.  Aortic Valve: The aortic valve was not well visualized. The aortic valve is trileaflet. No evidence of aortic stenosis. No evidence of aortic valve regurgitation is seen.  Aorta: The aortic root is normal in size and structure.  Venous: The inferior vena cava was normal sized, with respiratory size variation less than 50%, consistent with elevated right atrial pressure.  Additional Comments: A pacer wire is visualized in the right ventricle.       2D AND M-MODE MEASUREMENTS (normal ranges within parentheses):  Left Ventricle:                  Normal   Aorta/Left Atrium:             Normal  IVSd (2D):              1.05 cm (0.7-1.1) AoV Cusp Separation: 1.81 cm (1.5-2.6)  LVPWd (2D):             0.96 cm (0.7-1.1) Left Atrium (Mmode): 4.07 cm (1.9-4.0)  LVIDd (2D):             4.86 cm (3.4-5.7)  LVIDs (2D):             4.18 cm  LV FS (2D):             14.0 %   (>25%)  Relative Wall Thickness  0.39    (<0.42)    SPECTRAL DOPPLER ANALYSIS:  LV DIASTOLIC FUNCTION:  MV Peak E: 0.86 m/s Decel Time: 219 msec  MV Peak A: 0.84 m/s  E/A Ratio: 1.03    Aortic Valve:  AoV VMax:    1.15 m/s  AoV Pk Grad: 5.3 mmHg    LVOT Vmax: 0.60 m/s  LVOT VTI:  0.12 m    Mitral Valve:  MV P1/2 Time: 63.54 msec  MV Area, PHT: 3.46 cm²    Tricuspid Valve and PA/RV Systolic Pressure: TR Max Velocity: 2.63 m/s RA Pressure: 10 mmHg RVSP/PASP: 37.6 mmHg       [id number] Satya Martinez M.D., Electronically signed on 6/4/2020 at 1:53:11 PM         *** Final ***                    SATYA MARTINEZ MD  This document has been electronically signed. Jun 4 2020 12:04PM             (06-04-20 @ 12:04)  12 Lead ECG:   Ventricular Rate 73 BPM    Atrial Rate 73 BPM    P-R Interval 218 ms    QRS Duration 148 ms    Q-T Interval 430 ms    QTC Calculation(Bezet) 473 ms    R Axis 249 degrees    T Axis 45 degrees    Diagnosis Line AV dual-paced rhythm with prolonged AV conduction with occasional Premature  ventricular complexes  Biventricular pacemaker detected  Abnormal ECG    Confirmed by Satya Martinez ([id number]) on 6/4/2020 6:55:11 AM (06-04-20 @ 04:33)      MEDICATIONS  aspirin enteric coated 81 daily  atorvastatin 80 at bedtime  calcium acetate 667 three times a day with meals  carvedilol 3.125 every 12 hours  chlorhexidine 4% Liquid 1 <User Schedule>  Dakins Solution - 1/2 Strength 1 daily  DAPTOmycin IVPB 360 every 48 hours  dextrose 50% Injectable 12.5 once  dextrose 50% Injectable 25 once  dextrose 50% Injectable 25 once  digoxin     Tablet 0.125 every other day  folic acid 1 daily  furosemide    Tablet 40 daily  gabapentin 200 daily  heparin   Injectable 5000 every 8 hours  insulin glargine Injectable (LANTUS) 9 every morning  insulin lispro (HumaLOG) corrective regimen sliding scale  three times a day before meals  insulin lispro Injectable (HumaLOG) 3 three times a day before meals  meropenem  IVPB 500 every 12 hours  pantoprazole    Tablet 40 before breakfast  sodium bicarbonate 650 every 6 hours  sodium zirconium cyclosilicate 10 two times a day      WEIGHT  Weight (kg): 66 (06-10-20 @ 06:01)  Creatinine, Serum: 3.6 mg/dL (06-10-20 @ 06:18)  Creatinine, Serum: 3.3 mg/dL (06-09-20 @ 21:40)      ANTIBIOTICS:  DAPTOmycin IVPB 360 milliGRAM(s) IV Intermittent every 48 hours  meropenem  IVPB 500 milliGRAM(s) IV Intermittent every 12 hours      All available historical records have been reviewed

## 2020-06-10 NOTE — PROGRESS NOTE ADULT - ASSESSMENT
IMPRESSION:    Sepsis present on admission  G-ve bacteremia   LLE DFU / Nec fas? sp debridement  Polymicrobial bacteremia   HO HFr EF ( less than 20%)  HO DM and PVD  Renal failure  hyperk was on aldactone    PLAN:    CNS:  no depressants     HEENT: Oral care    PULMONARY:  HOB @ 45 degrees.  Aspiration precautions .    CARDIOVASCULAR:  I=O.  Avoid volume overload keep , PO LASIX, midodrine 10 q 8    GI: GI prophylaxis.  feeding    RENAL:  Follow up lytes.  Correct as needed.  dann Mckeonkelma    INFECTIOUS DISEASE: Follow up cultures. abx per ID    HEMATOLOGICAL:  DVT prophylaxis.      ENDOCRINE:  Follow up FS.  Insulin protocol if needed.    tele

## 2020-06-10 NOTE — PROGRESS NOTE ADULT - SUBJECTIVE AND OBJECTIVE BOX
Hospital Day:  6d    Subjective:    Patient is a 80y old Male who presents with a chief complaint of necrotizing fascitis (09 Jun 2020 08:21)  This morning patient is lying in bed comfortably. He reports no new complaints, including SOB, chest pain, abdominal pain, nausea, vomiting, dysuria, constipation, or diarrhea.      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    Allergies:  Byetta (Stomach Upset)  linezolid (Rash; Urticaria; Flushing)  melatonin (Other)    Current Meds:   Standng Meds:  aspirin enteric coated 81 milliGRAM(s) Oral daily  atorvastatin 80 milliGRAM(s) Oral at bedtime  calcium acetate 667 milliGRAM(s) Oral three times a day with meals  carvedilol 3.125 milliGRAM(s) Oral every 12 hours  chlorhexidine 4% Liquid 1 Application(s) Topical <User Schedule>  Dakins Solution - 1/2 Strength 1 Application(s) Topical daily  DAPTOmycin IVPB 360 milliGRAM(s) IV Intermittent every 48 hours  dextrose 50% Injectable 12.5 Gram(s) IV Push once  dextrose 50% Injectable 25 Gram(s) IV Push once  dextrose 50% Injectable 25 Gram(s) IV Push once  digoxin     Tablet 0.125 milliGRAM(s) Oral every other day  folic acid 1 milliGRAM(s) Oral daily  furosemide    Tablet 40 milliGRAM(s) Oral daily  gabapentin 200 milliGRAM(s) Oral daily  heparin   Injectable 5000 Unit(s) SubCutaneous every 8 hours  insulin glargine Injectable (LANTUS) 9 Unit(s) SubCutaneous every morning  insulin lispro (HumaLOG) corrective regimen sliding scale   SubCutaneous three times a day before meals  insulin lispro Injectable (HumaLOG) 3 Unit(s) SubCutaneous three times a day before meals  meropenem  IVPB 500 milliGRAM(s) IV Intermittent every 12 hours  pantoprazole    Tablet 40 milliGRAM(s) Oral before breakfast  sodium bicarbonate 650 milliGRAM(s) Oral every 6 hours  sodium zirconium cyclosilicate 10 Gram(s) Oral two times a day    PRN Meds:  acetaminophen   Tablet .. 650 milliGRAM(s) Oral every 6 hours PRN Mild Pain (1 - 3)  aluminum hydroxide/magnesium hydroxide/simethicone Suspension 30 milliLiter(s) Oral every 6 hours PRN Dyspepsia  dextrose 40% Gel 15 Gram(s) Oral once PRN Blood Glucose LESS THAN 70 milliGRAM(s)/deciliter  glucagon  Injectable 1 milliGRAM(s) IntraMuscular once PRN Glucose LESS THAN 70 milligrams/deciliter    HOME MEDICATIONS:  aspirin 81 mg oral tablet: 1 tab(s) orally once a day  atorvastatin 80 mg oral tablet: 1 tab(s) orally once a day  Digitek 125 mcg (0.125 mg) oral tablet: orally every other day (at bedtime)  famotidine 10 mg oral tablet: 1 tab(s) orally once (at bedtime)  ferrous sulfate 325 mg (65 mg elemental iron) oral tablet: 1 tab(s) orally every other day (at bedtime)  folic acid 1 mg oral tablet: 1 tab(s) orally once a day  gabapentin 100 mg oral tablet: 2 tab(s) orally once a day (at bedtime)  Januvia 50 mg oral tablet: 1 tab(s) orally once a day  losartan 25 mg oral tablet: 0.5 tab(s) orally once a day  spironolactone 50 mg oral tablet: 1 tab(s) orally 2 times a day  torsemide 100 mg oral tablet: 1 tab(s) orally once a day      Vital Signs:   T(F): 97 (06-10-20 @ 06:01), Max: 98.3 (06-09-20 @ 13:41)  HR: 70 (06-10-20 @ 06:01) (68 - 70)  BP: 99/52 (06-10-20 @ 06:01) (84/47 - 120/55)  RR: 18 (06-10-20 @ 06:01) (10 - 18)  SpO2: 100% (06-10-20 @ 06:01) (98% - 100%)      06-09-20 @ 07:01  -  06-10-20 @ 07:00  --------------------------------------------------------  IN: 480 mL / OUT: 200 mL / NET: 280 mL        Physical Exam:   GENERAL: NAD  HEENT: NCAT  CHEST/LUNG: B/l basilar crackles, R>L  HEART: Regular rate and rhythm; s1 s2 appreciated, No murmurs, rubs, or gallops  ABDOMEN: Soft, Nontender, Nondistended; Bowel sounds present  EXTREMITIES: 2+ LE edema b/l  NERVOUS SYSTEM:  Alert & Oriented X3          Labs:                         7.5    15.79 )-----------( 142      ( 10 Tin 2020 06:18 )             21.7     Neutophil% 86.3, Lymphocyte% 5.3, Monocyte% 4.7, Bands% 2.0 06-10-20 @ 06:18    10 Tin 2020 06:18    125    |  90     |  103    ----------------------------<  123    6.3     |  19     |  3.6      Ca    7.4        10 Tin 2020 06:18  Phos  6.8       10 Tin 2020 06:18  Mg     2.6       10 Tin 2020 06:18    TPro  5.5    /  Alb  2.6    /  TBili  0.5    /  DBili  x      /  AST  55     /  ALT  21     /  AlkPhos  237    10 Tin 2020 06:18      Chol 51, LDL 14, HDL 17, VLDL --, TRG 96 06-10-20 @ 06:18        Serum Pro-Brain Natriuretic Peptide: >17721 pg/mL (06-04-20 @ 04:12)    Troponin --, CKMB --,  06-10-20 @ 06:18  Troponin --, CKMB --,  06-09-20 @ 04:15  Troponin --, CKMB --,  06-08-20 @ 12:13              Culture - Blood (collected 06-08-20 @ 04:54)  Source: .Blood None  Preliminary Report (06-09-20 @ 12:01):    No growth to date.    Culture - Blood (collected 06-07-20 @ 05:20)  Source: .Blood None  Preliminary Report (06-08-20 @ 13:02):    No growth to date.    Culture - Blood (collected 06-07-20 @ 05:20)  Source: .Blood None  Preliminary Report (06-08-20 @ 13:02):    No growth to date.    Culture - Blood (collected 06-06-20 @ 05:22)  Source: .Blood None  Preliminary Report (06-07-20 @ 10:00):    No growth to date.    Culture - Blood (collected 06-04-20 @ 04:12)  Source: .Blood Blood-Peripheral  Gram Stain (06-04-20 @ 22:09):    Growth in anaerobic bottle: Gram Negative Rods  Final Report (06-06-20 @ 16:11):    Growth in anaerobic bottle: Proteus mirabilis ESBL    "Due to technical problems, Proteus sp. will Not be reported as part of    the BCID panel until further notice"    ***Blood Panel PCR results on this specimen are available    approximately 3 hours after the Gram stain result.***    Gram stain, PCR, and/or culture results may not always    correspond due to difference in methodologies.    ************************************************************    This PCR assay was performed using [employer].    The following targets are tested for: Enterococcus,    vancomycin resistant enterococci, Listeria monocytogenes,    coagulase negative staphylococci, S. aureus,    methicillin resistant S. aureus, Streptococcus agalactiae    (Group B), S. pneumoniae, S. pyogenes (Group A),    Acinetobacter baumannii, Enterobacter cloacae, E. coli,    Klebsiella oxytoca, K. pneumoniae, Proteus sp.,    Serratia marcescens, Haemophilus influenzae,    Neisseria meningitidis, Pseudomonas aeruginosa, Candida    albicans, C. glabrata, C krusei, C parapsilosis,    C. tropicalis and the KPC resistance gene.  Organism: Blood Culture PCR  Gram Negative Rods (06-06-20 @ 16:11)  Organism: Gram Negative Rods (06-06-20 @ 16:11)      -  Amikacin: S <=16      -  Ampicillin: R >16 These ampicillin results predict results for amoxicillin      -  Ampicillin/Sulbactam: R <=4/2 Enterobacter, Citrobacter, and Serratia may develop resistance during prolonged therapy (3-4 days)      -  Aztreonam: R <=4      -  Cefazolin: R >16 Enterobacter, Citrobacter, and Serratia may develop resistance during prolonged therapy (3-4 days)      -  Cefepime: R 8      -  Cefoxitin: S <=8      -  Ceftriaxone: R >32 Enterobacter, Citrobacter, and Serratia may develop resistance during prolonged therapy      -  Ciprofloxacin: R >2      -  Ertapenem: S <=0.5      -  Gentamicin: R >8      -  Levofloxacin: R >4      -  Meropenem: S <=1      -  Piperacillin/Tazobactam: R <=8      -  Tobramycin: R >8      -  Trimethoprim/Sulfamethoxazole: R >2/38      Method Type: RON  Organism: Blood Culture PCR (06-06-20 @ 16:11)      -  Acinetobacter baumanii: Nondet      -  Candida albicans: Nondet      -  Candida glabrata: Nondet      -  Candida krusei: Nondet      -  Candida parapsilosis: Nondet      -  Candida tropicalis: Nondet      -  Coagulase negative Staphylococcus: Nondet      -  Enterobacter cloacae complex: Nondet      -  Enterococcus species: Nondet      -  Escherichia coli: Nondet      -  Haemophilus influenzae: Nondet      -  Klebsiella oxytoca: Nondet      -  Klebsiella pneumoniae: Nondet      -  Listeria monocytogenes: Nondet      -  Methicillin resistant Staphylococcus aureus (MRSA): Nondet      -  Multidrug (KPC pos) resistant organism: Nondet      -  Neisseria meningitidis: Nondet      -  Pseudomonas aeruginosa: Nondet      -  Serratia marcescens: Nondet      -  Staphylococcus aureus: Nondet      -  Streptococcus agalactiae (Group B): Nondet      -  Streptococcus pneumoniae: Nondet      -  Streptococcus pyogenes (Group A): Nondet      -  Streptococcus sp. (Not Grp A, B or S pneumoniae): Nondet      -  Vancomycin resistant Enterococcus sp.: Nondet      Method Type: PCR    Culture - Blood (collected 06-04-20 @ 04:12)  Source: .Blood Blood-Peripheral  Gram Stain (06-05-20 @ 21:20):    Growth in anaerobic bottle: Gram positive cocci in pairs    Growth in aerobic bottle: Gram Negative Rods  Final Report (06-06-20 @ 17:01):    Growth in anaerobic bottle: Streptococcus agalactiae (Group B)    Growth in aerobic bottle: Proteus mirabilis ESBL    See previous culture [id number]    "Due to technical problems, Proteus sp. will Not be reported as part of    the BCID panel until further notice"    ***Blood Panel PCR results on this specimen are available    approximately 3 hours after the Gram stain result.***    Gram stain, PCR, and/or culture results may not always    correspond due to difference in methodologies.    ************************************************************    This PCR assay was performed using [employer].    The following targets are tested for: Enterococcus,    vancomycin resistant enterococci, Listeria monocytogenes,    coagulase negative staphylococci, S. aureus,    methicillin resistant S. aureus, Streptococcus agalactiae    (Group B), S. pneumoniae, S. pyogenes (Group A),    Acinetobacter baumannii, Enterobacter cloacae, E. coli,    Klebsiella oxytoca, K. pneumoniae, Proteus sp.,    Serratia marcescens, Haemophilus influenzae,    Neisseria meningitidis, Pseudomonas aeruginosa, Candida    albicans, C. glabrata, C krusei, C parapsilosis,    C. tropicalis and the KPC resistance gene.  Organism: Blood Culture PCR  Streptococcus agalactiae (Group B)  Streptococcus agalactiae (Group B) (06-06-20 @ 17:01)  Organism: Streptococcus agalactiae (Group B) (06-06-20 @ 17:01)      -  Ceftriaxone: S 0.064      -  Penicillin: S 0.125 Predicts results for ampicillin, amoxicillin, amoxicillin/clavulanate, ampicillin/sulbactam, 1st, 2nd and 3rd generation cephalosporins and carbapenems.      Method Type: ETEST  Organism: Streptococcus agalactiae (Group B) (06-06-20 @ 17:01)      -  Clindamycin: S      -  Levofloxacin: S      -  Vancomycin: S      Method Type: KB  Organism: Blood Culture PCR (06-06-20 @ 17:01)      -  Streptococcus agalactiae (Group B): Detec      Method Type: PCR          Assessment and Plan:     80 M with PMHx of anemia, ascites 2/2 CHF, CHF s/p biventricular AICD, CKD, CAD s/p CABGx5 on ASA, CKD, DM s/p R great toe amputation, HLD, HTN, hypothyroidism, PAD s/p R femoral stent who presents with altered mental status x 1 day and worsening left foot DFU. Was on augmentin as outpatient     #Polymicrobial bacteremia secondary to necrotizing L foot infection   - Xray of left foot demonstrates SQ emphysema of first digit MTP joint; concerning for soft tissue infection   - bedside I&D 6/4/2020. deep dissection of left foot by podiatry 6/5/2020 and 6/8/2020  - Patient pending LLE debridement 6/10/2020  - OR Sx positive 6/4 for numerous E. Coli and Proteus and MRSA   - BCx 6/4/2020 positive for Proteus ESBL and GBS. BCx 6/6/2020 negative.   - ID: c/w Daptomycin 6mg/kg [id number]. Baseline CPK level 119. 6/10 CPK level pending  - Podiatry: Wound care orders: Flush w/ Dakins and apply xeroform packing/DSD/ABD/Kerlix/Light ace; Pending debridement of LLE.  - Arterial Duplex with normal flow, EF = <20%     # Normocytic anemia  - S/p 2 units on 6/10  - Unsure where patient is losing blood. No signs of GI bleed or hematomas. Perhaps he is bleeding from his surgical site, but is unlikely he is bleeding enough from there to be anemic  - Will monitor    # MICHAEL on CKD. Baseline cr 1.8-2.2. With hyperkalemia  - Likely prerenal due to CHF, perhaps ATN as well  - Lokelema 10mg Po q12 as per nephro.   - 6/8 Dig level 1.0.    - Phoslo TID  - Aldactone and losartan held  - Will monitor    #Hyponatremia  - Currently 125  - Mike 20, UOsm 294 Ordered. Suggestive of CHF  - Serum Osmolality 308. Lipid profile negative for HLD induced hyponatremia.   - PO lasix 40mg daily  - Will monitor    # Type II NSTEMI   - trop 0.17  - EKG without ischemic changes    # HFrEF w/ BiVentricular AICD, h/o CHB  - TTE from [facility] from 2/2020 EF 20%, severe TR, akinetic apex, inf spetum, mid and apical anterior septum. Mild-mod MR, + ascites from CHF. Now EF <20%   - strict Is and Os   - BNP >70k on admission  - PO Coreg 3.125 BID and PO Lasix 40mg daily   - Aldactone held as patient is hyperkalemic.     # DM   - Monitor Fs.   - A1C: 10.6    # CAD s/p CABGx5  - on ASA, statin    Diet: DASH; Carb Consistent   DVT ppx; Heparin SubQ  GI ppx: PTX

## 2020-06-10 NOTE — PROGRESS NOTE ADULT - ASSESSMENT
ASSESSMENT  80 M with PMHx of anemia, ascites 2/2 CHF, CHF s/p biventricular AICD, CKD, CAD s/p CABGx5 on ASA, CKD, DM s/p R great toe amputation, HLD, HTN, hypothyroidism, PAD s/p R femoral stent who presents with altered mental status x 1 day and worsening left foot DFU. Was on augmentin as outpatient     IMPRESSION  #Polymicrobial bacteremia secondary to Necrotizing L foot infection     s/p Amputation of first metatarsal 08-Jun-2020 6/8 OR cx   Rare Proteus mirabilis  Rare Streptococcus agalactiae (Group B)    6/4 BCX with GBS and Proteus ESBL    s/p bedside I&D  WCX with MRSA (R Clinda), ESBL proteus, Ecoli  < from: Xray Foot AP + Lateral + Oblique, Left (06.04.20 @ 04:59) >Subcutaneous emphysema at the first digit MTP joint. Findings concerning for necrotizing soft tissue infection.  #Elevated trop/BNP  #Lactic acidosis Blood Gas Venous - Lactate: 2.5 mmoL/L (06-04-20 @ 04:51)  #Hyponatremia   #CXR Bilateral lung opacities, right greater than left. No pneumothorax.  #DM   #Abx allergy: linezolid (Rash; Urticaria; Flushing)    RECOMMENDATIONS  - f/u OR CX  - Dapto 6mg/kg q48h IV for MRSA (R Clinda, allergy to linezolid, avoid vanc given CrCl)  - Check CPK 6/16, monitor while on Dapto  Creatine Kinase, Serum: 111 U/L (06.10.20 @ 06:18)  - Meropenem 500mg q12h IV   - Appreciate Podiatry/Vascular consult      Spectra [phone]

## 2020-06-10 NOTE — PROGRESS NOTE ADULT - SUBJECTIVE AND OBJECTIVE BOX
Nephrology progress note    Patient is seen and examined, events over the last 24 h noted .  NPO for NF I&D ofo left foot  No SOB, lying flat  Allergies:  Byetta (Stomach Upset)  linezolid (Rash; Urticaria; Flushing)  melatonin (Other)    Hospital Medications:   MEDICATIONS  (STANDING):  aspirin enteric coated 81 milliGRAM(s) Oral daily  atorvastatin 80 milliGRAM(s) Oral at bedtime  calcium acetate 667 milliGRAM(s) Oral three times a day with meals  carvedilol 3.125 milliGRAM(s) Oral every 12 hours  chlorhexidine 4% Liquid 1 Application(s) Topical <User Schedule>  Dakins Solution - 1/2 Strength 1 Application(s) Topical daily  DAPTOmycin IVPB 360 milliGRAM(s) IV Intermittent every 48 hours  dextrose 50% Injectable 12.5 Gram(s) IV Push once  dextrose 50% Injectable 25 Gram(s) IV Push once  dextrose 50% Injectable 25 Gram(s) IV Push once  digoxin     Tablet 0.125 milliGRAM(s) Oral every other day  folic acid 1 milliGRAM(s) Oral daily  furosemide    Tablet 40 milliGRAM(s) Oral daily  gabapentin 200 milliGRAM(s) Oral daily  heparin   Injectable 5000 Unit(s) SubCutaneous every 8 hours  insulin glargine Injectable (LANTUS) 9 Unit(s) SubCutaneous every morning  insulin lispro (HumaLOG) corrective regimen sliding scale   SubCutaneous three times a day before meals  insulin lispro Injectable (HumaLOG) 3 Unit(s) SubCutaneous three times a day before meals  meropenem  IVPB 500 milliGRAM(s) IV Intermittent every 12 hours  pantoprazole    Tablet 40 milliGRAM(s) Oral before breakfast  sodium bicarbonate 650 milliGRAM(s) Oral every 6 hours  sodium zirconium cyclosilicate 10 Gram(s) Oral two times a day        VITALS:  T(F): 94.4 (06-10-20 @ 11:49), Max: 98.3 (06-09-20 @ 13:41)  HR: 68 (06-10-20 @ 11:49)  BP: 88/54 (06-10-20 @ 11:49)  RR: 17 (06-10-20 @ 11:49)  SpO2: 98% (06-10-20 @ 11:00)  Wt(kg): --    06-08 @ 07:01  -  06-09 @ 07:00  --------------------------------------------------------  IN: 75 mL / OUT: 300 mL / NET: -225 mL    06-09 @ 07:01  -  06-10 @ 07:00  --------------------------------------------------------  IN: 480 mL / OUT: 200 mL / NET: 280 mL      Height (cm): 167.64 (06-10 @ 06:01)  Weight (kg): 66 (06-10 @ 06:01)  BMI (kg/m2): 23.5 (06-10 @ 06:01)  BSA (m2): 1.75 (06-10 @ 06:01)    PHYSICAL EXAM:  Constitutional: NAD  HEENT: anicteric sclera, MMM  Neck: No JVD  Respiratory: CTA  Cardiovascular: S1, S2, RRR  Gastrointestinal: BS+, soft, NT/ND  Extremities: No peripheral edema. Lt Foot dry bandage  Neurological: A/O x 3  :  No arana.   Skin: No rashes  Vascular Access:    LABS:  06-10    124<L>  |  90<L>  |  106<HH>  ----------------------------<  60<L>  5.4<H>   |  19  |  3.5<H>  SODIUM TREND:  Sodium 124 [06-10 @ 11:12]  Sodium 125 [06-10 @ 06:18]  Sodium 123 [06-09 @ 21:40]  Sodium 124 [06-09 @ 04:15]  Sodium 126 [06-08 @ 04:54]  Sodium 125 [06-08 @ 00:21]  Sodium 125 [06-07 @ 20:37]  Sodium 129 [06-07 @ 05:20]  Sodium 129 [06-07 @ 00:54]  Sodium 128 [06-06 @ 17:06]  Creatinine Trend: 3.5<--, 3.6<--, 3.3<--, 3.5<--, 3.3<--, 3.2<--  Ca    7.3<L>      10 Tin 2020 11:12  Phos  6.8     06-10  Mg     2.6     06-10    TPro  5.5<L>  /  Alb  2.6<L>  /  TBili  0.5  /  DBili      /  AST  55<H>  /  ALT  21  /  AlkPhos  237<H>  06-10                          7.5    15.79 )-----------( 142      ( 10 Tin 2020 06:18 )             21.7       Urine Studies:    Sodium, Random Urine: 20.0 mmoL/L (06-09 @ 21:40)  Osmolality, Random Urine: 294 mos/kg (06-09 @ 21:40)    RADIOLOGY & ADDITIONAL STUDIES:  < from: Xray Chest 1 View-PORTABLE IMMEDIATE (06.10.20 @ 10:56) >    Stable left basilar opacity and effusion.     Improving right basilar opacity/effusion.    < end of copied text >

## 2020-06-11 NOTE — OCCUPATIONAL THERAPY INITIAL EVALUATION ADULT - GENERAL OBSERVATIONS, REHAB EVAL
1:25-2:05 chart reviewed, per RN pt ok to be seen by OT. pt received in bed +SANDI lock, wound vac, agreeable to OT eval

## 2020-06-11 NOTE — PROGRESS NOTE ADULT - SUBJECTIVE AND OBJECTIVE BOX
Podiatry Progress Note    Subjective:   WILL VIEYRA is a pleasant well-nourished, well developed 80y Male in no acute distress, alert awake, and oriented to person, place and time.    Patient is a 80y old  Male who presents with a chief complaint of necrotizing fascitis (11 Jun 2020 11:07)    PAST MEDICAL & SURGICAL HISTORY:  Chronic kidney disease  Anemia  Ascites  PAD (peripheral artery disease)  Hyperlipidemia  Hypothyroidism  Hypertension  Coronary artery disease  CHF (congestive heart failure)  Diabetes mellitus  Biventricular ICD (implantable cardioverter-defibrillator) in place  Amputation of toe of right foot  S/P arterial stent  S/P CABG x 5     Objective:  Vital Signs Last 24 Hrs  T(C): 34.9 (11 Jun 2020 10:06), Max: 36.7 (10 Tin 2020 20:35)  T(F): 94.8 (11 Jun 2020 10:06), Max: 98.1 (10 Tin 2020 20:35)  HR: 70 (11 Jun 2020 10:06) (10 - 71)  BP: 110/57 (11 Jun 2020 10:06) (88/54 - 128/68)  BP(mean): --  RR: 18 (11 Jun 2020 10:06) (15 - 20)  SpO2: 99% (10 Tin 2020 19:45) (98% - 99%)                        8.3    22.16 )-----------( 152      ( 11 Jun 2020 05:30 )             24.1                 06-11    123<L>  |  88<L>  |  101<HH>  ----------------------------<  144<H>  5.3<H>   |  19  |  3.4<H>    Ca    7.4<L>      11 Jun 2020 05:30  Phos  6.7     06-11  Mg     2.5     06-11    TPro  5.4<L>  /  Alb  2.5<L>  /  TBili  0.5  /  DBili  x   /  AST  49<H>  /  ALT  19  /  AlkPhos  219<H>  06-11    Derm:   Open Surgical Wound; Medial Aspect of 1st Ray; w/ Sx Incision Plantar Aspect; Just Proximal to the Calcaneus   Exposed Bone and Tendon; Clean Wound Margins; w/ Red Granular Wound Base   Mild Erythema of Midfoot w/ Pinpoint Bleeding at Surgical Site;   No Signs of Active Infection;     Vascular: DP and PT Pulses Diminished; Foot is Warm to Warm to the touch   Neuro: Protective Sensation Mildly Intact;     Assessment:   s/p Debridement w/ 1st Ray Resection; Left Foot; 6/8;   s/p Debridement of Left Foot; 6/10;   Open Surgical Site of Medial Aspect of Left Foot; Bone Exposed;   Sutures Intact on the Distal and Proximal Aspect of Wound Site;     Vascular; DP, PT and AT was Dopplerable on the Left Foot;   Neuro; Protective Sensation Diminished;     Plan:  Chart reviewed and Patient evaluated. Discussed diagnosis and treatment with Pt.   Wound Flushed w/ Normal Saline; Applied Wound Vac w/ White Foam @ 75mmHg @ Continuous;   No Further Sx Debridements Planned; Will Continue to Monitor Surgical Wound While Patient Admitted;   Request VNS; Q48-72 Hour VAC Dressing Changes;   Request Portable Wound Vac; Paperwork Filled and in Chart;   Next Wound VAC Dressing Change; Sunday 6/14;   Discussed Plan w/ Attending;     Podiatry [phone]

## 2020-06-11 NOTE — PROGRESS NOTE ADULT - SUBJECTIVE AND OBJECTIVE BOX
WILL VIEYRA  80y, Male  Allergy: Byetta (Stomach Upset)  linezolid (Rash; Urticaria; Flushing)  melatonin (Other)      LOS  7d    CHIEF COMPLAINT: necrotizing fascitis (11 Jun 2020 11:07)      INTERVAL EVENTS/HPI  - No acute events overnight  - T(F): , Max: 98.1 (06-10-20 @ 20:35)  - WBC Count: 22.16 (06-11-20 @ 05:30)  WBC Count: 15.72 (06-10-20 @ 20:42)  - Creatinine, Serum: 3.4 (06-11-20 @ 05:30)  Creatinine, Serum: 3.5 (06-10-20 @ 11:12)       ROS  General: Denies rigors, nightsweats  HEENT: Denies headache, rhinorrhea, sore throat, eye pain  CV: Denies CP, palpitations  PULM: Denies wheezing, hemoptysis  GI: Denies hematemesis, hematochezia, melena  : Denies discharge, hematuria  MSK: Denies arthralgias, myalgias  SKIN: Denies rash, lesions  NEURO: Denies paresthesias, weakness  PSYCH: Denies depression, anxiety    VITALS:  T(F): 94.8, Max: 98.1 (06-10-20 @ 20:35)  HR: 70  BP: 110/57  RR: 18Vital Signs Last 24 Hrs  T(C): 34.9 (11 Jun 2020 10:06), Max: 36.7 (10 Tin 2020 20:35)  T(F): 94.8 (11 Jun 2020 10:06), Max: 98.1 (10 Tin 2020 20:35)  HR: 70 (11 Jun 2020 10:06) (10 - 71)  BP: 110/57 (11 Jun 2020 10:06) (88/54 - 128/68)  BP(mean): --  RR: 18 (11 Jun 2020 10:06) (15 - 20)  SpO2: 99% (10 Tin 2020 19:45) (98% - 99%)    PHYSICAL EXAM:  Gen: Thin appearing M NAD  HEENT: Normocephalic, atraumatic  Neck: supple, no lymphadenopathy  CV: Regular rate & regular rhythm  Lungs: decreased BS at bases, no fremitus  Abdomen: Soft, BS present  Ext: L foot dressings  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    FH: Non-contributory  Social Hx: Non-contributory    TESTS & MEASUREMENTS:                        8.3    22.16 )-----------( 152      ( 11 Jun 2020 05:30 )             24.1     06-11    123<L>  |  88<L>  |  101<HH>  ----------------------------<  144<H>  5.3<H>   |  19  |  3.4<H>    Ca    7.4<L>      11 Jun 2020 05:30  Phos  6.7     06-11  Mg     2.5     06-11    TPro  5.4<L>  /  Alb  2.5<L>  /  TBili  0.5  /  DBili  x   /  AST  49<H>  /  ALT  19  /  AlkPhos  219<H>  06-11    eGFR if Non African American: 16 mL/min/1.73M2 (06-11-20 @ 05:30)  eGFR if African American: 19 mL/min/1.73M2 (06-11-20 @ 05:30)    LIVER FUNCTIONS - ( 11 Jun 2020 05:30 )  Alb: 2.5 g/dL / Pro: 5.4 g/dL / ALK PHOS: 219 U/L / ALT: 19 U/L / AST: 49 U/L / GGT: x               Culture - Blood (collected 06-10-20 @ 06:18)  Source: .Blood None  Preliminary Report (06-11-20 @ 13:02):    No growth to date.    Culture - Blood (collected 06-09-20 @ 04:15)  Source: .Blood Blood-Venous  Preliminary Report (06-10-20 @ 13:02):    No growth to date.    Culture - Blood (collected 06-09-20 @ 04:15)  Source: .Blood Blood  Preliminary Report (06-10-20 @ 13:02):    No growth to date.    Culture - Tissue with Gram Stain (collected 06-08-20 @ 12:05)  Source: .Tissue None  Gram Stain (06-08-20 @ 23:26):    Rare polymorphonuclear leukocytes per low power field    Rare Gram positive cocci in pairs per oil power field    Rare Gram Negative Rods per oil power field  Preliminary Report (06-10-20 @ 23:12):    Rare Proteus mirabilis ESBL    Rare Streptococcus agalactiae (Group B) Streptococcus agalactiae (Group    B) isolated    Group B streptococci are susceptible to ampicillin,    penicillin and cefazolin, but may be resistant to    erythromycin and clindamycin.    Recommendations for intrapartum prophylaxis for Group B    streptococci are penicillin or ampicillin.    Growth in fluid media only Escherichia coli  Organism: Proteus mirabilis ESBL  Streptococcus agalactiae (Group B)  Escherichia coli (06-10-20 @ 23:08)  Organism: Escherichia coli (06-10-20 @ 23:08)      Method Type: RON  Organism: Proteus mirabilis ESBL (06-10-20 @ 21:21)      -  Amikacin: S <=16      -  Amoxicillin/Clavulanic Acid: S <=8/4      -  Ampicillin: R >16 These ampicillin results predict results for amoxicillin      -  Ampicillin/Sulbactam: R <=4/2 Enterobacter, Citrobacter, and Serratia may develop resistance during prolonged therapy (3-4 days)      -  Aztreonam: R <=4      -  Cefazolin: R >16 Enterobacter, Citrobacter, and Serratia may develop resistance during prolonged therapy (3-4 days)      -  Cefepime: R 4      -  Cefoxitin: S <=8      -  Ceftriaxone: R >32 Enterobacter, Citrobacter, and Serratia may develop resistance during prolonged therapy      -  Ciprofloxacin: R >2      -  Ertapenem: S <=0.5      -  Gentamicin: R >8      -  Levofloxacin: R >4      -  Meropenem: S <=1      -  Piperacillin/Tazobactam: R <=8      -  Tobramycin: R >8      -  Trimethoprim/Sulfamethoxazole: R >2/38      Method Type: RON  Organism: Streptococcus agalactiae (Group B) (06-09-20 @ 18:55)      Method Type: RON          INFECTIOUS DISEASES TESTING  COVID-19 PCR: NotDetec (06-04-20 @ 05:27)      INFLAMMATORY MARKERS  Sedimentation Rate, Erythrocyte: 79 mm/Hr (06-05-20 @ 04:14)  C-Reactive Protein, Serum: 23.70 mg/dL (06-05-20 @ 04:14)      RADIOLOGY & ADDITIONAL TESTS:  I have personally reviewed the last available Chest xray  CXR      CT      CARDIOLOGY TESTING  Transthoracic Echocardiogram:    EXAM:  2-D ECHO (TTE) COMPLETE        PROCEDURE DATE:  06/04/2020      INTERPRETATION:  REPORT:    TRANSTHORACIC ECHOCARDIOGRAM REPORT         Patient Name:   WILL VIEYRA Accession #: [id number]  Medical Rec #:  [id number]       Height:      66.0in 167.6 cm  YOB: 1940        Weight:      132.0 lb 59.87 kg  Patient Age:    80 years        BSA:         1.68 m²  Patient Gender: M               BP:          118/65 mmHg       Date of Exam:        6/4/2020 12:04:28 PM  Referring Physician: [id number] STEFFANIE MAR  Sonographer:         Mami Simpson  Reading Physician:   Satya Martinez M.D.    Procedure:     2D Echo/Doppler/Color Doppler Complete.  Indications:   R07.9 - Chest Pain, unspecified  Diagnosis:     Chest pain, unspecified - R07.9  Study Details: Technically suboptimal study.         Summary:   1. Left ventricular ejection fraction, by visual estimation, is <20%.   2. Spectral Doppler shows impaired relaxation pattern of left ventricular myocardial filling (Grade I diastolic dysfunction).   3. Mitral annular calcification.   4. Moderate tricuspid regurgitation.   5. Estimated pulmonary artery systolic pressure is 37.6 mmHg assuming a right atrial pressure of 10 mmHg, which is consistent with borderline pulmonary hypertension.    PHYSICIAN INTERPRETATION:  Left Ventricle: The left ventricular internal cavity size is mildly increased. Left ventricular wall thickness is normal. Left ventricular ejection fraction, by visual estimation, is <20%. Spectral Doppler shows impaired relaxation pattern of left ventricular myocardial filling (Grade I diastolic dysfunction).  Right Ventricle: Normal right ventricular size and function.  Left Atrium: Mildly enlarged left atrium.  Right Atrium: Mildly enlarged right atrium.  Pericardium: There is no evidence of pericardial effusion.  Mitral Valve: Structurally normal mitral valve, with normal leaflet excursion. There is mitral annular calcification.  Tricuspid Valve: Structurally normal tricuspid valve, with normal leaflet excursion. Moderate tricuspid regurgitation is visualized. Estimated pulmonary artery systolic pressure is 37.6 mmHg assuming a right atrial pressure of 10 mmHg, which is consistent with borderline pulmonary hypertension.  Aortic Valve: The aortic valve was not well visualized. The aortic valve is trileaflet. No evidence of aortic stenosis. No evidence of aortic valve regurgitation is seen.  Aorta: The aortic root is normal in size and structure.  Venous: The inferior vena cava was normal sized, with respiratory size variation less than 50%, consistent with elevated right atrial pressure.  Additional Comments: A pacer wire is visualized in the right ventricle.       2D AND M-MODE MEASUREMENTS (normal ranges within parentheses):  Left Ventricle:                  Normal   Aorta/Left Atrium:             Normal  IVSd (2D):              1.05 cm (0.7-1.1) AoV Cusp Separation: 1.81 cm (1.5-2.6)  LVPWd (2D):             0.96 cm (0.7-1.1) Left Atrium (Mmode): 4.07 cm (1.9-4.0)  LVIDd (2D):             4.86 cm (3.4-5.7)  LVIDs (2D):             4.18 cm  LV FS (2D):             14.0 %   (>25%)  Relative Wall Thickness  0.39    (<0.42)    SPECTRAL DOPPLER ANALYSIS:  LV DIASTOLIC FUNCTION:  MV Peak E: 0.86 m/s Decel Time: 219 msec  MV Peak A: 0.84 m/s  E/A Ratio: 1.03    Aortic Valve:  AoV VMax:    1.15 m/s  AoV Pk Grad: 5.3 mmHg    LVOT Vmax: 0.60 m/s  LVOT VTI:  0.12 m    Mitral Valve:  MV P1/2 Time: 63.54 msec  MV Area, PHT: 3.46 cm²    Tricuspid Valve and PA/RV Systolic Pressure: TR Max Velocity: 2.63 m/s RA Pressure: 10 mmHg RVSP/PASP: 37.6 mmHg       [id number] Satya Martinez M.D., Electronically signed on 6/4/2020 at 1:53:11 PM         *** Final ***                    SATYA MARTINEZ MD  This document has been electronically signed. Jun 4 2020 12:04PM             (06-04-20 @ 12:04)  12 Lead ECG:   Ventricular Rate 73 BPM    Atrial Rate 73 BPM    P-R Interval 218 ms    QRS Duration 148 ms    Q-T Interval 430 ms    QTC Calculation(Bezet) 473 ms    R Axis 249 degrees    T Axis 45 degrees    Diagnosis Line AV dual-paced rhythm with prolonged AV conduction with occasional Premature  ventricular complexes  Biventricular pacemaker detected  Abnormal ECG    Confirmed by Satya Martinez ([id number]) on 6/4/2020 6:55:11 AM (06-04-20 @ 04:33)      MEDICATIONS  aspirin enteric coated 81 daily  atorvastatin 80 at bedtime  calcium acetate 667 three times a day with meals  carvedilol 3.125 every 12 hours  chlorhexidine 4% Liquid 1 <User Schedule>  Dakins Solution - 1/2 Strength 1 daily  DAPTOmycin IVPB 250 every 48 hours  dextrose 50% Injectable 12.5 once  dextrose 50% Injectable 25 once  dextrose 50% Injectable 25 once  digoxin     Tablet 0.125 every other day  folic acid 1 daily  gabapentin 200 daily  heparin   Injectable 5000 every 8 hours  insulin glargine Injectable (LANTUS) 9 every morning  insulin lispro (HumaLOG) corrective regimen sliding scale  three times a day before meals  insulin lispro Injectable (HumaLOG) 3 three times a day before meals  meropenem  IVPB 500 every 12 hours  midodrine. 5 three times a day  pantoprazole    Tablet 40 before breakfast  sodium bicarbonate 650 every 6 hours  sodium zirconium cyclosilicate 10 two times a day      WEIGHT  Weight (kg): 66 (06-10-20 @ 14:23)  Creatinine, Serum: 3.4 mg/dL (06-11-20 @ 05:30)      ANTIBIOTICS:  DAPTOmycin IVPB 250 milliGRAM(s) IV Intermittent every 48 hours  meropenem  IVPB 500 milliGRAM(s) IV Intermittent every 12 hours      All available historical records have been reviewed

## 2020-06-11 NOTE — PHYSICAL THERAPY INITIAL EVALUATION ADULT - PERTINENT HX OF CURRENT PROBLEM, REHAB EVAL
80 M with PMHx of anemia, ascites 2/2 CHF, CHF s/p biventricular AICD, CKD, CAD s/p CABGx5 on ASA, CKD, DM s/p R big toe amputation, HLD, HTN, hypothyroidism, PAD s/p R femoral stent who presents with altered mental status x 1 day and worsening left foot DFU

## 2020-06-11 NOTE — DIETITIAN INITIAL EVALUATION ADULT. - OTHER INFO
P/w: necrotizing fascitis. Polymicrobial bacteremia secondary to necrotizing L foot infection. - S/p LLE debridement on 6/10/2020. Further podiatry recs pending. Normocytic anemia: s/p 2 units. Type II NSTEMI: no changes on EKG. DM: monitor fingersticks. P/w: necrotizing fascitis. Polymicrobial bacteremia secondary to necrotizing L foot infection. - S/p LLE debridement on 6/10/2020. Further podiatry recs pending. Normocytic anemia: s/p 2 units. Type II NSTEMI: no changes on EKG. DM: monitor fingersticks. MICHAEL on CKDIV- Nephro following- no indication for RRT as of now.

## 2020-06-11 NOTE — DIETITIAN INITIAL EVALUATION ADULT. - RD TO REMAIN AVAILABLE
yes/RD to monitor diet roder, energy intake, body composition, NFPF, electrolyte/renal profile, glucose profile

## 2020-06-11 NOTE — PROGRESS NOTE ADULT - ASSESSMENT
ASSESSMENT  80 M with PMHx of anemia, ascites 2/2 CHF, CHF s/p biventricular AICD, CKD, CAD s/p CABGx5 on ASA, CKD, DM s/p R great toe amputation, HLD, HTN, hypothyroidism, PAD s/p R femoral stent who presents with altered mental status x 1 day and worsening left foot DFU. Was on augmentin as outpatient     IMPRESSION  #Polymicrobial bacteremia secondary to Necrotizing L foot infection     s/p delayed closure 6/10    s/p Amputation of first metatarsal 08-Jun-2020 6/8 OR cx   Rare Proteus mirabilis  Rare Streptococcus agalactiae (Group B)    6/4 BCX with GBS and Proteus ESBL    s/p bedside I&D  WCX with MRSA (R Clinda), ESBL proteus, Ecoli  < from: Xray Foot AP + Lateral + Oblique, Left (06.04.20 @ 04:59) >Subcutaneous emphysema at the first digit MTP joint. Findings concerning for necrotizing soft tissue infection.  #Elevated trop/BNP  #Lactic acidosis Blood Gas Venous - Lactate: 2.5 mmoL/L (06-04-20 @ 04:51)  #Hyponatremia   #CXR Bilateral lung opacities, right greater than left. No pneumothorax.  #DM   #Abx allergy: linezolid (Rash; Urticaria; Flushing)    RECOMMENDATIONS  - Dapto 6mg/kg q48h IV for MRSA (R Clinda, allergy to linezolid, avoid vanc given CrCl)  - Check CPK 6/16, monitor while on Dapto  Creatine Kinase, Serum: 111 U/L (06.10.20 @ 06:18)  - Meropenem 500mg q12h IV   - Appreciate Podiatry/Vascular consult      Spectra [phone]

## 2020-06-11 NOTE — DIETITIAN INITIAL EVALUATION ADULT. - CONTINUE CURRENT NUTRITION CARE PLAN
yes/Continue DASH/TLC, consistent carb NS , lacto ovo vegatarian diet, add no conc K, no conc Phos modifiers

## 2020-06-11 NOTE — OCCUPATIONAL THERAPY INITIAL EVALUATION ADULT - PERTINENT HX OF CURRENT PROBLEM, REHAB EVAL
80 M with PMHx of anemia, ascites 2/2 CHF, CHF s/p biventricular AICD, CKD, CAD s/p CABGx5 on ASA, CKD, DM s/p R big toe amputation, HLD, HTN, hypothyroidism, PAD s/p R femoral stent who presents with altered mental status x 1 day and worsening left foot DFU.

## 2020-06-11 NOTE — CONSULT NOTE ADULT - SUBJECTIVE AND OBJECTIVE BOX
HPI:  80 M with PMHx of anemia, ascites 2/2 CHF, CHF s/p biventricular AICD, CKD, CAD s/p CABGx5 on ASA, CKD, DM s/p R big toe amputation, HLD, HTN, hypothyroidism, PAD s/p R femoral stent who presents with altered mental status x 1 day and worsening left foot DFU. The pt's daughter states that he saw his L hallux which looked worse, and necrotic from the prior week. He was also forgetful. She checked his temp at home, it was 100.2F, so she gave him tylenol. She states the pt had been taking augmentin for his DFU for some time. She thinks he forgot to take his meds for several days due to the infection.   She also states, the pt was recently dx with HFrEF, biventricular aicd placed february 2020. She has noticed an 8 lb weight gain in the past 4 days.   No fever. No vomiting. No chills. No back pain. No abd pain. No neck stiffness. No abd pain.     In the ED: VS were stable. The pt states he took tylenol prior to coming in to the ED.  xray of the foot showed Subcutaneous gas, podiatry was c/s. Pt was given clindamycin, zosyn, vanco. Labs notable for WBC 19 with L shift, plt 126, BUN/Cr 94/3.2, trop 0.17, BNP >70k, lactate 2.5. (04 Jun 2020 05:39)      PAST MEDICAL & SURGICAL HISTORY:  Chronic kidney disease  Anemia  Ascites  PAD (peripheral artery disease)  Hyperlipidemia  Hypothyroidism  Hypertension  Coronary artery disease  CHF (congestive heart failure)  Diabetes mellitus  Biventricular ICD (implantable cardioverter-defibrillator) in place  Amputation of toe of right foot  S/P arterial stent  S/P CABG x 5      Hospital Course:   Now alert and oriented. S/p surgical debridement of left foot necrotizing fasciitis 6/10. Cleared for out of bed, LLE NWB.    TODAY'S SUBJECTIVE & REVIEW OF SYMPTOMS:   out of bed to chair. Comfortable, denies pain     Constitutional WNL   Cardio WNL   Resp WNL   GI WNL  Heme WNL  Endo WNL  Skin WNL  MSK see HPI  Neuro WNL  Cognitive WNL  Psych WNL      MEDICATIONS  (STANDING):  aspirin enteric coated 81 milliGRAM(s) Oral daily  atorvastatin 80 milliGRAM(s) Oral at bedtime  calcium acetate 667 milliGRAM(s) Oral three times a day with meals  carvedilol 3.125 milliGRAM(s) Oral every 12 hours  chlorhexidine 4% Liquid 1 Application(s) Topical <User Schedule>  Dakins Solution - 1/2 Strength 1 Application(s) Topical daily  DAPTOmycin IVPB 250 milliGRAM(s) IV Intermittent every 48 hours  dextrose 50% Injectable 12.5 Gram(s) IV Push once  dextrose 50% Injectable 25 Gram(s) IV Push once  dextrose 50% Injectable 25 Gram(s) IV Push once  digoxin     Tablet 0.125 milliGRAM(s) Oral every other day  folic acid 1 milliGRAM(s) Oral daily  gabapentin 200 milliGRAM(s) Oral daily  heparin   Injectable 5000 Unit(s) SubCutaneous every 8 hours  insulin glargine Injectable (LANTUS) 9 Unit(s) SubCutaneous every morning  insulin lispro (HumaLOG) corrective regimen sliding scale   SubCutaneous three times a day before meals  insulin lispro Injectable (HumaLOG) 3 Unit(s) SubCutaneous three times a day before meals  meropenem  IVPB 500 milliGRAM(s) IV Intermittent every 12 hours  midodrine. 5 milliGRAM(s) Oral three times a day  pantoprazole    Tablet 40 milliGRAM(s) Oral before breakfast  sodium bicarbonate 650 milliGRAM(s) Oral every 6 hours  sodium zirconium cyclosilicate 10 Gram(s) Oral two times a day    MEDICATIONS  (PRN):  acetaminophen   Tablet .. 650 milliGRAM(s) Oral every 6 hours PRN Mild Pain (1 - 3)  aluminum hydroxide/magnesium hydroxide/simethicone Suspension 30 milliLiter(s) Oral every 6 hours PRN Dyspepsia  dextrose 40% Gel 15 Gram(s) Oral once PRN Blood Glucose LESS THAN 70 milliGRAM(s)/deciliter  glucagon  Injectable 1 milliGRAM(s) IntraMuscular once PRN Glucose LESS THAN 70 milligrams/deciliter      FAMILY HISTORY:      Allergies    Byetta (Stomach Upset)  linezolid (Rash; Urticaria; Flushing)  melatonin (Other)    Intolerances        SOCIAL HISTORY:    [  ] Etoh  [  ] Smoking  [  ] Substance abuse     Home Environment:  [  ] Home Alone  [x  ] Lives with Family  [  ] Home Health Aid    Dwelling:  [ x ] Apartment  [  ] Private House  [  ] Adult Home  [  ] Skilled Nursing Facility      [  ] Short Term  [  ] Long Term  [  ] Stairs       Elevator [  ]    FUNCTIONAL STATUS PTA: (Check all that apply)  Ambulation: [  x ]Independent    [  ] Dependent     [  ] Non-Ambulatory  Assistive Device: [x  ] SA Cane  [  ]  Q Cane  [  ] Walker  [  ]  Wheelchair  ADL : [ x ] Independent  [  ]  Dependent       Vital Signs Last 24 Hrs  T(C): 34.9 (11 Jun 2020 10:06), Max: 36.7 (10 Tin 2020 20:35)  T(F): 94.8 (11 Jun 2020 10:06), Max: 98.1 (10 Tin 2020 20:35)  HR: 70 (11 Jun 2020 14:07) (10 - 71)  BP: 108/60 (11 Jun 2020 14:07) (88/54 - 128/68)  BP(mean): --  RR: 18 (11 Jun 2020 14:07) (15 - 20)  SpO2: 99% (10 Tin 2020 19:45) (98% - 99%)      PHYSICAL EXAM: Alert & Oriented X3  GENERAL: NAD, well-groomed, well-developed  HEAD:  Atraumatic, Normocephalic  EYES: EOMI, PER  NECK: Supple, No JVD  CHEST/LUNG: Clear   HEART: Regular rate and rhythm  ABDOMEN: Firm, Nontender, distended; Bowel sounds present  EXTREMITIES:  left foot bandaged with hemovac, right foot s/p 1st toe amputation. intact. no edema    NERVOUS SYSTEM:  Cranial Nerves 2-12 intact [x  ] Abnormal  [  ]  ROM: WFL all extremities [x  ]  Abnormal [  ]  Motor Strength: WFL all extremities  [ x ]  Abnormal [  ]  Sensation: intact to light touch [  ] Abnormal [  ]  Reflexes: Symmetric [  ]  Abnormal [  ]    FUNCTIONAL STATUS:  Bed Mobility: Independent [  ]  Supervision [  ]  Needs Assistance [  ]  N/A [  ]  Transfers: Independent [  ]  Supervision [x  ]  Needs Assistance [  ]  N/A [  ]   Ambulation: Independent [  ]  Supervision [  ]  Needs Assistance [  ]  N/A [x  ]  ADL: Independent [  ] Requires Assistance [  ] N/A [  ]      LABS:                        8.3    22.16 )-----------( 152      ( 11 Jun 2020 05:30 )             24.1     06-11    123<L>  |  88<L>  |  101<HH>  ----------------------------<  144<H>  5.3<H>   |  19  |  3.4<H>    Ca    7.4<L>      11 Jun 2020 05:30  Phos  6.7     06-11  Mg     2.5     06-11    TPro  5.4<L>  /  Alb  2.5<L>  /  TBili  0.5  /  DBili  x   /  AST  49<H>  /  ALT  19  /  AlkPhos  219<H>  06-11    PT/INR - ( 10 Tin 2020 11:12 )   PT: 15.10 sec;   INR: 1.31 ratio         PTT - ( 10 Tin 2020 11:12 )  PTT:52.9 sec      RADIOLOGY & ADDITIONAL STUDIES:    Assesment:

## 2020-06-11 NOTE — PROGRESS NOTE ADULT - SUBJECTIVE AND OBJECTIVE BOX
Hospital Day:  7d    Subjective:    Patient is a 80y old  Male who presents with a chief complaint of necrotizing fascitis (11 Jun 2020 08:26)  This morning patient is lying in bed comfortably. He reports no new complaints, including SOB, chest pain, abdominal pain, nausea, vomiting, dysuria, constipation, or diarrhea.      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    Allergies:  Byetta (Stomach Upset)  linezolid (Rash; Urticaria; Flushing)  melatonin (Other)    Current Meds:   Standng Meds:  aspirin enteric coated 81 milliGRAM(s) Oral daily  atorvastatin 80 milliGRAM(s) Oral at bedtime  calcium acetate 667 milliGRAM(s) Oral three times a day with meals  carvedilol 3.125 milliGRAM(s) Oral every 12 hours  chlorhexidine 4% Liquid 1 Application(s) Topical <User Schedule>  Dakins Solution - 1/2 Strength 1 Application(s) Topical daily  DAPTOmycin IVPB 250 milliGRAM(s) IV Intermittent every 48 hours  dextrose 50% Injectable 12.5 Gram(s) IV Push once  dextrose 50% Injectable 25 Gram(s) IV Push once  dextrose 50% Injectable 25 Gram(s) IV Push once  digoxin     Tablet 0.125 milliGRAM(s) Oral every other day  folic acid 1 milliGRAM(s) Oral daily  gabapentin 200 milliGRAM(s) Oral daily  heparin   Injectable 5000 Unit(s) SubCutaneous every 8 hours  insulin glargine Injectable (LANTUS) 9 Unit(s) SubCutaneous every morning  insulin lispro (HumaLOG) corrective regimen sliding scale   SubCutaneous three times a day before meals  insulin lispro Injectable (HumaLOG) 3 Unit(s) SubCutaneous three times a day before meals  meropenem  IVPB 500 milliGRAM(s) IV Intermittent every 12 hours  midodrine. 5 milliGRAM(s) Oral three times a day  pantoprazole    Tablet 40 milliGRAM(s) Oral before breakfast  sodium bicarbonate 650 milliGRAM(s) Oral every 6 hours  sodium zirconium cyclosilicate 10 Gram(s) Oral two times a day    PRN Meds:  acetaminophen   Tablet .. 650 milliGRAM(s) Oral every 6 hours PRN Mild Pain (1 - 3)  aluminum hydroxide/magnesium hydroxide/simethicone Suspension 30 milliLiter(s) Oral every 6 hours PRN Dyspepsia  dextrose 40% Gel 15 Gram(s) Oral once PRN Blood Glucose LESS THAN 70 milliGRAM(s)/deciliter  glucagon  Injectable 1 milliGRAM(s) IntraMuscular once PRN Glucose LESS THAN 70 milligrams/deciliter    HOME MEDICATIONS:  aspirin 81 mg oral tablet: 1 tab(s) orally once a day  atorvastatin 80 mg oral tablet: 1 tab(s) orally once a day  Digitek 125 mcg (0.125 mg) oral tablet: orally every other day (at bedtime)  famotidine 10 mg oral tablet: 1 tab(s) orally once (at bedtime)  ferrous sulfate 325 mg (65 mg elemental iron) oral tablet: 1 tab(s) orally every other day (at bedtime)  folic acid 1 mg oral tablet: 1 tab(s) orally once a day  gabapentin 100 mg oral tablet: 2 tab(s) orally once a day (at bedtime)  Januvia 50 mg oral tablet: 1 tab(s) orally once a day  losartan 25 mg oral tablet: 0.5 tab(s) orally once a day  spironolactone 50 mg oral tablet: 1 tab(s) orally 2 times a day  torsemide 100 mg oral tablet: 1 tab(s) orally once a day      Vital Signs:   T(F): 94.8 (06-11-20 @ 10:06), Max: 98.1 (06-10-20 @ 20:35)  HR: 70 (06-11-20 @ 10:06) (10 - 71)  BP: 110/57 (06-11-20 @ 10:06) (88/54 - 128/68)  RR: 18 (06-11-20 @ 10:06) (15 - 20)  SpO2: 99% (06-10-20 @ 19:45) (98% - 99%)      06-10-20 @ 07:01  -  06-11-20 @ 07:00  --------------------------------------------------------  IN: 520 mL / OUT: 420 mL / NET: 100 mL        Physical Exam:   GENERAL: NAD  HEENT: NCAT  CHEST/LUNG: CTAB  HEART: Regular rate and rhythm; s1 s2 appreciated, No murmurs, rubs, or gallops  ABDOMEN: Soft, Nontender, Nondistended; Bowel sounds present  EXTREMITIES: No LE edema b/l  SKIN: no rashes, no new lesions  NERVOUS SYSTEM:  Alert & Oriented X3        Labs:                         8.3    22.16 )-----------( 152      ( 11 Jun 2020 05:30 )             24.1     Neutophil% 90.2, Lymphocyte% 2.7, Monocyte% 4.9, Bands% 1.0 06-11-20 @ 05:30    11 Jun 2020 05:30    123    |  88     |  101    ----------------------------<  144    5.3     |  19     |  3.4      Ca    7.4        11 Jun 2020 05:30  Phos  6.7       11 Jun 2020 05:30  Mg     2.5       11 Jun 2020 05:30    TPro  5.4    /  Alb  2.5    /  TBili  0.5    /  DBili  x      /  AST  49     /  ALT  19     /  AlkPhos  219    11 Jun 2020 05:30       pTT    52.9             ----< 1.31 INR  (06-10-20 @ 11:12)    15.10        PT        Serum Pro-Brain Natriuretic Peptide: >70590 pg/mL (06-04-20 @ 04:12)    Troponin --, CKMB --,  06-10-20 @ 06:18  Troponin --, CKMB --,  06-09-20 @ 04:15  Troponin --, CKMB --,  06-08-20 @ 12:13              Culture - Blood (collected 06-09-20 @ 04:15)  Source: .Blood Blood-Venous  Preliminary Report (06-10-20 @ 13:02):    No growth to date.    Culture - Blood (collected 06-09-20 @ 04:15)  Source: .Blood Blood  Preliminary Report (06-10-20 @ 13:02):    No growth to date.    Culture - Blood (collected 06-08-20 @ 04:54)  Source: .Blood None  Preliminary Report (06-09-20 @ 12:01):    No growth to date.    Culture - Blood (collected 06-07-20 @ 05:20)  Source: .Blood None  Preliminary Report (06-08-20 @ 13:02):    No growth to date.    Culture - Blood (collected 06-07-20 @ 05:20)  Source: .Blood None  Preliminary Report (06-08-20 @ 13:02):    No growth to date.    Culture - Blood (collected 06-06-20 @ 05:22)  Source: .Blood None  Final Report (06-11-20 @ 10:01):    No Growth Final        Assessment and Plan:     80 M with PMHx of anemia, ascites 2/2 CHF, CHF s/p biventricular AICD, CKD, CAD s/p CABGx5 on ASA, CKD, DM s/p R great toe amputation, HLD, HTN, hypothyroidism, PAD s/p R femoral stent who presents with altered mental status x 1 day and worsening left foot DFU. Was on augmentin as outpatient     #Polymicrobial bacteremia secondary to necrotizing L foot infection   - Xray of left foot demonstrates SQ emphysema of first digit MTP joint; concerning for soft tissue infection   - bedside I&D 6/4/2020. deep dissection of left foot by podiatry 6/5/2020 and 6/8/2020  - S/p LLE debridement on 6/10/2020. Further podiatry recs pending  - OR Sx positive 6/4 for numerous E. Coli and Proteus and MRSA   - BCx 6/4/2020 positive for Proteus ESBL and GBS. BCx 6/6, 6/9 negative.   - ID: c/w Daptomycin 6mg/kg [id number]. Baseline CPK level 119. 6/10 CPK level pending  - Podiatry: Wound care orders: Flush w/ Dakins and apply xeroform packing/DSD/ABD/Kerlix/Light ace; Pending debridement of LLE.  - Arterial Duplex with normal flow, EF = <20%     # Normocytic anemia  - S/p 2 units on 6/10  - Unsure where patient is losing blood. No signs of GI bleed or hematomas. Perhaps he is bleeding from his surgical site, but is unlikely he is bleeding enough from there to be anemic  - Will monitor    # MICHAEL on CKD. Baseline cr 1.8-2.2. With improving hyperkalemia  - Likely prerenal due to CHF, perhaps ATN as well  - Lokelema 10mg Po q12 as per nephro.   - 6/8 Dig level 1.0.    - Phoslo TID  - Aldactone and losartan held  - U/S shows normal kidneys  - Will monitor    #Hyponatremia  - Currently 123  - Mike 20, UOsm 294 Ordered. Suggestive of CHF. Will obtain another urine Na  - Serum Osmolality 308. Lipid profile negative for HLD induced hyponatremia.   - Per nephro hold lasix  - Will monitor    # Type II NSTEMI   - trop 0.17  - EKG without ischemic changes    # HFrEF w/ BiVentricular AICD, h/o CHB  - midodrine for low BP  - TTE from [facility] from 2/2020 EF 20%, severe TR, akinetic apex, inf spetum, mid and apical anterior septum. Mild-mod MR, + ascites from CHF. Now EF <20%   - strict Is and Os   - BNP >70k on admission  - PO Coreg 3.125 BID and PO Lasix 40mg daily   - Aldactone held as patient is hyperkalemic.     # DM   - Monitor Fs.   - A1C: 10.6    # CAD s/p CABGx5  - on ASA, statin    Diet: DASH; Carb Consistent   DVT ppx; Heparin SubQ  GI ppx: PTX  Dispo: Pending further podiatry recs, recovery from MICHAEL

## 2020-06-11 NOTE — CHART NOTE - NSCHARTNOTEFT_GEN_A_CORE
Strikethrough the Dressing   D/C Wound Vac   Applied Surgicell/AIRAM/Kerlix  Will Continue to monitor surgical site

## 2020-06-11 NOTE — PROGRESS NOTE ADULT - ASSESSMENT
Sepsis / bacteremia / necrotising fascitis / MICHAEL/ stage 4 ckd:  #  ATN most likely, creat stable ~3.5 mg%,  # UO noted   # sono noted, bladder non distented   #  continue  lokelma 10 q 12  # hyponatremia , no lasix / stable   #BP noted, continue midodrine   # dig level 1.0  #Hyperkalemia - r/o GIB, Hb 6., receiving a Unit of blood, had Lokelma and Ins and Glucossa, low K diet  # phos noted, on  phoslo 1/1/1  # acidosis , continue  po sodium bicarb 650 mg q 6 hr.  # podiatry notes appreciated , s/p OR   #on roxanna/ dapto followed by ID   # no acute indication for RRT   # will follow

## 2020-06-11 NOTE — PHYSICAL THERAPY INITIAL EVALUATION ADULT - CRITERIA FOR SKILLED THERAPEUTIC INTERVENTIONS
impairments found/functional limitations in following categories/anticipated equipment needs at discharge/rehab potential/anticipated discharge recommendation/therapy frequency

## 2020-06-11 NOTE — DIETITIAN INITIAL EVALUATION ADULT. - PERTINENT LABORATORY DATA
(6/11) WBC 22.16, RBC 2.74, HG 8.3, Hct 24.1, Mg 2.5, K 5.3, , creat 3.4, glu 104, alk phosphatase 219, AST 49, eGFR 16, Phos 6.7 (6/11) WBC 22.16, RBC 2.74, HG 8.3, Hct 24.1, Mg 2.5, K 5.3, , creat 3.4, glu 104, alk phosphatase 219, AST 49, eGFR 16, Phos 6.7  (6/6) Hgb A1C 10.3

## 2020-06-11 NOTE — CONSULT NOTE ADULT - ASSESSMENT
IMPRESSION: Rehab of left foot DFU, necrotizing fasciitis, s/p debridement. Now NWB with drain- reportedly open    PRECAUTIONS: [x  ] Cardiac  [  ] Respiratory  [  ] Seizures [  ] Contact Isolation  [  ] Droplet Isolation  [  ] Other    Weight Bearing Status: NWB LLE    RECOMMENDATION:    Out of Bed to Chair     DVT/Decubiti Prophylaxis    REHAB PLAN:     [  x ] Bedside P/T 3-5 times a week   [ x  ]   Bedside O/T  2-3 times a week             [   ] No Rehab Therapy Indicated                   [   ]  Speech Therapy   Conditioning/ROM                                    ADL  Bed Mobility                                               Conditioning/ROM  Transfers                                                     Bed Mobility  Sitting /Standing Balance                         Transfers                                        Gait Training                                               Sitting/Standing Balance  Stair Training [   ]Applicable                    Home equipment Eval                                                                        Splinting  [   ] Only      GOALS:   ADL   [x   ]   Independent                    Transfers  [   ] Independent                          Ambulation  [   ] Independent     [    ] With device                            [   ]  CG                                                         [ x  ]  CG                                                                  [   ] CG                            [    ] Min A                                                   [   ] Min A                                                              [ x  ] Min  A          DISCHARGE PLAN:     --     Patient will likely be nonambulatory unless wt bearing status is advanced. He will require significant assistance and along with wound care needs, will likely require STR.                                                                                   [  x  ]  Short Term Rehab in Skilled Nursing Facility                                       [    ]  Home with Outpatient or VN services                                         [    ]  Possible Candidate for Intensive Hospital based Rehab

## 2020-06-11 NOTE — PROGRESS NOTE ADULT - SUBJECTIVE AND OBJECTIVE BOX
24 h events noted   lying comfortable  no distress         PAST HISTORY  --------------------------------------------------------------------------------  No significant changes to PMH, PSH, FHx, SHx, unless otherwise noted    ALLERGIES & MEDICATIONS  --------------------------------------------------------------------------------  Allergies    Byetta (Stomach Upset)  linezolid (Rash; Urticaria; Flushing)  melatonin (Other)    Intolerances      Standing Inpatient Medications  aspirin enteric coated 81 milliGRAM(s) Oral daily  atorvastatin 80 milliGRAM(s) Oral at bedtime  calcium acetate 667 milliGRAM(s) Oral three times a day with meals  carvedilol 3.125 milliGRAM(s) Oral every 12 hours  chlorhexidine 4% Liquid 1 Application(s) Topical <User Schedule>  Dakins Solution - 1/2 Strength 1 Application(s) Topical daily  DAPTOmycin IVPB 250 milliGRAM(s) IV Intermittent every 48 hours  dextrose 50% Injectable 12.5 Gram(s) IV Push once  dextrose 50% Injectable 25 Gram(s) IV Push once  dextrose 50% Injectable 25 Gram(s) IV Push once  digoxin     Tablet 0.125 milliGRAM(s) Oral every other day  folic acid 1 milliGRAM(s) Oral daily  gabapentin 200 milliGRAM(s) Oral daily  heparin   Injectable 5000 Unit(s) SubCutaneous every 8 hours  insulin glargine Injectable (LANTUS) 9 Unit(s) SubCutaneous every morning  insulin lispro (HumaLOG) corrective regimen sliding scale   SubCutaneous three times a day before meals  insulin lispro Injectable (HumaLOG) 3 Unit(s) SubCutaneous three times a day before meals  meropenem  IVPB 500 milliGRAM(s) IV Intermittent every 12 hours  midodrine. 5 milliGRAM(s) Oral three times a day  pantoprazole    Tablet 40 milliGRAM(s) Oral before breakfast  sodium bicarbonate 650 milliGRAM(s) Oral every 6 hours  sodium zirconium cyclosilicate 10 Gram(s) Oral two times a day    PRN Inpatient Medications  acetaminophen   Tablet .. 650 milliGRAM(s) Oral every 6 hours PRN  aluminum hydroxide/magnesium hydroxide/simethicone Suspension 30 milliLiter(s) Oral every 6 hours PRN  dextrose 40% Gel 15 Gram(s) Oral once PRN  glucagon  Injectable 1 milliGRAM(s) IntraMuscular once PRN      VITALS/PHYSICAL EXAM  --------------------------------------------------------------------------------  T(C): 36 (06-11-20 @ 04:00), Max: 36.7 (06-10-20 @ 20:35)  HR: 10 (06-11-20 @ 06:16) (10 - 71)  BP: 123/64 (06-11-20 @ 06:16) (88/54 - 128/68)  RR: 20 (06-11-20 @ 04:00) (15 - 20)  SpO2: 99% (06-10-20 @ 19:45) (98% - 99%)  Wt(kg): --  Height (cm): 167.64 (06-10-20 @ 14:23)  Weight (kg): 66 (06-10-20 @ 14:23)  BMI (kg/m2): 23.5 (06-10-20 @ 14:23)  BSA (m2): 1.75 (06-10-20 @ 14:23)      06-10-20 @ 07:01  -  06-11-20 @ 07:00  --------------------------------------------------------  IN: 520 mL / OUT: 420 mL / NET: 100 mL      Physical Exam:  	Gen: NAD  	Pulm: CTA B/L  	CV:  S1S2; no rub  	Abd: +distended  	LE: no edema      LABS/STUDIES  --------------------------------------------------------------------------------              8.3    22.16 >-----------<  152      [06-11-20 @ 05:30]              24.1     123  |  88  |  101  ----------------------------<  144      [06-11-20 @ 05:30]  5.3   |  19  |  3.4        Ca     7.4     [06-11-20 @ 05:30]      Mg     2.5     [06-11-20 @ 05:30]      Phos  6.7     [06-11-20 @ 05:30]    TPro  5.4  /  Alb  2.5  /  TBili  0.5  /  DBili  x   /  AST  49  /  ALT  19  /  AlkPhos  219  [06-11-20 @ 05:30]    PT/INR: PT 15.10, INR 1.31       [06-10-20 @ 11:12]  PTT: 52.9       [06-10-20 @ 11:12]          [06-10-20 @ 06:18]  Serum Osmolality 308      [06-09-20 @ 09:06]    Creatinine Trend:  SCr 3.4 [06-11 @ 05:30]  SCr 3.5 [06-10 @ 11:12]  SCr 3.6 [06-10 @ 06:18]  SCr 3.3 [06-09 @ 21:40]  SCr 3.5 [06-09 @ 04:15]      Urine Sodium 20.0      [06-09-20 @ 21:40]  Urine Osmolality 294      [06-09-20 @ 21:40]    Lipid: chol 51, TG 96, HDL 17, LDL 14      [06-10-20 @ 06:18] 24 h events noted   lying comfortable  no distress         PAST HISTORY  --------------------------------------------------------------------------------  No significant changes to PMH, PSH, FHx, SHx, unless otherwise noted    ALLERGIES & MEDICATIONS  --------------------------------------------------------------------------------  Allergies    Byetta (Stomach Upset)  linezolid (Rash; Urticaria; Flushing)  melatonin (Other)    Intolerances  < from: US Kidney and Bladder (06.10.20 @ 22:59) >  Normal-appearing kidneys.    Bilateral pleural effusions with ascites.    < end of copied text >      Standing Inpatient Medications  aspirin enteric coated 81 milliGRAM(s) Oral daily  atorvastatin 80 milliGRAM(s) Oral at bedtime  calcium acetate 667 milliGRAM(s) Oral three times a day with meals  carvedilol 3.125 milliGRAM(s) Oral every 12 hours  chlorhexidine 4% Liquid 1 Application(s) Topical <User Schedule>  Dakins Solution - 1/2 Strength 1 Application(s) Topical daily  DAPTOmycin IVPB 250 milliGRAM(s) IV Intermittent every 48 hours  dextrose 50% Injectable 12.5 Gram(s) IV Push once  dextrose 50% Injectable 25 Gram(s) IV Push once  dextrose 50% Injectable 25 Gram(s) IV Push once  digoxin     Tablet 0.125 milliGRAM(s) Oral every other day  folic acid 1 milliGRAM(s) Oral daily  gabapentin 200 milliGRAM(s) Oral daily  heparin   Injectable 5000 Unit(s) SubCutaneous every 8 hours  insulin glargine Injectable (LANTUS) 9 Unit(s) SubCutaneous every morning  insulin lispro (HumaLOG) corrective regimen sliding scale   SubCutaneous three times a day before meals  insulin lispro Injectable (HumaLOG) 3 Unit(s) SubCutaneous three times a day before meals  meropenem  IVPB 500 milliGRAM(s) IV Intermittent every 12 hours  midodrine. 5 milliGRAM(s) Oral three times a day  pantoprazole    Tablet 40 milliGRAM(s) Oral before breakfast  sodium bicarbonate 650 milliGRAM(s) Oral every 6 hours  sodium zirconium cyclosilicate 10 Gram(s) Oral two times a day    PRN Inpatient Medications  acetaminophen   Tablet .. 650 milliGRAM(s) Oral every 6 hours PRN  aluminum hydroxide/magnesium hydroxide/simethicone Suspension 30 milliLiter(s) Oral every 6 hours PRN  dextrose 40% Gel 15 Gram(s) Oral once PRN  glucagon  Injectable 1 milliGRAM(s) IntraMuscular once PRN      VITALS/PHYSICAL EXAM  --------------------------------------------------------------------------------  T(C): 36 (06-11-20 @ 04:00), Max: 36.7 (06-10-20 @ 20:35)  HR: 10 (06-11-20 @ 06:16) (10 - 71)  BP: 123/64 (06-11-20 @ 06:16) (88/54 - 128/68)  RR: 20 (06-11-20 @ 04:00) (15 - 20)  SpO2: 99% (06-10-20 @ 19:45) (98% - 99%)  Wt(kg): --  Height (cm): 167.64 (06-10-20 @ 14:23)  Weight (kg): 66 (06-10-20 @ 14:23)  BMI (kg/m2): 23.5 (06-10-20 @ 14:23)  BSA (m2): 1.75 (06-10-20 @ 14:23)      06-10-20 @ 07:01  -  06-11-20 @ 07:00  --------------------------------------------------------  IN: 520 mL / OUT: 420 mL / NET: 100 mL      Physical Exam:  	Gen: NAD  	Pulm: CTA B/L  	CV:  S1S2; no rub  	Abd: +distended  	LE: no edema      LABS/STUDIES  --------------------------------------------------------------------------------              8.3    22.16 >-----------<  152      [06-11-20 @ 05:30]              24.1     123  |  88  |  101  ----------------------------<  144      [06-11-20 @ 05:30]  5.3   |  19  |  3.4        Ca     7.4     [06-11-20 @ 05:30]      Mg     2.5     [06-11-20 @ 05:30]      Phos  6.7     [06-11-20 @ 05:30]    TPro  5.4  /  Alb  2.5  /  TBili  0.5  /  DBili  x   /  AST  49  /  ALT  19  /  AlkPhos  219  [06-11-20 @ 05:30]    PT/INR: PT 15.10, INR 1.31       [06-10-20 @ 11:12]  PTT: 52.9       [06-10-20 @ 11:12]          [06-10-20 @ 06:18]  Serum Osmolality 308      [06-09-20 @ 09:06]    Creatinine Trend:  SCr 3.4 [06-11 @ 05:30]  SCr 3.5 [06-10 @ 11:12]  SCr 3.6 [06-10 @ 06:18]  SCr 3.3 [06-09 @ 21:40]  SCr 3.5 [06-09 @ 04:15]      Urine Sodium 20.0      [06-09-20 @ 21:40]  Urine Osmolality 294      [06-09-20 @ 21:40]    Lipid: chol 51, TG 96, HDL 17, LDL 14      [06-10-20 @ 06:18]

## 2020-06-11 NOTE — DIETITIAN INITIAL EVALUATION ADULT. - DIET TYPE
Pt. reports good appetite PTP, regular meals and snacks. Vegetarian diet (eats milk products and eggs). NKFA. Stable weight trends ~145lbs. No supplement use.

## 2020-06-12 NOTE — PROGRESS NOTE ADULT - ASSESSMENT
ASSESSMENT  80 M with PMHx of anemia, ascites 2/2 CHF, CHF s/p biventricular AICD, CKD, CAD s/p CABGx5 on ASA, CKD, DM s/p R great toe amputation, HLD, HTN, hypothyroidism, PAD s/p R femoral stent who presents with altered mental status x 1 day and worsening left foot DFU. Was on augmentin as outpatient     IMPRESSION  #Polymicrobial bacteremia secondary to Necrotizing L foot infection     s/p delayed closure 6/10    s/p Amputation of first metatarsal 08-Jun-2020 6/8 OR cx   Rare Proteus mirabilis  Rare Streptococcus agalactiae (Group B)    6/4 BCX with GBS and Proteus ESBL    s/p bedside I&D  WCX with MRSA (R Clinda), ESBL proteus, Ecoli  < from: Xray Foot AP + Lateral + Oblique, Left (06.04.20 @ 04:59) >Subcutaneous emphysema at the first digit MTP joint. Findings concerning for necrotizing soft tissue infection.  #Elevated trop/BNP  #Lactic acidosis Blood Gas Venous - Lactate: 2.5 mmoL/L (06-04-20 @ 04:51)  #Hyponatremia   #CXR Bilateral lung opacities, right greater than left. No pneumothorax.  #DM   #Abx allergy: linezolid (Rash; Urticaria; Flushing)    RECOMMENDATIONS  - Dapto 6mg/kg q48h IV for MRSA (R Clinda, allergy to linezolid, avoid vanc given CrCl)  - Check CPK 6/16, monitor while on Dapto  Creatine Kinase, Serum: 111 U/L (06.10.20 @ 06:18)  - Meropenem 500mg q12h IV   - PICC x 6 weeks abx from last OR (on D/C IV ertapenem 500mg q24h and Dapto if approval- if no approval PO doxy 100mg BID)  - Appreciate Podiatry/Vascular consult      Spectra [phone]

## 2020-06-12 NOTE — PROGRESS NOTE ADULT - ASSESSMENT
Sepsis / bacteremia / necrotising fascitis / MICHAEL/ stage 4 ckd:  #  ATN most likely/creatinine stable   #  please document UO/ bladder scan   # sono noted, bladder non distented   #  continue  lokelma 10 q 12  # hyponatremia , no lasix / stable   #BP noted, continue midodrine   # dig level 1.0, please repeat   # phos noted, on  phoslo 1/1/1, corrected calcium around 8, increase phoslo 2/2/2, start calcitriol 0.25 q 24, check pth   # acidosis , continue  po sodium bicarb 650 mg q 6 hr.  # podiatry notes appreciated , s/p OR   #on roxanna/ dapto followed by ID   # follow FS closely  # no acute indication for RRT   # will follow

## 2020-06-12 NOTE — PROGRESS NOTE ADULT - SUBJECTIVE AND OBJECTIVE BOX
seen and examined  no distress   lying comfortable  episode of hypoglycemia         PAST HISTORY  --------------------------------------------------------------------------------  No significant changes to PMH, PSH, FHx, SHx, unless otherwise noted    ALLERGIES & MEDICATIONS  --------------------------------------------------------------------------------  Allergies    Byetta (Stomach Upset)  linezolid (Rash; Urticaria; Flushing)  melatonin (Other)    Intolerances      Standing Inpatient Medications  aspirin enteric coated 81 milliGRAM(s) Oral daily  atorvastatin 80 milliGRAM(s) Oral at bedtime  calcium acetate 667 milliGRAM(s) Oral three times a day with meals  carvedilol 3.125 milliGRAM(s) Oral every 12 hours  chlorhexidine 4% Liquid 1 Application(s) Topical <User Schedule>  Dakins Solution - 1/2 Strength 1 Application(s) Topical daily  DAPTOmycin IVPB 250 milliGRAM(s) IV Intermittent every 48 hours  dextrose 50% Injectable 12.5 Gram(s) IV Push once  dextrose 50% Injectable 25 Gram(s) IV Push once  dextrose 50% Injectable 25 Gram(s) IV Push once  digoxin     Tablet 0.125 milliGRAM(s) Oral every other day  folic acid 1 milliGRAM(s) Oral daily  gabapentin 200 milliGRAM(s) Oral daily  heparin   Injectable 5000 Unit(s) SubCutaneous every 8 hours  insulin glargine Injectable (LANTUS) 9 Unit(s) SubCutaneous every morning  insulin lispro (HumaLOG) corrective regimen sliding scale   SubCutaneous three times a day before meals  insulin lispro Injectable (HumaLOG) 3 Unit(s) SubCutaneous three times a day before meals  meropenem  IVPB 500 milliGRAM(s) IV Intermittent every 12 hours  midodrine. 5 milliGRAM(s) Oral three times a day  pantoprazole    Tablet 40 milliGRAM(s) Oral before breakfast  sodium bicarbonate 650 milliGRAM(s) Oral every 6 hours  sodium zirconium cyclosilicate 10 Gram(s) Oral two times a day    PRN Inpatient Medications  acetaminophen   Tablet .. 650 milliGRAM(s) Oral every 6 hours PRN  aluminum hydroxide/magnesium hydroxide/simethicone Suspension 30 milliLiter(s) Oral every 6 hours PRN  dextrose 40% Gel 15 Gram(s) Oral once PRN  glucagon  Injectable 1 milliGRAM(s) IntraMuscular once PRN          VITALS/PHYSICAL EXAM  --------------------------------------------------------------------------------  T(C): 35.6 (06-12-20 @ 05:55), Max: 35.6 (06-12-20 @ 05:55)  HR: 70 (06-12-20 @ 05:55) (70 - 70)  BP: 104/56 (06-12-20 @ 05:55) (104/56 - 119/58)  RR: 18 (06-12-20 @ 05:55) (18 - 18)  SpO2: --  Wt(kg): --  Height (cm): 167.4 (06-11-20 @ 11:23)  Weight (kg): 66 (06-10-20 @ 14:23)  BMI (kg/m2): 23.6 (06-11-20 @ 11:23)  BSA (m2): 1.75 (06-11-20 @ 11:23)      Physical Exam:  	Gen: NAD  	Pulm: CTA B/L  	CV:  S1S2; no rub  	Abd: +distended  	LE:  no edema      LABS/STUDIES  --------------------------------------------------------------------------------              7.3    15.18 >-----------<  152      [06-12-20 @ 05:00]              21.3     124  |  87  |  97  ----------------------------<  42      [06-12-20 @ 05:00]  5.0   |  19  |  3.7        Ca     6.8     [06-12-20 @ 05:00]      Mg     2.4     [06-12-20 @ 05:00]      Phos  6.7     [06-11-20 @ 05:30]    TPro  5.2  /  Alb  2.4  /  TBili  0.5  /  DBili  x   /  AST  52  /  ALT  16  /  AlkPhos  227  [06-12-20 @ 05:00]    PT/INR: PT 15.10, INR 1.31       [06-10-20 @ 11:12]  PTT: 52.9       [06-10-20 @ 11:12]      Creatinine Trend:  SCr 3.7 [06-12 @ 05:00]  SCr 3.6 [06-11 @ 16:00]  SCr 3.4 [06-11 @ 05:30]  SCr 3.5 [06-10 @ 11:12]  SCr 3.6 [06-10 @ 06:18]      Urine Sodium 20.0      [06-09-20 @ 21:40]  Urine Osmolality 294      [06-09-20 @ 21:40]    TSH 0.83      [06-10-20 @ 06:18]  Lipid: chol 51, TG 96, HDL 17, LDL 14      [06-10-20 @ 06:18]

## 2020-06-12 NOTE — PROGRESS NOTE ADULT - SUBJECTIVE AND OBJECTIVE BOX
patient has no chest pain   no sob   dropped hgb to 7.3 was had transfusion one unit 2 days ago. hgb was 9 on 6/10/20     Vital Signs Last 24 Hrs  T(C): 35.6 (12 Jun 2020 05:55), Max: 35.6 (12 Jun 2020 05:55)  T(F): 96 (12 Jun 2020 05:55), Max: 96 (12 Jun 2020 05:55)  HR: 70 (12 Jun 2020 05:55) (70 - 70)  BP: 104/56 (12 Jun 2020 05:55) (104/56 - 119/58)  BP(mean): 74 (12 Jun 2020 05:55) (74 - 74)  RR: 18 (12 Jun 2020 05:55) (18 - 18)  SpO2: --    PHYSICAL EXAM:  GENERAL: NAD  Pulm: Clear to auscultation bilaterally; No wheeze  CV: Regular rate and rhythm; No murmurs, rubs, or gallops  GI:: Soft, Nontender, Nondistended; Bowel sounds present  EXTREMITIES:  2+ Peripheral Pulses, No clubbing, cyanosis, or edema  PSYCH: AAOx3  NEUROLOGY: non-focal  SKIN: No rashes or lesions                          7.3    15.18 )-----------( 152      ( 12 Jun 2020 05:00 )             21.3     06-12    124<L>  |  87<L>  |  97<HH>  ----------------------------<  42<LL>  5.0   |  19  |  3.7<H>    Ca    6.8<L>      12 Jun 2020 05:00  Phos  6.7     06-11  Mg     2.4     06-12    TPro  5.2<L>  /  Alb  2.4<L>  /  TBili  0.5  /  DBili  x   /  AST  52<H>  /  ALT  16  /  AlkPhos  227<H>  06-12    LIVER FUNCTIONS - ( 12 Jun 2020 05:00 )  Alb: 2.4 g/dL / Pro: 5.2 g/dL / ALK PHOS: 227 U/L / ALT: 16 U/L / AST: 52 U/L / GGT: x           PT/INR - ( 10 Tin 2020 11:12 )   PT: 15.10 sec;   INR: 1.31 ratio         PTT - ( 10 Tin 2020 11:12 )  PTT:52.9 sec        Culture - Blood (collected 10 Tin 2020 06:18)  Source: .Blood None  Preliminary Report (11 Jun 2020 13:02):    No growth to date.    MEDICATIONS  (STANDING):  aspirin enteric coated 81 milliGRAM(s) Oral daily  atorvastatin 80 milliGRAM(s) Oral at bedtime  calcium acetate 667 milliGRAM(s) Oral three times a day with meals  carvedilol 3.125 milliGRAM(s) Oral every 12 hours  chlorhexidine 4% Liquid 1 Application(s) Topical <User Schedule>  Dakins Solution - 1/2 Strength 1 Application(s) Topical daily  DAPTOmycin IVPB 250 milliGRAM(s) IV Intermittent every 48 hours  dextrose 50% Injectable 12.5 Gram(s) IV Push once  dextrose 50% Injectable 25 Gram(s) IV Push once  dextrose 50% Injectable 25 Gram(s) IV Push once  digoxin     Tablet 0.125 milliGRAM(s) Oral every other day  folic acid 1 milliGRAM(s) Oral daily  gabapentin 200 milliGRAM(s) Oral daily  heparin   Injectable 5000 Unit(s) SubCutaneous every 8 hours  insulin glargine Injectable (LANTUS) 9 Unit(s) SubCutaneous every morning  insulin lispro (HumaLOG) corrective regimen sliding scale   SubCutaneous three times a day before meals  insulin lispro Injectable (HumaLOG) 3 Unit(s) SubCutaneous three times a day before meals  meropenem  IVPB 500 milliGRAM(s) IV Intermittent every 12 hours  midodrine. 5 milliGRAM(s) Oral three times a day  pantoprazole    Tablet 40 milliGRAM(s) Oral before breakfast  sodium bicarbonate 650 milliGRAM(s) Oral every 6 hours  sodium zirconium cyclosilicate 10 Gram(s) Oral two times a day    MEDICATIONS  (PRN):  acetaminophen   Tablet .. 650 milliGRAM(s) Oral every 6 hours PRN Mild Pain (1 - 3)  aluminum hydroxide/magnesium hydroxide/simethicone Suspension 30 milliLiter(s) Oral every 6 hours PRN Dyspepsia  dextrose 40% Gel 15 Gram(s) Oral once PRN Blood Glucose LESS THAN 70 milliGRAM(s)/deciliter  glucagon  Injectable 1 milliGRAM(s) IntraMuscular once PRN Glucose LESS THAN 70 milligrams/deciliter

## 2020-06-12 NOTE — CONSULT NOTE ADULT - SUBJECTIVE AND OBJECTIVE BOX
Gastroenterology Consultation:    Patient is a 80y old  Male who presents with a chief complaint of necrotizing fascitis (12 Jun 2020 13:44)      Admitted on: 06-04-20  HPI:  80 M with PMHx of anemia, ascites 2/2 CHF, CHF s/p biventricular AICD, CKD, CAD s/p CABGx5 on ASA, CKD, DM s/p R big toe amputation, HLD, HTN, hypothyroidism, PAD s/p R femoral stent who presents with altered mental status x 1 day and worsening left foot DFU. The pt's daughter states that he saw his L hallux which looked worse, and necrotic from the prior week. He was also forgetful. She checked his temp at home, it was 100.2F, so she gave him tylenol. She states the pt had been taking augmentin for his DFU for some time. She thinks he forgot to take his meds for several days due to the infection.   She also states, the pt was recently dx with HFrEF, biventricular aicd placed february 2020. She has noticed an 8 lb weight gain in the past 4 days.   No fever. No vomiting. No chills. No back pain. No abd pain. No neck stiffness. No abd pain.     In the ED: VS were stable. The pt states he took tylenol prior to coming in to the ED.  xray of the foot showed Subcutaneous gas, podiatry was c/s. Pt was given clindamycin, zosyn, vanco. Labs notable for WBC 19 with L shift, plt 126, BUN/Cr 94/3.2, trop 0.17, BNP >70k, lactate 2.5. (04 Jun 2020 05:39)    GI related history :80 M with PMHx of anemia, ascites 2/2 CHF, CHF s/p biventricular AICD, CKD, CAD s/p CABGx5 on ASA, CKD, DM s/p R big toe amputation, HLD, HTN, hypothyroidism, PAD s/p R femoral stent who presents with altered mental status x 1 day and worsening left foot DFU.  GI is called for evaluation of anemia . Patient with history of anemia , patient denies any fresh blood per rectum, no melena, no hematemesis Patient had bowel  movement today , brown in color . Patient denies abdominal pain , no altered bowel habits, denies dysphagia , odynophagia. Patient is complaining of SOB , wheezing in bed without doing any effort.     Prior records Reviewed (Y/N): Y  History obtained from person other than patient (Y/N): Y    Prior EGD: none in chart   Prior Colonoscopy: none in chart       PAST MEDICAL & SURGICAL HISTORY:  Chronic kidney disease  Anemia  Ascites  PAD (peripheral artery disease)  Hyperlipidemia  Hypothyroidism  Hypertension  Coronary artery disease  CHF (congestive heart failure)  Diabetes mellitus  Biventricular ICD (implantable cardioverter-defibrillator) in place  Amputation of toe of right foot  S/P arterial stent  S/P CABG x 5      FAMILY HISTORY: No pertinent family history       Social History:  Alcohol: denies   Drugs: denies     Home Medications:  aspirin 81 mg oral tablet: 1 tab(s) orally once a day (04 Jun 2020 07:05)  atorvastatin 80 mg oral tablet: 1 tab(s) orally once a day (04 Jun 2020 07:10)  Digitek 125 mcg (0.125 mg) oral tablet: orally every other day (at bedtime) (04 Jun 2020 07:10)  famotidine 10 mg oral tablet: 1 tab(s) orally once (at bedtime) (04 Jun 2020 07:11)  ferrous sulfate 325 mg (65 mg elemental iron) oral tablet: 1 tab(s) orally every other day (at bedtime) (04 Jun 2020 07:09)  folic acid 1 mg oral tablet: 1 tab(s) orally once a day (04 Jun 2020 07:06)  gabapentin 100 mg oral tablet: 2 tab(s) orally once a day (at bedtime) (04 Jun 2020 07:11)  Januvia 50 mg oral tablet: 1 tab(s) orally once a day (04 Jun 2020 07:04)  losartan 25 mg oral tablet: 0.5 tab(s) orally once a day (04 Jun 2020 07:05)  spironolactone 50 mg oral tablet: 1 tab(s) orally 2 times a day (04 Jun 2020 07:05)  torsemide 100 mg oral tablet: 1 tab(s) orally once a day (04 Jun 2020 07:05)    MEDICATIONS  (STANDING):  aspirin enteric coated 81 milliGRAM(s) Oral daily  atorvastatin 80 milliGRAM(s) Oral at bedtime  calcitriol   Capsule 0.25 MICROGram(s) Oral daily  calcium acetate 1334 milliGRAM(s) Oral three times a day with meals  carvedilol 3.125 milliGRAM(s) Oral every 12 hours  chlorhexidine 4% Liquid 1 Application(s) Topical <User Schedule>  Dakins Solution - 1/2 Strength 1 Application(s) Topical three times a day  DAPTOmycin IVPB 250 milliGRAM(s) IV Intermittent every 48 hours  dextrose 50% Injectable 12.5 Gram(s) IV Push once  dextrose 50% Injectable 25 Gram(s) IV Push once  dextrose 50% Injectable 25 Gram(s) IV Push once  digoxin     Tablet 0.125 milliGRAM(s) Oral every other day  folic acid 1 milliGRAM(s) Oral daily  furosemide    Tablet 40 milliGRAM(s) Oral daily  gabapentin 200 milliGRAM(s) Oral daily  heparin   Injectable 5000 Unit(s) SubCutaneous every 8 hours  insulin glargine Injectable (LANTUS) 9 Unit(s) SubCutaneous every morning  insulin lispro (HumaLOG) corrective regimen sliding scale   SubCutaneous three times a day before meals  insulin lispro Injectable (HumaLOG) 3 Unit(s) SubCutaneous three times a day before meals  meropenem  IVPB 500 milliGRAM(s) IV Intermittent every 12 hours  midodrine. 5 milliGRAM(s) Oral three times a day  pantoprazole    Tablet 40 milliGRAM(s) Oral before breakfast  sodium bicarbonate 650 milliGRAM(s) Oral every 6 hours  sodium zirconium cyclosilicate 10 Gram(s) Oral two times a day    MEDICATIONS  (PRN):  acetaminophen   Tablet .. 650 milliGRAM(s) Oral every 6 hours PRN Mild Pain (1 - 3)  aluminum hydroxide/magnesium hydroxide/simethicone Suspension 30 milliLiter(s) Oral every 6 hours PRN Dyspepsia  dextrose 40% Gel 15 Gram(s) Oral once PRN Blood Glucose LESS THAN 70 milliGRAM(s)/deciliter  glucagon  Injectable 1 milliGRAM(s) IntraMuscular once PRN Glucose LESS THAN 70 milligrams/deciliter      Allergies  Byetta (Stomach Upset)  linezolid (Rash; Urticaria; Flushing)  melatonin (Other)      Review of Systems:   Constitutional:  No Fever, No Chills  ENT/Mouth:  No Hearing Changes,  No Difficulty Swallowing  Eyes:  No Eye Pain, No Vision Changes  Cardiovascular:  No Chest Pain, No Palpitations  Respiratory:  No Cough, + Dyspnea  Gastrointestinal:  As described in HPI  Musculoskeletal:  No Joint Swelling, No Back Pain  Skin:  No Skin Lesions, No Jaundice  Neuro:  No Syncope, No Dizziness  Heme/Lymph:  No Bruising, No Bleeding.          Physical Examination:  T(C): 35 (06-12-20 @ 13:43), Max: 35.6 (06-12-20 @ 05:55)  HR: 70 (06-12-20 @ 13:43) (70 - 70)  BP: 125/66 (06-12-20 @ 13:43) (104/56 - 125/66)  RR: 18 (06-12-20 @ 13:43) (18 - 18)  SpO2: --      06-10-20 @ 07:01  -  06-11-20 @ 07:00  --------------------------------------------------------  IN: 520 mL / OUT: 420 mL / NET: 100 mL    06-12-20 @ 07:01  -  06-12-20 @ 16:29  --------------------------------------------------------  IN: 586 mL / OUT: 0 mL / NET: 586 mL        Constitutional: No acute distress.  Eyes:. Conjunctivae are clear, Sclera is non-icteric.  Ears Nose and Throat: The external ears are normal appearing,  Oral mucosa is pink and moist.  Respiratory:  No signs of respiratory distress. bilateral wheezing   Cardiovascular:  S1 S2, Regular rate and rhythm.  GI: Abdomen is soft, symmetric, and non-tender without distention.  Bowel sounds are present and normoactive in all four quadrants. No masses, hepatomegaly, or splenomegaly are noted.   Neuro: No Tremor, No involuntary movements  Skin: No rashes, No Jaundice.          Data: (reviewed by attending)                        7.3    15.18 )-----------( 152      ( 12 Jun 2020 05:00 )             21.3     Hgb Trend:  7.3  06-12-20 @ 05:00  8.3  06-11-20 @ 05:30  9.1  06-10-20 @ 20:42  7.5  06-10-20 @ 06:18  6.6  06-09-20 @ 21:40      06-10-20 @ 07:01  -  06-11-20 @ 07:00  --------------------------------------------------------  IN: 300 mL    06-12-20 @ 07:01  -  06-12-20 @ 16:29  --------------------------------------------------------  IN: 336 mL      06-12    124<L>  |  87<L>  |  97<HH>  ----------------------------<  42<LL>  5.0   |  19  |  3.7<H>    Ca    6.8<L>      12 Jun 2020 05:00  Phos  6.7     06-11  Mg     2.4     06-12    TPro  5.2<L>  /  Alb  2.4<L>  /  TBili  0.5  /  DBili  x   /  AST  52<H>  /  ALT  16  /  AlkPhos  227<H>  06-12    Liver panel trend:  TBili 0.5   /   AST 52   /   ALT 16   /   AlkP 227   /   Tptn 5.2   /   Alb 2.4    /   DBili --      06-12  TBili 0.5   /   AST 49   /   ALT 19   /   AlkP 219   /   Tptn 5.4   /   Alb 2.5    /   DBili --      06-11  TBili 0.5   /   AST 55   /   ALT 21   /   AlkP 237   /   Tptn 5.5   /   Alb 2.6    /   DBili --      06-10  TBili 0.4   /   AST 51   /   ALT 21   /   AlkP 214   /   Tptn 5.8   /   Alb 2.5    /   DBili --      06-09  TBili 0.5   /   AST 61   /   ALT 24   /   AlkP 232   /   Tptn 5.9   /   Alb 2.7    /   DBili --      06-09  TBili 0.4   /   AST 40   /   ALT 18   /   AlkP 213   /   Tptn 5.8   /   Alb 2.5    /   DBili --      06-08  TBili 0.5   /   AST 41   /   ALT 20   /   AlkP 225   /   Tptn 6.2   /   Alb 2.9    /   DBili --      06-07  TBili 0.7   /   AST 37   /   ALT 18   /   AlkP 218   /   Tptn 5.8   /   Alb 2.7    /   DBili --      06-07  TBili 0.5   /   AST 33   /   ALT 19   /   AlkP 206   /   Tptn 6.0   /   Alb 2.6    /   DBili --      06-07  TBili 0.6   /   AST 29   /   ALT 18   /   AlkP 196   /   Tptn 6.0   /   Alb 2.7    /   DBili --      06-06  TBili 0.7   /   AST 29   /   ALT 18   /   AlkP 215   /   Tptn 6.1   /   Alb 2.8    /   DBili --      06-05  TBili 0.6   /   AST 30   /   ALT 19   /   AlkP 240   /   Tptn 6.4   /   Alb 3.0    /   DBili --      06-04          Culture - Blood (collected 11 Jun 2020 05:30)  Source: .Blood None  Preliminary Report (12 Jun 2020 13:01):    No growth to date.    Culture - Blood (collected 10 Tin 2020 06:18)  Source: .Blood None  Preliminary Report (11 Jun 2020 13:02):    No growth to date.          Radiology:(reviewed by attending) Gastroenterology Consultation:    Patient is a 80y old  Male who presents with a chief complaint of necrotizing fascitis (12 Jun 2020 13:44)      Admitted on: 06-04-20  HPI:  80 M with PMHx of anemia, ascites 2/2 CHF, CHF s/p biventricular AICD, CKD, CAD s/p CABGx5 on ASA, CKD, DM s/p R big toe amputation, HLD, HTN, hypothyroidism, PAD s/p R femoral stent who presents with altered mental status x 1 day and worsening left foot DFU. The pt's daughter states that he saw his L hallux which looked worse, and necrotic from the prior week. He was also forgetful. She checked his temp at home, it was 100.2F, so she gave him tylenol. She states the pt had been taking augmentin for his DFU for some time. She thinks he forgot to take his meds for several days due to the infection.   She also states, the pt was recently dx with HFrEF, biventricular aicd placed february 2020. She has noticed an 8 lb weight gain in the past 4 days.   No fever. No vomiting. No chills. No back pain. No abd pain. No neck stiffness. No abd pain.     In the ED: VS were stable. The pt states he took tylenol prior to coming in to the ED.  xray of the foot showed Subcutaneous gas, podiatry was c/s. Pt was given clindamycin, zosyn, vanco. Labs notable for WBC 19 with L shift, plt 126, BUN/Cr 94/3.2, trop 0.17, BNP >70k, lactate 2.5. (04 Jun 2020 05:39)    GI related history :80 M with PMHx of anemia, ascites 2/2 CHF, CHF s/p biventricular AICD, CKD, CAD s/p CABGx5 on ASA, CKD, DM s/p R big toe amputation, HLD, HTN, hypothyroidism, PAD s/p R femoral stent who presents with altered mental status x 1 day and worsening left foot DFU.  GI is called for evaluation of anemia . Patient with history of anemia , patient denies any fresh blood per rectum, no melena, no hematemesis Patient had bowel  movement today , brown in color (per nurse). Patient denies abdominal pain , no altered bowel habits, denies dysphagia , odynophagia. Patient is complaining of SOB , wheezing in bed without doing any effort.     Prior records Reviewed (Y/N): Y  History obtained from person other than patient (Y/N): Y    Prior EGD: none in chart   Prior Colonoscopy: none in chart       PAST MEDICAL & SURGICAL HISTORY:  Chronic kidney disease  Anemia  Ascites  PAD (peripheral artery disease)  Hyperlipidemia  Hypothyroidism  Hypertension  Coronary artery disease  CHF (congestive heart failure)  Diabetes mellitus  Biventricular ICD (implantable cardioverter-defibrillator) in place  Amputation of toe of right foot  S/P arterial stent  S/P CABG x 5      FAMILY HISTORY: No pertinent family history       Social History:  Alcohol: denies   Drugs: denies     Home Medications:  aspirin 81 mg oral tablet: 1 tab(s) orally once a day (04 Jun 2020 07:05)  atorvastatin 80 mg oral tablet: 1 tab(s) orally once a day (04 Jun 2020 07:10)  Digitek 125 mcg (0.125 mg) oral tablet: orally every other day (at bedtime) (04 Jun 2020 07:10)  famotidine 10 mg oral tablet: 1 tab(s) orally once (at bedtime) (04 Jun 2020 07:11)  ferrous sulfate 325 mg (65 mg elemental iron) oral tablet: 1 tab(s) orally every other day (at bedtime) (04 Jun 2020 07:09)  folic acid 1 mg oral tablet: 1 tab(s) orally once a day (04 Jun 2020 07:06)  gabapentin 100 mg oral tablet: 2 tab(s) orally once a day (at bedtime) (04 Jun 2020 07:11)  Januvia 50 mg oral tablet: 1 tab(s) orally once a day (04 Jun 2020 07:04)  losartan 25 mg oral tablet: 0.5 tab(s) orally once a day (04 Jun 2020 07:05)  spironolactone 50 mg oral tablet: 1 tab(s) orally 2 times a day (04 Jun 2020 07:05)  torsemide 100 mg oral tablet: 1 tab(s) orally once a day (04 Jun 2020 07:05)    MEDICATIONS  (STANDING):  aspirin enteric coated 81 milliGRAM(s) Oral daily  atorvastatin 80 milliGRAM(s) Oral at bedtime  calcitriol   Capsule 0.25 MICROGram(s) Oral daily  calcium acetate 1334 milliGRAM(s) Oral three times a day with meals  carvedilol 3.125 milliGRAM(s) Oral every 12 hours  chlorhexidine 4% Liquid 1 Application(s) Topical <User Schedule>  Dakins Solution - 1/2 Strength 1 Application(s) Topical three times a day  DAPTOmycin IVPB 250 milliGRAM(s) IV Intermittent every 48 hours  dextrose 50% Injectable 12.5 Gram(s) IV Push once  dextrose 50% Injectable 25 Gram(s) IV Push once  dextrose 50% Injectable 25 Gram(s) IV Push once  digoxin     Tablet 0.125 milliGRAM(s) Oral every other day  folic acid 1 milliGRAM(s) Oral daily  furosemide    Tablet 40 milliGRAM(s) Oral daily  gabapentin 200 milliGRAM(s) Oral daily  heparin   Injectable 5000 Unit(s) SubCutaneous every 8 hours  insulin glargine Injectable (LANTUS) 9 Unit(s) SubCutaneous every morning  insulin lispro (HumaLOG) corrective regimen sliding scale   SubCutaneous three times a day before meals  insulin lispro Injectable (HumaLOG) 3 Unit(s) SubCutaneous three times a day before meals  meropenem  IVPB 500 milliGRAM(s) IV Intermittent every 12 hours  midodrine. 5 milliGRAM(s) Oral three times a day  pantoprazole    Tablet 40 milliGRAM(s) Oral before breakfast  sodium bicarbonate 650 milliGRAM(s) Oral every 6 hours  sodium zirconium cyclosilicate 10 Gram(s) Oral two times a day    MEDICATIONS  (PRN):  acetaminophen   Tablet .. 650 milliGRAM(s) Oral every 6 hours PRN Mild Pain (1 - 3)  aluminum hydroxide/magnesium hydroxide/simethicone Suspension 30 milliLiter(s) Oral every 6 hours PRN Dyspepsia  dextrose 40% Gel 15 Gram(s) Oral once PRN Blood Glucose LESS THAN 70 milliGRAM(s)/deciliter  glucagon  Injectable 1 milliGRAM(s) IntraMuscular once PRN Glucose LESS THAN 70 milligrams/deciliter      Allergies  Byetta (Stomach Upset)  linezolid (Rash; Urticaria; Flushing)  melatonin (Other)      Review of Systems:   Constitutional:  No Fever, No Chills  ENT/Mouth:  No Hearing Changes,  No Difficulty Swallowing  Eyes:  No Eye Pain, No Vision Changes  Cardiovascular:  No Chest Pain, No Palpitations  Respiratory:  No Cough, + Dyspnea  Gastrointestinal:  As described in HPI  Musculoskeletal:  No Joint Swelling, No Back Pain  Skin:  No Skin Lesions, No Jaundice  Neuro:  No Syncope, No Dizziness  Heme/Lymph:  No Bruising, No Bleeding.          Physical Examination:  T(C): 35 (06-12-20 @ 13:43), Max: 35.6 (06-12-20 @ 05:55)  HR: 70 (06-12-20 @ 13:43) (70 - 70)  BP: 125/66 (06-12-20 @ 13:43) (104/56 - 125/66)  RR: 18 (06-12-20 @ 13:43) (18 - 18)  SpO2: --      06-10-20 @ 07:01  -  06-11-20 @ 07:00  --------------------------------------------------------  IN: 520 mL / OUT: 420 mL / NET: 100 mL    06-12-20 @ 07:01  -  06-12-20 @ 16:29  --------------------------------------------------------  IN: 586 mL / OUT: 0 mL / NET: 586 mL        Constitutional: No acute distress.  Eyes:. Conjunctivae are clear, Sclera is non-icteric.  Ears Nose and Throat: The external ears are normal appearing,  Oral mucosa is pink and moist.  Respiratory:  No signs of respiratory distress. bilateral wheezing   Cardiovascular:  S1 S2, Regular rate and rhythm.  GI: Abdomen is soft, symmetric, and non-tender without distention.  Bowel sounds are present and normoactive in all four quadrants. No masses, hepatomegaly, or splenomegaly are noted.   Neuro: No Tremor, No involuntary movements  Skin: No rashes, No Jaundice.          Data: (reviewed by attending)                        7.3    15.18 )-----------( 152      ( 12 Jun 2020 05:00 )             21.3     Hgb Trend:  7.3  06-12-20 @ 05:00  8.3  06-11-20 @ 05:30  9.1  06-10-20 @ 20:42  7.5  06-10-20 @ 06:18  6.6  06-09-20 @ 21:40      06-10-20 @ 07:01  -  06-11-20 @ 07:00  --------------------------------------------------------  IN: 300 mL    06-12-20 @ 07:01  -  06-12-20 @ 16:29  --------------------------------------------------------  IN: 336 mL      06-12    124<L>  |  87<L>  |  97<HH>  ----------------------------<  42<LL>  5.0   |  19  |  3.7<H>    Ca    6.8<L>      12 Jun 2020 05:00  Phos  6.7     06-11  Mg     2.4     06-12    TPro  5.2<L>  /  Alb  2.4<L>  /  TBili  0.5  /  DBili  x   /  AST  52<H>  /  ALT  16  /  AlkPhos  227<H>  06-12    Liver panel trend:  TBili 0.5   /   AST 52   /   ALT 16   /   AlkP 227   /   Tptn 5.2   /   Alb 2.4    /   DBili --      06-12  TBili 0.5   /   AST 49   /   ALT 19   /   AlkP 219   /   Tptn 5.4   /   Alb 2.5    /   DBili --      06-11  TBili 0.5   /   AST 55   /   ALT 21   /   AlkP 237   /   Tptn 5.5   /   Alb 2.6    /   DBili --      06-10  TBili 0.4   /   AST 51   /   ALT 21   /   AlkP 214   /   Tptn 5.8   /   Alb 2.5    /   DBili --      06-09  TBili 0.5   /   AST 61   /   ALT 24   /   AlkP 232   /   Tptn 5.9   /   Alb 2.7    /   DBili --      06-09  TBili 0.4   /   AST 40   /   ALT 18   /   AlkP 213   /   Tptn 5.8   /   Alb 2.5    /   DBili --      06-08  TBili 0.5   /   AST 41   /   ALT 20   /   AlkP 225   /   Tptn 6.2   /   Alb 2.9    /   DBili --      06-07  TBili 0.7   /   AST 37   /   ALT 18   /   AlkP 218   /   Tptn 5.8   /   Alb 2.7    /   DBili --      06-07  TBili 0.5   /   AST 33   /   ALT 19   /   AlkP 206   /   Tptn 6.0   /   Alb 2.6    /   DBili --      06-07  TBili 0.6   /   AST 29   /   ALT 18   /   AlkP 196   /   Tptn 6.0   /   Alb 2.7    /   DBili --      06-06  TBili 0.7   /   AST 29   /   ALT 18   /   AlkP 215   /   Tptn 6.1   /   Alb 2.8    /   DBili --      06-05  TBili 0.6   /   AST 30   /   ALT 19   /   AlkP 240   /   Tptn 6.4   /   Alb 3.0    /   DBili --      06-04          Culture - Blood (collected 11 Jun 2020 05:30)  Source: .Blood None  Preliminary Report (12 Jun 2020 13:01):    No growth to date.    Culture - Blood (collected 10 Tin 2020 06:18)  Source: .Blood None  Preliminary Report (11 Jun 2020 13:02):    No growth to date.

## 2020-06-12 NOTE — CONSULT NOTE ADULT - ASSESSMENT
80 M with PMHx of anemia, ascites 2/2 CHF, CHF s/p biventricular AICD, CKD, CAD s/p CABGx5 on ASA, CKD, DM s/p R big toe amputation, HLD, HTN, hypothyroidism, PAD s/p R femoral stent who presents with altered mental status x 1 day and worsening left foot DFU.  GI is called for evaluation of anemia .    #acute on chronic normocytic anemia:   likely multifactorial   baseline 8-9 ( drop to 6.6 , received 2 units corrected to 9.1 then dropped to 7.3)   No evidence of active GI bleed   Brown stool   refused rectal exam     REC:   iron studies   optimize medically ( patient wheezing at rest / SOB)   No acute GI intervention unless patient develop gross GI bleed   patient will benefit from EGD/ colonoscopy as outpatient after medical optimization   f/u ct abdomen and pelvis without contrast to r/o retroperitoneal bleed   transfuse  to keep HGB above 8 80 M with PMHx of anemia, ascites 2/2 CHF, CHF s/p biventricular AICD, CKD, CAD s/p CABGx5 on ASA, CKD, DM s/p R big toe amputation, HLD, HTN, hypothyroidism, PAD s/p R femoral stent who presents with altered mental status x 1 day and worsening left foot DFU.  GI is called for evaluation of anemia .    #acute on chronic normocytic anemia:   likely multifactorial   baseline 8-9 ( drop to 6.6 , received 2 units corrected to 9.1 then dropped to 7.3)   No evidence of active GI bleed   Brown stool   refused rectal exam     REC:   optimize medically ( patient wheezing at rest / SOB)   consider alternative causes of anemia   iron studies   if  patient is iron deficient , patient will benefit EGD/ colon as outpatient   No acute GI intervention unless patient develop gross GI bleed   f/u ct abdomen and pelvis without contrast to r/o retroperitoneal bleed   transfuse  to keep HGB above 8 80 M with PMHx of anemia, ascites 2/2 CHF, CHF s/p biventricular AICD, CKD, CAD s/p CABGx5 on ASA, CKD, DM s/p R big toe amputation, HLD, HTN, hypothyroidism, PAD s/p R femoral stent who presents with altered mental status x 1 day and worsening left foot DFU.  GI is called for evaluation of anemia .    #acute on chronic normocytic anemia:   likely multifactorial   baseline 8-9 ( drop to 6.6 , received 2 units corrected to 9.1 then dropped to 7.3)   No evidence of active GI bleed   Brown stool   refused rectal exam     REC:    consider alternative causes of anemia (as it is normocytic and there is no report of bleeding)  iron studies   if  patient is iron deficient , patient will benefit EGD/ colon as outpatient   No acute GI intervention unless patient develop gross GI bleed   consider ct abdomen and pelvis without contrast to r/o retroperitoneal bleed   transfuse  to keep HGB above 8

## 2020-06-12 NOTE — PROGRESS NOTE ADULT - ASSESSMENT
#Polymicrobial bacteremia secondary to Necrotizing L foot infection  s/p Delayed primary closure of foot 10-Tin-2020 17:11:35 left foot  s/p Amputation of first metatarsal 08-Jun-2020  6/8 OR cx   Rare Proteus mirabilis  Rare Streptococcus agalactiae (Group B)  6/4 BCX with GBS and Proteus ESBL  s/p bedside I&D  WCX with MRSA (R Clinda), ESBL proteus, Ecoli  per ID c/w dapto and meropenem   per ID needs picc line     #MICHAEL on CKD misael ATN in the setting of sepsis which was present on admission/hyperkalemia / hyponatremia/hyperphosphatemia   resume  lasix patient has B/L effusions on chest xray and ascites spoke to renal and ok to resume lasix   renal US no hydro  on lokelma 10 BID for hyperkalemia   increase phoslow monitor phosphate daily   c/w sodium bicarb po   sodium stable 124 monitor now will be back on lasix     #Anemia hgb down to 7.3 dropped 2 grams in 48 hours was 6.8 a few days ago and required one unit of PRBC. will get CT NC rule out hematoma. rectal exam, GI eval, transfuse one unit of PRBC     #Chronic systolic chf with EF less than 20% has AICD   on coreg   on coreg low dose 3.125 BID BP will not tolerate higher dose   on digoxin every other day recheck level   can not do ACE/ARB because of MICHAEL   seen by cardiology and no further intervention as inpatient  resume lasix 40 qday     #history of CAD s/p CABG    #DM on insulin keep -180     #hypotension am cortisol normal. improved on midodrine     poor prognosis      #Progress Note Handoff  Pending (specify):  ID follow up, podiatry follow up . PICC line , follow anemia   Family discussion:patient   Disposition:unknown

## 2020-06-12 NOTE — PROGRESS NOTE ADULT - SUBJECTIVE AND OBJECTIVE BOX
WILL VIEYRA  80y, Male  Allergy: Byetta (Stomach Upset)  linezolid (Rash; Urticaria; Flushing)  melatonin (Other)      LOS  8d    CHIEF COMPLAINT: necrotizing fascitis (12 Jun 2020 13:10)      INTERVAL EVENTS/HPI  - No acute events overnight  - T(F): , Max: 96 (06-12-20 @ 05:55)  - Denies any worsening symptoms  - Tolerating medication  - WBC Count: 15.18 (06-12-20 @ 05:00) downtrending   WBC Count: 22.16 (06-11-20 @ 05:30)  - Creatinine, Serum: 3.7 (06-12-20 @ 05:00)  Creatinine, Serum: 3.6 (06-11-20 @ 16:00)       ROS  General: Denies rigors, nightsweats  HEENT: Denies headache, rhinorrhea, sore throat, eye pain  CV: Denies CP, palpitations  PULM: Denies wheezing, hemoptysis  GI: Denies hematemesis, hematochezia, melena  : Denies discharge, hematuria  MSK: Denies arthralgias, myalgias  SKIN: Denies rash, lesions  NEURO: Denies paresthesias, weakness  PSYCH: Denies depression, anxiety    VITALS:  T(F): 96, Max: 96 (06-12-20 @ 05:55)  HR: 70  BP: 125/66  RR: 18Vital Signs Last 24 Hrs  T(C): 35.6 (12 Jun 2020 05:55), Max: 35.6 (12 Jun 2020 05:55)  T(F): 96 (12 Jun 2020 05:55), Max: 96 (12 Jun 2020 05:55)  HR: 70 (12 Jun 2020 13:43) (70 - 70)  BP: 125/66 (12 Jun 2020 13:43) (104/56 - 125/66)  BP(mean): 74 (12 Jun 2020 05:55) (74 - 74)  RR: 18 (12 Jun 2020 13:43) (18 - 18)  SpO2: --    PHYSICAL EXAM:  Gen: Thin appearing M NAD  HEENT: Normocephalic, atraumatic  Neck: supple, no lymphadenopathy  CV: Regular rate & regular rhythm  Lungs: decreased BS at bases, no fremitus  Abdomen: Soft, BS present  Ext: L foot dressings  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      FH: Non-contributory  Social Hx: Non-contributory    TESTS & MEASUREMENTS:                        7.3    15.18 )-----------( 152      ( 12 Jun 2020 05:00 )             21.3     06-12    124<L>  |  87<L>  |  97<HH>  ----------------------------<  42<LL>  5.0   |  19  |  3.7<H>    Ca    6.8<L>      12 Jun 2020 05:00  Phos  6.7     06-11  Mg     2.4     06-12    TPro  5.2<L>  /  Alb  2.4<L>  /  TBili  0.5  /  DBili  x   /  AST  52<H>  /  ALT  16  /  AlkPhos  227<H>  06-12    eGFR if Non African American: 15 mL/min/1.73M2 (06-12-20 @ 05:00)  eGFR if African American: 17 mL/min/1.73M2 (06-12-20 @ 05:00)  eGFR if Non African American: 15 mL/min/1.73M2 (06-11-20 @ 16:00)  eGFR if : 18 mL/min/1.73M2 (06-11-20 @ 16:00)    LIVER FUNCTIONS - ( 12 Jun 2020 05:00 )  Alb: 2.4 g/dL / Pro: 5.2 g/dL / ALK PHOS: 227 U/L / ALT: 16 U/L / AST: 52 U/L / GGT: x               Culture - Blood (collected 06-11-20 @ 05:30)  Source: .Blood None  Preliminary Report (06-12-20 @ 13:01):    No growth to date.    Culture - Blood (collected 06-10-20 @ 06:18)  Source: .Blood None  Preliminary Report (06-11-20 @ 13:02):    No growth to date.    Culture - Blood (collected 06-09-20 @ 04:15)  Source: .Blood Blood-Venous  Preliminary Report (06-10-20 @ 13:02):    No growth to date.    Culture - Blood (collected 06-09-20 @ 04:15)  Source: .Blood Blood  Preliminary Report (06-10-20 @ 13:02):    No growth to date.    Culture - Tissue with Gram Stain (collected 06-08-20 @ 12:05)  Source: .Tissue None  Gram Stain (06-08-20 @ 23:26):    Rare polymorphonuclear leukocytes per low power field    Rare Gram positive cocci in pairs per oil power field    Rare Gram Negative Rods per oil power field  Preliminary Report (06-10-20 @ 23:12):    Rare Proteus mirabilis ESBL    Rare Streptococcus agalactiae (Group B) Streptococcus agalactiae (Group    B) isolated    Group B streptococci are susceptible to ampicillin,    penicillin and cefazolin, but may be resistant to    erythromycin and clindamycin.    Recommendations for intrapartum prophylaxis for Group B    streptococci are penicillin or ampicillin.    Growth in fluid media only Escherichia coli  Organism: Proteus mirabilis ESBL  Streptococcus agalactiae (Group B)  Escherichia coli (06-10-20 @ 23:08)  Organism: Escherichia coli (06-10-20 @ 23:08)      -  Amikacin: S <=16      -  Amoxicillin/Clavulanic Acid: S <=8/4      -  Ampicillin: R >16 These ampicillin results predict results for amoxicillin      -  Ampicillin/Sulbactam: I 16/8 Enterobacter, Citrobacter, and Serratia may develop resistance during prolonged therapy (3-4 days)      -  Aztreonam: S <=4      -  Cefazolin: S <=2 Enterobacter, Citrobacter, and Serratia may develop resistance during prolonged therapy (3-4 days)      -  Cefepime: S <=2      -  Cefoxitin: S <=8      -  Ceftriaxone: S <=1 Enterobacter, Citrobacter, and Serratia may develop resistance during prolonged therapy      -  Ciprofloxacin: S <=0.25      -  Ertapenem: S <=0.5      -  Gentamicin: S <=2      -  Imipenem: S <=1      -  Levofloxacin: S <=0.5      -  Meropenem: S <=1      -  Piperacillin/Tazobactam: S <=8      -  Tobramycin: S <=2      -  Trimethoprim/Sulfamethoxazole: S <=0.5/9.5      Method Type: RON  Organism: Proteus mirabilis ESBL (06-10-20 @ 21:21)      -  Amikacin: S <=16      -  Amoxicillin/Clavulanic Acid: S <=8/4      -  Ampicillin: R >16 These ampicillin results predict results for amoxicillin      -  Ampicillin/Sulbactam: R <=4/2 Enterobacter, Citrobacter, and Serratia may develop resistance during prolonged therapy (3-4 days)      -  Aztreonam: R <=4      -  Cefazolin: R >16 Enterobacter, Citrobacter, and Serratia may develop resistance during prolonged therapy (3-4 days)      -  Cefepime: R 4      -  Cefoxitin: S <=8      -  Ceftriaxone: R >32 Enterobacter, Citrobacter, and Serratia may develop resistance during prolonged therapy      -  Ciprofloxacin: R >2      -  Ertapenem: S <=0.5      -  Gentamicin: R >8      -  Levofloxacin: R >4      -  Meropenem: S <=1      -  Piperacillin/Tazobactam: R <=8      -  Tobramycin: R >8      -  Trimethoprim/Sulfamethoxazole: R >2/38      Method Type: RON  Organism: Streptococcus agalactiae (Group B) (06-09-20 @ 18:55)      Method Type: RON    Culture - Surgical Swab (collected 06-08-20 @ 12:05)  Source: .Surgical Swab None  Preliminary Report (06-11-20 @ 16:13):    Few Streptococcus agalactiae (Group B) Streptococcus agalactiae (Group B)    isolated    Group B streptococci are susceptible to ampicillin,    penicillin and cefazolin, but may be resistant to    erythromycin and clindamycin.    Recommendations for intrapartum prophylaxis for Group B    streptococci are penicillin or ampicillin.    Growth in fluid media only Proteus mirabilis ESBL  Organism: Streptococcus agalactiae (Group B)  Proteus mirabilis ESBL (06-11-20 @ 16:12)  Organism: Proteus mirabilis ESBL (06-11-20 @ 16:12)      -  Amikacin: S <=16      -  Amoxicillin/Clavulanic Acid: S <=8/4      -  Ampicillin: R >16 These ampicillin results predict results for amoxicillin      -  Ampicillin/Sulbactam: R <=4/2 Enterobacter, Citrobacter, and Serratia may develop resistance during prolonged therapy (3-4 days)      -  Aztreonam: R <=4      -  Cefazolin: R >16 Enterobacter, Citrobacter, and Serratia may develop resistance during prolonged therapy (3-4 days)      -  Cefepime: R >16      -  Cefoxitin: S <=8      -  Ceftriaxone: R >32 Enterobacter, Citrobacter, and Serratia may develop resistance during prolonged therapy      -  Ciprofloxacin: R >2      -  Ertapenem: S <=0.5      -  Gentamicin: R >8      -  Levofloxacin: R >4      -  Meropenem: S <=1      -  Piperacillin/Tazobactam: R <=8      -  Tobramycin: R >8      -  Trimethoprim/Sulfamethoxazole: R >2/38      Method Type: RON  Organism: Streptococcus agalactiae (Group B) (06-09-20 @ 19:14)      Method Type: RON    Culture - Blood (collected 06-08-20 @ 04:54)  Source: .Blood None  Preliminary Report (06-09-20 @ 12:01):    No growth to date.    Culture - Blood (collected 06-07-20 @ 05:20)  Source: .Blood None  Final Report (06-12-20 @ 13:00):    No Growth Final    Culture - Blood (collected 06-07-20 @ 05:20)  Source: .Blood None  Final Report (06-12-20 @ 13:00):    No Growth Final    Culture - Blood (collected 06-06-20 @ 05:22)  Source: .Blood None  Final Report (06-11-20 @ 10:01):    No Growth Final    Culture - Surgical Swab (collected 06-05-20 @ 19:00)  Source: .Surgical Swab None  Final Report (06-10-20 @ 16:59):    Moderate Escherichia coli    Moderate Proteus mirabilis ESBL    Few Methicillin resistant Staphylococcus aureus  Organism: Escherichia coli  Proteus mirabilis ESBL  Methicillin resistant Staphylococcus aureus (06-10-20 @ 16:59)  Organism: Methicillin resistant Staphylococcus aureus (06-10-20 @ 16:59)      -  Ampicillin/Sulbactam: R <=8/4      -  Cefazolin: R <=4      -  Clindamycin: R >4      -  Daptomycin: S 1      -  Erythromycin: R >4      -  Gentamicin: S <=1 Should not be used as monotherapy      -  Linezolid: S 2      -  Oxacillin: R >2          INFECTIOUS DISEASES TESTING  COVID-19 PCR: NotDetec (06-04-20 @ 05:27)      INFLAMMATORY MARKERS  Sedimentation Rate, Erythrocyte: 79 mm/Hr (06-05-20 @ 04:14)  C-Reactive Protein, Serum: 23.70 mg/dL (06-05-20 @ 04:14)      RADIOLOGY & ADDITIONAL TESTS:  I have personally reviewed the last available Chest xray  CXR      CT      CARDIOLOGY TESTING  12 Lead ECG:   Ventricular Rate 70 BPM    Atrial Rate 70 BPM    QRS Duration 154 ms    Q-T Interval 434 ms    QTC Calculation(Bezet) 468 ms    P Axis 26 degrees    R Axis -86 degrees    T Axis 25 degrees    Diagnosis Line Ventricular-paced rhythm  Biventricular pacemaker detected  Abnormal ECG    Confirmed by Satya Peralta ([id number]) on 6/11/2020 2:43:05 PM (06-10-20 @ 07:57)  12 Lead ECG:   Ventricular Rate 70 BPM    Atrial Rate 70 BPM    P-R Interval 144 ms    QRS Duration 156 ms    Q-T Interval 442 ms    QTC Calculation(Bezet) 477 ms    P Axis 27 degrees    R Axis -80 degrees    T Axis 41 degrees    Diagnosis Line Atrial-sensed ventricular-paced rhythm  Biventricular pacemaker detected  Abnormal ECG    Confirmed by Satya Peralta ([id number]) on 6/11/2020 2:43:02 PM (06-10-20 @ 07:56)      MEDICATIONS  aspirin enteric coated 81 daily  atorvastatin 80 at bedtime  calcitriol   Capsule 0.25 daily  calcium acetate 1334 three times a day with meals  carvedilol 3.125 every 12 hours  chlorhexidine 4% Liquid 1 <User Schedule>  Dakins Solution - 1/2 Strength 1 daily  DAPTOmycin IVPB 250 every 48 hours  dextrose 50% Injectable 12.5 once  dextrose 50% Injectable 25 once  dextrose 50% Injectable 25 once  digoxin     Tablet 0.125 every other day  folic acid 1 daily  furosemide    Tablet 40 daily  gabapentin 200 daily  heparin   Injectable 5000 every 8 hours  insulin glargine Injectable (LANTUS) 9 every morning  insulin lispro (HumaLOG) corrective regimen sliding scale  three times a day before meals  insulin lispro Injectable (HumaLOG) 3 three times a day before meals  meropenem  IVPB 500 every 12 hours  midodrine. 5 three times a day  pantoprazole    Tablet 40 before breakfast  sodium bicarbonate 650 every 6 hours  sodium zirconium cyclosilicate 10 two times a day      WEIGHT  Weight (kg): 66 (06-10-20 @ 14:23)  Creatinine, Serum: 3.7 mg/dL (06-12-20 @ 05:00)  Creatinine, Serum: 3.6 mg/dL (06-11-20 @ 16:00)      ANTIBIOTICS:  DAPTOmycin IVPB 250 milliGRAM(s) IV Intermittent every 48 hours  meropenem  IVPB 500 milliGRAM(s) IV Intermittent every 12 hours      All available historical records have been reviewed

## 2020-06-12 NOTE — PROGRESS NOTE ADULT - SUBJECTIVE AND OBJECTIVE BOX
Podiatry Progress Note    Subjective:   WILL VIEYRA is a pleasant well-nourished, well developed 80y Male in no acute distress, alert awake, and oriented to person, place and time.    Patient is a 80y old  Male who presents with a chief complaint of necrotizing fascitis (12 Jun 2020 12:40)  Patient was seen Bedside w/ Attending during PM Rounds;     PAST MEDICAL & SURGICAL HISTORY:  Chronic kidney disease  Anemia  Ascites  PAD (peripheral artery disease)  Hyperlipidemia  Hypothyroidism  Hypertension  Coronary artery disease  CHF (congestive heart failure)  Diabetes mellitus  Biventricular ICD (implantable cardioverter-defibrillator) in place  Amputation of toe of right foot  S/P arterial stent  S/P CABG x 5     Objective:  Vital Signs Last 24 Hrs  T(C): 35.6 (12 Jun 2020 05:55), Max: 35.6 (12 Jun 2020 05:55)  T(F): 96 (12 Jun 2020 05:55), Max: 96 (12 Jun 2020 05:55)  HR: 70 (12 Jun 2020 05:55) (70 - 70)  BP: 104/56 (12 Jun 2020 05:55) (104/56 - 119/58)  BP(mean): 74 (12 Jun 2020 05:55) (74 - 74)  RR: 18 (12 Jun 2020 05:55) (18 - 18)  SpO2: --                        7.3    15.18 )-----------( 152      ( 12 Jun 2020 05:00 )             21.3                 06-12    124<L>  |  87<L>  |  97<HH>  ----------------------------<  42<LL>  5.0   |  19  |  3.7<H>    Ca    6.8<L>      12 Jun 2020 05:00  Phos  6.7     06-11  Mg     2.4     06-12    TPro  5.2<L>  /  Alb  2.4<L>  /  TBili  0.5  /  DBili  x   /  AST  52<H>  /  ALT  16  /  AlkPhos  227<H>  06-12    Derm:   Open Surgical Wound; Medial Aspect of 1st Ray; w/ Sx Incision Plantar Aspect; Just Proximal to the Calcaneus   Exposed Bone and Tendon; Clean Wound Margins; w/ Red Granular Wound Base   Mild Erythema of Midfoot w/ Pinpoint Bleeding at Surgical Site;   Ischemic Tissue Noted on the Dorsal Wound Margins     Vascular: DP and PT Pulses Diminished; Foot is Warm to Warm to the touch   Neuro: Protective Sensation Mildly Intact;     Assessment:   s/p Debridement w/ 1st Ray Resection; Left Foot; 6/8;   s/p Debridement of Left Foot; 6/10;   Open Surgical Site of Medial Aspect of Left Foot; Bone Exposed;      Vascular; DP, PT and AT was Dopplerable on the Left Foot;   Neuro; Protective Sensation Diminished;     Plan:  Chart reviewed and Patient evaluated. Discussed diagnosis and treatment with Pt.   Local Wound Care Orders; Flush w/ Normal Saline; Apply Dakins Soaked Gauze / DSD / ABD / Kerlix / Ace Wrap; Q8 TID  Continue w/ Local Wound Care; Dakins; Q8; TID  Patient Scheduled for Further Debridement on Monday 6/15 @ 9:30AM w/ Dr. Clayton  NPO @ Midnight Sunday 6/14; Pre-Op Labs; Optimization  Discussed Plan w/ Dr. Clayton     Podiatry [id number]

## 2020-06-12 NOTE — PROGRESS NOTE ADULT - SUBJECTIVE AND OBJECTIVE BOX
Hospital Day:  8d    Subjective:    Patient is a 80y old  Male who presents with a chief complaint of necrotizing fascitis (12 Jun 2020 10:42)  This morning patient is lying in bed comfortably. He continues to report no new complaints, including SOB, chest pain, abdominal pain, nausea, vomiting, dysuria, constipation, or diarrhea.      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    Allergies:  Byetta (Stomach Upset)  linezolid (Rash; Urticaria; Flushing)  melatonin (Other)    Current Meds:   [facility] Meds:  aspirin enteric coated 81 milliGRAM(s) Oral daily  atorvastatin 80 milliGRAM(s) Oral at bedtime  calcitriol   Capsule 0.25 MICROGram(s) Oral daily  calcium acetate 1334 milliGRAM(s) Oral three times a day with meals  carvedilol 3.125 milliGRAM(s) Oral every 12 hours  chlorhexidine 4% Liquid 1 Application(s) Topical <User Schedule>  Dakins Solution - 1/2 Strength 1 Application(s) Topical daily  DAPTOmycin IVPB 250 milliGRAM(s) IV Intermittent every 48 hours  dextrose 50% Injectable 12.5 Gram(s) IV Push once  dextrose 50% Injectable 25 Gram(s) IV Push once  dextrose 50% Injectable 25 Gram(s) IV Push once  digoxin     Tablet 0.125 milliGRAM(s) Oral every other day  folic acid 1 milliGRAM(s) Oral daily  furosemide    Tablet 40 milliGRAM(s) Oral daily  gabapentin 200 milliGRAM(s) Oral daily  heparin   Injectable 5000 Unit(s) SubCutaneous every 8 hours  insulin glargine Injectable (LANTUS) 9 Unit(s) SubCutaneous every morning  insulin lispro (HumaLOG) corrective regimen sliding scale   SubCutaneous three times a day before meals  insulin lispro Injectable (HumaLOG) 3 Unit(s) SubCutaneous three times a day before meals  meropenem  IVPB 500 milliGRAM(s) IV Intermittent every 12 hours  midodrine. 5 milliGRAM(s) Oral three times a day  pantoprazole    Tablet 40 milliGRAM(s) Oral before breakfast  sodium bicarbonate 650 milliGRAM(s) Oral every 6 hours  sodium zirconium cyclosilicate 10 Gram(s) Oral two times a day    PRN Meds:  acetaminophen   Tablet .. 650 milliGRAM(s) Oral every 6 hours PRN Mild Pain (1 - 3)  aluminum hydroxide/magnesium hydroxide/simethicone Suspension 30 milliLiter(s) Oral every 6 hours PRN Dyspepsia  dextrose 40% Gel 15 Gram(s) Oral once PRN Blood Glucose LESS THAN 70 milliGRAM(s)/deciliter  glucagon  Injectable 1 milliGRAM(s) IntraMuscular once PRN Glucose LESS THAN 70 milligrams/deciliter    HOME MEDICATIONS:  aspirin 81 mg oral tablet: 1 tab(s) orally once a day  atorvastatin 80 mg oral tablet: 1 tab(s) orally once a day  Digitek 125 mcg (0.125 mg) oral tablet: orally every other day (at bedtime)  famotidine 10 mg oral tablet: 1 tab(s) orally once (at bedtime)  ferrous sulfate 325 mg (65 mg elemental iron) oral tablet: 1 tab(s) orally every other day (at bedtime)  folic acid 1 mg oral tablet: 1 tab(s) orally once a day  gabapentin 100 mg oral tablet: 2 tab(s) orally once a day (at bedtime)  Januvia 50 mg oral tablet: 1 tab(s) orally once a day  losartan 25 mg oral tablet: 0.5 tab(s) orally once a day  spironolactone 50 mg oral tablet: 1 tab(s) orally 2 times a day  torsemide 100 mg oral tablet: 1 tab(s) orally once a day      Vital Signs:   T(F): 96 (06-12-20 @ 05:55), Max: 96 (06-12-20 @ 05:55)  HR: 70 (06-12-20 @ 05:55) (70 - 70)  BP: 104/56 (06-12-20 @ 05:55) (104/56 - 119/58)  RR: 18 (06-12-20 @ 05:55) (18 - 18)      Physical Exam:   GENERAL: NAD  HEENT: NCAT  CHEST/LUNG: CTAB  HEART: Regular rate and rhythm; s1 s2 appreciated, No murmurs, rubs, or gallops  ABDOMEN: Soft, Nontender, Nondistended; Bowel sounds present  EXTREMITIES: No LE edema b/l  NERVOUS SYSTEM:  Alert & Oriented X3      Labs:                         7.3    15.18 )-----------( 152      ( 12 Jun 2020 05:00 )             21.3     Neutophil% 87.9, Lymphocyte% 4.6, Monocyte% 5.0, Bands% 0.8 06-12-20 @ 05:00    12 Jun 2020 05:00    124    |  87     |  97     ----------------------------<  42     5.0     |  19     |  3.7      Ca    6.8        12 Jun 2020 05:00  Phos  6.7       11 Jun 2020 05:30  Mg     2.4       12 Jun 2020 05:00    TPro  5.2    /  Alb  2.4    /  TBili  0.5    /  DBili  x      /  AST  52     /  ALT  16     /  AlkPhos  227    12 Jun 2020 05:00            Serum Pro-Brain Natriuretic Peptide: >15647 pg/mL (06-04-20 @ 04:12)    Troponin --, CKMB --,  06-10-20 @ 06:18  Troponin --, CKMB --,  06-09-20 @ 04:15              Culture - Blood (collected 06-10-20 @ 06:18)  Source: .Blood None  Preliminary Report (06-11-20 @ 13:02):    No growth to date.    Culture - Blood (collected 06-09-20 @ 04:15)  Source: .Blood Blood-Venous  Preliminary Report (06-10-20 @ 13:02):    No growth to date.    Culture - Blood (collected 06-09-20 @ 04:15)  Source: .Blood Blood  Preliminary Report (06-10-20 @ 13:02):    No growth to date.    Culture - Blood (collected 06-08-20 @ 04:54)  Source: .Blood None  Preliminary Report (06-09-20 @ 12:01):    No growth to date.    Culture - Blood (collected 06-07-20 @ 05:20)  Source: .Blood None  Preliminary Report (06-08-20 @ 13:02):    No growth to date.    Culture - Blood (collected 06-07-20 @ 05:20)  Source: .Blood None  Preliminary Report (06-08-20 @ 13:02):    No growth to date.    Culture - Blood (collected 06-06-20 @ 05:22)  Source: .Blood None  Final Report (06-11-20 @ 10:01):    No Growth Final            Assessment and Plan:     80 M with PMHx of anemia, ascites 2/2 CHF, CHF s/p biventricular AICD, CKD, CAD s/p CABGx5 on ASA, CKD, DM s/p R great toe amputation, HLD, HTN, hypothyroidism, PAD s/p R femoral stent who presents with altered mental status x 1 day and worsening left foot DFU. Was on augmentin as outpatient     #Polymicrobial bacteremia secondary to necrotizing L foot infection   - Xray of left foot demonstrates SQ emphysema of first digit MTP joint; concerning for soft tissue infection   - bedside I&D 6/4/2020. deep dissection of left foot by podiatry 6/5/2020 and 6/8/2020  - S/p LLE debridement on 6/10/2020. Further podiatry recs pending  - OR Sx positive 6/4 for numerous E. Coli and Proteus and MRSA   - BCx 6/4/2020 positive for Proteus ESBL and GBS. BCx 6/6, 6/9, 6/10 negative.   - ID: c/w Daptomycin 6mg/kg [id number].  - Podiatry: Wound care orders: Flush w/ Dakins and apply xeroform packing/DSD/ABD/Kerlix/Light ace; Pending debridement of LLE.  - Arterial Duplex with normal flow, EF = <20%   - Patient will need PICC line. Consult placed    # Normocytic anemia  - S/p 2 units on 6/10  - Unsure where patient is losing blood. No signs of GI bleed or hematomas. Perhaps he is bleeding from his surgical site, but is unlikely he is bleeding enough from there to be anemic  - Will obtain CT a/p to check for retroperitoneal bleeding, and GI consult  - Will transfuse another unit. Will monitor    # HFrEF w/ BiVentricular AICD, h/o CHB  - Pleural effusions and ascities seen on X-ray. Will restart lasix  - midodrine for low BP  - TTE from [facility] from 2/2020 EF 20%, severe TR, akinetic apex, inf spetum, mid and apical anterior septum. Mild-mod MR, + ascites from CHF. Now EF <20%   - strict Is and Os   - BNP >70k on admission  - PO Coreg 3.125 BID and PO Lasix 40mg daily   - Aldactone held as K is upper normal.     # MICHAEL on CKD. Baseline cr 1.8-2.2. With improving hyperkalemia  - Likely prerenal due to CHF, perhaps ATN as well  - Lokelema 10mg Po q12 as per nephro.   - 6/8 Dig level 1.0.  Will repeat level today  - Phoslo TID and calcitriol per nephro  - Aldactone and losartan held  - U/S shows normal kidneys  - Will monitor    #Hyponatremia  - Currently 123  - Mike 20, UOsm 294 Ordered. Suggestive of CHF. Will obtain another urine Na  - Serum Osmolality 308. Lipid profile negative for HLD induced hyponatremia.   - Will monitor    # Type II NSTEMI   - trop 0.17  - EKG without ischemic changes    # DM   - Monitor Fs.   - A1C: 10.6    # CAD s/p CABGx5  - on ASA, statin    Diet: DASH; Carb Consistent   DVT ppx; Heparin SubQ  GI ppx: PTX  Dispo: Acute

## 2020-06-13 NOTE — CHART NOTE - NSCHARTNOTEFT_GEN_A_CORE
podiatry    add on surgery tomorrow at 8am, ex dbx st b left foot  NPO MN  pre op labs  optimization  attending updated    spectra [phone] podiatry    add on surgery tomorrow at 8am, ex dbx st b left foot  NPO MN  pre op labs  optimization prior to sx  attending updated    spectra [phone]

## 2020-06-13 NOTE — PROGRESS NOTE ADULT - ASSESSMENT
Sepsis / bacteremia / necrotising fascitis / MICHAEL/ stage 4 ckd:  #  ATN most likely/creatinine stable   #  please document UO/ bladder scan   #   k noted continue  lokelma 10 q 12  # hyponatremia ,  ? volume related ,restarted in lasix   #BP noted, continue midodrine   # dig level 1.2,   # phos noted, on  phoslo 1/1/1, corrected calcium around 8,  on  phoslo 2/2/2,  and  calcitriol 0.25 q 24, check pth   # acidosis , continue  po sodium bicarb 650 mg q 6 hr.  # podiatry notes appreciated , s/p OR   #on roxanna/ dapto followed by ID   # no acute indication for RRT / might need to start if no improvement   # will follow

## 2020-06-13 NOTE — PROGRESS NOTE ADULT - SUBJECTIVE AND OBJECTIVE BOX
PODIATRY PROGRESS NOTE   [id number] WILL VIEYRA is a pleasant well-nourished, well developed 80y Male in no acute distress, alert awake, and oriented to person, place and time.   Patient is a 80y old  Male who presents with a chief complaint of necrotizing fascitis (13 Jun 2020 07:37)    HPI:  80 M with PMHx of anemia, ascites 2/2 CHF, CHF s/p biventricular AICD, CKD, CAD s/p CABGx5 on ASA, CKD, DM s/p R big toe amputation, HLD, HTN, hypothyroidism, PAD s/p R femoral stent who presents with altered mental status x 1 day and worsening left foot DFU. The pt's daughter states that he saw his L hallux which looked worse, and necrotic from the prior week. He was also forgetful. She checked his temp at home, it was 100.2F, so she gave him tylenol. She states the pt had been taking augmentin for his DFU for some time. She thinks he forgot to take his meds for several days due to the infection.   She also states, the pt was recently dx with HFrEF, biventricular aicd placed february 2020. She has noticed an 8 lb weight gain in the past 4 days.   No fever. No vomiting. No chills. No back pain. No abd pain. No neck stiffness. No abd pain.     In the ED: VS were stable. The pt states he took tylenol prior to coming in to the ED.  xray of the foot showed Subcutaneous gas, podiatry was c/s. Pt was given clindamycin, zosyn, vanco. Labs notable for WBC 19 with L shift, plt 126, BUN/Cr 94/3.2, trop 0.17, BNP >70k, lactate 2.5. (04 Jun 2020 05:39)    pt seen and assessed am rounds at bedside.  pt dressing clean dry intact.       Vital Signs Last 24 Hrs  T(C): 35.7 (13 Jun 2020 05:25), Max: 36.4 (12 Jun 2020 22:04)  T(F): 96.2 (13 Jun 2020 05:25), Max: 97.5 (12 Jun 2020 22:04)  HR: 70 (13 Jun 2020 05:25) (70 - 71)  BP: 104/59 (13 Jun 2020 05:25) (104/59 - 125/66)  BP(mean): --  RR: 19 (13 Jun 2020 05:25) (18 - 19)  SpO2: 97% (12 Jun 2020 22:04) (97% - 97%)                        9.0    18.03 )-----------( 136      ( 12 Jun 2020 17:22 )             26.7                 06-12    124<L>  |  87<L>  |  97<HH>  ----------------------------<  42<LL>  5.0   |  19  |  3.7<H>    Ca    6.8<L>      12 Jun 2020 05:00  Mg     2.4     06-12    TPro  5.2<L>  /  Alb  2.4<L>  /  TBili  0.5  /  DBili  x   /  AST  52<H>  /  ALT  16  /  AlkPhos  227<H>  06-12    Derm:   Open Surgical Wound; Medial Aspect of 1st Ray; w/ Sx Incision Plantar Aspect; Just Proximal to the Calcaneus   Exposed Bone and Tendon; granular necrotic wound base  Mild Erythema of Midfoot w/ Pinpoint Bleeding at Surgical Site;   worsening skin discoloration noted dorsal forefoot and plantar foot    Vascular: DP and PT Pulses Diminished; Foot is Warm to Warm to the touch   Neuro: Protective Sensation Mildly Intact;     Assessment:   s/p Debridement w/ 1st Ray Resection; Left Foot; 6/8;   s/p Debridement of Left Foot; 6/10;   Open Surgical Site of Medial Aspect of Left Foot; Bone/tendong/ligaments Exposed with granular necrotic wound base;  worsening skin discoloration dorsal and plantar foot    Vascular; DP, PT and AT was Dopplerable on the Left Foot;   Neuro; Protective Sensation Diminished;     Plan:  Chart reviewed and Patient evaluated. Discussed diagnosis and treatment with Pt.   Local Wound Care Orders; Flush w/ Normal Saline; Apply Dakins Soaked Gauze / DSD / ABD / Kerlix / Ace Wrap; Q8 TID  Continue w/ Local Wound Care; wash with soap and water, apply dakins wet to dry dsd abd kerlix ace TID  Patient Scheduled for Further Debridement on Monday 6/15 @ 9:30AM w/ Dr. Clayton  NPO @ Midnight Sunday 6/14; Pre-Op Labs; Optimization  Discussed Plan w/ Dr. Clayton     Podiatry   06-13-20 @ 08:26

## 2020-06-13 NOTE — PROGRESS NOTE ADULT - CONVERSATION DETAILS
Pts clinical status,  diagnosis, and poor prognosis was discussed with pt's daughter. She is aware of poor prognosis. But wants him to be Full code for now

## 2020-06-13 NOTE — PROGRESS NOTE ADULT - ASSESSMENT
80 M with PMHx of anemia, ascites 2/2 CHF, CHF s/p biventricular AICD, CKD, CAD s/p CABGx5 on ASA, CKD, DM s/p R big toe amputation, HLD, HTN, hypothyroidism, PAD s/p R femoral stent who presents with altered mental status x 1 day and worsening left foot DFU. The pt's daughter states that he saw his L hallux which looked worse, and necrotic from the prior week. He was also forgetful. She checked his temp at home, it was 100.2F, so she gave him tylenol. She states the pt had been taking augmentin for his DFU for some time. She thinks he forgot to take his meds for several days due to the infection.   She also states, the pt was recently dx with HFrEF, biventricular aicd placed february 2020. She has noticed an 8 lb weight gain in the past 4 days.     # Sepsis POA, lactic acidosis resolved  # Metabolic encephalopathy sec to sepsis resolved  #Polymicrobial bacteremia secondary to necrotizing Left  foot infection   -  Xray Foot AP + Lateral + Oblique, Left (06.08.20 @ 16:51) >Status post hallux metatarsal and great toe amputation sparing 1 cm of the base. Otherwise intact tarsal metatarsal alignment. Previously noted subcutaneous emphysema within soft tissues has been resected. No proximal residual gas. Vascular calcificationsand hindfoot neuropathic osteoarthropathy  -  VA Duplex Lower Extrem Arterial, Bilat (06.04.20 @ 09:16) >Normal arterial flow in bilateral lower extremities.  - bedside I&D 6/4/2020. deep dissection of left foot by podiatry 6/5/2020  - s/p Debridement w/ 1st Ray Resection; Left Foot; 6/8  - s/p Debridement of Left Foot; 6/10  - Sedimentation Rate, Erythrocyte: 79 mm/Hr (06-05-20 @ 04:14)  - C-Reactive Protein, Serum: 23.70 mg/dL (06-05-20 @ 04:14)  - blood cultures 6/4 : Proteus ESBL and GBS. blood cultures 6/6, 6/9, 6/10 negative.   - wound cultures  6/4 :  E. Coli , Proteus, MRSA   -  c/w Daptomycin, Meropenem  - Continue w/ Local Wound Care; wash with soap and water, apply dakins wet to dry dsd abd kerlix ace TID  - podiatry F/u : Patient Scheduled for Further Debridement on Monday 6/15 @ 9:30AM w/ Dr. Clayton  - Patient will need PICC line. IR consulted    # Acute kidney injury on CKD stage 4 sec to ATN, metabolic acidosis, Hyponatremia  # Hyperkalemia-resolved  - base line creat 2.2  -  US Kidney and Bladder (06.10.20 @ 22:59) >Normal-appearing kidneys. Bilateral pleural effusions with ascites.  - losartan, aldactone held  -  c/w lokelema , Phoslo, calcitriol   - c/w sodium bicarb  - monitor UO  - bladder scan  - Monitor BMP  - low k diet    # Acute on chronic systolic CHF with bilateral pleural effussions and ascitis S/p biventricular AICD  -  Transthoracic Echocardiogram (06.04.20 @ 12:04) >Left ventricular ejection fraction, by visual estimation, is <20%.Grade I diastolic dysfunction). Moderate tricuspid regurgitation.borderline pulmonary hypertension.  - Xray Chest 1 View- PORTABLE-Routine (06.11.20 @ 08:55) >Stable bibasilar opacity/effusions.   - monitor I/o, daily weight, restrict fluids  - c/w lasix, coreg, digoxin  - losartan and aldactone held    # Elevated troponin sec to Type 2 MI, H/o CAD s/p CABGx5  - Troponin T, Serum: 0.12: Critical value: ng/mL (06.04.20 @ 11:12)  - 12 Lead ECG (06.10.20 @ 07:57) >Ventricular-paced rhythm. Biventricular pacemaker detected  - c/w ASA, statin, coreg , digoxin    # DM type 2with hypoglycemia  - A1C with Estimated Average Glucose Result: 10.6 % (06.06.20 @ 05:22)  - monitor FS  - hold lantus  - insulin ssc    # Acute on chronic normocytic anemia- multifactorial  - S/p 2 units on 6/10 and 1 unit 6/12  - evaluated by GI outpt EGD/colonoscopy  - F/u CT abd/pelvis    # DVT prophylaxis    # Full code    # Pending: left foot Debridement on Monday 6/15, monitor BMP, CT abd/pelvis  # Discussed plan of care with daughter  # Disposition: TBD 80 M with PMHx of anemia, ascites 2/2 CHF, CHF s/p biventricular AICD, CKD, CAD s/p CABGx5 on ASA, CKD, DM s/p R big toe amputation, HLD, HTN, hypothyroidism, PAD s/p R femoral stent who presents with altered mental status x 1 day and worsening left foot DFU. The pt's daughter states that he saw his L hallux which looked worse, and necrotic from the prior week. He was also forgetful. She checked his temp at home, it was 100.2F, so she gave him tylenol. She states the pt had been taking augmentin for his DFU for some time. She thinks he forgot to take his meds for several days due to the infection.   She also states, the pt was recently dx with HFrEF, biventricular aicd placed february 2020. She has noticed an 8 lb weight gain in the past 4 days.     # Sepsis POA, lactic acidosis resolved  # Metabolic encephalopathy sec to sepsis resolved  #Polymicrobial bacteremia secondary to necrotizing Left  foot infection   -  Xray Foot AP + Lateral + Oblique, Left (06.08.20 @ 16:51) >Status post hallux metatarsal and great toe amputation sparing 1 cm of the base. Otherwise intact tarsal metatarsal alignment. Previously noted subcutaneous emphysema within soft tissues has been resected. No proximal residual gas. Vascular calcificationsand hindfoot neuropathic osteoarthropathy  -  VA Duplex Lower Extrem Arterial, Bilat (06.04.20 @ 09:16) >Normal arterial flow in bilateral lower extremities.  - bedside I&D 6/4/2020. deep dissection of left foot by podiatry 6/5/2020  - s/p Debridement w/ 1st Ray Resection; Left Foot; 6/8  - s/p Debridement of Left Foot; 6/10  - Sedimentation Rate, Erythrocyte: 79 mm/Hr (06-05-20 @ 04:14)  - C-Reactive Protein, Serum: 23.70 mg/dL (06-05-20 @ 04:14)  - blood cultures 6/4 : Proteus ESBL and GBS. blood cultures 6/6, 6/9, 6/10 negative.   - wound cultures  6/4 :  E. Coli , Proteus, MRSA   -  c/w Daptomycin, Meropenem  - Continue w/ Local Wound Care; wash with soap and water, apply dakins wet to dry dsd abd kerlix ace TID  - podiatry F/u : Patient Scheduled for Further Debridement on sunday 6/14@ 9:30AM w/ Dr. Clayton  - Patient will need PICC line. IR consulted    # Acute kidney injury on CKD stage 4 sec to ATN, metabolic acidosis, Hyponatremia  # Hyperkalemia-resolved  - base line creat 2.2  -  US Kidney and Bladder (06.10.20 @ 22:59) >Normal-appearing kidneys. Bilateral pleural effusions with ascites.  - losartan, aldactone held  -  c/w lokelema , Phoslo, calcitriol   - c/w sodium bicarb  - monitor UO  - bladder scan  - Monitor BMP  - low k diet    # Acute on chronic systolic CHF with bilateral pleural effussions and ascitis S/p biventricular AICD  -  Transthoracic Echocardiogram (06.04.20 @ 12:04) >Left ventricular ejection fraction, by visual estimation, is <20%.Grade I diastolic dysfunction). Moderate tricuspid regurgitation.borderline pulmonary hypertension.  - Xray Chest 1 View- PORTABLE-Routine (06.11.20 @ 08:55) >Stable bibasilar opacity/effusions.   - monitor I/o, daily weight, restrict fluids  - c/w lasix, coreg, digoxin  - losartan and aldactone held    # Elevated troponin sec to Type 2 MI, H/o CAD s/p CABGx5  - Troponin T, Serum: 0.12: Critical value: ng/mL (06.04.20 @ 11:12)  - 12 Lead ECG (06.10.20 @ 07:57) >Ventricular-paced rhythm. Biventricular pacemaker detected  - c/w ASA, statin, coreg , digoxin    # DM type 2with hypoglycemia  - A1C with Estimated Average Glucose Result: 10.6 % (06.06.20 @ 05:22)  - monitor FS  - hold lantus  - insulin ssc    # Acute on chronic normocytic anemia- multifactorial  - S/p 2 units on 6/10 and 1 unit 6/12  - evaluated by GI outpt EGD/colonoscopy  - F/u CT abd/pelvis    # DVT prophylaxis    # Full code    # Pending: left foot Debridement on sunday 6/14, monitor BMP, CT abd/pelvis  # Discussed plan of care with daughter  # Disposition: TBD 80 M with PMHx of anemia, ascites 2/2 CHF, CHF s/p biventricular AICD, CKD, CAD s/p CABGx5 on ASA, CKD, DM s/p R big toe amputation, HLD, HTN, hypothyroidism, PAD s/p R femoral stent who presents with altered mental status x 1 day and worsening left foot DFU. The pt's daughter states that he saw his L hallux which looked worse, and necrotic from the prior week. He was also forgetful. She checked his temp at home, it was 100.2F, so she gave him tylenol. She states the pt had been taking augmentin for his DFU for some time. She thinks he forgot to take his meds for several days due to the infection.   She also states, the pt was recently dx with HFrEF, biventricular aicd placed february 2020. She has noticed an 8 lb weight gain in the past 4 days.     # Sepsis POA, lactic acidosis resolved  # Metabolic encephalopathy sec to sepsis resolved  #Polymicrobial bacteremia secondary to necrotizing Left  foot infection   -  Xray Foot AP + Lateral + Oblique, Left (06.08.20 @ 16:51) >Status post hallux metatarsal and great toe amputation sparing 1 cm of the base. Otherwise intact tarsal metatarsal alignment. Previously noted subcutaneous emphysema within soft tissues has been resected. No proximal residual gas. Vascular calcificationsand hindfoot neuropathic osteoarthropathy  -  VA Duplex Lower Extrem Arterial, Bilat (06.04.20 @ 09:16) >Normal arterial flow in bilateral lower extremities.  - bedside I&D 6/4/2020. deep dissection of left foot by podiatry 6/5/2020  - s/p Debridement w/ 1st Ray Resection; Left Foot; 6/8  - s/p Debridement of Left Foot; 6/10  - Sedimentation Rate, Erythrocyte: 79 mm/Hr (06-05-20 @ 04:14)  - C-Reactive Protein, Serum: 23.70 mg/dL (06-05-20 @ 04:14)  - blood cultures 6/4 : Proteus ESBL and GBS. blood cultures 6/6, 6/9, 6/10 negative.   - wound cultures  6/4 :  E. Coli , Proteus, MRSA   -  c/w Daptomycin, Meropenem  - Continue w/ Local Wound Care; wash with soap and water, apply dakins wet to dry dsd abd kerlix ace TID  - podiatry F/u : Patient Scheduled for Further Debridement on sunday 6/14@ 9:30AM w/ Dr. Clayton  - Patient will need PICC line. IR consulted    # Acute kidney injury on CKD stage 4 sec to ATN, metabolic acidosis, Hyponatremia  # Hyperkalemia-resolved  - base line creat 2.2  -  US Kidney and Bladder (06.10.20 @ 22:59) >Normal-appearing kidneys. Bilateral pleural effusions with ascites.  - losartan, aldactone held  -  c/w lokelema , Phoslo, calcitriol   - c/w sodium bicarb  - monitor UO  - bladder scan  - Monitor BMP  - low k diet    # Acute on chronic systolic CHF with bilateral pleural effussions and ascitis S/p biventricular AICD  -  Transthoracic Echocardiogram (06.04.20 @ 12:04) >Left ventricular ejection fraction, by visual estimation, is <20%.Grade I diastolic dysfunction). Moderate tricuspid regurgitation.borderline pulmonary hypertension.  - Xray Chest 1 View- PORTABLE-Routine (06.11.20 @ 08:55) >Stable bibasilar opacity/effusions.   - monitor I/o, daily weight, restrict fluids  - c/w lasix, coreg, digoxin  - losartan and aldactone held  - Consult heart failure specialist    # Elevated troponin sec to Type 2 MI, H/o CAD s/p CABGx5  - Troponin T, Serum: 0.12: Critical value: ng/mL (06.04.20 @ 11:12)  - 12 Lead ECG (06.10.20 @ 07:57) >Ventricular-paced rhythm. Biventricular pacemaker detected  - c/w ASA, statin, coreg , digoxin    # DM type 2with hypoglycemia  - A1C with Estimated Average Glucose Result: 10.6 % (06.06.20 @ 05:22)  - monitor FS  - hold lantus  - insulin ssc    # Acute on chronic normocytic anemia- multifactorial  - S/p 2 units on 6/10 and 1 unit 6/12  - evaluated by GI outpt EGD/colonoscopy  - F/u CT abd/pelvis    # DVT prophylaxis    # Poor prognosis    # Full code, GOC discusssed with pt's daughter    # Pending: left foot Debridement on sunday 6/14, monitor BMP, CT abd/pelvis, heart failure specialist  # Discussed plan of care with daughter  # Disposition: TBD

## 2020-06-13 NOTE — CHART NOTE - NSCHARTNOTEFT_GEN_A_CORE
podiatry    dressing strikethrough left foot  applied surgicel dakins wet to dry abd kerlix ace  please keep dressing intact until tomorrow  repeat CBC at 4pm, if hemoglobin <8, will likely require 1 prbc prior to sx tomorrow morning  npo mn  pre op labs  optimization  bedrest  attending updated

## 2020-06-13 NOTE — PROGRESS NOTE ADULT - SUBJECTIVE AND OBJECTIVE BOX
Patient accompanied by:no one    APPETITE: normal  STOOLS: normal  VARICELLA STATUS: confirmed by vaccine administration  SCHOOL HISTORY:       Grade - College Freshman       Extracurricular activities - NO  CONCERNS:   Medications reviewed and updated.  Denies known Latex allergy or symptoms of Latex sensitivity.  Health Maintenance Due   Topic Date Due   • Meningococcal Vaccine (2 of 2) 11/16/2014        Patient due for topics as listed above  Wears glasses.    Patient Information:  WILL VIEYRA / 80y / Male / MRN#:[id number]    Hospital Day: 9d    HPI:  80 M with PMHx of anemia, ascites 2/2 CHF, CHF s/p biventricular AICD, CKD, CAD s/p CABGx5 on ASA, CKD, DM s/p R big toe amputation, HLD, HTN, hypothyroidism, PAD s/p R femoral stent who presents with altered mental status x 1 day and worsening left foot DFU. The pt's daughter states that he saw his L hallux which looked worse, and necrotic from the prior week. He was also forgetful. She checked his temp at home, it was 100.2F, so she gave him tylenol. She states the pt had been taking augmentin for his DFU for some time. She thinks he forgot to take his meds for several days due to the infection.   She also states, the pt was recently dx with HFrEF, biventricular aicd placed february 2020. She has noticed an 8 lb weight gain in the past 4 days.   No fever. No vomiting. No chills. No back pain. No abd pain. No neck stiffness. No abd pain.     In the ED: VS were stable. The pt states he took tylenol prior to coming in to the ED.  xray of the foot showed Subcutaneous gas, podiatry was c/s. Pt was given clindamycin, zosyn, vanco. Labs notable for WBC 19 with L shift, plt 126, BUN/Cr 94/3.2, trop 0.17, BNP >70k, lactate 2.5.    Interval History:  Patient seen and examined at bedside. No acute events overnight. Hypoglycemic but Asx this AM.    Past Medical History:  Chronic kidney disease  Anemia  Ascites  PAD (peripheral artery disease)  Hyperlipidemia  Hypothyroidism  Hypertension  Coronary artery disease  CHF (congestive heart failure)  Diabetes mellitus    Past Surgical History:  Biventricular ICD (implantable cardioverter-defibrillator) in place  Amputation of toe of right foot  S/P arterial stent  S/P CABG x 5    Allergies:  Byetta (Stomach Upset)  linezolid (Rash; Urticaria; Flushing)  melatonin (Other)    Medications:  PRN:  acetaminophen   Tablet .. 650 milliGRAM(s) Oral every 6 hours PRN Mild Pain (1 - 3)  aluminum hydroxide/magnesium hydroxide/simethicone Suspension 30 milliLiter(s) Oral every 6 hours PRN Dyspepsia  dextrose 40% Gel 15 Gram(s) Oral once PRN Blood Glucose LESS THAN 70 milliGRAM(s)/deciliter  glucagon  Injectable 1 milliGRAM(s) IntraMuscular once PRN Glucose LESS THAN 70 milligrams/deciliter    Standing:  aspirin enteric coated 81 milliGRAM(s) Oral daily  atorvastatin 80 milliGRAM(s) Oral at bedtime  calcitriol   Capsule 0.25 MICROGram(s) Oral daily  calcium acetate 1334 milliGRAM(s) Oral three times a day with meals  carvedilol 3.125 milliGRAM(s) Oral every 12 hours  chlorhexidine 4% Liquid 1 Application(s) Topical <User Schedule>  Dakins Solution - 1/2 Strength 1 Application(s) Topical three times a day  DAPTOmycin IVPB 250 milliGRAM(s) IV Intermittent every 48 hours  dextrose 10% Bolus 250 milliLiter(s) IV Bolus once  dextrose 50% Injectable 12.5 Gram(s) IV Push once  dextrose 50% Injectable 25 Gram(s) IV Push once  dextrose 50% Injectable 25 Gram(s) IV Push once  digoxin     Tablet 0.125 milliGRAM(s) Oral every other day  folic acid 1 milliGRAM(s) Oral daily  furosemide    Tablet 40 milliGRAM(s) Oral daily  gabapentin 200 milliGRAM(s) Oral daily  heparin   Injectable 5000 Unit(s) SubCutaneous every 8 hours  insulin lispro (HumaLOG) corrective regimen sliding scale   SubCutaneous three times a day before meals  meropenem  IVPB 500 milliGRAM(s) IV Intermittent every 12 hours  midodrine. 5 milliGRAM(s) Oral three times a day  pantoprazole    Tablet 40 milliGRAM(s) Oral before breakfast  sodium bicarbonate 650 milliGRAM(s) Oral every 6 hours  sodium zirconium cyclosilicate 10 Gram(s) Oral two times a day    Home:  aspirin 81 mg oral tablet: 1 tab(s) orally once a day  atorvastatin 80 mg oral tablet: 1 tab(s) orally once a day  Digitek 125 mcg (0.125 mg) oral tablet: orally every other day (at bedtime)  famotidine 10 mg oral tablet: 1 tab(s) orally once (at bedtime)  ferrous sulfate 325 mg (65 mg elemental iron) oral tablet: 1 tab(s) orally every other day (at bedtime)  folic acid 1 mg oral tablet: 1 tab(s) orally once a day  gabapentin 100 mg oral tablet: 2 tab(s) orally once a day (at bedtime)  Januvia 50 mg oral tablet: 1 tab(s) orally once a day  losartan 25 mg oral tablet: 0.5 tab(s) orally once a day  spironolactone 50 mg oral tablet: 1 tab(s) orally 2 times a day  torsemide 100 mg oral tablet: 1 tab(s) orally once a day    Vitals:  T(C): 35.7, Max: 36.4 (06-12-20 @ 22:04)  T(F): 96.2, Max: 97.5 (06-12-20 @ 22:04)  HR: 70 (70 - 71)  BP: 104/59 (104/59 - 125/66)  RR: 19 (18 - 19)  SpO2: 97% (97% - 97%)    Physical Exam:  General: Awake, Alert. Not in acute distress.  Heart: Regular rate and rhythm; S1, S2; No murmurs.  Lungs: Clear to auscultation bilaterally.  Abdomen: Soft, nontender, nondistended.  Extremities: No edema in upper or lower extremities.  Neuro: AAOx3, b/l LE weakness    Labs:  CBC (06-13 @ 07:16)                        Hgb: 8.0    WBC: 25.54 )-----------------( Plts: 129                              Hct: 23.5     Chem (06-13 @ 07:16)  Na: 125  |     Cl: 89     |  BUN: 98  -----------------------------------------< Gluc: 35    K: 4.8   |    HCO3: 19  |  Cr: 3.5    Ca 6.9 (06-13 @ 07:16)  Mg 2.5 (06-13 @ 07:16)    LFTs (06-13 @ 07:16)  TPro 5.2  /  Alb 2.4  TBili 0.5  /  DBili     AST 43  /  ALT 15  /  AlkPhos 225          Microbiology:  Culture - Blood (collected 06-11 @ 05:30)  Source: .Blood None  Preliminary Report (06-12 @ 13:01):    No growth to date.    Culture - Blood (collected 06-10 @ 06:18)  Source: .Blood None  Preliminary Report (06-11 @ 13:02):    No growth to date.    Culture - Blood (collected 06-09 @ 04:15)  Source: .Blood Blood-Venous  Preliminary Report (06-10 @ 13:02):    No growth to date.    Culture - Blood (collected 06-09 @ 04:15)  Source: .Blood Blood  Preliminary Report (06-10 @ 13:02):    No growth to date.    Culture - Tissue with Gram Stain (collected 06-08 @ 12:05)  Source: .Tissue None  Gram Stain (06-08 @ 23:26):    Rare polymorphonuclear leukocytes per low power field    Rare Gram positive cocci in pairs per oil power field    Rare Gram Negative Rods per oil power field  Preliminary Report (06-10 @ 23:12):    Rare Proteus mirabilis ESBL    Rare Streptococcus agalactiae (Group B) Streptococcus agalactiae (Group    B) isolated    Group B streptococci are susceptible to ampicillin,    penicillin and cefazolin, but may be resistant to    erythromycin and clindamycin.    Recommendations for intrapartum prophylaxis for Group B    streptococci are penicillin or ampicillin.    Growth in fluid media only Escherichia coli  Organism: Proteus mirabilis ESBL  Streptococcus agalactiae (Group B)  Escherichia coli (06-10 @ 23:08)  Organism: Escherichia coli (06-10 @ 23:08)  Organism: Proteus mirabilis ESBL (06-10 @ 21:21)  Organism: Streptococcus agalactiae (Group B) (06-09 @ 18:55)    Culture - Surgical Swab (collected 06-08 @ 12:05)  Source: .Surgical Swab None  Preliminary Report (06-11 @ 16:13):    Few Streptococcus agalactiae (Group B) Streptococcus agalactiae (Group B)    isolated    Group B streptococci are susceptible to ampicillin,    penicillin and cefazolin, but may be resistant to    erythromycin and clindamycin.    Recommendations for intrapartum prophylaxis for Group B    streptococci are penicillin or ampicillin.    Growth in fluid media only Proteus mirabilis ESBL  Organism: Streptococcus agalactiae (Group B)  Proteus mirabilis ESBL (06-11 @ 16:12)  Organism: Proteus mirabilis ESBL (06-11 @ 16:12)  Organism: Streptococcus agalactiae (Group B) (06-09 @ 19:14)    Culture - Blood (collected 06-08 @ 04:54)  Source: .Blood None  Preliminary Report (06-09 @ 12:01):    No growth to date.    Culture - Blood (collected 06-07 @ 05:20)  Source: .Blood None  Final Report (06-12 @ 13:00):    No Growth Final    Culture - Blood (collected 06-07 @ 05:20)  Source: .Blood None  Final Report (06-12 @ 13:00):    No Growth Final        Radiology:

## 2020-06-13 NOTE — PROGRESS NOTE ADULT - ASSESSMENT
80 M with PMHx of anemia, ascites 2/2 CHF, CHF s/p biventricular AICD, CKD, CAD s/p CABGx5 on ASA, CKD, DM s/p R great toe amputation, HLD, HTN, hypothyroidism, PAD s/p R femoral stent who presents with altered mental status x 1 day and worsening left foot DFU. Was on augmentin as outpatient     #Polymicrobial bacteremia secondary to necrotizing L foot infection   - Arterial Duplex with normal flow, EF = <20%   - Xray of left foot demonstrates SQ emphysema of first digit MTP joint; concerning for soft tissue infection   - bedside I&D 6/4/2020. deep dissection of left foot by podiatry 6/5/2020 and 6/8/2020  - S/p LLE debridement on 6/10/2020. Further podiatry recs pending  - OR Sx positive 6/4 for numerous E. Coli and Proteus and MRSA   - BCx 6/4/2020 positive for Proteus ESBL and GBS. BCx 6/6, 6/9, 6/10 negative.   - ID: c/w Daptomycin and Merrem  - Podiatry: Scheduled for OR on Monday, but possible add-on tomorrow for further debridement due to worsening WBC count  - Patient will need PICC line. Consult placed    # Normocytic anemia  - S/p 2 units on 6/10 and 1 unit 6/12  - Unsure where patient is losing blood. No signs of GI bleed or hematomas. Perhaps he is bleeding from his surgical site, but is unlikely he is bleeding enough from there to be anemic  - Likely multifactorial from his chronic disease and possible bone marrow suppression from Daptomycin   - f/u CT a/p to check for retroperitoneal bleeding  - As per GI no intervention    # HFrEF w/ BiVentricular AICD, h/o CHB  - Pleural effusions and ascites seen on X-ray.  - midodrine for low BP  - TTE from [facility] from 2/2020 EF 20%, severe TR, akinetic apex, inf spetum, mid and apical anterior septum. Mild-mod MR, + ascites from CHF. Now EF <20%   - strict Is and Os   - BNP >70k on admission  - PO Coreg 3.125 BID and PO Lasix 40mg daily   - Aldactone held as K is upper normal.     # MICHAEL on CKD. Baseline cr 1.8-2.2. With improving hyperkalemia  - Likely prerenal due to CHF, perhaps ATN as well  - Lokelema 10mg Po q12 as per nephro.   - 6/8 Dig level 1.0, and 6/12 level 1.2.  - Phoslo TID and calcitriol per nephro  - Aldactone and losartan held  - U/S shows normal kidneys  - Will monitor    #Hyponatremia  - Currently 123  - Mike 20, UOsm 294 Ordered. Suggestive of CHF. Will obtain another urine Na  - Serum Osmolality 308. Lipid profile negative for HLD induced hyponatremia.   - Will monitor    # Type II NSTEMI   - trop 0.17  - EKG without ischemic changes    # DM   - Patient hypoglycemic this AM  - d/c Lantus, c/w Lispro qac and ss  - Monitor Fs.   - A1C: 10.6    # CAD s/p CABGx5: - on ASA, statin    Diet: DASH; Carb Consistent   DVT ppx; Heparin SubQ  GI ppx: PTX  Dispo: Acute

## 2020-06-13 NOTE — PROGRESS NOTE ADULT - SUBJECTIVE AND OBJECTIVE BOX
Patient is a 80y old  Male who presents with a chief complaint of necrotizing fascitis (13 Jun 2020 10:39)    Patient was seen and examined.  Pt was hypoglycemic this AM  Denies any complaints.  All systems reviewed     PAST MEDICAL & SURGICAL HISTORY:  Chronic kidney disease  Anemia  Ascites  PAD (peripheral artery disease)  Hyperlipidemia  Hypothyroidism  Hypertension  Coronary artery disease  CHF (congestive heart failure)  Diabetes mellitus  Biventricular ICD (implantable cardioverter-defibrillator) in place  Amputation of toe of right foot  S/P arterial stent  S/P CABG x 5    Allergies  Byetta (Stomach Upset)  linezolid (Rash; Urticaria; Flushing)  melatonin (Other)    Home Medications:  aspirin 81 mg oral tablet: 1 tab(s) orally once a day (04 Jun 2020 07:05)  atorvastatin 80 mg oral tablet: 1 tab(s) orally once a day (04 Jun 2020 07:10)  Digitek 125 mcg (0.125 mg) oral tablet: orally every other day (at bedtime) (04 Jun 2020 07:10)  famotidine 10 mg oral tablet: 1 tab(s) orally once (at bedtime) (04 Jun 2020 07:11)  ferrous sulfate 325 mg (65 mg elemental iron) oral tablet: 1 tab(s) orally every other day (at bedtime) (04 Jun 2020 07:09)  folic acid 1 mg oral tablet: 1 tab(s) orally once a day (04 Jun 2020 07:06)  gabapentin 100 mg oral tablet: 2 tab(s) orally once a day (at bedtime) (04 Jun 2020 07:11)  Januvia 50 mg oral tablet: 1 tab(s) orally once a day (04 Jun 2020 07:04)  losartan 25 mg oral tablet: 0.5 tab(s) orally once a day (04 Jun 2020 07:05)  spironolactone 50 mg oral tablet: 1 tab(s) orally 2 times a day (04 Jun 2020 07:05)  torsemide 100 mg oral tablet: 1 tab(s) orally once a day (04 Jun 2020 07:05)      MEDICATIONS  (STANDING):  aspirin enteric coated 81 milliGRAM(s) Oral daily  atorvastatin 80 milliGRAM(s) Oral at bedtime  calcitriol   Capsule 0.25 MICROGram(s) Oral daily  calcium acetate 1334 milliGRAM(s) Oral three times a day with meals  carvedilol 3.125 milliGRAM(s) Oral every 12 hours  chlorhexidine 4% Liquid 1 Application(s) Topical <User Schedule>  Dakins Solution - 1/2 Strength 1 Application(s) Topical three times a day  DAPTOmycin IVPB 250 milliGRAM(s) IV Intermittent every 48 hours  digoxin     Tablet 0.125 milliGRAM(s) Oral every other day  folic acid 1 milliGRAM(s) Oral daily  furosemide    Tablet 40 milliGRAM(s) Oral daily  gabapentin 200 milliGRAM(s) Oral daily  heparin   Injectable 5000 Unit(s) SubCutaneous every 8 hours  insulin lispro (HumaLOG) corrective regimen sliding scale   SubCutaneous three times a day before meals  meropenem  IVPB 500 milliGRAM(s) IV Intermittent every 12 hours  midodrine. 5 milliGRAM(s) Oral three times a day  pantoprazole    Tablet 40 milliGRAM(s) Oral before breakfast  sodium bicarbonate 650 milliGRAM(s) Oral every 6 hours  sodium zirconium cyclosilicate 10 Gram(s) Oral two times a day    MEDICATIONS  (PRN):  acetaminophen   Tablet .. 650 milliGRAM(s) Oral every 6 hours PRN Mild Pain (1 - 3)  aluminum hydroxide/magnesium hydroxide/simethicone Suspension 30 milliLiter(s) Oral every 6 hours PRN Dyspepsia  dextrose 40% Gel 15 Gram(s) Oral once PRN Blood Glucose LESS THAN 70 milliGRAM(s)/deciliter  glucagon  Injectable 1 milliGRAM(s) IntraMuscular once PRN Glucose LESS THAN 70 milligrams/deciliter    Vital Signs Last 24 Hrs  T(C): 35.7  T(F): 96.2  HR: 70  BP: 104/59  BP(mean): --  RR: 19  SpO2: 97%    O/E:  Awake, alert, not in distress.  HEENT: atraumatic, EOMI.  Chest: decreased breath sounds at bases  CVS: SIS2 +, systolic  murmur.  P/A: mildly distended,  BS+, free fluid+  CNS: non focal.  Ext: left foot: Open Surgical Wound,with mild erythema of Midfoot with worsening skin discoloration noted dorsal forefoot and plantar foot. dressing+  All systems reviewed positive findings as above.    POCT Blood Glucose.: 158 mg/dL (13 Jun 2020 10:53)  POCT Blood Glucose.: 52 mg/dL (13 Jun 2020 07:43)  POCT Blood Glucose.: 131 mg/dL (12 Jun 2020 16:33)  POCT Blood Glucose.: 127 mg/dL (12 Jun 2020 12:38)                            8.0<L>  25.54<H> )-----------( 129<L>    ( 13 Jun 2020 07:16 )             23.5<L>                        9.0<L>  18.03<H> )-----------( 136      ( 12 Jun 2020 17:22 )             26.7<L>    06-13    125<L>  |  89<L>  |  98<HH>  ----------------------------<  35<LL>  4.8   |  19  |  3.5<H>  06-12    124<L>  |  87<L>  |  97<HH>  ----------------------------<  42<LL>  5.0   |  19  |  3.7<H>    Ca    6.9<L>      13 Jun 2020 07:16  Ca    6.8<L>      12 Jun 2020 05:00  Ca    6.8<L>      11 Jun 2020 16:00  Mg     2.5     06-13    TPro  5.2<L>  /  Alb  2.4<L>  /  TBili  0.5  /  DBili  x   /  AST  43<H>  /  ALT  15  /  AlkPhos  225<H>  06-13  TPro  5.2<L>  /  Alb  2.4<L>  /  TBili  0.5  /  DBili  x   /  AST  52<H>  /  ALT  16  /  AlkPhos  227<H>  06-12                Culture - Blood (collected 11 Jun 2020 05:30)  Source: .Blood None  Preliminary Report (12 Jun 2020 13:01):    No growth to date. Patient is a 80y old  Male who presents with a chief complaint of necrotizing fascitis (13 Jun 2020 10:39)    Patient was seen and examined.  Pt was hypoglycemic this AM  C/o bleeding from left foot wound--> podiatry contacted  Denies any complaints.  All systems reviewed     PAST MEDICAL & SURGICAL HISTORY:  Chronic kidney disease  Anemia  Ascites  PAD (peripheral artery disease)  Hyperlipidemia  Hypothyroidism  Hypertension  Coronary artery disease  CHF (congestive heart failure)  Diabetes mellitus  Biventricular ICD (implantable cardioverter-defibrillator) in place  Amputation of toe of right foot  S/P arterial stent  S/P CABG x 5    Allergies  Byetta (Stomach Upset)  linezolid (Rash; Urticaria; Flushing)  melatonin (Other)    Home Medications:  aspirin 81 mg oral tablet: 1 tab(s) orally once a day (04 Jun 2020 07:05)  atorvastatin 80 mg oral tablet: 1 tab(s) orally once a day (04 Jun 2020 07:10)  Digitek 125 mcg (0.125 mg) oral tablet: orally every other day (at bedtime) (04 Jun 2020 07:10)  famotidine 10 mg oral tablet: 1 tab(s) orally once (at bedtime) (04 Jun 2020 07:11)  ferrous sulfate 325 mg (65 mg elemental iron) oral tablet: 1 tab(s) orally every other day (at bedtime) (04 Jun 2020 07:09)  folic acid 1 mg oral tablet: 1 tab(s) orally once a day (04 Jun 2020 07:06)  gabapentin 100 mg oral tablet: 2 tab(s) orally once a day (at bedtime) (04 Jun 2020 07:11)  Januvia 50 mg oral tablet: 1 tab(s) orally once a day (04 Jun 2020 07:04)  losartan 25 mg oral tablet: 0.5 tab(s) orally once a day (04 Jun 2020 07:05)  spironolactone 50 mg oral tablet: 1 tab(s) orally 2 times a day (04 Jun 2020 07:05)  torsemide 100 mg oral tablet: 1 tab(s) orally once a day (04 Jun 2020 07:05)      MEDICATIONS  (STANDING):  aspirin enteric coated 81 milliGRAM(s) Oral daily  atorvastatin 80 milliGRAM(s) Oral at bedtime  calcitriol   Capsule 0.25 MICROGram(s) Oral daily  calcium acetate 1334 milliGRAM(s) Oral three times a day with meals  carvedilol 3.125 milliGRAM(s) Oral every 12 hours  chlorhexidine 4% Liquid 1 Application(s) Topical <User Schedule>  Dakins Solution - 1/2 Strength 1 Application(s) Topical three times a day  DAPTOmycin IVPB 250 milliGRAM(s) IV Intermittent every 48 hours  digoxin     Tablet 0.125 milliGRAM(s) Oral every other day  folic acid 1 milliGRAM(s) Oral daily  furosemide    Tablet 40 milliGRAM(s) Oral daily  gabapentin 200 milliGRAM(s) Oral daily  heparin   Injectable 5000 Unit(s) SubCutaneous every 8 hours  insulin lispro (HumaLOG) corrective regimen sliding scale   SubCutaneous three times a day before meals  meropenem  IVPB 500 milliGRAM(s) IV Intermittent every 12 hours  midodrine. 5 milliGRAM(s) Oral three times a day  pantoprazole    Tablet 40 milliGRAM(s) Oral before breakfast  sodium bicarbonate 650 milliGRAM(s) Oral every 6 hours  sodium zirconium cyclosilicate 10 Gram(s) Oral two times a day    MEDICATIONS  (PRN):  acetaminophen   Tablet .. 650 milliGRAM(s) Oral every 6 hours PRN Mild Pain (1 - 3)  aluminum hydroxide/magnesium hydroxide/simethicone Suspension 30 milliLiter(s) Oral every 6 hours PRN Dyspepsia  dextrose 40% Gel 15 Gram(s) Oral once PRN Blood Glucose LESS THAN 70 milliGRAM(s)/deciliter  glucagon  Injectable 1 milliGRAM(s) IntraMuscular once PRN Glucose LESS THAN 70 milligrams/deciliter    Vital Signs Last 24 Hrs  T(C): 35.7  T(F): 96.2  HR: 70  BP: 104/59  BP(mean): --  RR: 19  SpO2: 97%    O/E:  Awake, alert, not in distress.  HEENT: atraumatic, EOMI.  Chest: decreased breath sounds at bases  CVS: SIS2 +, systolic  murmur.  P/A: mildly distended,  BS+, free fluid+  CNS: non focal.  Ext: left foot: Open Surgical Wound,with mild erythema of Midfoot with worsening skin discoloration noted dorsal forefoot and plantar foot. dressing+  All systems reviewed positive findings as above.    POCT Blood Glucose.: 158 mg/dL (13 Jun 2020 10:53)  POCT Blood Glucose.: 52 mg/dL (13 Jun 2020 07:43)  POCT Blood Glucose.: 131 mg/dL (12 Jun 2020 16:33)  POCT Blood Glucose.: 127 mg/dL (12 Jun 2020 12:38)                            8.0<L>  25.54<H> )-----------( 129<L>    ( 13 Jun 2020 07:16 )             23.5<L>                        9.0<L>  18.03<H> )-----------( 136      ( 12 Jun 2020 17:22 )             26.7<L>    06-13    125<L>  |  89<L>  |  98<HH>  ----------------------------<  35<LL>  4.8   |  19  |  3.5<H>  06-12    124<L>  |  87<L>  |  97<HH>  ----------------------------<  42<LL>  5.0   |  19  |  3.7<H>    Ca    6.9<L>      13 Jun 2020 07:16  Ca    6.8<L>      12 Jun 2020 05:00  Ca    6.8<L>      11 Jun 2020 16:00  Mg     2.5     06-13    TPro  5.2<L>  /  Alb  2.4<L>  /  TBili  0.5  /  DBili  x   /  AST  43<H>  /  ALT  15  /  AlkPhos  225<H>  06-13  TPro  5.2<L>  /  Alb  2.4<L>  /  TBili  0.5  /  DBili  x   /  AST  52<H>  /  ALT  16  /  AlkPhos  227<H>  06-12                Culture - Blood (collected 11 Jun 2020 05:30)  Source: .Blood None  Preliminary Report (12 Jun 2020 13:01):    No growth to date.

## 2020-06-13 NOTE — PROGRESS NOTE ADULT - SUBJECTIVE AND OBJECTIVE BOX
seen and examined  no distress   lying comfortable         PAST HISTORY  --------------------------------------------------------------------------------  No significant changes to PMH, PSH, FHx, SHx, unless otherwise noted    ALLERGIES & MEDICATIONS  --------------------------------------------------------------------------------  Allergies    Byetta (Stomach Upset)  linezolid (Rash; Urticaria; Flushing)  melatonin (Other)    Intolerances      Standing Inpatient Medications  aspirin enteric coated 81 milliGRAM(s) Oral daily  atorvastatin 80 milliGRAM(s) Oral at bedtime  calcitriol   Capsule 0.25 MICROGram(s) Oral daily  calcium acetate 1334 milliGRAM(s) Oral three times a day with meals  carvedilol 3.125 milliGRAM(s) Oral every 12 hours  chlorhexidine 4% Liquid 1 Application(s) Topical <User Schedule>  Dakins Solution - 1/2 Strength 1 Application(s) Topical three times a day  DAPTOmycin IVPB 250 milliGRAM(s) IV Intermittent every 48 hours  dextrose 50% Injectable 12.5 Gram(s) IV Push once  dextrose 50% Injectable 25 Gram(s) IV Push once  dextrose 50% Injectable 25 Gram(s) IV Push once  digoxin     Tablet 0.125 milliGRAM(s) Oral every other day  folic acid 1 milliGRAM(s) Oral daily  furosemide    Tablet 40 milliGRAM(s) Oral daily  gabapentin 200 milliGRAM(s) Oral daily  heparin   Injectable 5000 Unit(s) SubCutaneous every 8 hours  insulin glargine Injectable (LANTUS) 9 Unit(s) SubCutaneous every morning  insulin lispro (HumaLOG) corrective regimen sliding scale   SubCutaneous three times a day before meals  insulin lispro Injectable (HumaLOG) 3 Unit(s) SubCutaneous three times a day before meals  meropenem  IVPB 500 milliGRAM(s) IV Intermittent every 12 hours  midodrine. 5 milliGRAM(s) Oral three times a day  pantoprazole    Tablet 40 milliGRAM(s) Oral before breakfast  sodium bicarbonate 650 milliGRAM(s) Oral every 6 hours  sodium zirconium cyclosilicate 10 Gram(s) Oral two times a day    PRN Inpatient Medications  acetaminophen   Tablet .. 650 milliGRAM(s) Oral every 6 hours PRN  aluminum hydroxide/magnesium hydroxide/simethicone Suspension 30 milliLiter(s) Oral every 6 hours PRN  dextrose 40% Gel 15 Gram(s) Oral once PRN  glucagon  Injectable 1 milliGRAM(s) IntraMuscular once PRN        VITALS/PHYSICAL EXAM  --------------------------------------------------------------------------------  T(C): 35.7 (06-13-20 @ 05:25), Max: 36.4 (06-12-20 @ 22:04)  HR: 70 (06-13-20 @ 05:25) (70 - 71)  BP: 104/59 (06-13-20 @ 05:25) (104/59 - 125/66)  RR: 19 (06-13-20 @ 05:25) (18 - 19)  SpO2: 97% (06-12-20 @ 22:04) (97% - 97%)  Wt(kg): --  Height (cm): 167.6 (06-12-20 @ 22:04)  Weight (kg): 73.9 (06-12-20 @ 22:04)  BMI (kg/m2): 26.3 (06-12-20 @ 22:04)  BSA (m2): 1.83 (06-12-20 @ 22:04)      06-12-20 @ 07:01  -  06-13-20 @ 07:00  --------------------------------------------------------  IN: 586 mL / OUT: 0 mL / NET: 586 mL      Physical Exam:  	Gen: NAD  	Pulm:  B/L ronchi at bases   	CV:  S1S2; no rub  	Abd: +distended    LABS/STUDIES  --------------------------------------------------------------------------------              9.0    18.03 >-----------<  136      [06-12-20 @ 17:22]              26.7     124  |  87  |  97  ----------------------------<  42      [06-12-20 @ 05:00]  5.0   |  19  |  3.7        Ca     6.8     [06-12-20 @ 05:00]      Mg     2.4     [06-12-20 @ 05:00]    TPro  5.2  /  Alb  2.4  /  TBili  0.5  /  DBili  x   /  AST  52  /  ALT  16  /  AlkPhos  227  [06-12-20 @ 05:00]          Creatinine Trend:  SCr 3.7 [06-12 @ 05:00]  SCr 3.6 [06-11 @ 16:00]  SCr 3.4 [06-11 @ 05:30]  SCr 3.5 [06-10 @ 11:12]  SCr 3.6 [06-10 @ 06:18]      Urine Sodium 20.0      [06-09-20 @ 21:40]  Urine Osmolality 294      [06-09-20 @ 21:40]    PTH -- (Ca 7.1)      [06-12-20 @ 17:22]   187  TSH 0.83      [06-10-20 @ 06:18]  Lipid: chol 51, TG 96, HDL 17, LDL 14      [06-10-20 @ 06:18]

## 2020-06-14 NOTE — CHART NOTE - NSCHARTNOTEFT_GEN_A_CORE
-Sx cancelled for today, 6/15. BS-60mg/ml and heme is 7.6mg/dl  -WBC trended down from 25.54 yesterday to 12.88 today  -Pt. scheduled for sx tomorrow morning, 6/15  -Pt. will need a 1 unit of PRBC prior to sx tomorrow, 6/15  -Please optimize all pre-surgical labs prior to sx  -NPO MN    Discussed plan w/ Attending, Dr. Clayton

## 2020-06-14 NOTE — PROGRESS NOTE ADULT - SUBJECTIVE AND OBJECTIVE BOX
Patient is a 80y old  Male who presents with a chief complaint of necrotizing fascitis (13 Jun 2020 10:39)    Patient was seen and examined.  Pt again hypoglycemic this AM, NPO for procedure--> started on IV  fluids  Denies any complaints.  All systems reviewed     PAST MEDICAL & SURGICAL HISTORY:  Chronic kidney disease  Anemia  Ascites  PAD (peripheral artery disease)  Hyperlipidemia  Hypothyroidism  Hypertension  Coronary artery disease  CHF (congestive heart failure)  Diabetes mellitus  Biventricular ICD (implantable cardioverter-defibrillator) in place  Amputation of toe of right foot  S/P arterial stent  S/P CABG x 5    Allergies  Byetta (Stomach Upset)  linezolid (Rash; Urticaria; Flushing)  melatonin (Other)    Home Medications:  aspirin 81 mg oral tablet: 1 tab(s) orally once a day (04 Jun 2020 07:05)  atorvastatin 80 mg oral tablet: 1 tab(s) orally once a day (04 Jun 2020 07:10)  Digitek 125 mcg (0.125 mg) oral tablet: orally every other day (at bedtime) (04 Jun 2020 07:10)  famotidine 10 mg oral tablet: 1 tab(s) orally once (at bedtime) (04 Jun 2020 07:11)  ferrous sulfate 325 mg (65 mg elemental iron) oral tablet: 1 tab(s) orally every other day (at bedtime) (04 Jun 2020 07:09)  folic acid 1 mg oral tablet: 1 tab(s) orally once a day (04 Jun 2020 07:06)  gabapentin 100 mg oral tablet: 2 tab(s) orally once a day (at bedtime) (04 Jun 2020 07:11)  Januvia 50 mg oral tablet: 1 tab(s) orally once a day (04 Jun 2020 07:04)  losartan 25 mg oral tablet: 0.5 tab(s) orally once a day (04 Jun 2020 07:05)  spironolactone 50 mg oral tablet: 1 tab(s) orally 2 times a day (04 Jun 2020 07:05)  torsemide 100 mg oral tablet: 1 tab(s) orally once a day (04 Jun 2020 07:05)    MEDICATIONS  (STANDING):  aspirin enteric coated 81 milliGRAM(s) Oral daily  atorvastatin 80 milliGRAM(s) Oral at bedtime  calcitriol   Capsule 0.25 MICROGram(s) Oral daily  calcium acetate 1334 milliGRAM(s) Oral three times a day with meals  carvedilol 3.125 milliGRAM(s) Oral every 12 hours  chlorhexidine 4% Liquid 1 Application(s) Topical <User Schedule>  Dakins Solution - 1/2 Strength 1 Application(s) Topical three times a day  DAPTOmycin IVPB 250 milliGRAM(s) IV Intermittent every 48 hours  dextrose 50% Injectable 12.5 Gram(s) IV Push once  dextrose 50% Injectable 25 Gram(s) IV Push once  dextrose 50% Injectable 25 Gram(s) IV Push once  digoxin     Tablet 0.125 milliGRAM(s) Oral every other day  folic acid 1 milliGRAM(s) Oral daily  furosemide    Tablet 40 milliGRAM(s) Oral daily  gabapentin 200 milliGRAM(s) Oral daily  heparin   Injectable 5000 Unit(s) SubCutaneous every 8 hours  insulin lispro (HumaLOG) corrective regimen sliding scale   SubCutaneous three times a day before meals  meropenem  IVPB 500 milliGRAM(s) IV Intermittent every 12 hours  midodrine. 5 milliGRAM(s) Oral three times a day  pantoprazole    Tablet 40 milliGRAM(s) Oral before breakfast  sodium bicarbonate 650 milliGRAM(s) Oral every 6 hours  sodium zirconium cyclosilicate 10 Gram(s) Oral two times a day    MEDICATIONS  (PRN):  acetaminophen   Tablet .. 650 milliGRAM(s) Oral every 6 hours PRN Mild Pain (1 - 3)  aluminum hydroxide/magnesium hydroxide/simethicone Suspension 30 milliLiter(s) Oral every 6 hours PRN Dyspepsia  dextrose 40% Gel 15 Gram(s) Oral once PRN Blood Glucose LESS THAN 70 milliGRAM(s)/deciliter  glucagon  Injectable 1 milliGRAM(s) IntraMuscular once PRN Glucose LESS THAN 70 milligrams/deciliter    T(C): 36.1 (06-14-20 @ 07:38), Max: 36.1 (06-14-20 @ 07:38)  HR: 70 (06-14-20 @ 07:38) (70 - 70)  BP: 93/51 (06-14-20 @ 07:38) (93/51 - 125/71)  RR: 19 (06-14-20 @ 07:38) (19 - 20)  SpO2: --    O/E:  Awake, alert, not in distress.  HEENT: atraumatic, EOMI.  Chest: decreased breath sounds at bases  CVS: SIS2 +, systolic  murmur.  P/A: mildly distended,  BS+, free fluid+  CNS: non focal.  Ext: left foot: Open Surgical Wound,with mild erythema of Midfoot with worsening skin discoloration noted dorsal forefoot and plantar foot. dressing+  All systems reviewed positive findings as above.                            7.6<L>  12.88<H> )-----------( 141      ( 14 Jun 2020 07:48 )             22.1<L>                        8.0<L>  15.42<H> )-----------( 137      ( 13 Jun 2020 20:50 )             23.4<L>  06-14    124<L>  |  88<L>  |  102<HH>  ----------------------------<  60<L>  5.0   |  21  |  3.9<H>  06-13    123<L>  |  86<L>  |  99<HH>  ----------------------------<  264<H>  5.1<H>   |  20  |  3.6<H>    Ca    7.0<L>      14 Jun 2020 07:48  Ca    7.1<L>      13 Jun 2020 20:50  Ca    6.9<L>      13 Jun 2020 07:16  Mg     2.6     06-14    TPro  5.3<L>  /  Alb  2.3<L>  /  TBili  0.4  /  DBili  x   /  AST  41  /  ALT  14  /  AlkPhos  216<H>  06-14  TPro  5.4<L>  /  Alb  2.4<L>  /  TBili  0.4  /  DBili  x   /  AST  43<H>  /  ALT  15  /  AlkPhos  230<H>  06-13  TPro  5.2<L>  /  Alb  2.4<L>  /  TBili  0.5  /  DBili  x   /  AST  43<H>  /  ALT  15  /  AlkPhos  225<H>  06-13

## 2020-06-14 NOTE — PROGRESS NOTE ADULT - ASSESSMENT
80 M with PMHx of anemia, ascites 2/2 CHF, CHF s/p biventricular AICD, CKD, CAD s/p CABGx5 on ASA, CKD, DM s/p R big toe amputation, HLD, HTN, hypothyroidism, PAD s/p R femoral stent who presents with altered mental status x 1 day and worsening left foot DFU. The pt's daughter states that he saw his L hallux which looked worse, and necrotic from the prior week. He was also forgetful. She checked his temp at home, it was 100.2F, so she gave him tylenol. She states the pt had been taking augmentin for his DFU for some time. She thinks he forgot to take his meds for several days due to the infection.   She also states, the pt was recently dx with HFrEF, biventricular aicd placed february 2020. She has noticed an 8 lb weight gain in the past 4 days.     # Sepsis POA, lactic acidosis resolved  # Metabolic encephalopathy sec to sepsis resolved  #Polymicrobial bacteremia secondary to necrotizing Left  foot infection   -  Xray Foot AP + Lateral + Oblique, Left (06.08.20 @ 16:51) >Status post hallux metatarsal and great toe amputation sparing 1 cm of the base. Otherwise intact tarsal metatarsal alignment. Previously noted subcutaneous emphysema within soft tissues has been resected. No proximal residual gas. Vascular calcificationsand hindfoot neuropathic osteoarthropathy  -  VA Duplex Lower Extrem Arterial, Bilat (06.04.20 @ 09:16) >Normal arterial flow in bilateral lower extremities.  - bedside I&D 6/4/2020. deep dissection of left foot by podiatry 6/5/2020  - s/p Debridement w/ 1st Ray Resection; Left Foot; 6/8  - s/p Debridement of Left Foot; 6/10  - Sedimentation Rate, Erythrocyte: 79 mm/Hr (06-05-20 @ 04:14)  - C-Reactive Protein, Serum: 23.70 mg/dL (06-05-20 @ 04:14)  - blood cultures 6/4 : Proteus ESBL and GBS. blood cultures 6/6, 6/9, 6/10 negative.   - wound cultures  6/4 :  E. Coli , Proteus, MRSA   -  c/w Daptomycin, Meropenem  - Continue w/ Local Wound Care; wash with soap and water, apply dakins wet to dry dsd abd kerlix ace TID  - podiatry F/u : Patient Scheduled for Further Debridement on sunday 6/14@ 9:30AM w/ Dr. Clayton  - Patient will need PICC line. IR consulted    # Acute kidney injury on CKD stage 4 sec to ATN, metabolic acidosis, Hyponatremia  # Hyperkalemia-resolved  - base line creat 2.2  -  US Kidney and Bladder (06.10.20 @ 22:59) >Normal-appearing kidneys. Bilateral pleural effusions with ascites.  - losartan, aldactone held  -  c/w lokelema , Phoslo, calcitriol   - c/w sodium bicarb  - monitor UO  - bladder scan  - Monitor BMP  - low k diet    # Acute on chronic systolic CHF with bilateral pleural effussions and ascitis S/p biventricular AICD  -  Transthoracic Echocardiogram (06.04.20 @ 12:04) >Left ventricular ejection fraction, by visual estimation, is <20%.Grade I diastolic dysfunction). Moderate tricuspid regurgitation.borderline pulmonary hypertension.  -  Xray Chest 1 View- PORTABLE-Routine (06.13.20 @ 14:43) >Bilateral opacities with small effusions left greater than right unchanged since prior  - monitor I/o, daily weight, restrict fluids  - c/w lasix, coreg, digoxin  - losartan and aldactone held  - Consult heart failure specialist    # Elevated troponin sec to Type 2 MI, H/o CAD s/p CABGx5  - Troponin T, Serum: 0.12: Critical value: ng/mL (06.04.20 @ 11:12)  - 12 Lead ECG (06.10.20 @ 07:57) >Ventricular-paced rhythm. Biventricular pacemaker detected  - c/w ASA, statin, coreg , digoxin    # DM type 2with hypoglycemia  - A1C with Estimated Average Glucose Result: 10.6 % (06.06.20 @ 05:22)  - monitor FS  - continue to hold lantus  - insulin ssc  - IV D5 w, while NPO    # Acute on chronic normocytic anemia- multifactorial  - S/p 2 units on 6/10 and 1 unit 6/12  - evaluated by GI outpt EGD/colonoscopy  - will transfuse with 1 unit PRBC    # DVT prophylaxis    # Poor prognosis    # Full code, GOC discusssed with pt's daughter    # Pending: left foot Debridement today on 6/14, monitor cbc,  BMP, CT abd/pelvis, heart failure specialist eval   # Discussed plan of care with daughter  # Disposition: TBD

## 2020-06-14 NOTE — PROGRESS NOTE ADULT - ASSESSMENT
pt admitted with sepsis secondary to fascitis on antibiotics , CKD 4   creatinine 3.9   Na+ 124   ,Bicarb 21 ,   NAH CO3 121   BP 93/51 still low    diuretics   continue sodium bicarb   continue antibiotics   monitor  K+ closely   Podiatry noted   Multiple  problems may require  dialysis soon

## 2020-06-14 NOTE — PROGRESS NOTE ADULT - SUBJECTIVE AND OBJECTIVE BOX
[facility] FOLLOW UP NOTE  --------------------------------------------------------------------------------  Chief Complaint:    24 hour events/subjective:        PAST HISTORY  --------------------------------------------------------------------------------  No significant changes to PMH, PSH, FHx, SHx, unless otherwise noted    ALLERGIES & MEDICATIONS  --------------------------------------------------------------------------------  Allergies    Byetta (Stomach Upset)  linezolid (Rash; Urticaria; Flushing)  melatonin (Other)    Intolerances      Standing Inpatient Medications  aspirin enteric coated 81 milliGRAM(s) Oral daily  atorvastatin 80 milliGRAM(s) Oral at bedtime  calcitriol   Capsule 0.25 MICROGram(s) Oral daily  calcium acetate 1334 milliGRAM(s) Oral three times a day with meals  carvedilol 3.125 milliGRAM(s) Oral every 12 hours  chlorhexidine 4% Liquid 1 Application(s) Topical <User Schedule>  Dakins Solution - 1/2 Strength 1 Application(s) Topical three times a day  DAPTOmycin IVPB 250 milliGRAM(s) IV Intermittent every 48 hours  dextrose 50% Injectable 12.5 Gram(s) IV Push once  dextrose 50% Injectable 25 Gram(s) IV Push once  dextrose 50% Injectable 25 Gram(s) IV Push once  digoxin     Tablet 0.125 milliGRAM(s) Oral every other day  folic acid 1 milliGRAM(s) Oral daily  furosemide    Tablet 40 milliGRAM(s) Oral daily  gabapentin 200 milliGRAM(s) Oral daily  heparin   Injectable 5000 Unit(s) SubCutaneous every 8 hours  insulin lispro (HumaLOG) corrective regimen sliding scale   SubCutaneous three times a day before meals  meropenem  IVPB 500 milliGRAM(s) IV Intermittent every 12 hours  midodrine. 5 milliGRAM(s) Oral three times a day  pantoprazole    Tablet 40 milliGRAM(s) Oral before breakfast  sodium bicarbonate 650 milliGRAM(s) Oral every 6 hours  sodium zirconium cyclosilicate 10 Gram(s) Oral two times a day    PRN Inpatient Medications  acetaminophen   Tablet .. 650 milliGRAM(s) Oral every 6 hours PRN  aluminum hydroxide/magnesium hydroxide/simethicone Suspension 30 milliLiter(s) Oral every 6 hours PRN  dextrose 40% Gel 15 Gram(s) Oral once PRN  glucagon  Injectable 1 milliGRAM(s) IntraMuscular once PRN      REVIEW OF SYSTEMS  --------------------------------------------------------------------------------  Gen: No weight changes, fatigue, fevers/chills, weakness  Skin: No rashes  Head/Eyes/Ears/Mouth: No headache; Normal hearing; Normal vision w/o blurriness; No sinus pain/discomfort, sore throat  Respiratory: No dyspnea, cough, wheezing, hemoptysis  CV: No chest pain, PND, orthopnea  GI: No abdominal pain, diarrhea, constipation, nausea, vomiting, melena, hematochezia  : No increased frequency, dysuria, hematuria, nocturia  MSK: No joint pain/swelling; no back pain; no edema  Neuro: No dizziness/lightheadedness, weakness, seizures, numbness, tingling  Heme: No easy bruising or bleeding  Endo: No heat/cold intolerance  Psych: No significant nervousness, anxiety, stress, depression    All other systems were reviewed and are negative, except as noted.    VITALS/PHYSICAL EXAM  --------------------------------------------------------------------------------  T(C): 36.1 (06-14-20 @ 07:38), Max: 36.1 (06-14-20 @ 07:38)  HR: 70 (06-14-20 @ 07:38) (70 - 70)  BP: 93/51 (06-14-20 @ 07:38) (93/51 - 125/71)  RR: 19 (06-14-20 @ 07:38) (19 - 20)  SpO2: --  Wt(kg): --  Height (cm): 167.6 (06-12-20 @ 22:04)  Weight (kg): 73.9 (06-12-20 @ 22:04)  BMI (kg/m2): 26.3 (06-12-20 @ 22:04)  BSA (m2): 1.83 (06-12-20 @ 22:04)      Physical Exam:  	Gen: NAD, well-appearing  	HEENT: PERRL, supple neck, clear oropharynx  	Pulm: CTA B/L  	CV: RRR, S1S2; no rub  	Back: No spinal or CVA tenderness; no sacral edema  	Abd: +BS, soft, nontender/nondistended  	: No suprapubic tenderness  	UE: Warm, FROM, no clubbing, intact strength; no edema; no asterixis  	LE: Warm, FROM, no clubbing, intact strength; no edema  	Neuro: No focal deficits, intact gait  	Psych: Normal affect and mood  	Skin: Warm, without rashes  	Vascular access:    LABS/STUDIES  --------------------------------------------------------------------------------              7.6    12.88 >-----------<  141      [06-14-20 @ 07:48]              22.1     124  |  88  |  102  ----------------------------<  60      [06-14-20 @ 07:48]  5.0   |  21  |  3.9        Ca     7.0     [06-14-20 @ 07:48]      Mg     2.6     [06-14-20 @ 07:48]    TPro  5.3  /  Alb  2.3  /  TBili  0.4  /  DBili  x   /  AST  41  /  ALT  14  /  AlkPhos  216  [06-14-20 @ 07:48]    PT/INR: PT 13.90, INR 1.21       [06-13-20 @ 20:50]  PTT: 40.2       [06-13-20 @ 20:50]      Creatinine Trend:  SCr 3.9 [06-14 @ 07:48]  SCr 3.6 [06-13 @ 20:50]  SCr 3.5 [06-13 @ 07:16]  SCr 3.7 [06-12 @ 05:00]  SCr 3.6 [06-11 @ 16:00]      Urine Sodium 20.0      [06-09-20 @ 21:40]  Urine Osmolality 294      [06-09-20 @ 21:40]    PTH -- (Ca 7.1)      [06-12-20 @ 17:22]   187  TSH 0.83      [06-10-20 @ 06:18]  Lipid: chol 51, TG 96, HDL 17, LDL 14      [06-10-20 @ 06:18]

## 2020-06-15 NOTE — PROCEDURE NOTE - NSPROCDETAILS_GEN_ALL_CORE
location identified, draped/prepped, sterile technique used/sterile dressing applied/sterile technique, catheter placed/ultrasound guidance/supine position

## 2020-06-15 NOTE — PROGRESS NOTE ADULT - ASSESSMENT
80 M with PMHx of anemia, ascites 2/2 CHF, CHF s/p biventricular AICD, CKD, CAD s/p CABGx5 on ASA, CKD, DM s/p R big toe amputation, HLD, HTN, hypothyroidism, PAD s/p R femoral stent who presents with altered mental status x 1 day and worsening left foot DFU. The pt's daughter states that he saw his L hallux which looked worse, and necrotic from the prior week. He was also forgetful. She checked his temp at home, it was 100.2F, so she gave him tylenol. She states the pt had been taking augmentin for his DFU for some time. She thinks he forgot to take his meds for several days due to the infection.   She also states, the pt was recently dx with HFrEF, biventricular aicd placed february 2020. She has noticed an 8 lb weight gain in the past 4 days.     # Sepsis POA, lactic acidosis resolved  # Metabolic encephalopathy sec to sepsis resolved  #Polymicrobial bacteremia secondary to necrotizing Left  foot infection   -  Xray Foot AP + Lateral + Oblique, Left (06.08.20 @ 16:51) >Status post hallux metatarsal and great toe amputation sparing 1 cm of the base. Otherwise intact tarsal metatarsal alignment. Previously noted subcutaneous emphysema within soft tissues has been resected. No proximal residual gas. Vascular calcificationsand hindfoot neuropathic osteoarthropathy  -  VA Duplex Lower Extrem Arterial, Bilat (06.04.20 @ 09:16) >Normal arterial flow in bilateral lower extremities.  - bedside I&D 6/4/2020. deep dissection of left foot by podiatry 6/5/2020  - s/p Debridement w/ 1st Ray Resection; Left Foot; 6/8  - s/p Debridement of Left Foot; 6/10  - Sedimentation Rate, Erythrocyte: 79 mm/Hr (06-05-20 @ 04:14)  - C-Reactive Protein, Serum: 23.70 mg/dL (06-05-20 @ 04:14)  - blood cultures 6/4 : Proteus ESBL and GBS. blood cultures 6/6, 6/9, 6/10 negative.   - wound cultures  6/4 :  E. Coli , Proteus, MRSA   -  c/w Daptomycin, Meropenem  - Continue w/ Local Wound Care; wash with soap and water, apply dakins wet to dry dsd abd kerlix ace TID  - podiatry F/u : Patient Scheduled for Further Debridement on sunday 6/14@ 9:30AM w/ Dr. Clayton  - Patient will need PICC line. IR consulted    # Acute kidney injury on CKD stage 4 sec to ATN, metabolic acidosis, Hyponatremia  # Hyperkalemia-resolved  - base line creat 2.2  -  US Kidney and Bladder (06.10.20 @ 22:59) >Normal-appearing kidneys. Bilateral pleural effusions with ascites.  - losartan, aldactone held  -  c/w lokelema , Phoslo, calcitriol   - c/w sodium bicarb  - monitor UO  - bladder scan  - Monitor BMP  - low k diet    # Acute on chronic systolic CHF with bilateral pleural effussions and ascitis S/p biventricular AICD  -  Transthoracic Echocardiogram (06.04.20 @ 12:04) >Left ventricular ejection fraction, by visual estimation, is <20%.Grade I diastolic dysfunction). Moderate tricuspid regurgitation.borderline pulmonary hypertension.  -  Xray Chest 1 View- PORTABLE-Routine (06.13.20 @ 14:43) >Bilateral opacities with small effusions left greater than right unchanged since prior  - monitor I/o, daily weight, restrict fluids  - c/w lasix, coreg, digoxin  - losartan and aldactone held  - Consult heart failure specialist    # Elevated troponin sec to Type 2 MI, H/o CAD s/p CABGx5  - Troponin T, Serum: 0.12: Critical value: ng/mL (06.04.20 @ 11:12)  - 12 Lead ECG (06.10.20 @ 07:57) >Ventricular-paced rhythm. Biventricular pacemaker detected  - c/w ASA, statin, coreg , digoxin    # DM type 2 with hypoglycemia  - A1C with Estimated Average Glucose Result: 10.6 % (06.06.20 @ 05:22)  - monitor FS  - continue to hold lantus  - insulin ssc  - IV D5 w, while NPO    # Acute on chronic normocytic anemia- multifactorial  - S/p 2 units on 6/10 and 1 unit 6/12  - evaluated by GI outpt EGD/colonoscopy  - will transfuse with 1 unit PRBC    # DVT prophylaxis    # Poor prognosis    # Full code, GOC discusssed with pt's daughter    # Pending: left foot Debridement today on 6/14, monitor cbc,  BMP, CT abd/pelvis, heart failure specialist eval   # Discussed plan of care with daughter  # Disposition: TBD / OR today / f/u Labs

## 2020-06-15 NOTE — PROGRESS NOTE ADULT - SUBJECTIVE AND OBJECTIVE BOX
Patient is a 80y old  Male who presents with a chief complaint of necrotizing fascitis (13 Jun 2020 10:39)    Patient was seen and examined.  Pt again hypoglycemic this AM, NPO for procedure--> started on IV  fluids  Going to the OR today for nec - fascitis surgery   All systems reviewed     PAST MEDICAL & SURGICAL HISTORY:  Chronic kidney disease  Anemia  Ascites  PAD (peripheral artery disease)  Hyperlipidemia  Hypothyroidism  Hypertension  Coronary artery disease  CHF (congestive heart failure)  Diabetes mellitus  Biventricular ICD (implantable cardioverter-defibrillator) in place  Amputation of toe of right foot  S/P arterial stent  S/P CABG x 5    Allergies  Byetta (Stomach Upset)  linezolid (Rash; Urticaria; Flushing)  melatonin (Other)    Home Medications:  aspirin 81 mg oral tablet: 1 tab(s) orally once a day (04 Jun 2020 07:05)  atorvastatin 80 mg oral tablet: 1 tab(s) orally once a day (04 Jun 2020 07:10)  Digitek 125 mcg (0.125 mg) oral tablet: orally every other day (at bedtime) (04 Jun 2020 07:10)  famotidine 10 mg oral tablet: 1 tab(s) orally once (at bedtime) (04 Jun 2020 07:11)  ferrous sulfate 325 mg (65 mg elemental iron) oral tablet: 1 tab(s) orally every other day (at bedtime) (04 Jun 2020 07:09)  folic acid 1 mg oral tablet: 1 tab(s) orally once a day (04 Jun 2020 07:06)  gabapentin 100 mg oral tablet: 2 tab(s) orally once a day (at bedtime) (04 Jun 2020 07:11)  Januvia 50 mg oral tablet: 1 tab(s) orally once a day (04 Jun 2020 07:04)  losartan 25 mg oral tablet: 0.5 tab(s) orally once a day (04 Jun 2020 07:05)  spironolactone 50 mg oral tablet: 1 tab(s) orally 2 times a day (04 Jun 2020 07:05)  torsemide 100 mg oral tablet: 1 tab(s) orally once a day (04 Jun 2020 07:05)    MEDICATIONS  (STANDING):  aspirin enteric coated 81 milliGRAM(s) Oral daily  atorvastatin 80 milliGRAM(s) Oral at bedtime  calcitriol   Capsule 0.25 MICROGram(s) Oral daily  calcium acetate 1334 milliGRAM(s) Oral three times a day with meals  carvedilol 3.125 milliGRAM(s) Oral every 12 hours  chlorhexidine 4% Liquid 1 Application(s) Topical <User Schedule>  Dakins Solution - 1/2 Strength 1 Application(s) Topical three times a day  DAPTOmycin IVPB 250 milliGRAM(s) IV Intermittent every 48 hours  dextrose 50% Injectable 12.5 Gram(s) IV Push once  dextrose 50% Injectable 25 Gram(s) IV Push once  dextrose 50% Injectable 25 Gram(s) IV Push once  digoxin     Tablet 0.125 milliGRAM(s) Oral every other day  folic acid 1 milliGRAM(s) Oral daily  furosemide    Tablet 40 milliGRAM(s) Oral daily  gabapentin 200 milliGRAM(s) Oral daily  heparin   Injectable 5000 Unit(s) SubCutaneous every 8 hours  insulin lispro (HumaLOG) corrective regimen sliding scale   SubCutaneous three times a day before meals  meropenem  IVPB 500 milliGRAM(s) IV Intermittent every 12 hours  midodrine. 5 milliGRAM(s) Oral three times a day  pantoprazole    Tablet 40 milliGRAM(s) Oral before breakfast  sodium bicarbonate 650 milliGRAM(s) Oral every 6 hours  sodium zirconium cyclosilicate 10 Gram(s) Oral two times a day    MEDICATIONS  (PRN):  acetaminophen   Tablet .. 650 milliGRAM(s) Oral every 6 hours PRN Mild Pain (1 - 3)  aluminum hydroxide/magnesium hydroxide/simethicone Suspension 30 milliLiter(s) Oral every 6 hours PRN Dyspepsia  dextrose 40% Gel 15 Gram(s) Oral once PRN Blood Glucose LESS THAN 70 milliGRAM(s)/deciliter  glucagon  Injectable 1 milliGRAM(s) IntraMuscular once PRN Glucose LESS THAN 70 milligrams/deciliter    Vital Signs Last 24 Hrs  T(C): 35 (15 Tin 2020 14:19), Max: 36.7 (15 Tin 2020 01:58)  T(F): 95 (15 Tin 2020 14:19), Max: 98.1 (15 Tin 2020 01:58)  HR: 70 (15 Tin 2020 14:19) (46 - 71)  BP: 92/55 (15 Tin 2020 14:19) (92/55 - 134/60)  BP(mean): 78 (14 Jun 2020 21:30) (78 - 78)  RR: 18 (15 Tin 2020 14:19) (11 - 20)  SpO2: 98% (15 Tin 2020 13:30) (97% - 100%)    O/E:  Awake, alert, not in distress.  HEENT: atraumatic, EOMI.  Chest: decreased breath sounds at bases  CVS: SIS2 +, systolic  murmur.  P/A: mildly distended,  BS+, free fluid+  CNS: non focal.  Ext: left foot: Open Surgical Wound, with mild erythema of Midfoot with worsening skin discoloration noted dorsal forefoot and plantar foot. Dressing in place.     All systems reviewed positive findings as above.                        7.6<L>  12.88<H> )-----------( 141      ( 14 Jun 2020 07:48 )             22.1<L>                        8.0<L>  15.42<H> )-----------( 137      ( 13 Jun 2020 20:50 )             23.4<L>  06-14    124<L>  |  88<L>  |  102<HH>  ----------------------------<  60<L>  5.0   |  21  |  3.9<H>  06-13    123<L>  |  86<L>  |  99<HH>  ----------------------------<  264<H>  5.1<H>   |  20  |  3.6<H>    Ca    7.0<L>      14 Jun 2020 07:48  Ca    7.1<L>      13 Jun 2020 20:50  Ca    6.9<L>      13 Jun 2020 07:16  Mg     2.6     06-14    TPro  5.3<L>  /  Alb  2.3<L>  /  TBili  0.4  /  DBili  x   /  AST  41  /  ALT  14  /  AlkPhos  216<H>  06-14  TPro  5.4<L>  /  Alb  2.4<L>  /  TBili  0.4  /  DBili  x   /  AST  43<H>  /  ALT  15  /  AlkPhos  230<H>  06-13  TPro  5.2<L>  /  Alb  2.4<L>  /  TBili  0.5  /  DBili  x   /  AST  43<H>  /  ALT  15  /  AlkPhos  225<H>  06-13

## 2020-06-15 NOTE — CONSULT NOTE ADULT - ASSESSMENT
left foot with exposed bone and tendons, viable tissue and areas necrotic tissue debrided to viable tissue--> wound vac placed    rec: wound vac--> will skin graft 2--4 weeks if granulation tissue    may need BKA

## 2020-06-15 NOTE — PROGRESS NOTE ADULT - ASSESSMENT
Sepsis / bacteremia / necrotising fascitis / MICHAEL/ stage 4 ckd:  #  ATN most likely/creatinine stable   #  please document UO/ bladder scan   #   k noted continue  lokelma 10 q 12  # hyponatremia ,  ? volume related ,restarted in lasix   #BP noted, continue midodrine   # dig level 1.2,   # phos noted,  corrected calcium around 8,  on  phoslo 2/2/2,  and  calcitriol 0.25 q 24,  pth 187   # acidosis , improved   po sodium bicarb 650 mg q 6 hr.  # podiatry notes appreciated , s/p OR   #on roxanna/ dapto followed by ID   # no acute indication for RRT / might need to start if no improvement   # will follow

## 2020-06-15 NOTE — CHART NOTE - NSCHARTNOTEFT_GEN_A_CORE
PACU ANESTHESIA ADMISSION NOTE      Procedure: I & D left foot    Post op diagnosis:  Necrotizing fasciitis of ankle and foot      ____  Intubated  TV:______       Rate: ______      FiO2: ______    __x__  Patent Airway    __x__  Full return of protective reflexes    __x__  Full recovery from anesthesia / back to baseline status    Vitals:  T(C): 97.8  HR: 69  BP: 112/68   RR: 16   SpO2: 100     Mental Status:  __x__ Awake   _____ Alert   _____ Drowsy   _____ Sedated    Nausea/Vomiting:  _x__ NO  ______Yes,   See Post - Op Orders          Pain Scale (0-10):  ___0__    Treatment: ____ None    ____ See Post - Op/PCA Orders    Post - Operative Fluids:   ____ Oral   __x__ See Post - Op Orders    Plan: Discharge:   ____Home       __x___Floor     _____Critical Care    _____  Other:_________________    Comments: uneventful anesthesia course no complications. VItals stable. Pt transferred to PACU

## 2020-06-15 NOTE — PROCEDURE NOTE - NSPOSTPRCRAD_GEN_A_CORE
depth of insertion/fluoroscopy/line adjusted to depth of insertion/line in appropriate postion/postion of catheter

## 2020-06-15 NOTE — PROGRESS NOTE ADULT - SUBJECTIVE AND OBJECTIVE BOX
Nephrology progress note    Patient was seen and examined, events over the last 24 h noted .  debridment today  Cr unchanged     Allergies:  Byetta (Stomach Upset)  linezolid (Rash; Urticaria; Flushing)  melatonin (Other)    Hospital Medications:   MEDICATIONS  (STANDING):  aspirin enteric coated 81 milliGRAM(s) Oral daily  atorvastatin 80 milliGRAM(s) Oral at bedtime  calcitriol   Capsule 0.25 MICROGram(s) Oral daily  calcium acetate 1334 milliGRAM(s) Oral three times a day with meals  carvedilol 3.125 milliGRAM(s) Oral every 12 hours  chlorhexidine 4% Liquid 1 Application(s) Topical <User Schedule>  DAPTOmycin IVPB 250 milliGRAM(s) IV Intermittent every 48 hours  dextrose 50% Injectable 12.5 Gram(s) IV Push once  dextrose 50% Injectable 25 Gram(s) IV Push once  digoxin     Tablet 0.125 milliGRAM(s) Oral every other day  folic acid 1 milliGRAM(s) Oral daily  furosemide    Tablet 40 milliGRAM(s) Oral daily  gabapentin 200 milliGRAM(s) Oral daily  heparin   Injectable 5000 Unit(s) SubCutaneous every 8 hours  insulin lispro (HumaLOG) corrective regimen sliding scale   SubCutaneous three times a day before meals  meropenem  IVPB 500 milliGRAM(s) IV Intermittent every 12 hours  midodrine. 5 milliGRAM(s) Oral three times a day  pantoprazole    Tablet 40 milliGRAM(s) Oral before breakfast  sodium bicarbonate 650 milliGRAM(s) Oral every 6 hours  sodium zirconium cyclosilicate 10 Gram(s) Oral two times a day        VITALS:  T(F): 95 (06-15-20 @ 14:19), Max: 98.1 (06-15-20 @ 01:58)  HR: 70 (06-15-20 @ 14:19)  BP: 92/55 (06-15-20 @ 14:19)  RR: 18 (06-15-20 @ 14:19)  SpO2: 98% (06-15-20 @ 13:30)  Wt(kg): --    06-15 @ 07:01  -  06-15 @ 15:22  --------------------------------------------------------  IN: 25 mL / OUT: 0 mL / NET: 25 mL      Height (cm): 167.64 (06-15 @ 11:17)  Weight (kg): 73.9 (06-15 @ 11:17)  BMI (kg/m2): 26.3 (06-15 @ 11:17)  BSA (m2): 1.83 (06-15 @ 11:17)    PHYSICAL EXAM:  	Gen: NAD  	Pulm:  B/L ronchi at bases   	CV:  S1S2; no rub    LABS:  06-15    125<L>  |  86<L>  |  101<HH>  ----------------------------<  182<H>  5.1<H>   |  21  |  3.7<H>    Ca    7.3<L>      15 Tin 2020 05:40  Mg     2.5     06-15    TPro  5.7<L>  /  Alb  2.6<L>  /  TBili  0.5  /  DBili      /  AST  52<H>  /  ALT  17  /  AlkPhos  276<H>  06-15                          7.6    12.88 )-----------( 141      ( 14 Jun 2020 07:48 )             22.1       Urine Studies:    Sodium, Random Urine: 20.0 mmoL/L (06-09 @ 21:40)  Osmolality, Random Urine: 294 mos/kg (06-09 @ 21:40)    RADIOLOGY & ADDITIONAL STUDIES:

## 2020-06-16 NOTE — PROGRESS NOTE ADULT - SUBJECTIVE AND OBJECTIVE BOX
Nephrology progress note    THIS IS AN INCOMPLETE NOTE . FULL NOTE TO FOLLOW SHORTLY    Patient is seen and examined, events over the last 24 h noted .    Allergies:  Byetta (Stomach Upset)  linezolid (Rash; Urticaria; Flushing)  melatonin (Other)    Hospital Medications:   MEDICATIONS  (STANDING):  aspirin enteric coated 81 milliGRAM(s) Oral daily  atorvastatin 80 milliGRAM(s) Oral at bedtime  calcitriol   Capsule 0.25 MICROGram(s) Oral daily  calcium acetate 1334 milliGRAM(s) Oral three times a day with meals  carvedilol 3.125 milliGRAM(s) Oral every 12 hours  chlorhexidine 4% Liquid 1 Application(s) Topical <User Schedule>  DAPTOmycin IVPB 250 milliGRAM(s) IV Intermittent every 48 hours  dextrose 50% Injectable 12.5 Gram(s) IV Push once  dextrose 50% Injectable 25 Gram(s) IV Push once  digoxin     Tablet 0.125 milliGRAM(s) Oral every other day  folic acid 1 milliGRAM(s) Oral daily  furosemide    Tablet 40 milliGRAM(s) Oral daily  gabapentin 200 milliGRAM(s) Oral daily  heparin   Injectable 5000 Unit(s) SubCutaneous every 8 hours  insulin lispro (HumaLOG) corrective regimen sliding scale   SubCutaneous three times a day before meals  meropenem  IVPB 500 milliGRAM(s) IV Intermittent every 12 hours  midodrine. 5 milliGRAM(s) Oral three times a day  pantoprazole    Tablet 40 milliGRAM(s) Oral before breakfast  sodium bicarbonate 650 milliGRAM(s) Oral every 6 hours  sodium zirconium cyclosilicate 10 Gram(s) Oral two times a day  thrombin Topical Powder 81040 [employer] Unit(s) Topical once        VITALS:  T(F): 97.1 (06-16-20 @ 05:35), Max: 97.8 (06-15-20 @ 12:32)  HR: 70 (06-16-20 @ 05:35)  BP: 90/40 (06-16-20 @ 05:35)  RR: 18 (06-16-20 @ 05:35)  SpO2: 97% (06-16-20 @ 05:35)  Wt(kg): --    06-15 @ 07:01  -  06-16 @ 07:00  --------------------------------------------------------  IN: 25 mL / OUT: 0 mL / NET: 25 mL      Height (cm): 167.64 (06-15 @ 18:30)  Weight (kg): 73.9 (06-15 @ 18:30)  BMI (kg/m2): 26.3 (06-15 @ 18:30)  BSA (m2): 1.83 (06-15 @ 18:30)    PHYSICAL EXAM:  Constitutional: NAD  HEENT: anicteric sclera, oropharynx clear, MMM  Neck: No JVD  Respiratory: CTAB, no wheezes, rales or rhonchi  Cardiovascular: S1, S2, RRR  Gastrointestinal: BS+, soft, NT/ND  Extremities: No cyanosis or clubbing. No peripheral edema  :  No arana.   Skin: No rashes    LABS:  06-16    127<L>  |  89<L>  |  103<HH>  ----------------------------<  160<H>  4.5   |  20  |  3.9<H>    Ca    7.2<L>      16 Jun 2020 07:31  Phos  8.2     06-16  Mg     2.6     06-16    TPro  5.1<L>  /  Alb  2.3<L>  /  TBili  0.5  /  DBili      /  AST  46<H>  /  ALT  14  /  AlkPhos  231<H>  06-16                          8.1    10.78 )-----------( 137      ( 16 Jun 2020 07:31 )             23.6       Urine Studies:    Sodium, Random Urine: 20.0 mmoL/L (06-09 @ 21:40)  Osmolality, Random Urine: 294 mos/kg (06-09 @ 21:40)    RADIOLOGY & ADDITIONAL STUDIES: Nephrology progress note  Patient is seen and examined, events over the last 24 h noted .  had diarrhea this morning     Allergies:  Byetta (Stomach Upset)  linezolid (Rash; Urticaria; Flushing)  melatonin (Other)    Hospital Medications:   MEDICATIONS  (STANDING):  aspirin enteric coated 81 milliGRAM(s) Oral daily  atorvastatin 80 milliGRAM(s) Oral at bedtime  calcitriol   Capsule 0.25 MICROGram(s) Oral daily  calcium acetate 1334 milliGRAM(s) Oral three times a day with meals  carvedilol 3.125 milliGRAM(s) Oral every 12 hours  DAPTOmycin IVPB 250 milliGRAM(s) IV Intermittent every 48 hours  digoxin     Tablet 0.125 milliGRAM(s) Oral every other day  folic acid 1 milliGRAM(s) Oral daily  furosemide    Tablet 40 milliGRAM(s) Oral daily  gabapentin 200 milliGRAM(s) Oral daily  heparin   Injectable 5000 Unit(s) SubCutaneous every 8 hours  insulin lispro (HumaLOG) corrective regimen sliding scale   SubCutaneous three times a day before meals  meropenem  IVPB 500 milliGRAM(s) IV Intermittent every 12 hours  midodrine. 5 milliGRAM(s) Oral three times a day  pantoprazole    Tablet 40 milliGRAM(s) Oral before breakfast  sodium bicarbonate 650 milliGRAM(s) Oral every 6 hours  sodium zirconium cyclosilicate 10 Gram(s) Oral two times a day  thrombin Topical Powder 50031 [employer] Unit(s) Topical once        VITALS:  T(F): 97.1 (06-16-20 @ 05:35), Max: 97.8 (06-15-20 @ 12:32)  HR: 70 (06-16-20 @ 05:35)  BP: 90/40 (06-16-20 @ 05:35)  RR: 18 (06-16-20 @ 05:35)  SpO2: 97% (06-16-20 @ 05:35)      06-15 @ 07:01  -  06-16 @ 07:00  --------------------------------------------------------  IN: 25 mL / OUT: 0 mL / NET: 25 mL      Height (cm): 167.64 (06-15 @ 18:30)  Weight (kg): 73.9 (06-15 @ 18:30)  BMI (kg/m2): 26.3 (06-15 @ 18:30)  BSA (m2): 1.83 (06-15 @ 18:30)    PHYSICAL EXAM:  Constitutional: NAD  HEENT: anicteric sclera, oropharynx clear, MMM  Neck: No JVD  Respiratory: CTAB, no wheezes, rales or rhonchi  Cardiovascular: S1, S2, RRR  Gastrointestinal: BS+, soft, NT/ND  Extremities: No cyanosis or clubbing. No peripheral edema  :  No arana.   Skin: No rashes    LABS:  06-16    127<L>  |  89<L>  |  103<HH>  ----------------------------<  160<H>  4.5   |  20  |  3.9<H>    SODIUM TREND:  Sodium 127 [06-16 @ 07:31]  Sodium 125 [06-15 @ 22:22]  Sodium 125 [06-15 @ 05:40]  Sodium 124 [06-14 @ 07:48]  Sodium 123 [06-13 @ 20:50]  Sodium 125 [06-13 @ 07:16]  Sodium 124 [06-12 @ 05:00]  Sodium 126 [06-11 @ 16:00]  Sodium 123 [06-11 @ 05:30]  Sodium 124 [06-10 @ 11:12]  Creatinine Trend: 3.9<--, 4.1<--, 3.7<--, 3.9<--, 3.6<--, 3.5<--  Ca    7.2<L>      16 Jun 2020 07:31  Phos  8.2     06-16  Mg     2.6     06-16    TPro  5.1<L>  /  Alb  2.3<L>  /  TBili  0.5  /  DBili      /  AST  46<H>  /  ALT  14  /  AlkPhos  231<H>  06-16               Creatinine Trend: 3.9<--, 4.1<--, 3.7<--, 3.9<--, 3.6<--, 3.5<--           8.1    10.78 )-----------( 137      ( 16 Jun 2020 07:31 )             23.6       Urine Studies:    Sodium, Random Urine: 20.0 mmoL/L (06-09 @ 21:40)  Osmolality, Random Urine: 294 mos/kg (06-09 @ 21:40)    RADIOLOGY & ADDITIONAL STUDIES:

## 2020-06-16 NOTE — PROGRESS NOTE ADULT - ASSESSMENT
Patient is a 80y old  Male who presents with a chief complaint of necrotizing fascitis. He is S/P multiple debridement of the LLE.   Planned for another debridement tomorrow by Podiatry. If fails, might need BKA if family agreeable.    Sepsis secondary to DFU  Subacute HFrEF exacerbation- remains volume overloaded and positive I/O  Ischemic cardiomyopathy s/p CRT-D  CAD s/p CABG  Chronic kidney disease  Anemia  Ascites  PAD (peripheral artery disease) s/p RLE stenting  Hyperlipidemia  Hypothyroidism  Hypertension  Diabetes mellitus    Recommendations:   Upgrade to telemetry   IV lasix 40 mg STAT, then once daily  Add metolazone 2.5 mg daily   c/w Coreg (might need to switch to metoprolol) and lipitor   Strict I/O, daily weights, low salt diet and fluid restriction to 1 L/day  Remains at high risk for cardiac complications for a low risk procedure (debridement) and a high risk surgery (if BKA is to be considered) Patient is a 80y old  Male who presents with a chief complaint of necrotizing fascitis. He is S/P multiple debridement of the LLE.   Planned for another debridement tomorrow by Podiatry. If fails, might need BKA if family agreeable.    Sepsis secondary to DFU  Subacute HFrEF exacerbation- remains volume overloaded and positive I/O  Ischemic cardiomyopathy s/p CRT-D  CAD s/p CABG  Chronic kidney disease  Anemia  Ascites  PAD (peripheral artery disease) s/p RLE stenting  Hyperlipidemia  Hypothyroidism  Hypertension  Diabetes mellitus    Recommendations:   Upgrade to telemetry for better CHF control  IV lasix 40 mg STAT, then once daily  Add metolazone 2.5 mg daily   nephrology following  c/w Coreg (might need to switch to metoprolol) and Lipitor   Monitor I/O, daily weights, low salt diet and fluid restriction to 1 L/day  Remains a high risk patient for cardiac complications for a low risk procedure (debridement) and a high risk surgery (if BKA is to be considered), however may need to be performed, with appropriate monitoring if local infection cannot be controlled otherwise.

## 2020-06-16 NOTE — CHART NOTE - NSCHARTNOTEFT_GEN_A_CORE
Attending Spoke w/ Daughter Bedside Regarding Plan;   Daughter Wishes to try to Salvage the Left Foot; But Currently Still Deciding if BKA is the Correct Choice;   Sx Scheduled for Wednesday 6/17 @ 12:00PM; Debridement w/ Dr. Clayton;   NPO @ Midnight Wednesday 6/16; Pre-Op Labs; Optimization  Discussed Plan w/ Dr. Clayton;     Podiatry; ; Attending Spoke w/ Daughter Bedside Regarding Plan;   Daughter Wishes to try to Salvage the Left Foot; But Currently Still Deciding if BKA is the Correct Choice;   Sx Scheduled for Wednesday 6/17 @ 12:00PM; Debridement w/ Dr. Clayton;   NPO @ Midnight Tuesday 6/16; Pre-Op Labs; Optimization  Discussed Plan w/ Dr. Clayton;     Podiatry; ;

## 2020-06-16 NOTE — PROGRESS NOTE ADULT - SUBJECTIVE AND OBJECTIVE BOX
Patient is a 80y old  Male who presents with a chief complaint of necrotizing fascitis (16 Jun 2020 14:15)      Subjective:  Patient seen and examined at bedside.        Review Of Systems: Increased abdominal girth          Medications:  acetaminophen   Tablet .. 650 milliGRAM(s) Oral every 6 hours PRN  aluminum hydroxide/magnesium hydroxide/simethicone Suspension 30 milliLiter(s) Oral every 6 hours PRN  aspirin enteric coated 81 milliGRAM(s) Oral daily  atorvastatin 80 milliGRAM(s) Oral at bedtime  calcitriol   Capsule 0.25 MICROGram(s) Oral daily  calcium acetate 1334 milliGRAM(s) Oral three times a day with meals  carvedilol 3.125 milliGRAM(s) Oral every 12 hours  chlorhexidine 4% Liquid 1 Application(s) Topical <User Schedule>  dextrose 40% Gel 15 Gram(s) Oral once PRN  dextrose 50% Injectable 12.5 Gram(s) IV Push once  dextrose 50% Injectable 25 Gram(s) IV Push once  digoxin     Tablet 0.125 milliGRAM(s) Oral every other day  folic acid 1 milliGRAM(s) Oral daily  furosemide    Tablet 40 milliGRAM(s) Oral daily  gabapentin 200 milliGRAM(s) Oral daily  glucagon  Injectable 1 milliGRAM(s) IntraMuscular once PRN  heparin   Injectable 5000 Unit(s) SubCutaneous every 8 hours  insulin lispro (HumaLOG) corrective regimen sliding scale   SubCutaneous three times a day before meals  meropenem  IVPB 500 milliGRAM(s) IV Intermittent every 12 hours  midodrine. 5 milliGRAM(s) Oral three times a day  pantoprazole    Tablet 40 milliGRAM(s) Oral before breakfast  sodium bicarbonate 650 milliGRAM(s) Oral every 6 hours  sodium zirconium cyclosilicate 5 Gram(s) Oral two times a day      PAST MEDICAL & SURGICAL HISTORY:  Chronic kidney disease  Anemia  Ascites  PAD (peripheral artery disease)  Hyperlipidemia  Hypothyroidism  Hypertension  Coronary artery disease  CHF (congestive heart failure)  Diabetes mellitus  Biventricular ICD (implantable cardioverter-defibrillator) in place  Amputation of toe of right foot  S/P arterial stent  S/P CABG x 5      Objective:  Vitals:  T(F): 96.3 (06-16), Max: 97.1 (06-16)  HR: 70 (06-16) (70 - 70)  BP: 103/51 (06-16) (90/40 - 103/51)  RR: 18 (06-16)  SpO2: 97% (06-16)  I&O's Summary    15 Tin 2020 07:01  -  16 Jun 2020 07:00  --------------------------------------------------------  IN: 25 mL / OUT: 0 mL / NET: 25 mL    16 Jun 2020 07:01  -  16 Jun 2020 18:57  --------------------------------------------------------  IN: 360 mL / OUT: 0 mL / NET: 360 mL        Physical Exam:  Appearance: No acute distress; lethargic  Eyes: PERRL, EOMI, pink conjunctiva  HENT: Normal oral muscosa  Cardiovascular: RRR, S1, S2, no murmurs, rubs, or gallops; no edema; +JVD  Respiratory: decreased breath sounds at the bases  Gastrointestinal: soft, non-tender, non-distended with normal bowel sounds  Musculoskeletal: No clubbing; no joint deformity   Neurologic: Non-focal  Lymphatic: No lymphadenopathy  Psychiatry: AAOx3, mood & affect appropriate  Skin: No rashes, ecchymoses, or cyanosis                          8.1    10.78 )-----------( 137      ( 16 Jun 2020 07:31 )             23.6     06-16    127<L>  |  89<L>  |  103<HH>  ----------------------------<  160<H>  4.5   |  20  |  3.9<H>    Ca    7.2<L>      16 Jun 2020 07:31  Phos  8.2     06-16  Mg     2.6     06-16    TPro  5.1<L>  /  Alb  2.3<L>  /  TBili  0.5  /  DBili  x   /  AST  46<H>  /  ALT  14  /  AlkPhos  231<H>  06-16      CARDIAC MARKERS ( 16 Jun 2020 07:31 )  x     / x     / 124 U/L / x     / x              New ECG(s): Personally reviewed    Echo:  < from: Transthoracic Echocardiogram (06.04.20 @ 12:04) >  Summary:   1. Left ventricular ejection fraction, by visual estimation, is <20%.   2. Spectral Doppler shows impaired relaxation pattern of left ventricular myocardial filling (Grade I diastolic dysfunction).   3. Mitral annular calcification.   4. Moderate tricuspid regurgitation.   5. Estimated pulmonary artery systolic pressure is 37.6 mmHg assuming a right atrial pressure of 10 mmHg, which is consistent with borderline pulmonary hypertension.    < end of copied text >    Imaging:  < from: Xray Chest 1 View-PORTABLE IMMEDIATE (06.14.20 @ 22:45) >  Findings:    Support devices: Left chest wall pacer device is in stable position.     Cardiac/mediastinum/hilum: Stable.    Lung parenchyma/Pleura: Unchanged bilateral opacities and pleural effusions.  No pneumothorax.    Skeleton/soft tissues: Stable.    Impression:      1.  Unchanged bilateral opacities and pleural effusions.     < end of copied text >    Interpretation of Telemetry: not on telemetry Patient is a 80y old  Male who presents with a chief complaint of necrotizing fascitis (16 Jun 2020 14:15)      Subjective:  Patient seen and examined at bedside.        Review Of Systems: Increased abdominal girth          Medications:  acetaminophen   Tablet .. 650 milliGRAM(s) Oral every 6 hours PRN  aluminum hydroxide/magnesium hydroxide/simethicone Suspension 30 milliLiter(s) Oral every 6 hours PRN  aspirin enteric coated 81 milliGRAM(s) Oral daily  atorvastatin 80 milliGRAM(s) Oral at bedtime  calcitriol   Capsule 0.25 MICROGram(s) Oral daily  calcium acetate 1334 milliGRAM(s) Oral three times a day with meals  carvedilol 3.125 milliGRAM(s) Oral every 12 hours  chlorhexidine 4% Liquid 1 Application(s) Topical <User Schedule>  dextrose 40% Gel 15 Gram(s) Oral once PRN  dextrose 50% Injectable 12.5 Gram(s) IV Push once  dextrose 50% Injectable 25 Gram(s) IV Push once  digoxin     Tablet 0.125 milliGRAM(s) Oral every other day  folic acid 1 milliGRAM(s) Oral daily  furosemide    Tablet 40 milliGRAM(s) Oral daily  gabapentin 200 milliGRAM(s) Oral daily  glucagon  Injectable 1 milliGRAM(s) IntraMuscular once PRN  heparin   Injectable 5000 Unit(s) SubCutaneous every 8 hours  insulin lispro (HumaLOG) corrective regimen sliding scale   SubCutaneous three times a day before meals  meropenem  IVPB 500 milliGRAM(s) IV Intermittent every 12 hours  midodrine. 5 milliGRAM(s) Oral three times a day  pantoprazole    Tablet 40 milliGRAM(s) Oral before breakfast  sodium bicarbonate 650 milliGRAM(s) Oral every 6 hours  sodium zirconium cyclosilicate 5 Gram(s) Oral two times a day      PAST MEDICAL & SURGICAL HISTORY:  Chronic kidney disease  Anemia  Ascites  PAD (peripheral artery disease)  Hyperlipidemia  Hypothyroidism  Hypertension  Coronary artery disease  CHF (congestive heart failure)  Diabetes mellitus  Biventricular ICD (implantable cardioverter-defibrillator) in place  Amputation of toe of right foot  S/P arterial stent  S/P CABG x 5      Objective:  Vitals:  T(F): 96.3 (06-16), Max: 97.1 (06-16)  HR: 70 (06-16) (70 - 70)  BP: 103/51 (06-16) (90/40 - 103/51)  RR: 18 (06-16)  SpO2: 97% (06-16)  I&O's Summary    15 Tin 2020 07:01  -  16 Jun 2020 07:00  --------------------------------------------------------  IN: 25 mL / OUT: 0 mL / NET: 25 mL    16 Jun 2020 07:01  -  16 Jun 2020 18:57  --------------------------------------------------------  IN: 360 mL / OUT: 0 mL / NET: 360 mL        Physical Exam:  Appearance: No acute distress; lethargic  Eyes: PERRL, EOMI, pink conjunctiva  HENT: Normal oral muscosa  Cardiovascular: RRR, S1, S2, no murmurs, rubs, or gallops; no edema; +JVD  Respiratory: decreased breath sounds at the bases  Gastrointestinal: soft, non-tender, distended and tympanic with normal bowel sounds  Musculoskeletal: No clubbing; no joint deformity   Neurologic: Non-focal  Lymphatic: No lymphadenopathy  Psychiatry: AAOx3, mood & affect appropriate  Skin: No rashes, ecchymoses, or cyanosis                          8.1    10.78 )-----------( 137      ( 16 Jun 2020 07:31 )             23.6     06-16    127<L>  |  89<L>  |  103<HH>  ----------------------------<  160<H>  4.5   |  20  |  3.9<H>    Ca    7.2<L>      16 Jun 2020 07:31  Phos  8.2     06-16  Mg     2.6     06-16    TPro  5.1<L>  /  Alb  2.3<L>  /  TBili  0.5  /  DBili  x   /  AST  46<H>  /  ALT  14  /  AlkPhos  231<H>  06-16      CARDIAC MARKERS ( 16 Jun 2020 07:31 )  x     / x     / 124 U/L / x     / x              New ECG(s): Personally reviewed    Echo:  < from: Transthoracic Echocardiogram (06.04.20 @ 12:04) >  Summary:   1. Left ventricular ejection fraction, by visual estimation, is <20%.   2. Spectral Doppler shows impaired relaxation pattern of left ventricular myocardial filling (Grade I diastolic dysfunction).   3. Mitral annular calcification.   4. Moderate tricuspid regurgitation.   5. Estimated pulmonary artery systolic pressure is 37.6 mmHg assuming a right atrial pressure of 10 mmHg, which is consistent with borderline pulmonary hypertension.    < end of copied text >    Imaging:  < from: Xray Chest 1 View-PORTABLE IMMEDIATE (06.14.20 @ 22:45) >  Findings:    Support devices: Left chest wall pacer device is in stable position.     Cardiac/mediastinum/hilum: Stable.    Lung parenchyma/Pleura: Unchanged bilateral opacities and pleural effusions.  No pneumothorax.    Skeleton/soft tissues: Stable.    Impression:      1.  Unchanged bilateral opacities and pleural effusions.     < end of copied text >    Interpretation of Telemetry: not on telemetry

## 2020-06-16 NOTE — CHART NOTE - NSCHARTNOTEFT_GEN_A_CORE
Vascular Surgery -     dx: left foot extensive wound    Plan: for BKA on Friday 6/19  Needs cardiac clearance for general anesthesia  Full labs, Hgb 9-10  CXR, EKG  2 u PRBC on hold for OR        SPECTRA [id number]

## 2020-06-16 NOTE — PROGRESS NOTE ADULT - ASSESSMENT
80 M with PMHx of anemia, ascites 2/2 CHF, CHF s/p biventricular AICD, CKD, CAD s/p CABGx5 on ASA, CKD, DM s/p R big toe amputation, HLD, HTN, hypothyroidism, PAD s/p R femoral stent who presents with altered mental status x 1 day and worsening left foot DFU. The pt's daughter states that he saw his L hallux which looked worse, and necrotic from the prior week. He was also forgetful. She checked his temp at home, it was 100.2F, so she gave him tylenol. She states the pt had been taking augmentin for his DFU for some time. She thinks he forgot to take his meds for several days due to the infection.   She also states, the pt was recently dx with HFrEF, biventricular aicd placed february 2020. She has noticed an 8 lb weight gain.      # Sepsis POA, lactic acidosis resolved  # Metabolic encephalopathy sec to sepsis resolved  # Polymicrobial bacteremia secondary to necrotizing fascitis in Left foot infection   -  Xray Foot AP + Lateral + Oblique, Left (06.08.20 @ 16:51) >Status post hallux metatarsal and great toe amputation sparing 1 cm of the base. Otherwise intact tarsal metatarsal alignment. Previously noted subcutaneous emphysema within soft tissues has been resected. No proximal residual gas. Vascular calcification sand hind foot neuropathic osteoarthropathy  -  VA Duplex Lower Extrem Arterial, Bilat (06.04.20 @ 09:16) >Normal arterial flow in bilateral lower extremities.  - bedside I&D 6/4/2020. deep dissection of left foot by podiatry 6/5/2020  - s/p Debridement w/ 1st Ray Resection; Left Foot; 6/8  - s/p Debridement of Left Foot; 6/10  - Sedimentation Rate, Erythrocyte: 79 mm/Hr (06-05-20 @ 04:14)  - C-Reactive Protein, Serum: 23.70 mg/dL (06-05-20 @ 04:14)  - blood cultures 6/4 : Proteus ESBL and GBS. blood cultures 6/6, 6/9, 6/10 negative.   - wound cultures  6/4 :  E. Coli , Proteus, MRSA   - Continue w/ Local Wound Care; wash with soap and water, apply dakins wet to dry dsd abd kerlix ace TID  - Dr. Clayton did another surgery to infected left foot yesterday   - PICC Line in place 6/15/2020 by IR  - D/C Daptomycin  - c/w Meropenem 500mg IV q12h   - Started Vancomycin 25mg IV q48 hrs  - Check Vanco level every morning per ID  - Decision made for Amputation Surgery LLE today - Surgery this Friday 6/19/2020  - ID/Podiatry/Nephro/Vascular Care Appreciated  - Cardiology Consultation for Pre-Op CV Evaluation for Amputation ordered     # Acute kidney injury on CKD stage 4 sec to ATN, metabolic acidosis, Hyponatremia  # Hyperkalemia-resolved  - base line creat 2.2  -  US Kidney and Bladder (06.10.20 @ 22:59) >Normal-appearing kidneys. Bilateral pleural effusions with ascites.  - losartan, aldactone held  - c/w lokelema dose decreased to 5mg, Phoslo, calcitriol   - c/w sodium bicarb  - monitor UO  - bladder scan  - Monitor BMP  - low k diet    # Acute on chronic systolic CHF with bilateral pleural effussions and ascitis S/p biventricular AICD  -  Transthoracic Echocardiogram (06.04.20 @ 12:04) >Left ventricular ejection fraction, by visual estimation, is <20%.Grade I diastolic dysfunction). Moderate tricuspid regurgitation.borderline pulmonary hypertension.  -  Xray Chest 1 View- PORTABLE-Routine (06.13.20 @ 14:43) >Bilateral opacities with small effusions left greater than right unchanged since prior  - monitor I/o, daily weight, restrict fluids  - c/w lasix, coreg, digoxin  - losartan and aldactone held due to MICHAEL on CKD4  - Consult heart failure specialist    # Elevated troponin sec to Type 2 MI, H/o CAD s/p CABGx5  - Troponin T, Serum: 0.12: Critical value: ng/mL (06.04.20 @ 11:12)  - 12 Lead ECG (06.10.20 @ 07:57) >Ventricular-paced rhythm. Biventricular pacemaker detected  - c/w ASA, statin, coreg , digoxin    # DM type 2 with hypoglycemia  - A1C with Estimated Average Glucose Result: 10.6 % (06.06.20 @ 05:22)  - monitor FS  - continue to hold lantus  - insulin ssc  - IV D5 w, while NPO    # Acute on chronic normocytic anemia- multifactorial  - S/p 2 units on 6/10 and 1 unit 6/12  - evaluated by GI outpt EGD/colonoscopy  - will transfuse with 1 unit PRBC    # DVT prophylaxis    # Poor prognosis    # Full code, GOC discusssed with pt's daughter    # Discussed plan of care with daughter    # Disposition: Amputation Friday / CV Pre-Op Eval pending / TOSHA Pending

## 2020-06-16 NOTE — PROGRESS NOTE ADULT - SUBJECTIVE AND OBJECTIVE BOX
WILL VIEYRA  80y, Male  Allergy: Byetta (Stomach Upset)  linezolid (Rash; Urticaria; Flushing)  melatonin (Other)      LOS  12d    CHIEF COMPLAINT: necrotizing fascitis (16 Jun 2020 08:57)      INTERVAL EVENTS/HPI  - No acute events overnight  - T(F): , Max: 97.8 (06-15-20 @ 12:32)  - Denies any worsening symptoms  - Tolerating medication  - WBC Count: 10.78 (06-16-20 @ 07:31)  WBC Count: 8.31 (06-15-20 @ 22:22)  - Creatinine, Serum: 3.9 (06-16-20 @ 07:31)  Creatinine, Serum: 4.1 (06-15-20 @ 22:22)       ROS  General: Denies rigors, nightsweats  HEENT: Denies headache, rhinorrhea, sore throat, eye pain  CV: Denies CP, palpitations  PULM: Denies wheezing, hemoptysis  GI: Denies hematemesis, hematochezia, melena  : Denies discharge, hematuria  MSK: Denies arthralgias, myalgias  SKIN: Denies rash, lesions  NEURO: Denies paresthesias, weakness  PSYCH: Denies depression, anxiety    VITALS:  T(F): 97.1, Max: 97.8 (06-15-20 @ 12:32)  HR: 70  BP: 90/40  RR: 18Vital Signs Last 24 Hrs  T(C): 36.2 (16 Jun 2020 05:35), Max: 36.6 (15 Tin 2020 12:32)  T(F): 97.1 (16 Jun 2020 05:35), Max: 97.8 (15 Tin 2020 12:32)  HR: 70 (16 Jun 2020 05:35) (46 - 70)  BP: 90/40 (16 Jun 2020 05:35) (90/40 - 134/60)  BP(mean): --  RR: 18 (16 Jun 2020 05:35) (11 - 20)  SpO2: 97% (16 Jun 2020 05:35) (97% - 100%)    PHYSICAL EXAM:  Gen: NAD, resting in bed  HEENT: Normocephalic, atraumatic  Neck: supple, no lymphadenopathy  CV: Regular rate & regular rhythm  Lungs: decreased BS at bases, no fremitus  Abdomen: Soft, BS present  Ext: Warm, well perfused, LE dressings  Neuro: non focal, awake  Skin: no rash, no erythema  Lines: no phlebitis    FH: Non-contributory  Social Hx: Non-contributory    TESTS & MEASUREMENTS:                        8.1    10.78 )-----------( 137      ( 16 Jun 2020 07:31 )             23.6     06-16    127<L>  |  89<L>  |  103<HH>  ----------------------------<  160<H>  4.5   |  20  |  3.9<H>    Ca    7.2<L>      16 Jun 2020 07:31  Phos  8.2     06-16  Mg     2.6     06-16    TPro  5.1<L>  /  Alb  2.3<L>  /  TBili  0.5  /  DBili  x   /  AST  46<H>  /  ALT  14  /  AlkPhos  231<H>  06-16    eGFR if Non African American: 14 mL/min/1.73M2 (06-16-20 @ 07:31)  eGFR if African American: 16 mL/min/1.73M2 (06-16-20 @ 07:31)  eGFR if Non African American: 13 mL/min/1.73M2 (06-15-20 @ 22:22)  eGFR if African American: 15 mL/min/1.73M2 (06-15-20 @ 22:22)    LIVER FUNCTIONS - ( 16 Jun 2020 07:31 )  Alb: 2.3 g/dL / Pro: 5.1 g/dL / ALK PHOS: 231 U/L / ALT: 14 U/L / AST: 46 U/L / GGT: x               Culture - Tissue with Gram Stain (collected 06-15-20 @ 12:53)  Source: .Tissue None  Gram Stain (06-15-20 @ 23:04):    No polymorphonuclear leukocytes per low power field    No organisms seen per oil power field          INFECTIOUS DISEASES TESTING  COVID-19 PCR: NotDetec (06-04-20 @ 05:27)      INFLAMMATORY MARKERS  Sedimentation Rate, Erythrocyte: 79 mm/Hr (06-05-20 @ 04:14)  C-Reactive Protein, Serum: 23.70 mg/dL (06-05-20 @ 04:14)      RADIOLOGY & ADDITIONAL TESTS:  I have personally reviewed the last available Chest xray  CXR      CT      CARDIOLOGY TESTING  12 Lead ECG:   Ventricular Rate 70 BPM    Atrial Rate 70 BPM    QRS Duration 154 ms    Q-T Interval 434 ms    QTC Calculation(Bezet) 468 ms    P Axis 26 degrees    R Axis -86 degrees    T Axis 25 degrees    Diagnosis Line Ventricular-paced rhythm  Biventricular pacemaker detected  Abnormal ECG    Confirmed by Satya Peralta ([id number]) on 6/11/2020 2:43:05 PM (06-10-20 @ 07:57)  12 Lead ECG:   Ventricular Rate 70 BPM    Atrial Rate 70 BPM    P-R Interval 144 ms    QRS Duration 156 ms    Q-T Interval 442 ms    QTC Calculation(Bezet) 477 ms    P Axis 27 degrees    R Axis -80 degrees    T Axis 41 degrees    Diagnosis Line Atrial-sensed ventricular-paced rhythm  Biventricular pacemaker detected  Abnormal ECG    Confirmed by Satya Peralta ([id number]) on 6/11/2020 2:43:02 PM (06-10-20 @ 07:56)      MEDICATIONS  aspirin enteric coated 81 daily  atorvastatin 80 at bedtime  calcitriol   Capsule 0.25 daily  calcium acetate 1334 three times a day with meals  carvedilol 3.125 every 12 hours  chlorhexidine 4% Liquid 1 <User Schedule>  DAPTOmycin IVPB 250 every 48 hours  dextrose 50% Injectable 12.5 once  dextrose 50% Injectable 25 once  digoxin     Tablet 0.125 every other day  folic acid 1 daily  furosemide    Tablet 40 daily  gabapentin 200 daily  heparin   Injectable 5000 every 8 hours  insulin lispro (HumaLOG) corrective regimen sliding scale  three times a day before meals  meropenem  IVPB 500 every 12 hours  midodrine. 5 three times a day  pantoprazole    Tablet 40 before breakfast  sodium bicarbonate 650 every 6 hours  sodium zirconium cyclosilicate 10 two times a day  thrombin Topical Powder 95171 once      WEIGHT  Weight (kg): 73.9 (06-15-20 @ 18:30)  Creatinine, Serum: 3.9 mg/dL (06-16-20 @ 07:31)  Creatinine, Serum: 4.1 mg/dL (06-15-20 @ 22:22)      ANTIBIOTICS:  DAPTOmycin IVPB 250 milliGRAM(s) IV Intermittent every 48 hours  meropenem  IVPB 500 milliGRAM(s) IV Intermittent every 12 hours      All available historical records have been reviewed

## 2020-06-16 NOTE — ANESTHESIA FOLLOW-UP NOTE - NSEVALATION_GEN_ALL_CORE
No apparent complications or complaints regarding anesthesia care at this time/All questions were answered

## 2020-06-16 NOTE — PROGRESS NOTE ADULT - ASSESSMENT
Sepsis / bacteremia / necrotising fascitis / MICHAEL/ stage 4 ckd:  #  ATN most likely/creatinine stable   #  please document UO/ bladder scan   #   k noted continue  lokelma decrease to 5 q12h   # hyponatremia ,  ? volume related ,restarted in lasix sodium better   #BP noted, continue midodrine   # dig level 1.2,   # phos noted,  corrected calcium around 8,  on  phoslo 2/2/2,  and  calcitriol 0.25 q 24,  pth 187   # acidosis , improved   po sodium bicarb 650 mg q 6 hr.  # podiatry notes appreciated , s/p OR   #on roxanna/ dapto followed by ID   # no acute indication for RRT / might need to start if no improvement   # will follow

## 2020-06-16 NOTE — PROGRESS NOTE ADULT - ASSESSMENT
ASSESSMENT  80 M with PMHx of anemia, ascites 2/2 CHF, CHF s/p biventricular AICD, CKD, CAD s/p CABGx5 on ASA, CKD, DM s/p R great toe amputation, HLD, HTN, hypothyroidism, PAD s/p R femoral stent who presents with altered mental status x 1 day and worsening left foot DFU. Was on augmentin as outpatient     IMPRESSION  #Polymicrobial bacteremia secondary to Necrotizing L foot infection     s/p Excision, exostosis, bossing, foot 15-Tin-2020 12:24:59 , necrotic tissue and bone    s/p delayed closure 6/10    s/p Amputation of first metatarsal 08-Jun-2020 6/8 OR cx   Rare Proteus mirabilis  Rare Streptococcus agalactiae (Group B)    6/4 BCX with GBS and Proteus ESBL    s/p bedside I&D  WCX with MRSA (R Clinda), ESBL proteus, Ecoli  < from: Xray Foot AP + Lateral + Oblique, Left (06.04.20 @ 04:59) >Subcutaneous emphysema at the first digit MTP joint. Findings concerning for necrotizing soft tissue infection.  #Elevated trop/BNP  #Lactic acidosis Blood Gas Venous - Lactate: 2.5 mmoL/L (06-04-20 @ 04:51)  #Hyponatremia   #CXR Bilateral lung opacities, right greater than left. No pneumothorax.  #DM   #Abx allergy: linezolid (Rash; Urticaria; Flushing)    RECOMMENDATIONS  - Rising CPK, start IV Vanc 250mg q48h   - AM vancomycin level daily   - Meropenem 500mg q12h IV   - PICC x 6 weeks abx from last OR (on D/C IV ertapenem 500mg q24h and IV Vanc) end 7/27  - Appreciate Podiatry/Vascular consult      Spectra [id number]

## 2020-06-16 NOTE — PROGRESS NOTE ADULT - SUBJECTIVE AND OBJECTIVE BOX
PROGRESS NOTE   Pt seen @ bedside during AM rounds. S/p Exc Dbx of st/b, left foot (DOS: 6/15/20). Dressing +Clean, +Dry, +Intact. Pt. denies any recent n/f/c/v/sob. Pt. denies any other pedal complaints at this time.      Vital Signs Last 24 Hrs  T(C): 36.2 (16 Jun 2020 05:35), Max: 36.6 (15 Tin 2020 12:32)  T(F): 97.1 (16 Jun 2020 05:35), Max: 97.8 (15 Tin 2020 12:32)  HR: 70 (16 Jun 2020 05:35) (46 - 70)  BP: 90/40 (16 Jun 2020 05:35) (90/40 - 134/60)  BP(mean): --  RR: 18 (16 Jun 2020 05:35) (11 - 20)  SpO2: 97% (16 Jun 2020 05:35) (97% - 100%)                          8.1    10.78 )-----------( 137      ( 16 Jun 2020 07:31 )             23.6               06-16    127<L>  |  89<L>  |  103<HH>  ----------------------------<  160<H>  4.5   |  20  |  3.9<H>    Ca    7.2<L>      16 Jun 2020 07:31  Phos  8.2     06-16  Mg     2.6     06-16    TPro  5.1<L>  /  Alb  2.3<L>  /  TBili  0.5  /  DBili  x   /  AST  46<H>  /  ALT  14  /  AlkPhos  231<H>  06-16      PHYSICAL EXAM  Right LE Focused:  DERM: Wound base 90% granular and 10% necrotic. Active bleeding noted deep within where bone is exposed. No signs of acute infection. Hyperpi  Vasc: DP and PT pulses are mildly diminished CFT > 3 seconds.     Assessment:  S/p Exc Dbx of st/b, left foot (DOS: 6/15/20    Plan:  Pt. seen and evaluated  Discussed treatment and condition w/ patient  Active bleeding noted: Applied 20,000 units of topical thrombin/DSD/ABD/Kerlix/Ace  Will cont. to monitor surgical site  Will f/u w/ Attending for plan, if bleeding continues tomorrow, 6/17  Attending updated and aware. PROGRESS NOTE   Pt seen @ bedside during AM rounds. S/p Exc Dbx of st/b, left foot (DOS: 6/15/20). Dressing +Clean, +Dry, +Intact. Pt. denies any recent n/f/c/v/sob. Pt. denies any other pedal complaints at this time.      Vital Signs Last 24 Hrs  T(C): 36.2 (16 Jun 2020 05:35), Max: 36.6 (15 Tin 2020 12:32)  T(F): 97.1 (16 Jun 2020 05:35), Max: 97.8 (15 Tin 2020 12:32)  HR: 70 (16 Jun 2020 05:35) (46 - 70)  BP: 90/40 (16 Jun 2020 05:35) (90/40 - 134/60)  BP(mean): --  RR: 18 (16 Jun 2020 05:35) (11 - 20)  SpO2: 97% (16 Jun 2020 05:35) (97% - 100%)                          8.1    10.78 )-----------( 137      ( 16 Jun 2020 07:31 )             23.6               06-16    127<L>  |  89<L>  |  103<HH>  ----------------------------<  160<H>  4.5   |  20  |  3.9<H>    Ca    7.2<L>      16 Jun 2020 07:31  Phos  8.2     06-16  Mg     2.6     06-16    TPro  5.1<L>  /  Alb  2.3<L>  /  TBili  0.5  /  DBili  x   /  AST  46<H>  /  ALT  14  /  AlkPhos  231<H>  06-16      PHYSICAL EXAM  Right LE Focused:  DERM: Wound base 90% granular and 10% necrotic. Active bleeding noted deep within where bone is exposed. No signs of acute infection. Hyperpi  Vasc: DP and PT pulses are mildly diminished CFT > 3 seconds.     Assessment:  S/p Exc Dbx of st/b, left foot (DOS: 6/15/20    Plan:  Pt. seen and evaluated  Discussed treatment and condition w/ patient  Active bleeding noted: Applied 20,000 units of topical thrombin/DSD/ABD/Kerlix/Ace  Will cont. to monitor surgical site  Will f/u w/ Attending for plan, if bleeding continues tomorrow, 6/17  Will hold off from applying wound vac for now  Attending updated and aware. PROGRESS NOTE   Pt seen @ bedside during AM rounds. S/p Exc Dbx of st/b, left foot (DOS: 6/15/20). Dressing +Clean, +Dry, +Intact. Pt. denies any recent n/f/c/v/sob. Pt. denies any other pedal complaints at this time.      Vital Signs Last 24 Hrs  T(C): 36.2 (16 Jun 2020 05:35), Max: 36.6 (15 Tin 2020 12:32)  T(F): 97.1 (16 Jun 2020 05:35), Max: 97.8 (15 Tin 2020 12:32)  HR: 70 (16 Jun 2020 05:35) (46 - 70)  BP: 90/40 (16 Jun 2020 05:35) (90/40 - 134/60)  BP(mean): --  RR: 18 (16 Jun 2020 05:35) (11 - 20)  SpO2: 97% (16 Jun 2020 05:35) (97% - 100%)                          8.1    10.78 )-----------( 137      ( 16 Jun 2020 07:31 )             23.6               06-16    127<L>  |  89<L>  |  103<HH>  ----------------------------<  160<H>  4.5   |  20  |  3.9<H>    Ca    7.2<L>      16 Jun 2020 07:31  Phos  8.2     06-16  Mg     2.6     06-16    TPro  5.1<L>  /  Alb  2.3<L>  /  TBili  0.5  /  DBili  x   /  AST  46<H>  /  ALT  14  /  AlkPhos  231<H>  06-16      PHYSICAL EXAM  Left LE Focused:  DERM: Wound base 90% granular and 10% necrotic. Active bleeding noted deep within where bone is exposed. No signs of acute infection. Hyperpi  Vasc: DP and PT pulses are mildly diminished CFT > 3 seconds.     Assessment:  S/p Exc Dbx of st/b, left foot (DOS: 6/15/20    Plan:  Pt. seen and evaluated  Discussed treatment and condition w/ patient  Active bleeding noted: Applied 20,000 units of topical thrombin/DSD/ABD/Kerlix/Ace  Will cont. to monitor surgical site  Will f/u w/ Attending for plan, if bleeding continues tomorrow, 6/17  Will hold off from applying wound vac for now  Attending updated and aware.

## 2020-06-16 NOTE — PROGRESS NOTE ADULT - SUBJECTIVE AND OBJECTIVE BOX
Patient is a 80y old  Male who presents with a chief complaint of necrotizing fascitis (13 Jun 2020 10:39)    s/p OR for Nec - fascitis surgery however now Vascular Plan to do Amputation of LLE this Friday     Pt denies any complaints at this time.     PAST MEDICAL & SURGICAL HISTORY:  Chronic kidney disease  Anemia  Ascites  PAD (peripheral artery disease)  Hyperlipidemia  Hypothyroidism  Hypertension  Coronary artery disease  CHF (congestive heart failure)  Diabetes mellitus  Biventricular ICD (implantable cardioverter-defibrillator) in place  Amputation of toe of right foot  S/P arterial stent  S/P CABG x 5    Allergies  Byetta (Stomach Upset)  linezolid (Rash; Urticaria; Flushing)  melatonin (Other)    Home Medications:  aspirin 81 mg oral tablet: 1 tab(s) orally once a day (04 Jun 2020 07:05)  atorvastatin 80 mg oral tablet: 1 tab(s) orally once a day (04 Jun 2020 07:10)  Digitek 125 mcg (0.125 mg) oral tablet: orally every other day (at bedtime) (04 Jun 2020 07:10)  famotidine 10 mg oral tablet: 1 tab(s) orally once (at bedtime) (04 Jun 2020 07:11)  ferrous sulfate 325 mg (65 mg elemental iron) oral tablet: 1 tab(s) orally every other day (at bedtime) (04 Jun 2020 07:09)  folic acid 1 mg oral tablet: 1 tab(s) orally once a day (04 Jun 2020 07:06)  gabapentin 100 mg oral tablet: 2 tab(s) orally once a day (at bedtime) (04 Jun 2020 07:11)  Januvia 50 mg oral tablet: 1 tab(s) orally once a day (04 Jun 2020 07:04)  losartan 25 mg oral tablet: 0.5 tab(s) orally once a day (04 Jun 2020 07:05)  spironolactone 50 mg oral tablet: 1 tab(s) orally 2 times a day (04 Jun 2020 07:05)  torsemide 100 mg oral tablet: 1 tab(s) orally once a day (04 Jun 2020 07:05)    HOSPITAL MEDS:     MEDICATIONS  (STANDING):  aspirin enteric coated 81 milliGRAM(s) Oral daily  atorvastatin 80 milliGRAM(s) Oral at bedtime  calcitriol   Capsule 0.25 MICROGram(s) Oral daily  calcium acetate 1334 milliGRAM(s) Oral three times a day with meals  carvedilol 3.125 milliGRAM(s) Oral every 12 hours  chlorhexidine 4% Liquid 1 Application(s) Topical <User Schedule>  dextrose 50% Injectable 12.5 Gram(s) IV Push once  dextrose 50% Injectable 25 Gram(s) IV Push once  digoxin     Tablet 0.125 milliGRAM(s) Oral every other day  folic acid 1 milliGRAM(s) Oral daily  furosemide    Tablet 40 milliGRAM(s) Oral daily  gabapentin 200 milliGRAM(s) Oral daily  heparin   Injectable 5000 Unit(s) SubCutaneous every 8 hours  insulin lispro (HumaLOG) corrective regimen sliding scale   SubCutaneous three times a day before meals  meropenem  IVPB 500 milliGRAM(s) IV Intermittent every 12 hours  midodrine. 5 milliGRAM(s) Oral three times a day  pantoprazole    Tablet 40 milliGRAM(s) Oral before breakfast  sodium bicarbonate 650 milliGRAM(s) Oral every 6 hours  sodium zirconium cyclosilicate 5 Gram(s) Oral two times a day  vancomycin  IVPB 250 milliGRAM(s) IV Intermittent every 48 hours    MEDICATIONS  (PRN):  acetaminophen   Tablet .. 650 milliGRAM(s) Oral every 6 hours PRN Mild Pain (1 - 3)  aluminum hydroxide/magnesium hydroxide/simethicone Suspension 30 milliLiter(s) Oral every 6 hours PRN Dyspepsia  dextrose 40% Gel 15 Gram(s) Oral once PRN Blood Glucose LESS THAN 70 milliGRAM(s)/deciliter  glucagon  Injectable 1 milliGRAM(s) IntraMuscular once PRN Glucose LESS THAN 70 milligrams/deciliter      Vital Signs Last 24 Hrs  T(C): 36.2 (16 Jun 2020 05:35), Max: 36.2 (16 Jun 2020 05:35)  T(F): 97.1 (16 Jun 2020 05:35), Max: 97.1 (16 Jun 2020 05:35)  HR: 70 (16 Jun 2020 05:35) (70 - 70)  BP: 90/40 (16 Jun 2020 05:35) (90/40 - 113/63)  RR: 18 (16 Jun 2020 05:35) (18 - 18)  SpO2: 97% (16 Jun 2020 05:35) (97% - 97%)    O/E:  Awake, alert, not in distress.  HEENT: atraumatic, EOMI.  Chest: decreased breath sounds at bases  CVS: SIS2 +, systolic  murmur.  P/A: mildly distended,  BS+, free fluid+  CNS: non focal.  Ext: Left Foot s/p OR yesterday for nec - fascitis surgery / dressing in place per podiatry     All systems reviewed positive findings as above.                          8.1    10.78 )-----------( 137      ( 16 Jun 2020 07:31 )             23.6       06-16    127<L>  |  89<L>  |  103<HH>  ----------------------------<  160<H>  4.5   |  20  |  3.9<H>    Ca    7.2<L>      16 Jun 2020 07:31  Phos  8.2     06-16  Mg     2.6     06-16    TPro  5.1<L>  /  Alb  2.3<L>  /  TBili  0.5  /  DBili  x   /  AST  46<H>  /  ALT  14  /  AlkPhos  231<H>  06-16    Ca    7.0<L>      14 Jun 2020 07:48  Ca    7.1<L>      13 Jun 2020 20:50  Ca    6.9<L>      13 Jun 2020 07:16  Mg     2.6     06-14    TPro  5.3<L>  /  Alb  2.3<L>  /  TBili  0.4  /  DBili  x   /  AST  41  /  ALT  14  /  AlkPhos  216<H>  06-14  TPro  5.4<L>  /  Alb  2.4<L>  /  TBili  0.4  /  DBili  x   /  AST  43<H>  /  ALT  15  /  AlkPhos  230<H>  06-13  TPro  5.2<L>  /  Alb  2.4<L>  /  TBili  0.5  /  DBili  x   /  AST  43<H>  /  ALT  15  /  AlkPhos  225<H>  06-13

## 2020-06-17 NOTE — CHART NOTE - NSCHARTNOTEFT_GEN_A_CORE
-Sx cancelled today, 6/17; Pt. is hypothermic-Temp of 93.  -Sx re-scheduled for tomorrow morning for Exc Dbx of st/b, Left foot  -Please optimize all pre-surgical labs prior to sx  -NPO MN

## 2020-06-17 NOTE — PROGRESS NOTE ADULT - SUBJECTIVE AND OBJECTIVE BOX
WILL VIEYRA 80y Male  MRN#: [id number]   CODE STATUS: FULL     SUBJECTIVE  Patient is a 80y old Male who presents with a chief complaint of necrotizing fascitis (16 Jun 2020 18:57)  Currently admitted to medicine with the primary diagnosis of Necrotizing fasciitis    Today is hospital day 13d, and this morning he is _________ and reports ________ overnight events.       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 150 milliLiter(s) IV Continuous <Continuous>  dextrose 50% Injectable 12.5 Gram(s) IV Push once  dextrose 50% Injectable 25 Gram(s) IV Push once  digoxin     Tablet 0.125 milliGRAM(s) Oral every other day  folic acid 1 milliGRAM(s) Oral daily  furosemide    Tablet 40 milliGRAM(s) Oral daily  gabapentin 200 milliGRAM(s) Oral daily  heparin   Injectable 5000 Unit(s) SubCutaneous every 8 hours  meropenem  IVPB 500 milliGRAM(s) IV Intermittent every 12 hours  metolazone 2.5 milliGRAM(s) Oral daily  midodrine. 5 milliGRAM(s) Oral three times a day  pantoprazole    Tablet 40 milliGRAM(s) Oral before breakfast  sodium bicarbonate 650 milliGRAM(s) Oral every 6 hours  sodium zirconium cyclosilicate 5 Gram(s) Oral two times a day    PRN MEDICATIONS  acetaminophen   Tablet .. 650 milliGRAM(s) Oral every 6 hours PRN  aluminum hydroxide/magnesium hydroxide/simethicone Suspension 30 milliLiter(s) Oral every 6 hours PRN  dextrose 40% Gel 15 Gram(s) Oral once PRN  glucagon  Injectable 1 milliGRAM(s) IntraMuscular once PRN      VITAL SIGNS: Last 24 Hours  T(C): 33.9 (17 Jun 2020 07:41), Max: 36.1 (16 Jun 2020 20:29)  T(F): 93 (17 Jun 2020 07:41), Max: 97 (16 Jun 2020 20:29)  HR: 71 (17 Jun 2020 06:21) (70 - 71)  BP: 99/54 (17 Jun 2020 06:21) (97/52 - 107/57)  BP(mean): --  RR: 18 (17 Jun 2020 05:05) (18 - 18)  SpO2: 99% (17 Jun 2020 06:21) (98% - 99%)    LABS:                        7.7    10.72 )-----------( 140      ( 17 Jun 2020 04:30 )             23.0     06-17    126<L>  |  89<L>  |  105<HH>  ----------------------------<  75  4.4   |  19  |  4.3<HH>    Ca    7.3<L>      17 Jun 2020 04:30  Phos  8.2     06-16  Mg     2.5     06-17    TPro  5.3<L>  /  Alb  2.3<L>  /  TBili  0.4  /  DBili  x   /  AST  45<H>  /  ALT  11  /  AlkPhos  208<H>  06-17              Culture - Tissue with Gram Stain (collected 15 Tin 2020 12:53)  Source: .Tissue None  Gram Stain (15 Tin 2020 23:04):    No polymorphonuclear leukocytes per low power field    No organisms seen per oil power field  Preliminary Report (16 Jun 2020 16:24):    No growth    Culture - Blood (collected 15 Tin 2020 05:40)  Source: .Blood None  Preliminary Report (16 Jun 2020 12:04):    No growth to date.    Culture - Blood (collected 14 Jun 2020 11:24)  Source: .Blood None  Preliminary Report (15 Tin 2020 17:01):    No growth to date.      CARDIAC MARKERS ( 16 Jun 2020 07:31 )  x     / x     / 124 U/L / x     / x          RADIOLOGY:      PHYSICAL EXAM:    GENERAL: NAD, well-developed, AAOx3  HEENT:  Atraumatic, Normocephalic. EOMI, PERRLA, conjunctiva and sclera clear, No JVD  PULMONARY: Clear to auscultation bilaterally; No wheeze  CARDIOVASCULAR: Regular rate and rhythm; No murmurs, rubs, or gallops  GASTROINTESTINAL: Soft, Nontender, Nondistended; Bowel sounds present  MUSCULOSKELETAL:  2+ Peripheral Pulses, No clubbing, cyanosis, or edema  NEUROLOGY: non-focal  SKIN: No rashes or lesions      ADMISSION SUMMARY  Patient is a 80y old Male who presents with a chief complaint of necrotizing fascitis (16 Jun 2020 18:57)  Currently admitted to medicine with the primary diagnosis of Necrotizing fasciitis  Hospital course has been complicated by _______.       ASSESSMENT & PLAN WILL VIEYRA 80y Male  MRN#: [id number]   CODE STATUS: FULL     SUBJECTIVE  Patient is a 80y old Male who presents with a chief complaint of necrotizing fascitis (16 Jun 2020 18:57)  Currently admitted to medicine with the primary diagnosis of Necrotizing fasciitis    Today is hospital day 13d. Overnight patient was hypoglycemic to 50s, bradycardic to 30-40s, hypothermic to 93. Given D5, started on rachelle hugger.     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 150 milliLiter(s) IV Continuous <Continuous>  dextrose 50% Injectable 12.5 Gram(s) IV Push once  dextrose 50% Injectable 25 Gram(s) IV Push once  digoxin     Tablet 0.125 milliGRAM(s) Oral every other day  folic acid 1 milliGRAM(s) Oral daily  furosemide    Tablet 40 milliGRAM(s) Oral daily  gabapentin 200 milliGRAM(s) Oral daily  heparin   Injectable 5000 Unit(s) SubCutaneous every 8 hours  meropenem  IVPB 500 milliGRAM(s) IV Intermittent every 12 hours  metolazone 2.5 milliGRAM(s) Oral daily  midodrine. 5 milliGRAM(s) Oral three times a day  pantoprazole    Tablet 40 milliGRAM(s) Oral before breakfast  sodium bicarbonate 650 milliGRAM(s) Oral every 6 hours  sodium zirconium cyclosilicate 5 Gram(s) Oral two times a day    PRN MEDICATIONS  acetaminophen   Tablet .. 650 milliGRAM(s) Oral every 6 hours PRN  aluminum hydroxide/magnesium hydroxide/simethicone Suspension 30 milliLiter(s) Oral every 6 hours PRN  dextrose 40% Gel 15 Gram(s) Oral once PRN  glucagon  Injectable 1 milliGRAM(s) IntraMuscular once PRN      VITAL SIGNS: Last 24 Hours  T(C): 33.9 (17 Jun 2020 07:41), Max: 36.1 (16 Jun 2020 20:29)  T(F): 93 (17 Jun 2020 07:41), Max: 97 (16 Jun 2020 20:29)  HR: 71 (17 Jun 2020 06:21) (70 - 71)  BP: 99/54 (17 Jun 2020 06:21) (97/52 - 107/57)  BP(mean): --  RR: 18 (17 Jun 2020 05:05) (18 - 18)  SpO2: 99% (17 Jun 2020 06:21) (98% - 99%)    LABS:                        7.7    10.72 )-----------( 140      ( 17 Jun 2020 04:30 )             23.0     06-17    126<L>  |  89<L>  |  105<HH>  ----------------------------<  75  4.4   |  19  |  4.3<HH>    Ca    7.3<L>      17 Jun 2020 04:30  Phos  8.2     06-16  Mg     2.5     06-17    TPro  5.3<L>  /  Alb  2.3<L>  /  TBili  0.4  /  DBili  x   /  AST  45<H>  /  ALT  11  /  AlkPhos  208<H>  06-17      Culture - Tissue with Gram Stain (collected 15 Tin 2020 12:53)  Source: .Tissue None  Gram Stain (15 Tin 2020 23:04):    No polymorphonuclear leukocytes per low power field    No organisms seen per oil power field  Preliminary Report (16 Jun 2020 16:24):    No growth    Culture - Blood (collected 15 Tin 2020 05:40)  Source: .Blood None  Preliminary Report (16 Jun 2020 12:04):    No growth to date.    Culture - Blood (collected 14 Jun 2020 11:24)  Source: .Blood None  Preliminary Report (15 Tin 2020 17:01):    No growth to date.      CARDIAC MARKERS ( 16 Jun 2020 07:31 )  x     / x     / 124 U/L / x     / x          RADIOLOGY:  < from: CT Head No Cont (06.17.20 @ 10:15) >    IMPRESSION:    Unremarkable study    < end of copied text >    PHYSICAL EXAM:  GENERAL: appears pale, on nasal cannula   HEENT:  conjunctiva and sclera clear, No JVD  PULMONARY: good air entry   CARDIOVASCULAR: Regular rate and rhythm  GASTROINTESTINAL: Soft, Nontender, Nondistended  MUSCULOSKELETAL:  L foot wrapped in ACE bandage, blood soaked through   NEUROLOGY: opened eyes to verbal stimuli but then became more awake throughout AM - has slurred speech     ADMISSION SUMMARY  Patient is a 80y old Male who presents with a chief complaint of necrotizing fascitis (16 Jun 2020 18:57)  Currently admitted to medicine with the primary diagnosis of Necrotizing fasciitis    ASSESSMENT & PLAN  80 M with PMHx of anemia, ascites 2/2 CHF, CHF s/p biventricular AICD, CKD, CAD s/p CABGx5 on ASA, CKD, DM s/p R great toe amputation, HLD, HTN, hypothyroidism, PAD s/p R femoral stent who presents with altered mental status x 1 day and worsening left foot DFU. Was on augmentin as outpatient     #Polymicrobial bacteremia secondary to necrotizing L foot infection   - Arterial Duplex with normal flow, EF = <20%   - Xray of left foot demonstrates SQ emphysema of first digit MTP joint; concerning for soft tissue infection   - bedside I&D 6/4/2020. deep dissection of left foot by podiatry 6/5/2020 and 6/8/2020  - S/p LLE debridement on 6/10/2020. Further podiatry recs pending  - OR Sx positive 6/4 for numerous E. Coli and Proteus and MRSA   - BCx 6/4/2020 positive for Proteus ESBL and GBS. BCx 6/6, 6/9, 6/10 negative.   - ID: c/w Daptomycin and Merrem  - Podiatry: Scheduled for OR on Monday, but possible add-on tomorrow for further debridement due to worsening WBC count  - Patient will need PICC line. Consult placed    # Normocytic anemia  - S/p 2 units on 6/10 and 1 unit 6/12  - Unsure where patient is losing blood. No signs of GI bleed or hematomas. Perhaps he is bleeding from his surgical site, but is unlikely he is bleeding enough from there to be anemic  - Likely multifactorial from his chronic disease and possible bone marrow suppression from Daptomycin   - f/u CT a/p to check for retroperitoneal bleeding  - As per GI no intervention    # HFrEF w/ BiVentricular AICD, h/o CHB  - Pleural effusions and ascites seen on X-ray.  - midodrine for low BP  - TTE from [facility] from 2/2020 EF 20%, severe TR, akinetic apex, inf spetum, mid and apical anterior septum. Mild-mod MR, + ascites from CHF. Now EF <20%   - strict Is and Os   - BNP >70k on admission  - PO Coreg 3.125 BID and PO Lasix 40mg daily   - Aldactone held as K is upper normal.     # MICHAEL on CKD. Baseline cr 1.8-2.2. With improving hyperkalemia  - Likely prerenal due to CHF, perhaps ATN as well  - Lokelema 10mg Po q12 as per nephro.   - 6/8 Dig level 1.0, and 6/12 level 1.2.  - Phoslo TID and calcitriol per nephro  - Aldactone and losartan held  - U/S shows normal kidneys  - Will monitor    #Hyponatremia  - Currently 123  - Mike 20, UOsm 294 Ordered. Suggestive of CHF. Will obtain another urine Na  - Serum Osmolality 308. Lipid profile negative for HLD induced hyponatremia.   - Will monitor    # Type II NSTEMI   - trop 0.17  - EKG without ischemic changes    # DM   - Patient hypoglycemic this AM  - d/c Lantus, c/w Lispro qac and ss  - Monitor Fs.   - A1C: 10.6    # CAD s/p CABGx5: - on ASA, statin    Diet: DASH; Carb Consistent   DVT ppx; Heparin SubQ  GI ppx: PTX  Dispo: Acute      Electronic Signatur WILL VIEYRA 80y Male  MRN#: [id number]   CODE STATUS: FULL     SUBJECTIVE  Patient is a 80y old Male who presents with a chief complaint of necrotizing fascitis (16 Jun 2020 18:57)  Currently admitted to medicine with the primary diagnosis of Necrotizing fasciitis    Today is hospital day 13d. Overnight patient was hypoglycemic to 50s, bradycardic to 30-40s, hypothermic to 93. Given D5, started on rachelle hugger.     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 150 milliLiter(s) IV Continuous <Continuous>  dextrose 50% Injectable 12.5 Gram(s) IV Push once  dextrose 50% Injectable 25 Gram(s) IV Push once  digoxin     Tablet 0.125 milliGRAM(s) Oral every other day  folic acid 1 milliGRAM(s) Oral daily  furosemide    Tablet 40 milliGRAM(s) Oral daily  gabapentin 200 milliGRAM(s) Oral daily  heparin   Injectable 5000 Unit(s) SubCutaneous every 8 hours  meropenem  IVPB 500 milliGRAM(s) IV Intermittent every 12 hours  metolazone 2.5 milliGRAM(s) Oral daily  midodrine. 5 milliGRAM(s) Oral three times a day  pantoprazole    Tablet 40 milliGRAM(s) Oral before breakfast  sodium bicarbonate 650 milliGRAM(s) Oral every 6 hours  sodium zirconium cyclosilicate 5 Gram(s) Oral two times a day    PRN MEDICATIONS  acetaminophen   Tablet .. 650 milliGRAM(s) Oral every 6 hours PRN  aluminum hydroxide/magnesium hydroxide/simethicone Suspension 30 milliLiter(s) Oral every 6 hours PRN  dextrose 40% Gel 15 Gram(s) Oral once PRN  glucagon  Injectable 1 milliGRAM(s) IntraMuscular once PRN      VITAL SIGNS: Last 24 Hours  T(C): 33.9 (17 Jun 2020 07:41), Max: 36.1 (16 Jun 2020 20:29)  T(F): 93 (17 Jun 2020 07:41), Max: 97 (16 Jun 2020 20:29)  HR: 71 (17 Jun 2020 06:21) (70 - 71)  BP: 99/54 (17 Jun 2020 06:21) (97/52 - 107/57)  BP(mean): --  RR: 18 (17 Jun 2020 05:05) (18 - 18)  SpO2: 99% (17 Jun 2020 06:21) (98% - 99%)    LABS:                        7.7    10.72 )-----------( 140      ( 17 Jun 2020 04:30 )             23.0     06-17    126<L>  |  89<L>  |  105<HH>  ----------------------------<  75  4.4   |  19  |  4.3<HH>    Ca    7.3<L>      17 Jun 2020 04:30  Phos  8.2     06-16  Mg     2.5     06-17    TPro  5.3<L>  /  Alb  2.3<L>  /  TBili  0.4  /  DBili  x   /  AST  45<H>  /  ALT  11  /  AlkPhos  208<H>  06-17      Culture - Tissue with Gram Stain (collected 15 Tin 2020 12:53)  Source: .Tissue None  Gram Stain (15 Tin 2020 23:04):    No polymorphonuclear leukocytes per low power field    No organisms seen per oil power field  Preliminary Report (16 Jun 2020 16:24):    No growth    Culture - Blood (collected 15 Tin 2020 05:40)  Source: .Blood None  Preliminary Report (16 Jun 2020 12:04):    No growth to date.    Culture - Blood (collected 14 Jun 2020 11:24)  Source: .Blood None  Preliminary Report (15 Tin 2020 17:01):    No growth to date.      CARDIAC MARKERS ( 16 Jun 2020 07:31 )  x     / x     / 124 U/L / x     / x          RADIOLOGY:  < from: CT Head No Cont (06.17.20 @ 10:15) >    IMPRESSION:    Unremarkable study    < end of copied text >    PHYSICAL EXAM:  GENERAL: appears pale, on nasal cannula   HEENT:  conjunctiva and sclera clear, No JVD  PULMONARY: good air entry   CARDIOVASCULAR: Regular rate and rhythm  GASTROINTESTINAL: Soft, Nontender, Nondistended  MUSCULOSKELETAL:  L foot wrapped in ACE bandage, blood soaked through   NEUROLOGY: opened eyes to verbal stimuli but then became more awake throughout AM - has slurred speech     ADMISSION SUMMARY  Patient is a 80y old Male who presents with a chief complaint of necrotizing fascitis (16 Jun 2020 18:57)  Currently admitted to medicine with the primary diagnosis of Necrotizing fasciitis    ASSESSMENT & PLAN  80 M with PMHx of anemia, ascites 2/2 CHF, CHF s/p biventricular AICD, CKD, CAD s/p CABGx5 on ASA, CKD, DM s/p R great toe amputation, HLD, HTN, hypothyroidism, PAD s/p R femoral stent who presents with altered mental status x 1 day and worsening left foot DFU. Was on augmentin as outpatient     # Sepsis POA, lactic acidosis resolved  # Metabolic encephalopathy sec to sepsis resolved  # Polymicrobial bacteremia secondary to necrotizing fascitis in left foot infection   -  Xray Foot AP + Lateral + Oblique, Left (06.08.20 @ 16:51) >Status post hallux metatarsal and great toe amputation sparing 1 cm of the base. Otherwise intact tarsal metatarsal alignment. Previously noted subcutaneous emphysema within soft tissues has been resected. No proximal residual gas. Vascular calcification sand hind foot neuropathic osteoarthropathy  - VA Duplex Lower Extrem Arterial, Bilat- Normal arterial flow in bilateral lower extremities.  - s/p multiple debridements with podiatry   - ESR 79, CRP 23   - blood cultures 6/4 : Proteus ESBL and GBS. Blood cultures negative since 6/6, most recent 6/16 NGTD   - wound cultures 6/4: E. Coli, Proteus, MRSA   - Continue w/ Local Wound Care; wash with soap and water, apply Dakins wet to dry dsd abd kerlix ace TID  - PICC Line in place 6/15/2020 by IR  - continue meropenem & vancomycin - check vanc level every morning   - Decision made for Amputation Surgery LLE today - Surgery this Friday 6/19/2020  - Cardiology risk stratification in cardio note     #Slurred speech - CT head negative  #Hypothermia/bradycardia/hypoglycemia overnight - sepsis v. myxedema coma? - will f/u TSH & cortisol     # Acute kidney injury on CKD stage 4 sec to ATN, metabolic acidosis, hyponatremia  # Hyperkalemia-resolved  - baseline creat 2.2  - US Kidney and Bladder (06.10.20 @ 22:59) >Normal-appearing kidneys. Bilateral pleural effusions with ascites.  - losartan, aldactone held  - c/w lokelema dose decreased to 5mg, phoslo, calcitriol   - c/w sodium bicarb  - monitor UO, bladder scan    # Acute on chronic systolic CHF with bilateral pleural effusions and ascites s/p biventricular AICD  -  Transthoracic Echocardiogram (06.04.20 @ 12:04) >Left ventricular ejection fraction, by visual estimation, is <20%.Grade I diastolic dysfunction). Moderate tricuspid regurgitation. borderline pulmonary hypertension.  -  Xray Chest 1 View- PORTABLE-Routine (06.13.20 @ 14:43) >Bilateral opacities with small effusions left greater than right unchanged since prior  - monitor I/o, daily weight, restrict fluids  - c/w lasix, coreg, digoxin  - losartan and aldactone held due to MICHAEL on CKD4   - Consult heart failure specialist - consult placed     # Elevated troponin sec to Type 2 MI, H/o CAD s/p CABGx5  - Troponin T, Serum: 0.12: Critical value: ng/mL (06.04.20 @ 11:12)  - 12 Lead ECG (06.10.20 @ 07:57) >Ventricular-paced rhythm. Biventricular pacemaker detected  - c/w ASA, statin, coreg, digoxin    # DM type 2 with hypoglycemia  - A1C- 10.6 %  - monitor FS - do not use insulin unless needed prior to meals     # Acute on chronic normocytic anemia- multifactorial  - S/p 2 units on 6/10 and 2 unit 6/12  - evaluated by GI - outpt  EGD/colonoscopy    # DVT prophylaxis - heparin subq     Pending: patient has debridement tomorrow, possibly BKA Friday WILL VIEYRA 80y Male  MRN#: [id number]   CODE STATUS: FULL     SUBJECTIVE  Patient is a 80y old Male who presents with a chief complaint of necrotizing fascitis (16 Jun 2020 18:57)  Currently admitted to medicine with the primary diagnosis of Necrotizing fasciitis    Today is hospital day 13d. Overnight patient was hypoglycemic to 50s, bradycardic to 30-40s, hypothermic to 93. Given D5, started on rachelle hugger.     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 150 milliLiter(s) IV Continuous <Continuous>  dextrose 50% Injectable 12.5 Gram(s) IV Push once  dextrose 50% Injectable 25 Gram(s) IV Push once  digoxin     Tablet 0.125 milliGRAM(s) Oral every other day  folic acid 1 milliGRAM(s) Oral daily  furosemide    Tablet 40 milliGRAM(s) Oral daily  gabapentin 200 milliGRAM(s) Oral daily  heparin   Injectable 5000 Unit(s) SubCutaneous every 8 hours  meropenem  IVPB 500 milliGRAM(s) IV Intermittent every 12 hours  metolazone 2.5 milliGRAM(s) Oral daily  midodrine. 5 milliGRAM(s) Oral three times a day  pantoprazole    Tablet 40 milliGRAM(s) Oral before breakfast  sodium bicarbonate 650 milliGRAM(s) Oral every 6 hours  sodium zirconium cyclosilicate 5 Gram(s) Oral two times a day    PRN MEDICATIONS  acetaminophen   Tablet .. 650 milliGRAM(s) Oral every 6 hours PRN  aluminum hydroxide/magnesium hydroxide/simethicone Suspension 30 milliLiter(s) Oral every 6 hours PRN  dextrose 40% Gel 15 Gram(s) Oral once PRN  glucagon  Injectable 1 milliGRAM(s) IntraMuscular once PRN      VITAL SIGNS: Last 24 Hours  T(C): 33.9 (17 Jun 2020 07:41), Max: 36.1 (16 Jun 2020 20:29)  T(F): 93 (17 Jun 2020 07:41), Max: 97 (16 Jun 2020 20:29)  HR: 71 (17 Jun 2020 06:21) (70 - 71)  BP: 99/54 (17 Jun 2020 06:21) (97/52 - 107/57)  BP(mean): --  RR: 18 (17 Jun 2020 05:05) (18 - 18)  SpO2: 99% (17 Jun 2020 06:21) (98% - 99%)    LABS:                        7.7    10.72 )-----------( 140      ( 17 Jun 2020 04:30 )             23.0     06-17    126<L>  |  89<L>  |  105<HH>  ----------------------------<  75  4.4   |  19  |  4.3<HH>    Ca    7.3<L>      17 Jun 2020 04:30  Phos  8.2     06-16  Mg     2.5     06-17    TPro  5.3<L>  /  Alb  2.3<L>  /  TBili  0.4  /  DBili  x   /  AST  45<H>  /  ALT  11  /  AlkPhos  208<H>  06-17      Culture - Tissue with Gram Stain (collected 15 Tin 2020 12:53)  Source: .Tissue None  Gram Stain (15 Tin 2020 23:04):    No polymorphonuclear leukocytes per low power field    No organisms seen per oil power field  Preliminary Report (16 Jun 2020 16:24):    No growth    Culture - Blood (collected 15 Tin 2020 05:40)  Source: .Blood None  Preliminary Report (16 Jun 2020 12:04):    No growth to date.    Culture - Blood (collected 14 Jun 2020 11:24)  Source: .Blood None  Preliminary Report (15 Tin 2020 17:01):    No growth to date.      CARDIAC MARKERS ( 16 Jun 2020 07:31 )  x     / x     / 124 U/L / x     / x          RADIOLOGY:  < from: CT Head No Cont (06.17.20 @ 10:15) >    IMPRESSION:    Unremarkable study    < end of copied text >    PHYSICAL EXAM:  GENERAL: appears pale, on nasal cannula   HEENT:  conjunctiva and sclera clear, No JVD  PULMONARY: good air entry   CARDIOVASCULAR: Regular rate and rhythm  GASTROINTESTINAL: Soft, Nontender, Nondistended  MUSCULOSKELETAL:  L foot wrapped in ACE bandage, blood soaked through   NEUROLOGY: opened eyes to verbal stimuli but then became more awake throughout AM - has slurred speech     ADMISSION SUMMARY  Patient is a 80y old Male who presents with a chief complaint of necrotizing fascitis (16 Jun 2020 18:57)  Currently admitted to medicine with the primary diagnosis of Necrotizing fasciitis    ASSESSMENT & PLAN  80 M with PMHx of anemia, ascites 2/2 CHF, CHF s/p biventricular AICD, CKD, CAD s/p CABGx5 on ASA, CKD, DM s/p R great toe amputation, HLD, HTN, hypothyroidism, PAD s/p R femoral stent who presents with altered mental status x 1 day and worsening left foot DFU. Was on augmentin as outpatient     # Sepsis POA, lactic acidosis resolved  # Metabolic encephalopathy sec to sepsis resolved  # Polymicrobial bacteremia secondary to necrotizing fascitis in left foot infection   -  Xray Foot AP + Lateral + Oblique, Left (06.08.20 @ 16:51) >Status post hallux metatarsal and great toe amputation sparing 1 cm of the base. Otherwise intact tarsal metatarsal alignment. Previously noted subcutaneous emphysema within soft tissues has been resected. No proximal residual gas. Vascular calcification sand hind foot neuropathic osteoarthropathy  - VA Duplex Lower Extrem Arterial, Bilat- Normal arterial flow in bilateral lower extremities.  - s/p multiple debridements with podiatry   - ESR 79, CRP 23   - blood cultures 6/4 : Proteus ESBL and GBS. Blood cultures negative since 6/6, most recent 6/16 NGTD   - wound cultures 6/4: E. Coli, Proteus, MRSA   - Continue w/ Local Wound Care; wash with soap and water, apply Dakins wet to dry dsd abd kerlix ace TID  - PICC Line in place 6/15/2020 by IR  - continue meropenem & vancomycin - check vanc level every morning   - Decision made for Amputation Surgery LLE today - Surgery this Friday 6/19/2020  - Cardiology risk stratification in cardio note     #Slurred speech - CT head negative  #Hypothermia/bradycardia/hypoglycemia overnight - sepsis v. myxedema coma? - will f/u TSH & cortisol     # Acute kidney injury on CKD stage 4 sec to ATN, metabolic acidosis, hyponatremia  # Hyperkalemia-resolved  - baseline creat 2.2  - US Kidney and Bladder (06.10.20 @ 22:59) >Normal-appearing kidneys. Bilateral pleural effusions with ascites.  - losartan, aldactone held  - c/w lokelema dose decreased to 5mg, phoslo, calcitriol   - c/w sodium bicarb  - monitor UO, bladder scan    # Acute on chronic systolic CHF with bilateral pleural effusions and ascites s/p biventricular AICD  -  Transthoracic Echocardiogram (06.04.20 @ 12:04) >Left ventricular ejection fraction, by visual estimation, is <20%.Grade I diastolic dysfunction). Moderate tricuspid regurgitation. borderline pulmonary hypertension.  -  Xray Chest 1 View- PORTABLE-Routine (06.13.20 @ 14:43) >Bilateral opacities with small effusions left greater than right unchanged since prior  - monitor I/o, daily weight, restrict fluids  - c/w lasix, coreg, digoxin  - losartan and aldactone held due to MICHAEL on CKD4   - Consult heart failure specialist - consult placed     # Elevated troponin sec to Type 2 MI, H/o CAD s/p CABGx5  - Troponin T, Serum: 0.12: Critical value: ng/mL (06.04.20 @ 11:12)  - 12 Lead ECG (06.10.20 @ 07:57) >Ventricular-paced rhythm. Biventricular pacemaker detected  - c/w ASA, statin, coreg, digoxin    # DM type 2 with hypoglycemia  - A1C- 10.6 %  - monitor FS - do not use insulin unless needed prior to meals     # Acute on chronic normocytic anemia- multifactorial  - S/p 2 units on 6/10 and 2 unit 6/12  - evaluated by GI - outpt  EGD/colonoscopy    # GI prophylaxis - protonix  # DVT prophylaxis - heparin subq     Pending: patient has debridement tomorrow, possibly BKA Friday

## 2020-06-17 NOTE — CONSULT NOTE ADULT - ASSESSMENT
81 y/o M with h/o ICM, chronic systolic heart failure s/p CRT-D, DM, PVD admitted with AMS necrotizing fasciitis s/p debridement of foot infection.       - Chronic systolic heart failure /ICM s/p CRT-D     Reportedly LVEF ~ 20% with severe - echo done on 06/04 only has still frames stored   Would get repeat echo to adequately assess LV systolic and diastolic function, filling pressures and valves function  Start furosemide 80 mg iv daily   Continue metolazone for now   Continue low dose carvedilol for now   Continue digoxin   DC midodrine   Strict I/Os   Daily weight     CKD  Creat/BUN 4.3/105   Nephrology following       Necrotizing fasciitis    s/p debridement   Plan for BKA   Abx and pain control per primary team   Follow up by vascular and podiatry

## 2020-06-17 NOTE — PROGRESS NOTE ADULT - ASSESSMENT
Sepsis / bacteremia / necrotising fascitis / MICHAEL/ stage 4 ckd:  #  ATN most likely/creatinine stable   #  please document UO/ bladder scan   #   k noted continue  lokelma decrease to 5 q12h   # hyponatremia ,  ? volume related ,restarted in lasix sodium better   #BP noted, continue midodrine   # dig level 1.2,   # phos noted,  corrected calcium around 8,  on  phoslo 2/2/2,  and  calcitriol 0.25 q 24,  pth 187   # acidosis , improved   po sodium bicarb 650 mg q 6 hr.  # podiatry notes appreciated , s/p OR   #on roxanna/ dapto followed by ID   # no acute indication for RRT / might need to start if no improvement , attempted to discuss w/ family no answer   # will follow

## 2020-06-17 NOTE — CONSULT NOTE ADULT - SUBJECTIVE AND OBJECTIVE BOX
Patient is a very poor historian and seems a little confused. - History obtained from chart review and other team members.       79 y/o M with h/o CAD s/p CABG, systolic heart failure s/p CRT-D, CKD, DM admitted with AMS and foot infection. Reportedly, patient has gained ~ 8 lb recently. He was admitted with a diagnosis of sepsis due to necrotizing fasciitis. Per cardiology note, records from [facility] were reviewed, TTE from 02/2020 had showed LVEF ~ 20% and severe TR (Lead impingement)        PMH: Systolic heart failure, CKD, CAD, DM, HLD, HTN, hypothyroidism, PAD    PSH: CABG, femoral stent     Socia; No ETOH or drugs     ROS: unable to obtain           PHYSICAL EXAM     General: AAO x1, confused  Eyes: Clear eyes   ENT: No ear discharge  Neck: Trachea is central, Unable to assess JVD  Lungs: CTA, no crackles on anterior auscultation   Heart: Regular heart sounds, no murmurs   GI: Abdomen is soft, NT  Neuro: Normal strength  Psych: Calm affect   Integument: No skin bruises

## 2020-06-17 NOTE — PROGRESS NOTE ADULT - SUBJECTIVE AND OBJECTIVE BOX
Nephrology progress note    Patient was seen and examined, events over the last 24 h noted .    Allergies:  Byetta (Stomach Upset)  linezolid (Rash; Urticaria; Flushing)  melatonin (Other)    Hospital Medications:   MEDICATIONS  (STANDING):  aspirin enteric coated 81 milliGRAM(s) Oral daily  atorvastatin 80 milliGRAM(s) Oral at bedtime  calcitriol   Capsule 0.25 MICROGram(s) Oral daily  calcium acetate 1334 milliGRAM(s) Oral three times a day with meals  carvedilol 3.125 milliGRAM(s) Oral every 12 hours  chlorhexidine 4% Liquid 1 Application(s) Topical <User Schedule>  dextrose 5%. 150 milliLiter(s) (50 mL/Hr) IV Continuous <Continuous>  dextrose 50% Injectable 12.5 Gram(s) IV Push once  dextrose 50% Injectable 25 Gram(s) IV Push once  digoxin     Tablet 0.125 milliGRAM(s) Oral every other day  folic acid 1 milliGRAM(s) Oral daily  furosemide    Tablet 40 milliGRAM(s) Oral daily  gabapentin 200 milliGRAM(s) Oral daily  heparin   Injectable 5000 Unit(s) SubCutaneous every 8 hours  meropenem  IVPB 500 milliGRAM(s) IV Intermittent every 12 hours  metolazone 2.5 milliGRAM(s) Oral daily  midodrine. 5 milliGRAM(s) Oral three times a day  pantoprazole    Tablet 40 milliGRAM(s) Oral before breakfast  sodium bicarbonate 650 milliGRAM(s) Oral every 6 hours  sodium zirconium cyclosilicate 5 Gram(s) Oral two times a day        VITALS:  T(F): 97.4 (06-17-20 @ 14:15), Max: 97.4 (06-17-20 @ 14:15)  HR: 70 (06-17-20 @ 12:21)  BP: 107/57 (06-17-20 @ 12:21)  RR: 18 (06-17-20 @ 12:21)  SpO2: 99% (06-17-20 @ 06:21)  Wt(kg): --    06-15 @ 07:01  -  06-16 @ 07:00  --------------------------------------------------------  IN: 25 mL / OUT: 0 mL / NET: 25 mL    06-16 @ 07:01  -  06-17 @ 07:00  --------------------------------------------------------  IN: 680 mL / OUT: 0 mL / NET: 680 mL    06-17 @ 07:01  -  06-17 @ 15:29  --------------------------------------------------------  IN: 440 mL / OUT: 200 mL / NET: 240 mL      Height (cm): 167.6 (06-17 @ 06:21)  Weight (kg): 69.8 (06-17 @ 06:21)  BMI (kg/m2): 24.8 (06-17 @ 06:21)  BSA (m2): 1.79 (06-17 @ 06:21)    PHYSICAL EXAM:  Constitutional: NAD  HEENT: anicteric sclera, oropharynx clear, MMM  Neck: No JVD  Respiratory: CTAB, no wheezes, rales or rhonchi  Cardiovascular: S1, S2, RRR  Gastrointestinal: BS+, soft, NT/ND  Extremities: No cyanosis or clubbing. No peripheral edema  :  No arana.   Skin: No rashes    LABS:  06-17    126<L>  |  89<L>  |  105<HH>  ----------------------------<  75  4.4   |  19  |  4.3<HH>    Ca    7.3<L>      17 Jun 2020 04:30  Phos  8.2     06-16  Mg     2.5     06-17    TPro  5.3<L>  /  Alb  2.3<L>  /  TBili  0.4  /  DBili      /  AST  45<H>  /  ALT  11  /  AlkPhos  208<H>  06-17                          7.7    10.72 )-----------( 140      ( 17 Jun 2020 04:30 )             23.0       Urine Studies:      RADIOLOGY & ADDITIONAL STUDIES:

## 2020-06-18 NOTE — BRIEF OPERATIVE NOTE - NSICDXBRIEFPOSTOP_GEN_ALL_CORE_FT
POST-OP DIAGNOSIS:  Necrotizing fasciitis of ankle and foot 05-Jun-2020 15:15:35  Sowmya Breen

## 2020-06-18 NOTE — PRE-ANESTHESIA EVALUATION ADULT - NSANTHADDINFOFT_GEN_ALL_CORE
procedure/Risks discussed with daughter who provided telephone consent for MAC sedation with GA backup as rescue + routine monitoring.   Anesthetic plan understood and agreed to by daughter.

## 2020-06-18 NOTE — PRE-ANESTHESIA EVALUATION ADULT - NSATTENDATTESTRD_GEN_ALL_CORE
The patient has been re-examined and I agree with the above assessment or I updated with my findings.

## 2020-06-18 NOTE — PROGRESS NOTE ADULT - ASSESSMENT
Severe ischemic CM, systolic dysfunction  S/p CRT-D  S/p debridement by podiatry.  Possible BKA by vascular    CHF consultation appreciated  Nephrology follow-up appreciated  C/w diuretics  Monitor I&O

## 2020-06-18 NOTE — PRE-ANESTHESIA EVALUATION ADULT - MALLAMPATI CLASS
Class II - visualization of the soft palate, fauces, and uvula

## 2020-06-18 NOTE — PRE-ANESTHESIA EVALUATION ADULT - NSANTHAPLANRD_GEN_ALL_CORE
general/monitored anesthesia care (MAC)
monitored anesthesia care (MAC)

## 2020-06-18 NOTE — PROGRESS NOTE ADULT - SUBJECTIVE AND OBJECTIVE BOX
WILL VIEYRA 80y Male  MRN#: [id number]   CODE STATUS: FULL     SUBJECTIVE  Patient is a 80y old Male who presents with a chief complaint of necrotizing fascitis (18 Jun 2020 09:47)  Currently admitted to medicine with the primary diagnosis of Necrotizing fasciitis    Today is hospital day 14d.     OBJECTIVE  PAST MEDICAL & SURGICAL HISTORY  Chronic kidney disease  Anemia  Ascites  PAD (peripheral artery disease)  Hyperlipidemia  Hypothyroidism  Hypertension  Coronary artery disease  CHF (congestive heart failure)  Diabetes mellitus  Biventricular ICD (implantable cardioverter-defibrillator) in place  Amputation of toe of right foot  S/P arterial stent  S/P CABG x 5    ALLERGIES:  Byetta (Stomach Upset)  linezolid (Rash; Urticaria; Flushing)  melatonin (Other)    MEDICATIONS:  STANDING MEDICATIONS  dextrose 5%. 150 milliLiter(s) IV Continuous <Continuous>    PRN MEDICATIONS      VITAL SIGNS: Last 24 Hours  T(C): 36.5 (18 Jun 2020 10:00), Max: 36.5 (18 Jun 2020 09:38)  T(F): 97.7 (18 Jun 2020 09:38), Max: 97.7 (18 Jun 2020 09:38)  HR: 90 (18 Jun 2020 10:00) (70 - 90)  BP: 120/61 (18 Jun 2020 10:00) (107/57 - 120/61)  BP(mean): --  RR: 20 (18 Jun 2020 10:00) (18 - 20)  SpO2: 97% (18 Jun 2020 10:00) (96% - 97%)    LABS:                        7.3    11.60 )-----------( 131      ( 18 Jun 2020 07:01 )             21.1     06-18    126<L>  |  86<L>  |  102<HH>  ----------------------------<  161<H>  5.1<H>   |  20  |  4.5<HH>    Ca    7.2<L>      18 Jun 2020 07:01  Phos  8.1     06-18  Mg     2.5     06-18    TPro  5.3<L>  /  Alb  2.3<L>  /  TBili  0.7  /  DBili  x   /  AST  41  /  ALT  10  /  AlkPhos  199<H>  06-18    PT/INR - ( 18 Jun 2020 07:01 )   PT: 14.30 sec;   INR: 1.24 ratio         PTT - ( 18 Jun 2020 07:01 )  PTT:32.9 sec    Culture - Blood (collected 16 Jun 2020 07:31)  Source: .Blood None  Preliminary Report (17 Jun 2020 12:01):    No growth to date.    Culture - Tissue with Gram Stain (collected 15 Tin 2020 12:53)  Source: .Tissue None  Gram Stain (15 Tin 2020 23:04):    No polymorphonuclear leukocytes per low power field    No organisms seen per oil power field  Preliminary Report (16 Jun 2020 16:24):    No growth    RADIOLOGY:  < from: CT Head No Cont (06.17.20 @ 10:15) >  IMPRESSION:  Unremarkable study  < end of copied text >    PHYSICAL EXAM:  GENERAL: appears pale, on nasal cannula   HEENT:  conjunctiva and sclera clear, No JVD  PULMONARY: good air entry   CARDIOVASCULAR: Regular rate and rhythm  GASTROINTESTINAL: Soft, Nontender, Nondistended  MUSCULOSKELETAL:  L foot wrapped in ACE bandage, blood soaked through   NEUROLOGY: mumbling - slow speech similar to baseline     ADMISSION SUMMARY  Patient is a 80y old Male who presents with a chief complaint of necrotizing fascitis (18 Jun 2020 09:47)  Currently admitted to medicine with the primary diagnosis of Necrotizing fasciitis     ASSESSMENT & PLAN  80 M with PMHx of anemia, ascites 2/2 CHF, CHF s/p biventricular AICD, CKD, CAD s/p CABGx5 on ASA, CKD, DM s/p R great toe amputation, HLD, HTN, hypothyroidism, PAD s/p R femoral stent who presents with altered mental status x 1 day and worsening left foot DFU. Was on Augmentin as outpatient.     # Sepsis POA, lactic acidosis resolved  # Metabolic encephalopathy sec to sepsis resolved  # Polymicrobial bacteremia secondary to necrotizing fascitis in left foot infection   -  Xray Foot AP + Lateral + Oblique, Left (06.08.20 @ 16:51) >Status post hallux metatarsal and great toe amputation sparing 1 cm of the base. Otherwise intact tarsal metatarsal alignment. Previously noted subcutaneous emphysema within soft tissues has been resected. No proximal residual gas. Vascular calcification sand hind foot neuropathic osteoarthropathy  - VA Duplex Lower Extrem Arterial, Bilat- Normal arterial flow in bilateral lower extremities.  - s/p multiple debridements with podiatry   - ESR 79, CRP 23   - blood cultures 6/4 : Proteus ESBL and GBS. Blood cultures negative since 6/6, most recent 6/16 NGTD   - wound cultures 6/4: E. Coli, Proteus, MRSA   - Continue w/ Local Wound Care; wash with soap and water, apply Dakins wet to dry dsd abd kerlix ace TID  - PICC Line in place 6/15/2020 by IR  - continue meropenem & vancomycin - check vanc level every morning   - Decision made for Amputation Surgery LLE today - Surgery this Friday 6/19/2020  - Cardiology risk stratification in cardio note     #Slurred speech - CT head negative  #Hypothermia/bradycardia/hypoglycemia overnight - sepsis v. myxedema coma? - will f/u TSH & cortisol     # Acute kidney injury on CKD stage 4 sec to ATN, metabolic acidosis, hyponatremia  # Hyperkalemia-resolved  - baseline creat 2.2  - US Kidney and Bladder (06.10.20 @ 22:59) >Normal-appearing kidneys. Bilateral pleural effusions with ascites.  - losartan, aldactone held  - c/w lokelema dose decreased to 5mg, phoslo, calcitriol   - c/w sodium bicarb  - monitor UO, bladder scan     # Acute on chronic systolic CHF with bilateral pleural effusions and ascites s/p biventricular AICD  -  Transthoracic Echocardiogram (06.04.20 @ 12:04) >Left ventricular ejection fraction, by visual estimation, is <20%.Grade I diastolic dysfunction). Moderate tricuspid regurgitation. borderline pulmonary hypertension.  -  Xray Chest 1 View- PORTABLE-Routine (06.13.20 @ 14:43) >Bilateral opacities with small effusions left greater than right unchanged since prior  - monitor I/o, daily weight, restrict fluids  - c/w lasix, coreg, digoxin  - losartan and aldactone held due to MICHAEL on CKD4   - Consult heart failure specialist - consult placed     # Elevated troponin sec to Type 2 MI, H/o CAD s/p CABGx5  - Troponin T, Serum: 0.12: Critical value: ng/mL (06.04.20 @ 11:12)  - 12 Lead ECG (06.10.20 @ 07:57) >Ventricular-paced rhythm. Biventricular pacemaker detected  - c/w ASA, statin, coreg, digoxin    # DM type 2 with hypoglycemia  - A1C- 10.6 %  - monitor FS - do not use insulin unless needed prior to meals     # Acute on chronic normocytic anemia- multifactorial  - S/p 2 units on 6/10 and 2 unit 6/12  - evaluated by GI - outpt EGD/colonoscopy    # GI prophylaxis - protonix  # DVT prophylaxis - heparin subq     Pending: patient has debridement today, possibly BKA tomorrow WILL VIEYRA 80y Male  MRN#: [id number]   CODE STATUS: FULL     SUBJECTIVE  Patient is a 80y old Male who presents with a chief complaint of necrotizing fascitis (18 Jun 2020 09:47)  Currently admitted to medicine with the primary diagnosis of Necrotizing fasciitis    Today is hospital day 14d.     OBJECTIVE  PAST MEDICAL & SURGICAL HISTORY  Chronic kidney disease  Anemia  Ascites  PAD (peripheral artery disease)  Hyperlipidemia  Hypothyroidism  Hypertension  Coronary artery disease  CHF (congestive heart failure)  Diabetes mellitus  Biventricular ICD (implantable cardioverter-defibrillator) in place  Amputation of toe of right foot  S/P arterial stent  S/P CABG x 5    ALLERGIES:  Byetta (Stomach Upset)  linezolid (Rash; Urticaria; Flushing)  melatonin (Other)    MEDICATIONS:  STANDING MEDICATIONS  dextrose 5%. 150 milliLiter(s) IV Continuous <Continuous>    PRN MEDICATIONS      VITAL SIGNS: Last 24 Hours  T(C): 36.5 (18 Jun 2020 10:00), Max: 36.5 (18 Jun 2020 09:38)  T(F): 97.7 (18 Jun 2020 09:38), Max: 97.7 (18 Jun 2020 09:38)  HR: 90 (18 Jun 2020 10:00) (70 - 90)  BP: 120/61 (18 Jun 2020 10:00) (107/57 - 120/61)  BP(mean): --  RR: 20 (18 Jun 2020 10:00) (18 - 20)  SpO2: 97% (18 Jun 2020 10:00) (96% - 97%)    LABS:                        7.3    11.60 )-----------( 131      ( 18 Jun 2020 07:01 )             21.1     06-18    126<L>  |  86<L>  |  102<HH>  ----------------------------<  161<H>  5.1<H>   |  20  |  4.5<HH>    Ca    7.2<L>      18 Jun 2020 07:01  Phos  8.1     06-18  Mg     2.5     06-18    TPro  5.3<L>  /  Alb  2.3<L>  /  TBili  0.7  /  DBili  x   /  AST  41  /  ALT  10  /  AlkPhos  199<H>  06-18    PT/INR - ( 18 Jun 2020 07:01 )   PT: 14.30 sec;   INR: 1.24 ratio         PTT - ( 18 Jun 2020 07:01 )  PTT:32.9 sec    Culture - Blood (collected 16 Jun 2020 07:31)  Source: .Blood None  Preliminary Report (17 Jun 2020 12:01):    No growth to date.    Culture - Tissue with Gram Stain (collected 15 Tin 2020 12:53)  Source: .Tissue None  Gram Stain (15 Tin 2020 23:04):    No polymorphonuclear leukocytes per low power field    No organisms seen per oil power field  Preliminary Report (16 Jun 2020 16:24):    No growth    RADIOLOGY:  < from: CT Head No Cont (06.17.20 @ 10:15) >  IMPRESSION:  Unremarkable study  < end of copied text >    PHYSICAL EXAM:  GENERAL: appears pale, on nasal cannula   HEENT:  conjunctiva and sclera clear, No JVD  PULMONARY: good air entry   CARDIOVASCULAR: Regular rate and rhythm  GASTROINTESTINAL: Soft, Nontender, Nondistended  MUSCULOSKELETAL:  L foot wrapped in ACE bandage, blood soaked through   NEUROLOGY: mumbling - slow speech similar to baseline     ADMISSION SUMMARY  Patient is a 80y old Male who presents with a chief complaint of necrotizing fascitis (18 Jun 2020 09:47)  Currently admitted to medicine with the primary diagnosis of Necrotizing fasciitis     ASSESSMENT & PLAN  80 M with PMHx of anemia, ascites 2/2 CHF, CHF s/p biventricular AICD, CKD, CAD s/p CABGx5 on ASA, CKD, DM s/p R great toe amputation, HLD, HTN, hypothyroidism, PAD s/p R femoral stent who presents with altered mental status x 1 day and worsening left foot DFU. Was on Augmentin as outpatient.     # Sepsis POA, lactic acidosis resolved  # Metabolic encephalopathy sec to sepsis resolved  # Polymicrobial bacteremia secondary to necrotizing fascitis in left foot infection   -  Xray Foot AP + Lateral + Oblique, Left (06.08.20 @ 16:51) >Status post hallux metatarsal and great toe amputation sparing 1 cm of the base. Otherwise intact tarsal metatarsal alignment. Previously noted subcutaneous emphysema within soft tissues has been resected. No proximal residual gas. Vascular calcification sand hind foot neuropathic osteoarthropathy  - VA Duplex Lower Extrem Arterial, Bilat- Normal arterial flow in bilateral lower extremities.  - s/p multiple debridements with podiatry   - ESR 79, CRP 23   - blood cultures 6/4 : Proteus ESBL and GBS. Blood cultures negative since 6/6, most recent 6/16 NGTD   - wound cultures 6/4: E. Coli, Proteus, MRSA   - Continue w/ Local Wound Care; wash with soap and water, apply Dakins wet to dry dsd abd kerlix ace TID  - PICC Line in place 6/15/2020 by IR  - continue meropenem & vancomycin - check vanc level every morning   - Decision made for Amputation Surgery LLE today - Surgery this Friday 6/19/2020  - Cardiology risk stratification in cardio note     #Slurred speech - CT head negative  #Hypothermia/bradycardia/hypoglycemia - likely from sepsis?     # Acute kidney injury on CKD stage 4 sec to ATN, metabolic acidosis, hyponatremia  # Hyperkalemia-resolved  - baseline creat 2.2  - US Kidney and Bladder (06.10.20 @ 22:59) >Normal-appearing kidneys. Bilateral pleural effusions with ascites.  - losartan, aldactone held  - c/w lokelema dose decreased to 5mg, phoslo, calcitriol   - c/w sodium bicarb  - monitor UO, bladder scan - will put condom cath on     # Acute on chronic systolic CHF with bilateral pleural effusions and ascites s/p biventricular AICD  -  Transthoracic Echocardiogram (06.04.20 @ 12:04) >Left ventricular ejection fraction, by visual estimation, is <20%.Grade I diastolic dysfunction). Moderate tricuspid regurgitation. borderline pulmonary hypertension.  -  Xray Chest 1 View- PORTABLE-Routine (06.13.20 @ 14:43) >Bilateral opacities with small effusions left greater than right unchanged since prior  - monitor I/o, daily weight, restrict fluids  - c/w lasix, coreg, digoxin  - losartan and aldactone held due to MICHAEL on CKD4   - Consult heart failure specialist - INCREASED lasix, dc midodrine as per rec     # Elevated troponin sec to Type 2 MI, H/o CAD s/p CABGx5  - Troponin T, Serum: 0.12: Critical value: ng/mL (06.04.20 @ 11:12)  - 12 Lead ECG (06.10.20 @ 07:57) >Ventricular-paced rhythm. Biventricular pacemaker detected  - c/w ASA, statin, coreg, digoxin    # DM type 2 with hypoglycemia  - A1C- 10.6 %  - monitor FS - do not use insulin unless needed prior to meals     # Acute on chronic normocytic anemia- multifactorial  - S/p 2 units on 6/10 and 2 unit 6/12  - evaluated by GI - outpt EGD/colonoscopy    # GI prophylaxis - protonix  # DVT prophylaxis - heparin subq     Pending: patient has debridement today, possibly BKA tomorrow WILL VIEYRA 80y Male  MRN#: [id number]   CODE STATUS: FULL     SUBJECTIVE  Patient is a 80y old Male who presents with a chief complaint of necrotizing fascitis (18 Jun 2020 09:47)  Currently admitted to medicine with the primary diagnosis of Necrotizing fasciitis    Today is hospital day 14d.     OBJECTIVE  PAST MEDICAL & SURGICAL HISTORY  Chronic kidney disease  Anemia  Ascites  PAD (peripheral artery disease)  Hyperlipidemia  Hypothyroidism  Hypertension  Coronary artery disease  CHF (congestive heart failure)  Diabetes mellitus  Biventricular ICD (implantable cardioverter-defibrillator) in place  Amputation of toe of right foot  S/P arterial stent  S/P CABG x 5    ALLERGIES:  Byetta (Stomach Upset)  linezolid (Rash; Urticaria; Flushing)  melatonin (Other)    MEDICATIONS:  STANDING MEDICATIONS  dextrose 5%. 150 milliLiter(s) IV Continuous <Continuous>    PRN MEDICATIONS      VITAL SIGNS: Last 24 Hours  T(C): 36.5 (18 Jun 2020 10:00), Max: 36.5 (18 Jun 2020 09:38)  T(F): 97.7 (18 Jun 2020 09:38), Max: 97.7 (18 Jun 2020 09:38)  HR: 90 (18 Jun 2020 10:00) (70 - 90)  BP: 120/61 (18 Jun 2020 10:00) (107/57 - 120/61)  BP(mean): --  RR: 20 (18 Jun 2020 10:00) (18 - 20)  SpO2: 97% (18 Jun 2020 10:00) (96% - 97%)    LABS:                        7.3    11.60 )-----------( 131      ( 18 Jun 2020 07:01 )             21.1     06-18    126<L>  |  86<L>  |  102<HH>  ----------------------------<  161<H>  5.1<H>   |  20  |  4.5<HH>    Ca    7.2<L>      18 Jun 2020 07:01  Phos  8.1     06-18  Mg     2.5     06-18    TPro  5.3<L>  /  Alb  2.3<L>  /  TBili  0.7  /  DBili  x   /  AST  41  /  ALT  10  /  AlkPhos  199<H>  06-18    PT/INR - ( 18 Jun 2020 07:01 )   PT: 14.30 sec;   INR: 1.24 ratio         PTT - ( 18 Jun 2020 07:01 )  PTT:32.9 sec    Culture - Blood (collected 16 Jun 2020 07:31)  Source: .Blood None  Preliminary Report (17 Jun 2020 12:01):    No growth to date.    Culture - Tissue with Gram Stain (collected 15 Tin 2020 12:53)  Source: .Tissue None  Gram Stain (15 Tin 2020 23:04):    No polymorphonuclear leukocytes per low power field    No organisms seen per oil power field  Preliminary Report (16 Jun 2020 16:24):    No growth    RADIOLOGY:  < from: CT Head No Cont (06.17.20 @ 10:15) >  IMPRESSION:  Unremarkable study  < end of copied text >    PHYSICAL EXAM:  GENERAL: appears pale, on nasal cannula   HEENT:  conjunctiva and sclera clear, No JVD  PULMONARY: good air entry   CARDIOVASCULAR: Regular rate and rhythm  GASTROINTESTINAL: Soft, Nontender, Nondistended  MUSCULOSKELETAL:  L foot wrapped in ACE bandage, blood soaked through   NEUROLOGY: mumbling - slow speech similar to baseline     ADMISSION SUMMARY  Patient is a 80y old Male who presents with a chief complaint of necrotizing fascitis (18 Jun 2020 09:47)  Currently admitted to medicine with the primary diagnosis of Necrotizing fasciitis     ASSESSMENT & PLAN  80 M with PMHx of anemia, ascites 2/2 CHF, CHF s/p biventricular AICD, CKD, CAD s/p CABGx5 on ASA, CKD, DM s/p R great toe amputation, HLD, HTN, hypothyroidism, PAD s/p R femoral stent who presents with altered mental status x 1 day and worsening left foot DFU. Was on Augmentin as outpatient.     # Sepsis POA, lactic acidosis resolved  # Metabolic encephalopathy sec to sepsis resolved  # Polymicrobial bacteremia secondary to necrotizing fascitis in left foot infection   -  Xray Foot AP + Lateral + Oblique, Left (06.08.20 @ 16:51) >Status post hallux metatarsal and great toe amputation sparing 1 cm of the base. Otherwise intact tarsal metatarsal alignment. Previously noted subcutaneous emphysema within soft tissues has been resected. No proximal residual gas. Vascular calcification sand hind foot neuropathic osteoarthropathy  - VA Duplex Lower Extrem Arterial, Bilat- Normal arterial flow in bilateral lower extremities.  - s/p multiple debridements with podiatry   - ESR 79, CRP 23   - blood cultures 6/4 : Proteus ESBL and GBS. Blood cultures negative since 6/6, most recent 6/16 NGTD   - wound cultures 6/4: E. Coli, Proteus, MRSA   - Continue w/ Local Wound Care; wash with soap and water, apply Dakins wet to dry dsd abd kerlix ace TID  - PICC Line in place 6/15/2020 by IR  - continue meropenem & vancomycin - check vanc level every morning   - Decision made for Amputation Surgery LLE today - Surgery this Friday 6/19/2020  - Cardiology risk stratification in cardio note     #Slurred speech - CT head negative  #Hypothermia/bradycardia/hypoglycemia - likely from sepsis?     # Acute kidney injury on CKD stage 4 sec to ATN, metabolic acidosis, hyponatremia  # Hyperkalemia-resolved  - baseline creat 2.2  - US Kidney and Bladder (06.10.20 @ 22:59) >Normal-appearing kidneys. Bilateral pleural effusions with ascites.  - losartan, aldactone held  - c/w lokelema dose decreased to 5mg, phoslo, calcitriol   - c/w sodium bicarb  - monitor UO, bladder scan - will put condom cath on     # Acute on chronic systolic CHF with bilateral pleural effusions and ascites s/p biventricular AICD  -  Transthoracic Echocardiogram (06.04.20 @ 12:04) >Left ventricular ejection fraction, by visual estimation, is <20%.Grade I diastolic dysfunction). Moderate tricuspid regurgitation. borderline pulmonary hypertension.  -  Xray Chest 1 View- PORTABLE-Routine (06.13.20 @ 14:43) >Bilateral opacities with small effusions left greater than right unchanged since prior  - monitor I/o, daily weight, restrict fluids  - c/w lasix, coreg, digoxin  - losartan and aldactone held due to MICHAEL on CKD4   - Consult heart failure specialist - INCREASED lasix, dc midodrine as per rec     # Elevated troponin sec to Type 2 MI, H/o CAD s/p CABGx5  - Troponin T, Serum: 0.12: Critical value: ng/mL (06.04.20 @ 11:12)  - 12 Lead ECG (06.10.20 @ 07:57) >Ventricular-paced rhythm. Biventricular pacemaker detected  - c/w ASA, statin, coreg, digoxin    # DM type 2 with hypoglycemia  - A1C- 10.6 %  - monitor FS - do not use insulin unless needed prior to meals     # Acute on chronic normocytic anemia- multifactorial  - S/p 2 units on 6/10 and 2 unit 6/12  - evaluated by GI - outpt EGD/colonoscopy    # GI prophylaxis - protonix  # DVT prophylaxis - heparin subq     Pending: patient has debridement today

## 2020-06-18 NOTE — PROGRESS NOTE ADULT - SUBJECTIVE AND OBJECTIVE BOX
seen and examined  no distress   lying comfortable         PAST HISTORY  --------------------------------------------------------------------------------  No significant changes to PMH, PSH, FHx, SHx, unless otherwise noted    ALLERGIES & MEDICATIONS  --------------------------------------------------------------------------------  Allergies    Byetta (Stomach Upset)  linezolid (Rash; Urticaria; Flushing)  melatonin (Other)    Intolerances      Standing Inpatient Medications  aspirin enteric coated 81 milliGRAM(s) Oral daily  atorvastatin 80 milliGRAM(s) Oral at bedtime  calcitriol   Capsule 0.25 MICROGram(s) Oral daily  calcium acetate 1334 milliGRAM(s) Oral three times a day with meals  carvedilol 3.125 milliGRAM(s) Oral every 12 hours  chlorhexidine 4% Liquid 1 Application(s) Topical <User Schedule>  dextrose 5%. 150 milliLiter(s) IV Continuous <Continuous>  dextrose 50% Injectable 12.5 Gram(s) IV Push once  dextrose 50% Injectable 25 Gram(s) IV Push once  digoxin     Tablet 0.125 milliGRAM(s) Oral every other day  folic acid 1 milliGRAM(s) Oral daily  furosemide    Tablet 40 milliGRAM(s) Oral daily  gabapentin 200 milliGRAM(s) Oral daily  heparin   Injectable 5000 Unit(s) SubCutaneous every 8 hours  meropenem  IVPB 500 milliGRAM(s) IV Intermittent every 12 hours  metolazone 2.5 milliGRAM(s) Oral daily  midodrine. 5 milliGRAM(s) Oral three times a day  pantoprazole    Tablet 40 milliGRAM(s) Oral before breakfast  sodium bicarbonate 650 milliGRAM(s) Oral every 6 hours  sodium zirconium cyclosilicate 5 Gram(s) Oral two times a day  vancomycin  IVPB 250 milliGRAM(s) IV Intermittent every 48 hours    PRN Inpatient Medications  acetaminophen   Tablet .. 650 milliGRAM(s) Oral every 6 hours PRN  aluminum hydroxide/magnesium hydroxide/simethicone Suspension 30 milliLiter(s) Oral every 6 hours PRN  dextrose 40% Gel 15 Gram(s) Oral once PRN  glucagon  Injectable 1 milliGRAM(s) IntraMuscular once PRN          VITALS/PHYSICAL EXAM  --------------------------------------------------------------------------------  T(C): 35.9 (06-18-20 @ 08:00), Max: 36.3 (06-17-20 @ 14:15)  HR: 70 (06-18-20 @ 06:14) (70 - 70)  BP: 120/61 (06-18-20 @ 06:14) (105/54 - 120/61)  RR: 19 (06-18-20 @ 06:14) (18 - 19)  SpO2: 97% (06-18-20 @ 08:00) (96% - 97%)  Wt(kg): --  Height (cm): 167.64 (06-18-20 @ 06:14)  Weight (kg): 67.8 (06-18-20 @ 06:14)  BMI (kg/m2): 24.1 (06-18-20 @ 06:14)  BSA (m2): 1.77 (06-18-20 @ 06:14)      06-17-20 @ 07:01  -  06-18-20 @ 07:00  --------------------------------------------------------  IN: 680 mL / OUT: 200 mL / NET: 480 mL      Physical Exam:  	Gen: NAD  	Pulm: B/L ronchi at bases   	CV: S1S2; no rub  	Abd: +distended      LABS/STUDIES  --------------------------------------------------------------------------------              7.3    11.60 >-----------<  131      [06-18-20 @ 07:01]              21.1     126  |  86  |  102  ----------------------------<  161      [06-18-20 @ 07:01]  5.1   |  20  |  4.5        Ca     7.2     [06-18-20 @ 07:01]      Mg     2.5     [06-18-20 @ 07:01]      Phos  8.1     [06-18-20 @ 07:01]    TPro  5.3  /  Alb  2.3  /  TBili  0.7  /  DBili  x   /  AST  41  /  ALT  10  /  AlkPhos  199  [06-18-20 @ 07:01]    PT/INR: PT 14.30, INR 1.24       [06-18-20 @ 07:01]  PTT: 32.9       [06-18-20 @ 07:01]    Serum Osmolality 296      [06-17-20 @ 10:21]    Creatinine Trend:  SCr 4.5 [06-18 @ 07:01]  SCr 4.3 [06-17 @ 04:30]  SCr 3.9 [06-16 @ 07:31]  SCr 4.1 [06-15 @ 22:22]  SCr 3.7 [06-15 @ 05:40]        Iron 38, TIBC 149, %sat 26      [06-16-20 @ 07:31]  Ferritin 2690      [06-16-20 @ 07:31]  PTH -- (Ca 7.1)      [06-12-20 @ 17:22]   187  TSH 0.83      [06-10-20 @ 06:18]  Lipid: chol 51, TG 96, HDL 17, LDL 14      [06-10-20 @ 06:18] seen and examined  no distress   lying comfortable         PAST HISTORY  --------------------------------------------------------------------------------  No significant changes to PMH, PSH, FHx, SHx, unless otherwise noted    ALLERGIES & MEDICATIONS  --------------------------------------------------------------------------------  Allergies    Byetta (Stomach Upset)  linezolid (Rash; Urticaria; Flushing)  melatonin (Other)    Intolerances      Standing Inpatient Medications  aspirin enteric coated 81 milliGRAM(s) Oral daily  atorvastatin 80 milliGRAM(s) Oral at bedtime  calcitriol   Capsule 0.25 MICROGram(s) Oral daily  calcium acetate 1334 milliGRAM(s) Oral three times a day with meals  carvedilol 3.125 milliGRAM(s) Oral every 12 hours  chlorhexidine 4% Liquid 1 Application(s) Topical <User Schedule>  dextrose 5%. 150 milliLiter(s) IV Continuous <Continuous>  dextrose 50% Injectable 12.5 Gram(s) IV Push once  dextrose 50% Injectable 25 Gram(s) IV Push once  digoxin     Tablet 0.125 milliGRAM(s) Oral every other day  folic acid 1 milliGRAM(s) Oral daily  furosemide    Tablet 40 milliGRAM(s) Oral daily  gabapentin 200 milliGRAM(s) Oral daily  heparin   Injectable 5000 Unit(s) SubCutaneous every 8 hours  meropenem  IVPB 500 milliGRAM(s) IV Intermittent every 12 hours  metolazone 2.5 milliGRAM(s) Oral daily  midodrine. 5 milliGRAM(s) Oral three times a day  pantoprazole    Tablet 40 milliGRAM(s) Oral before breakfast  sodium bicarbonate 650 milliGRAM(s) Oral every 6 hours  sodium zirconium cyclosilicate 5 Gram(s) Oral two times a day  vancomycin  IVPB 250 milliGRAM(s) IV Intermittent every 48 hours    PRN Inpatient Medications  acetaminophen   Tablet .. 650 milliGRAM(s) Oral every 6 hours PRN  aluminum hydroxide/magnesium hydroxide/simethicone Suspension 30 milliLiter(s) Oral every 6 hours PRN  dextrose 40% Gel 15 Gram(s) Oral once PRN  glucagon  Injectable 1 milliGRAM(s) IntraMuscular once PRN    COVID-19 PCR: NotDetec: Methodology:  The Abbott Real Time SARS-CoV-2 assay is a real time reverse  transcriptase (RT) Polymerase Chain Reaction (PCR), test intended for the  qualitative detection of RNA from the SARS-CoV-2 in nasopharyngeal and  oropharyngeal swabs from patients suspected of COVID-19 infection.  The SARS-CoV-2 assay is performed on the Abbott m2000 system.  Reference Range:  Not Detected  This test has been validated by [facility]  Molecular lab. This assay is for in Vitro diagnostic testing under FDA  Emergency Use Authorization only.  This Test Was Performed At [facility],  [place], [place] [address] (06.04.20 @ 05:27)          VITALS/PHYSICAL EXAM  --------------------------------------------------------------------------------  T(C): 35.9 (06-18-20 @ 08:00), Max: 36.3 (06-17-20 @ 14:15)  HR: 70 (06-18-20 @ 06:14) (70 - 70)  BP: 120/61 (06-18-20 @ 06:14) (105/54 - 120/61)  RR: 19 (06-18-20 @ 06:14) (18 - 19)  SpO2: 97% (06-18-20 @ 08:00) (96% - 97%)  Wt(kg): --  Height (cm): 167.64 (06-18-20 @ 06:14)  Weight (kg): 67.8 (06-18-20 @ 06:14)  BMI (kg/m2): 24.1 (06-18-20 @ 06:14)  BSA (m2): 1.77 (06-18-20 @ 06:14)      06-17-20 @ 07:01  -  06-18-20 @ 07:00  --------------------------------------------------------  IN: 680 mL / OUT: 200 mL / NET: 480 mL      Physical Exam:  	Gen: NAD  	Pulm: B/L moses at bases   	CV: S1S2; no rub  	Abd: +distended      LABS/STUDIES  --------------------------------------------------------------------------------              7.3    11.60 >-----------<  131      [06-18-20 @ 07:01]              21.1     126  |  86  |  102  ----------------------------<  161      [06-18-20 @ 07:01]  5.1   |  20  |  4.5        Ca     7.2     [06-18-20 @ 07:01]      Mg     2.5     [06-18-20 @ 07:01]      Phos  8.1     [06-18-20 @ 07:01]    TPro  5.3  /  Alb  2.3  /  TBili  0.7  /  DBili  x   /  AST  41  /  ALT  10  /  AlkPhos  199  [06-18-20 @ 07:01]    PT/INR: PT 14.30, INR 1.24       [06-18-20 @ 07:01]  PTT: 32.9       [06-18-20 @ 07:01]    Serum Osmolality 296      [06-17-20 @ 10:21]    Creatinine Trend:  SCr 4.5 [06-18 @ 07:01]  SCr 4.3 [06-17 @ 04:30]  SCr 3.9 [06-16 @ 07:31]  SCr 4.1 [06-15 @ 22:22]  SCr 3.7 [06-15 @ 05:40]        Iron 38, TIBC 149, %sat 26      [06-16-20 @ 07:31]  Ferritin 2690      [06-16-20 @ 07:31]  PTH -- (Ca 7.1)      [06-12-20 @ 17:22]   187  TSH 0.83      [06-10-20 @ 06:18]  Lipid: chol 51, TG 96, HDL 17, LDL 14      [06-10-20 @ 06:18]

## 2020-06-18 NOTE — BRIEF OPERATIVE NOTE - NSICDXBRIEFPROCEDURE_GEN_ALL_CORE_FT
PROCEDURES:  Application of negative pressure wound therapy of area less than 50 sq cm 15-Tin-2020 12:30:30  Anthony Clayton  Excision, exostosis, bossing, foot 15-Tin-2020 12:24:59  Anthony Clayton  Deep dissection for foot infection 05-Jun-2020 15:12:28  Sowmya Breen  Pulsed lavage with suction 05-Jun-2020 15:12:05  Sowmya Breen
PROCEDURES:  Delayed primary closure of foot 10-Tin-2020 17:11:35 left foot Anthony Clayton  Deep dissection for foot infection 05-Jun-2020 15:12:28  Sowmya Breen  Pulsed lavage with suction 05-Jun-2020 15:12:05  Sowmya Breen
PROCEDURES:  Deep dissection for foot infection 05-Jun-2020 15:12:28  Sowmya Breen  Pulsed lavage with suction 05-Jun-2020 15:12:05  Sowmya Breen
PROCEDURES:  Delayed primary closure of foot 10-Tin-2020 17:11:35 left foot Anthony Clayton  Pulsed lavage with suction 05-Jun-2020 15:12:05  Sowmya Breen
PROCEDURES:  Amputation of first metatarsal 08-Jun-2020 11:33:16 Left foot Anthony Clayton  Deep dissection for foot infection 05-Jun-2020 15:12:28  Sowmya Breen  Pulsed lavage with suction 05-Jun-2020 15:12:05  Sowmya Breen

## 2020-06-18 NOTE — PRE-OP CHECKLIST - 1.
[facility] [place]   [facility]       Patient: Rock ALEXANDRA Rausch Date: 2018     : 1957 Attending: Chris Peguero MD   60 year old male      Chief Complaint: s/p OHT    History of Present Illness:  60 year old male with a past medical history significant for Ischemic Cardiomyopathy s/p Heartmate II BTT-LVAD, Chronic Systolic HF, s/p x3 PCI to LAD & x1 PCI to RCA (), DMII, HLD, HTN, AFib, bipolar disorder, and epistaxis with cauterization to left nare (10/2017) who was admitted today after a suitable donor heart was found. He is now s/p LVAD explantation, OHTx. In the OR he decompensated hemodynamically and an emergent VA-ECMO and IABP was inserted.     Summary of Stay:  : Out of the OR, he is on milrinone 0.25, dobutamine 5, vaso 0.1, epi 2, amio 1. IABP 1:1. Mike 60 ppm. Currently paralyzed.  : Still intubated, requiring multiple pressors + inotropes + IABP + VA ECMO support.   6/10: VA ECMO flows 6, IABP 1:3. Dobutamine 3mcg, Epi 1mcg, Primacor 0.25mcg, Inhaled Nitric 40 PPM, Vaso 0.03 units. Lasix 5 mg/hr. Will get PRBC x 2 units today. Bronch again today. ID consult requested.  : Remains intubated and on paralytics. Echo this am. Wife present. Dobs 3, Epi 1, Milrinone 0.25, Lasix 5 mg/hour, ECMO, IABP 1:3  : Intubated and sedated. ECMO flows @ 4L.  3, Epi 1, Primacor 0.25, Lasix 5/hr.  IABP remains at 1:3.   : Remains intubated and sedated.  ECMO flows decreased to 1.5L and hemodynamics remain unchanged.        Review of Systems: MARIBELL 2/2 intubated and sedated     Nutrition: Tube feeding     Medications/Infusions:  Reviewed.     Rhythm: Sinus rhythm, Sinus Tach - Back up pacing AAI 90 bpm    Arrythmias: VT      Vital Last Value 24 Hour Range   Temperature 98.4 °F (36.9 °C) Temp  Min: 98.2 °F (36.8 °C)  Max: 99 °F (37.2 °C)   Pulse 99 Pulse  Min: 94  Max: 101   Respiratory 12 Resp  Min: 10  Max: 17   Blood Pressure 131/61 No 
Data Recorded   Pulse Oximetry 98 % SpO2  Min: 96 %  Max: 99 %     Vital Today Admitted   Weight 109.9 kg Weight: 104.9 kg   Height N/A Height: 5' 8\" (172.7 cm)   BMI (body mass index) N/A BMI (Calculated): 35.24     Hemodynamics:  PAP (mmHg): (-3-40)/(-3-28) 31/20  CVP:  [4 mmHg-248 mmHg] 9 mmHg    Weight over the past 48 Hours:  Patient Vitals for the past 48 hrs:   Weight   06/12/18 0600 108 kg   06/13/18 0500 109.9 kg        Intake/Output:  I/O this shift:  In: 360 [NG/GT:110; IV Piggyback:250]  Out: 435 [Urine:415; Chest Tube:20]  I/O last 3 completed shifts:  In: 4247.8 [I.V.:1894.8; Blood:308; NG/GT:1211; IV Piggyback:834]  Out: 2970 [Urine:2800; Chest Tube:170]    Intake/Output Summary (Last 24 hours) at 06/13/18 1122  Last data filed at 06/13/18 1100   Gross per 24 hour   Intake           4124.8 ml   Output             2565 ml   Net           1559.8 ml         Physical Assessment:    General:    Ventilated, sedated   Incision:    Mid sternal incision intact   EENT:    +ET tube   Oral Mucosa:    Deferred 2/2 ETT   Neck:   Trachea midline, right neck central line   Lungs:     Anterior CTA.    Cardiovascular:   regular, S1, S2,   Abdomen:     Soft, no masses, no hepatosplenomegaly   Pulses:   normal bilaterally   Skin:   Warm, perfused. Bilateral groin sites intact - IABP and ECMO   Neurologic:   Sedated.  (Per RN, spontaneously moves all extremities when sedation is off)       Laboratory Results:  Lab Results   Component Value Date    WBC 12.7 (H) 06/13/2018    HGB 7.3 (L) 06/13/2018    HCT 22.7 (L) 06/13/2018    PLT 58 (L) 06/13/2018    BUN 51 (H) 06/13/2018    CREATININE 1.55 (H) 06/13/2018    SODIUM 153 (H) 06/13/2018    POTASSIUM 4.2 06/13/2018    CO2 25 06/13/2018    MG 2.4 06/13/2018    BILIRUBIN 0.7 06/13/2018    INR 1.0 06/13/2018    PTT 38 (H) 06/13/2018    LACTA 1.4 06/13/2018    ALKPT 40 (L) 06/13/2018    AST 39 (H) 06/13/2018    GPT 18 06/13/2018    ALBUMIN 2.5 (L) 06/13/2018    NASREEN 1.11 (L) 
diabetes, hypo/hyperglycemia
06/10/2018    GLUCOSE 110 (H) 06/13/2018    TSH 0.803 06/10/2018       Tacrolimus Level  Lab Results   Component Value Date    TACRO 4.9 (L) 06/12/2018    TACRO <1.5 (L) 06/10/2018       Endomyocardial biopsy:   6/9/18:  0R, 0AMR    CAMI 6/11/18      CAMI 6/9/2018  Bedside CAMI in a patient with VA-ECMO .  Normal left ventricular cavity size. Severe left ventricular hypokinesis - Worse toward LV apex vs base .  Severely decreased right ventricular systolic function.  Mild MR .  Aspace with 'condense material' - likely a clot ) is noted near RA , compressing the RA .  Critical results were conveyed to the referring provider.      Diagnosis/Plan:   S/P Orthotopic Heart Transplant on 6/8/2018 (Heart). Surgeon: Dr. Peguero.  Cardiogenic Shock - etiology undetermined with VA ECMO and IABP placement 6/8/18, take back to OR 6/9/18 for exploration and mediastinal clot removal with closure.  Volume: Euvolemic.  Perfusion: Adequate with IV supports, ECMO and IABP  -Immunosuppression   -Cellcept 1500 mg IV BID  -Continue Tacrolimus to 3 mg BID, Goal 9-12. Daily levels  -Solumedrol taper in process  -Prophylaxis  -Hold ASA for CAV prophylaxis 2/2 thrombocytopenia  -Pravastatin or CAV prophylaxis  -Hold Bactrim for PCP prophylaxis due to bone marrow suppression and renal function    -CMV Status:  Recipient positive / Donor negative; Ganciclovir held due to thrombocytopenia  -Nystatin for Candida ppx while on steroids  -Graft Function/Cardiogenic Shock   -Initial concern for primary graft failure   -CAMI today and consider removal of ECMO   -Continue IABP + multiple pressor support.      -Consider DCing Lasix drip?  -Next RHC/EMBx - to be scheduled for Friday   -Remains active on Transplant Waitlist as Status 1A   -Dobutamine 3 mcg, Epi 1 mcg, Milrinone 0.25 mcg   -Mike 40 ppm    Acute on chronic anemia (expected post op blood loss)   -Transfuse today for Hb < 7.0    Acute Kidney Injury with CKD Stage 3   -Monitor indices   -Consider 
DCing Lasix drip?    Hypernatremia/Hyperchrloremia     -Consider free water flushes    -Consider DCing Lasix drip?    Acute Hypoxemic Respiratory Failure s/p bronch    -ID following, broad spectrum antibiotics    Thrombocytopenia   -PF4 negative 6/10   -Likely related to IABP and ECMO   -See above, continue to hold Ganciclovir, Bactrim and ASA     Transaminitis   -Improving, trend daily    Uriel Collins APNP  [phone]      Heart Failure-Transplant Cardiology Attending Note     I saw and evaluated the patient. I discussed the case with NP/house officer and agree with the findings and plan as documented. I personally spent a total of 60 minutes on the unit in direct management/discussion/coordination of Rock Rausch's care. Of that, 50 minutes was spent in counseling and/or coordination of care including: counseling patient regarding diagnosis, prognosis, and treatment plan (including risks and benefits) and discussing case with care team members.      In summary, 59 y/o M with end-stage cardiomyopathy and LVAD who was admitted for heart transplantation after a suitable donor became available. Patient suffered acute decompensation after transplantation on 6/8, requiring VA-ECMO, re-opening to address bleeding, currently multiple pressors and IABP support. Etiology undetermined. CAMI on 6/9 with global hypokinesis of LV (more severe towards apex) + external compression of RA - decision to take back to OR. Biopsy piece x 1 tissue was negative for rejection per pathologist - reported 6/10. Bronch initially with thick secretions, since improved. On IV abx per ID for RLL consolidation. Blood transfusions PRN for post surgical anemia. CAMI on 6/11 showed improvement with LV function + wall motion abnormalities. ECMO weaned off after CAMI today confirmed good LV function with minimal ECMO support. Cross remains match negative. Will follow closely.     Art Macario MD  Advanced HF/ Heart Transplant/ Mechanical Circulatory 
Support  [facility]  
oxygen at 2 liters via nc

## 2020-06-18 NOTE — PROGRESS NOTE ADULT - ASSESSMENT
Sepsis / bacteremia / necrotising fascitis / MICHAEL/ stage 4 ckd:  #  ATN most likely  # creatinine trending up   #  please document UO/ bladder scan   #   k noted , lokelma 10 q12   # hyponatremia ,  ? volume related ,on lasix/ metolazone , sodium stable   #BP noted, continue midodrine   # dig level 1.2,   # phos noted,  corrected calcium around 8,  on  phoslo 2/2/2,  and  calcitriol 0.25 q 24,  pth 187 , add renagel 1/1/1  # acidosis , improved   po sodium bicarb 650 mg q 6 hr.  # podiatry notes appreciated ,  BKA today   # h/h noted , no venofer elevated sat   #on roxanna  followed by ID / on vanco , c diff negative   # no acute indication for RRT / might need to start if no improvement via tesio   # will follow

## 2020-06-18 NOTE — BRIEF OPERATIVE NOTE - COMMENTS
Prognosis guarded   local wound care for now   FOllow up in 24 hours
Prognosis guarded   vac applied   will monitor
patient at risk for limb loss
prognosis guarded
will wait for deep wound and tissue cultures and pathology   Patient will be monitor closely   patient is at risk for limb loss

## 2020-06-18 NOTE — PRE-ANESTHESIA EVALUATION ADULT - NS MD HP INPLANTS MED DEV
Automatic Implantable Cardioverter Defibrillator
None
Automatic Implantable Cardioverter Defibrillator/Pacemaker
Automatic Implantable Cardioverter Defibrillator
None
Pacemaker/Automatic Implantable Cardioverter Defibrillator

## 2020-06-18 NOTE — PRE-ANESTHESIA EVALUATION ADULT - NSANTHOSAYNRD_GEN_A_CORE
No. WANG screening performed.  STOP BANG Legend: 0-2 = LOW Risk; 3-4 = INTERMEDIATE Risk; 5-8 = HIGH Risk

## 2020-06-18 NOTE — PRE-OP CHECKLIST - SELECT TESTS ORDERED
BMP/Type and Screen/CBC
CBC/POCT Blood Glucose/BMP/PT/PTT
Type and Screen/POCT Blood Glucose
POCT Blood Glucose

## 2020-06-18 NOTE — PROGRESS NOTE ADULT - SUBJECTIVE AND OBJECTIVE BOX
Patient is a 80y old  Male who presents with a chief complaint of necrotizing fascitis (18 Jun 2020 08:32)      SUBJ:  Patient seen and examined. Comfortable today. Supine in bed with no dyspnea. Rapid response was called last night - RRT attending impression was that patient fell asleep on the bedpan. No events otherwise.      MEDICATIONS  (STANDING):  aspirin enteric coated 81 milliGRAM(s) Oral daily  atorvastatin 80 milliGRAM(s) Oral at bedtime  calcitriol   Capsule 0.25 MICROGram(s) Oral daily  calcium acetate 1334 milliGRAM(s) Oral three times a day with meals  carvedilol 3.125 milliGRAM(s) Oral every 12 hours  chlorhexidine 4% Liquid 1 Application(s) Topical <User Schedule>  dextrose 5%. 150 milliLiter(s) (50 mL/Hr) IV Continuous <Continuous>  dextrose 50% Injectable 12.5 Gram(s) IV Push once  dextrose 50% Injectable 25 Gram(s) IV Push once  digoxin     Tablet 0.125 milliGRAM(s) Oral every other day  folic acid 1 milliGRAM(s) Oral daily  furosemide    Tablet 40 milliGRAM(s) Oral daily  gabapentin 200 milliGRAM(s) Oral daily  heparin   Injectable 5000 Unit(s) SubCutaneous every 8 hours  meropenem  IVPB 500 milliGRAM(s) IV Intermittent every 12 hours  metolazone 2.5 milliGRAM(s) Oral daily  midodrine. 5 milliGRAM(s) Oral three times a day  pantoprazole    Tablet 40 milliGRAM(s) Oral before breakfast  sodium bicarbonate 650 milliGRAM(s) Oral every 6 hours  sodium zirconium cyclosilicate 5 Gram(s) Oral two times a day  vancomycin  IVPB 250 milliGRAM(s) IV Intermittent every 48 hours    MEDICATIONS  (PRN):  acetaminophen   Tablet .. 650 milliGRAM(s) Oral every 6 hours PRN Mild Pain (1 - 3)  aluminum hydroxide/magnesium hydroxide/simethicone Suspension 30 milliLiter(s) Oral every 6 hours PRN Dyspepsia  dextrose 40% Gel 15 Gram(s) Oral once PRN Blood Glucose LESS THAN 70 milliGRAM(s)/deciliter  glucagon  Injectable 1 milliGRAM(s) IntraMuscular once PRN Glucose LESS THAN 70 milligrams/deciliter            Vital Signs Last 24 Hrs  T(C): 35.9 (18 Jun 2020 08:00), Max: 36.3 (17 Jun 2020 14:15)  T(F): 96.6 (18 Jun 2020 08:00), Max: 97.4 (17 Jun 2020 14:15)  HR: 70 (18 Jun 2020 06:14) (70 - 70)  BP: 120/61 (18 Jun 2020 06:14) (107/57 - 120/61)  BP(mean): --  RR: 19 (18 Jun 2020 06:14) (18 - 19)  SpO2: 97% (18 Jun 2020 08:00) (96% - 97%)      PHYSICAL EXAM:    GEN:  NAD  HEENT: NC/AT  Neck: No JVD  CV: Reg, S1-S2, 2/6 systolic murmur  Lungs: CTAB  Abd: Soft, non-tender  Ext: + edema, + dressing in place      I&O's Summary    17 Jun 2020 07:01  -  18 Jun 2020 07:00  --------------------------------------------------------  IN: 680 mL / OUT: 200 mL / NET: 480 mL    	    TELEMETRY: Paced    ECG:   < from: 12 Lead ECG (06.10.20 @ 07:57) >  Ventricular-paced rhythm  Biventricular pacemaker detected  Abnormal ECG      < end of copied text >    TTE:  < from: Transthoracic Echocardiogram (06.04.20 @ 12:04) >  Summary:   1. Left ventricular ejection fraction, by visual estimation, is <20%.   2. Spectral Doppler shows impaired relaxation pattern of left ventricular myocardial filling (Grade I diastolic dysfunction).   3. Mitral annular calcification.   4. Moderate tricuspid regurgitation.   5. Estimated pulmonary artery systolic pressure is 37.6 mmHg assuming a right atrial pressure of 10 mmHg, which is consistent with borderline pulmonary hypertension.    < end of copied text >      LABS:                        7.3    11.60 )-----------( 131      ( 18 Jun 2020 07:01 )             21.1     06-18    126<L>  |  86<L>  |  102<HH>  ----------------------------<  161<H>  5.1<H>   |  20  |  4.5<HH>    Ca    7.2<L>      18 Jun 2020 07:01  Phos  8.1     06-18  Mg     2.5     06-18    TPro  5.3<L>  /  Alb  2.3<L>  /  TBili  0.7  /  DBili  x   /  AST  41  /  ALT  10  /  AlkPhos  199<H>  06-18        PT/INR - ( 18 Jun 2020 07:01 )   PT: 14.30 sec;   INR: 1.24 ratio         PTT - ( 18 Jun 2020 07:01 )  PTT:32.9 sec      BNP  RADIOLOGY & ADDITIONAL STUDIES:      IMPRESSION AND PLAN:

## 2020-06-18 NOTE — BRIEF OPERATIVE NOTE - NSICDXBRIEFPREOP_GEN_ALL_CORE_FT
PRE-OP DIAGNOSIS:  Necrotizing fasciitis of ankle and foot 05-Jun-2020 15:12:56  Sowmya Breen

## 2020-06-19 NOTE — PROGRESS NOTE ADULT - SUBJECTIVE AND OBJECTIVE BOX
Nephrology progress note    Patient was seen and examined, events over the last 24 h noted .  Acute events cardiac arrest  PEA w/ ROSc    Allergies:  Byetta (Stomach Upset)  linezolid (Rash; Urticaria; Flushing)  melatonin (Other)    Hospital Medications:   MEDICATIONS  (STANDING):  aspirin enteric coated 81 milliGRAM(s) Oral daily  atorvastatin 80 milliGRAM(s) Oral at bedtime  buMETAnide Infusion 1 mG/Hr (5 mL/Hr) IV Continuous <Continuous>  buMETAnide Injectable 2 milliGRAM(s) IV Push once  calcitriol   Capsule 0.25 MICROGram(s) Oral daily  calcium acetate 1334 milliGRAM(s) Oral three times a day with meals  chlorhexidine 0.12% Liquid 15 milliLiter(s) Oral Mucosa two times a day  chlorhexidine 4% Liquid 1 Application(s) Topical <User Schedule>  dexMEDEtomidine Infusion 1.5 MICROgram(s)/kG/Hr (25.7 mL/Hr) IV Continuous <Continuous>  dextrose 50% Injectable 12.5 Gram(s) IV Push once  folic acid 1 milliGRAM(s) Oral daily  heparin   Injectable 5000 Unit(s) SubCutaneous every 8 hours  meropenem  IVPB 500 milliGRAM(s) IV Intermittent every 12 hours  metolazone 2.5 milliGRAM(s) Oral daily  norepinephrine Infusion 0.05 MICROgram(s)/kG/Min (6.36 mL/Hr) IV Continuous <Continuous>  pantoprazole   Suspension 40 milliGRAM(s) Oral daily  sodium bicarbonate 650 milliGRAM(s) Oral every 6 hours  sodium zirconium cyclosilicate 10 Gram(s) Oral two times a day  vancomycin  IVPB 500 milliGRAM(s) IV Intermittent every 24 hours  vancomycin  IVPB            VITALS:  T(F): 99.5 (06-19-20 @ 08:00), Max: 99.8 (06-19-20 @ 05:00)  HR: 68 (06-19-20 @ 10:00)  BP: 104/56 (06-19-20 @ 10:00)  RR: 12 (06-19-20 @ 10:00)  SpO2: 100% (06-19-20 @ 10:00)  Wt(kg): --    06-17 @ 07:01  -  06-18 @ 07:00  --------------------------------------------------------  IN: 680 mL / OUT: 200 mL / NET: 480 mL    06-18 @ 07:01  -  06-19 @ 07:00  --------------------------------------------------------  IN: 161.5 mL / OUT: 70 mL / NET: 91.5 mL    06-19 @ 07:01  -  06-19 @ 11:51  --------------------------------------------------------  IN: 7.8 mL / OUT: 20 mL / NET: -12.2 mL      Height (cm): 167.64 (06-19 @ 10:29)  Weight (kg): 68.5 (06-19 @ 10:29)  BMI (kg/m2): 24.4 (06-19 @ 10:29)  BSA (m2): 1.78 (06-19 @ 10:29)    PHYSICAL EXAM:  	Gen: NAD  	Pulm: B/L ronchi at bases   	CV: S1S2; no rub  	Abd: +distended      LABS:  06-19    127<L>  |  88<L>  |  105<HH>  ----------------------------<  162<H>  4.5   |  23  |  4.8<HH>    Ca    7.6<L>      19 Jun 2020 09:00  Phos  8.1     06-18  Mg     2.4     06-19    TPro  4.9<L>  /  Alb  2.0<L>  /  TBili  0.5  /  DBili      /  AST  32  /  ALT  7   /  AlkPhos  176<H>  06-19                          7.3    16.18 )-----------( 169      ( 19 Jun 2020 09:00 )             21.0       Urine Studies:      RADIOLOGY & ADDITIONAL STUDIES:

## 2020-06-19 NOTE — PROGRESS NOTE ADULT - ASSESSMENT
IMPRESSION:    Acute respiratory failure  Cardiac arrest 6 min  Volume overload  HO HFrEF EF 20%  MICHAEL on CKD oliguric  Sepsis present on admission due left foot necrotizing fascitis  Polymicrobial bacteremia now bcx negative    PLAN:    CNS: SAT, precedex if agitated    HEENT:  Oral care    PULMONARY:  HOB @ 45 degrees. Vent changes: peep 5, fio2 40%, rate 12, tv 450    CARDIOVASCULAR: Goal MAP >65, titrate down levo, lasix 80 iv q12h, I<O    GI: GI prophylaxis. OG feeds    RENAL:  F/u  lytes.  Correct as needed.  Accurate I/O, renal follow up, repeat bmp now    INFECTIOUS DISEASE: vanc and merrem, vanc tough before 4th dose, ID following, f/up cultures    HEMATOLOGICAL:  DVT prophylaxis.    ENDOCRINE:  Follow up FS.  Insulin protocol if needed.    MUSCULOSKELETAL: Bedrest, f/up podiatry    LINES/ARANA: right picc line, arana    CODE STATUS: FULL CODE    DISPOSITION: Pt requires continued monitoring in the MICU IMPRESSION:    Acute respiratory failure  Cardiac arrest 6 min  Volume overload  HO HFrEF EF 20% s/p AICD  MICHAEL on CKD oliguric  Sepsis present on admission due left foot necrotizing fascitis s/p debridement  Polymicrobial bacteremia now bcx negative    PLAN:    CNS: SAT, precedex if agitated    HEENT:  Oral care    PULMONARY:  HOB @ 45 degrees. Vent changes: peep 5, fio2 40%, rate 12, tv 450    CARDIOVASCULAR: Goal MAP >65, titrate down levo, lasix 80 iv q12h, I<O    GI: GI prophylaxis. OG feeds    RENAL:  F/u  lytes.  Correct as needed.  Accurate I/O, renal follow up, repeat bmp now    INFECTIOUS DISEASE: vanc and merrem, vanc tough before 4th dose, ID following, f/up cultures    HEMATOLOGICAL:  DVT prophylaxis.    ENDOCRINE:  Follow up FS.  Insulin protocol if needed.    MUSCULOSKELETAL: Bedrest, f/up podiatry    LINES/ARANA: right picc line, arana    CODE STATUS: FULL CODE    DISPOSITION: Pt requires continued monitoring in the MICU IMPRESSION:    Acute respiratory failure  Cardiac arrest 6 min  Cardiogenic vs septic shock  Volume overload / large bilateral pleural effusions simple fluid on lung sono  MICHAEL on CKD oligo-anuric  HO HFrEF EF 20% s/p AICD  Sepsis present on admission due left foot necrotizing fascitis s/p debridement  Polymicrobial bacteremia now bcx negative    PLAN:    CNS: SAT, precedex if agitated    HEENT:  Oral care    PULMONARY:  HOB @ 45 degrees. Vent changes: peep 5, fio2 40%, rate 12, tv 450    CARDIOVASCULAR: Goal MAP >65, titrate down levo, lasix 80 iv q12h, I<O    GI: GI prophylaxis. OG feeds    RENAL:  F/u  lytes.  Correct as needed.  Accurate I/O, renal follow up, repeat bmp now    INFECTIOUS DISEASE: vanc and merrem, vanc tough before 4th dose, ID following, f/up cultures    HEMATOLOGICAL:  DVT prophylaxis.    ENDOCRINE:  Follow up FS.  Insulin protocol if needed.    MUSCULOSKELETAL: Bedrest, f/up podiatry    LINES/ARANA: right picc line, arana    CODE STATUS: FULL CODE    DISPOSITION: Pt requires continued monitoring in the MICU IMPRESSION:    Acute respiratory failure  Cardiac arrest 6 min  Cardiogenic vs septic shock  Volume overload / large bilateral pleural effusions simple fluid on lung sono  MICHAEL on CKD oligo-anuric  HO HFrEF EF 20% s/p AICD  Sepsis present on admission due left foot necrotizing fascitis s/p debridement  Polymicrobial bacteremia now bcx negative    PLAN:    CNS: SAT, precedex if agitated    HEENT:  Oral care    PULMONARY:  HOB @ 45 degrees. Vent changes: peep 5, fio2 40%, rate 12, tv 450    CARDIOVASCULAR: Goal MAP >65, titrate down levo, lasix 80 iv q12h, I<O    GI: GI prophylaxis. OG feeds    RENAL:  F/u  lytes.  Correct as needed.  Accurate I/O, renal follow up, repeat bmp now    INFECTIOUS DISEASE: vanc and merrem, vanc tough before 4th dose, ID following, f/up cultures    HEMATOLOGICAL:  DVT prophylaxis.    ENDOCRINE:  Follow up FS.  Insulin protocol if needed.    MUSCULOSKELETAL: Bedrest, f/up podiatry    LINES/ARANA: right picc line, arana    CODE STATUS: DNR    DISPOSITION: Pt requires continued monitoring in the MICU IMPRESSION:    Acute respiratory failure  Cardiac arrest 6 min ro cardiac event  Volume overload / large bilateral pleural effusions simple fluid on lung sono  MICHAEL on CKD oligo-anuric  HO HFrEF EF 20% s/p AICD  Sepsis present on admission due left foot necrotizing fascitis s/p debridement  Polymicrobial bacteremia now bcx negative    PLAN:    CNS: SAT, precedex if agitated, Neuro eval, EEG    HEENT:  Oral care    PULMONARY:  HOB @ 45 degrees. Vent changes: peep 5, fio2 40%, rate 12, tv 450    CARDIOVASCULAR: Goal MAP >65, titrate down levo, lasix 80 iv q12h, I<O    GI: GI prophylaxis. OG feeds    RENAL:  F/u  lytes.  Correct as needed.  Accurate I/O, renal follow up, repeat bmp now    INFECTIOUS DISEASE: vanc and merrem, vanc tough before 4th dose, ID following, f/up cultures    HEMATOLOGICAL:  DVT prophylaxis.    ENDOCRINE:  Follow up FS.  Insulin protocol if needed.    MUSCULOSKELETAL: Bedrest, f/up podiatry    LINES/ARANA: right picc line, arana    CODE STATUS: DNR    DISPOSITION: Pt requires continued monitoring in the MICU

## 2020-06-19 NOTE — PROGRESS NOTE ADULT - ASSESSMENT
79 y/o M with h/o ICM, chronic systolic heart failure s/p CRT-D, DM, PVD admitted with AMS necrotizing fasciitis s/p debridement of foot infection.       - PEA cardiac arrest     No apparent arrhythmias   Need to rule out PE   Rule out sepsis / COVID-19  No e/o RV strain or dilatation on bedside echo   IVC is dilated and not collapsing   Continue mechanical ventilation   Pressor support to maintain MAP > 70 mmHg   Discussed with CCU team at bedside       - Chronic systolic heart failure /ICM s/p CRT-D     Reportedly LVEF ~ 20% with severe - echo done on 06/04 only has still frames stored   Overloaded on exam   Get repeat echo   Give Bumex 2 mg iv push   Start Bumex gtt at 1 mg/hr   Continue metolazone 2.5 mg daily  Hold vasodilators and betablocker while on pressor support   Strict I/Os   Daily weight       CKD  Creat/BUN 4.8/105  Nephrology on board in case he needs HD      Necrotizing fasciitis    s/p debridement   Plan for BKA on hold  Abx and pain control per primary team   Follow up by vascular and podiatry

## 2020-06-19 NOTE — CONSULT NOTE ADULT - PROVIDER SPECIALTY LIST ADULT
Burn
Nephrology
Podiatry
Cardiology
Gastroenterology
Heart Failure
Neurology
Physiatry
Vascular Surgery
Critical Care
Infectious Disease
Burn

## 2020-06-19 NOTE — PROGRESS NOTE ADULT - ASSESSMENT
ASSESSMENT  80 M with PMHx of anemia, ascites 2/2 CHF, CHF s/p biventricular AICD, CKD, CAD s/p CABGx5 on ASA, CKD, DM s/p R great toe amputation, HLD, HTN, hypothyroidism, PAD s/p R femoral stent who presents with altered mental status x 1 day and worsening left foot DFU. Was on augmentin as outpatient.    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 Findings concerning for necrotizing soft tissue infection.  #Elevated trop/BNP  #Lactic acidosis Blood Gas Venous - Lactate: 2.5 mmoL/L (06-04-20 @ 04:51)  #Hyponatremia   #CXR Bilateral lung opacities, right greater than left. No pneumothorax.  #DM   #Abx allergy: linezolid (Rash; Urticaria; Flushing)    RECOMMENDATIONS  - ADD ON AM VANC level and HOLD further dosing for now. IF >15, redose 500mg q48h IV starting 6/20. If >20 HOLD any further dosing and recheck AM level 6/20, If <15 Dose Vanc 500mg q24h IV starting today  - Meropenem 500mg q12h IV   - PICC x 6 weeks abx from last OR end 7/27  - Appreciate Podiatry/Vascular consult  This is a preliminary incomplete pended note, all final recommendations to follow after interview and examination of the patient.     Spectra [id number] ASSESSMENT  80 M with PMHx of anemia, ascites 2/2 CHF, CHF s/p biventricular AICD, CKD, CAD s/p CABGx5 on ASA, CKD, DM s/p R great toe amputation, HLD, HTN, hypothyroidism, PAD s/p R femoral stent who presents with altered mental status x 1 day and worsening left foot DFU. Was on augmentin as outpatient.    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 Findings concerning for necrotizing soft tissue infection.  #Elevated trop/BNP  #Lactic acidosis Blood Gas Venous - Lactate: 2.5 mmoL/L (06-04-20 @ 04:51)  #Hyponatremia   #CXR Bilateral lung opacities, right greater than left. No pneumothorax.  #DM   #Abx allergy: linezolid (Rash; Urticaria; Flushing)    RECOMMENDATIONS  - ADD ON AM VANC level and HOLD further dosing for now. IF >15, redose 500mg q48h IV starting 6/20. If >20 HOLD any further dosing and recheck AM level 6/20, If <15 Dose Vanc 500mg q24h IV starting today  - Meropenem 500mg q12h IV   - PICC x 6 weeks abx from last OR end 7/27  - Appreciate Podiatry/Vascular consult    Spectra [id number]

## 2020-06-19 NOTE — CHART NOTE - NSCHARTNOTESELECT_GEN_ALL_CORE
Anesthesia PACU note/Transfer Note
Digoxin
Downgrade/Event Note
Event Note
Event Note/Podiatry
Event Note/Podiatry
Event Note/Vascular Surgery
Post Anesthesia Follow Up
Registered Dietitian/Event Note
Transfer Note
Upgrade/Transfer Note
No pertinent family history in first degree relatives

## 2020-06-19 NOTE — PROGRESS NOTE ADULT - SUBJECTIVE AND OBJECTIVE BOX
Interval history: Patient had PEA arrest on the floor, he was intubated and transferrred to CCU.           HPI    79 y/o M with h/o CAD s/p CABG, systolic heart failure s/p CRT-D, CKD, DM admitted with AMS and foot infection. Reportedly, patient has gained ~ 8 lb recently. He was admitted with a diagnosis of sepsis due to necrotizing fasciitis. Per cardiology note, records from [facility] were reviewed, TTE from 02/2020 had showed LVEF ~ 20% and severe TR (Lead impingement)        PMH: Systolic heart failure, CKD, CAD, DM, HLD, HTN, hypothyroidism, PAD    PSH: CABG, femoral stent     Socia; No ETOH or drugs         PHYSICAL EXAM     General intubated, sedated   Neck: Trachea is central, Unable to assess JVD  Lungs: CTA, no crackles on anterior auscultation   Heart: Regular heart sounds, no murmurs

## 2020-06-19 NOTE — PROGRESS NOTE ADULT - ASSESSMENT
Sepsis / bacteremia / necrotising fascitis / MICHAEL/ stage 4 ckd:    Now s/p Cardiac arrest early AM 6/19  # creatinine  trending up Cardiac arrest will only worsen   # No absolute indication for Hd, discussed w/ CCU team they would like to hold off see if UOP improves with diuretics, which is reasonable particularly as is on pressors , will make UF w/ HD challenging   # cont Levo Keep MAP > 65  # phos noted,  corrected calcium around 8,  on  phoslo 2/2/2,  and  calcitriol 0.25 q 24,  pth 187   # acidosis , improved   po sodium bicarb 650 mg q 6 hr.      Case also discussed w/ Pt's daughter Arabella Watt ([phone], she is an RN) she favored  delaying Hd, reports has had severe MICHAEL Cr up to 8 which improved w/ diuretics. Also expressed not wantign to pursue HD if "there was no hope" or if "had no mental status" and preferred hearing from neuro what prognosis was for mental status before willing to consider Hd . " I want him to be comfortable"

## 2020-06-19 NOTE — RAPID RESPONSE TEAM SUMMARY - NSSITUATIONBACKGROUNDRRT_GEN_ALL_CORE
rapid was called by PCA as she was unable to wake him up. Vitals : 120/76, FS: 152, temp: 98F ; Spo2 99%
monitor showed bradycardia , RN noticed no pulse , unresponsive  CODE BLUE was activated

## 2020-06-19 NOTE — CONSULT NOTE ADULT - ATTENDING COMMENTS
left foot with exposed bone and tendons, viable tissue and areas necrotic tissue debrided to viable tissue--> wound vac placed    rec: wound vac--> will skin graft 2--4 weeks if granulation tissue    may need BKA
As above. No Burn Service  intervention
I have personally seen and examined this patient.  I have fully participated in the care of this patient.  I have reviewed all pertinent clinical information, including history, physical exam, plan and note.   I have reviewed all pertinent clinical information and reviewed all relevant imaging and diagnostic studies personally.  As per CCU staff pt following commands off sedation and spontaneously BARRON.  Prognosis currently remains guarded since still vent-dependent and on sedation.  Recommendations as above.  Wean sedation if possible.

## 2020-06-19 NOTE — PROGRESS NOTE ADULT - ASSESSMENT
79 y/o male with extensive cardiac history, ischemic CM, s/p CABG, severe systolic dysfunction, EF in 20% range on multiple echocardiograms, tricuspid regurgitation, biventricular failure, PAD, CRI, IDDM, admitted with sepsis due to DFU, polymicrobial cultures, treated with antibiotics, local debridment, BKA was being considered as well. Overnight patient deteriorated, had a PEA arrest. No evidence of arrhythmia/VT on tele or device interrogation., Now on MV support, pressor support. Sedated. Oliguric with worsening renal function.    Recommend:    Decrease FiO2 to 40%, decrease PEEP to 5 cmH2O  Wean off sedation. repeat ABG.  If better mental status, and meets weaning parameters, will try to wean off the vent in the afternoon  Give a dose of IV Lasix 80 mg now, if now urine output, recall nephrology follow-up to consider RRT/HD  Monitor K.  Keep on Levophed for now, wean off as tolerated. Hold Coreg.  C/w IV antibiotics  DVT prophylaxis.  Discussed with the cardiac fellow and CCU team on rounds.

## 2020-06-19 NOTE — PROGRESS NOTE ADULT - SUBJECTIVE AND OBJECTIVE BOX
PROGRESS NOTE   Pt seen @ bedside during AM rounds. S/p Exc Dbx of st/b, lefyt foot (DOS: 6/18/20). Dressing +Clean, +Dry, +Intact. Pt. denies any recent n/f/c/v/sob. Pt. denies any other pedal complaints at this time.      Vital Signs Last 24 Hrs  T(C): 37.7 (19 Jun 2020 05:00), Max: 37.7 (19 Jun 2020 05:00)  T(F): 99.8 (19 Jun 2020 05:00), Max: 99.8 (19 Jun 2020 05:00)  HR: 68 (19 Jun 2020 06:00) (68 - 90)  BP: 119/56 (19 Jun 2020 06:00) (103/51 - 126/64)  BP(mean): 84 (19 Jun 2020 06:00) (69 - 84)  RR: 17 (19 Jun 2020 06:00) (12 - 20)  SpO2: 100% (19 Jun 2020 06:00) (93% - 100%)                          8.1    14.32 )-----------( 155      ( 19 Jun 2020 03:48 )             24.5               06-19    130<L>  |  88<L>  |  99<HH>  ----------------------------<  182<H>  5.6<H>   |  20  |  4.9<HH>    Ca    11.0<H>      19 Jun 2020 03:48  Phos  8.1     06-18  Mg     2.6     06-19    TPro  5.2<L>  /  Alb  2.3<L>  /  TBili  0.5  /  DBili  x   /  AST  33  /  ALT  8   /  AlkPhos  187<H>  06-19      PHYSICAL EXAM  Left LE Focused:  DERM: Wound base 90% granular and 10% necrotic and w/ bone exposure. No active bleeding. Necrosis noted on the dorsum of the foot. Sutures appear well intact and coapted. No signs of wound dehiscence.  Vasc: DP and PT pulses are mildly diminished CFT > 3 seconds.     Assessment:  S/p Exc Dbx of st/b, left foot (DOS: 6/18/20)        Plan:  Pt. seen and evaluated  Discussed treatment and condition w/ patient  Flushed with normal saline and applied michelle/DSD/ABD/Kerlix  Cont. w/ LWC: As stated above, Q48h  Will cont. to monitor surgical site  Attending updated and aware. PROGRESS NOTE   Pt seen @ bedside during AM rounds. S/p Exc Dbx of st/b, lefyt foot (DOS: 6/18/20). Dressing +Clean, +Dry, +Intact. Pt. denies any recent n/f/c/v/sob. Pt. denies any other pedal complaints at this time.      Vital Signs Last 24 Hrs  T(C): 37.7 (19 Jun 2020 05:00), Max: 37.7 (19 Jun 2020 05:00)  T(F): 99.8 (19 Jun 2020 05:00), Max: 99.8 (19 Jun 2020 05:00)  HR: 68 (19 Jun 2020 06:00) (68 - 90)  BP: 119/56 (19 Jun 2020 06:00) (103/51 - 126/64)  BP(mean): 84 (19 Jun 2020 06:00) (69 - 84)  RR: 17 (19 Jun 2020 06:00) (12 - 20)  SpO2: 100% (19 Jun 2020 06:00) (93% - 100%)                          8.1    14.32 )-----------( 155      ( 19 Jun 2020 03:48 )             24.5               06-19    130<L>  |  88<L>  |  99<HH>  ----------------------------<  182<H>  5.6<H>   |  20  |  4.9<HH>    Ca    11.0<H>      19 Jun 2020 03:48  Phos  8.1     06-18  Mg     2.6     06-19    TPro  5.2<L>  /  Alb  2.3<L>  /  TBili  0.5  /  DBili  x   /  AST  33  /  ALT  8   /  AlkPhos  187<H>  06-19      PHYSICAL EXAM  Left LE Focused:  DERM: Wound base 90% granular and 10% necrotic and w/ bone exposure. No active bleeding. Necrosis noted on the dorsum of the foot. Sutures appear well intact and coapted. No signs of wound dehiscence.  Vasc: DP and PT pulses are mildly diminished CFT > 3 seconds.     Assessment:  S/p Exc Dbx of st/b, left foot (DOS: 6/18/20)        Plan:  Pt. seen and evaluated  Discussed treatment and condition w/ patient  Flushed with normal saline and applied michelle/DSD/ABD/Kerlix  Cont. w/ LWC: As stated above, Q48h  Will cont. to monitor surgical site  F/u w/ Vascular  Attending updated and aware.

## 2020-06-19 NOTE — RAPID RESPONSE TEAM SUMMARY - NSADDTLFINDINGSRRT_GEN_ALL_CORE
On my exam , patient woke up, has slow speech at baseline, AAOx3, neuro exam non focal  NO other pertinant findings  patient was likely in deep sleep
On exam, patient was in PEA

## 2020-06-19 NOTE — CHART NOTE - NSCHARTNOTEFT_GEN_A_CORE
ICU UPGRADE NOTE:    80y Male transferred from floor to CCU    Patient is a 80y old Male who presents with a chief complaint of necrotizing fascitis (18 Jun 2020 10:24)    The patient is currently admitted for the primary diagnosis of Necrotizing fasciitis s/p delayed primary closure of LLE    The patient was intubated  and (was/was never) on pressors for (days).    Indwelling vascular catheters: Right arm PICC, 2 left peripheral 18G,     Urinary Catheter: condom cath    SEDATION: versed + Fentanyl    Disposition: CCU    Code Status: Full    Rapid Response and Code blue: patient's monitor showed bradycardia, he was unresponsive was found to be in PEA. As per ACLS protocol , 3 rounds of CPR with 3 doses of epinephrine, 1 dose sodium bicarn, 1 dose calcium gluconate was give. ROSC achieved in 6minutes. Patient was intubated, sedated on versed and fentanyl.  STAT cbc, cmp, lactate, cardiac enzymes were sent. STAT EKG showed ventricular paced rhythm, STAT cxr verfied ET tube in place.    VITALS: BP: 156/74, saturation 99% on room air, temp: 99.7F  (axillary) FS: 216      80 M with PMHx of anemia, ascites 2/2 CHF, CHF s/p biventricular AICD, CKD, CAD s/p CABGx5 on ASA, CKD, DM s/p R great toe amputation, HLD, HTN, hypothyroidism, PAD s/p R femoral stent who presents with altered mental status x 1 day and worsening left foot DFU with sepsis and metabolic encephalopathy which was resolved. Was on Augmentin as outpatient.     #cardiac arrest s/p ROSC in 6min s/p MV on sedation  - current vent settings: 16/450/100%FiO2/peep of 5  - fu ABG STAT  - STAT CXR : ET in place  - on versed and fentanyl  - EP fu for device interrogation      # Polymicrobial bacteremia secondary to necrotizing fascitis in left foot infection   - s/p delayed primary closure with possible L BKA  -  Xray Foot AP + Lateral + Oblique, Left (06.08.20 @ 16:51) >Status post hallux metatarsal and great toe amputation sparing 1 cm of the base. Otherwise intact tarsal metatarsal alignment. Previously noted subcutaneous emphysema within soft tissues has been resected. No proximal residual gas. Vascular calcification sand hind foot neuropathic osteoarthropathy  - VA Duplex Lower Extrem Arterial, Bilat- Normal arterial flow in bilateral lower extremities.  - s/p multiple debridements with podiatry   - ESR 79, CRP 23   - blood cultures 6/4 : Proteus ESBL and GBS. Blood cultures negative since 6/6, most recent 6/16 NGTD   - wound cultures 6/4: E. Coli, Proteus, MRSA   - Continue w/ Local Wound Care; wash with soap and water, apply Dakins wet to dry dsd abd kerlix ace TID  - PICC Line in place 6/15/2020 by IR  - continue meropenem & vancomycin - check vanc level every morning   - Decision made for Amputation Surgery LLE today - Surgery this Friday 6/19/2020  - Cardiology risk stratification in cardio note     #Slurred speech - CT head negative  #Hypothermia/bradycardia/hypoglycemia - likely from sepsis?     # Sepsis POA, lactic acidosis resolved  # Metabolic encephalopathy sec to sepsis     # Acute kidney injury on CKD stage 4 sec to ATN, metabolic acidosis, hyponatremia  # Hyperkalemia-resolved  - baseline creat 2.2  - US Kidney and Bladder (06.10.20 @ 22:59) >Normal-appearing kidneys. Bilateral pleural effusions with ascites.  - losartan, aldactone held  - c/w lokelema dose decreased to 5mg, phoslo, calcitriol   - c/w sodium bicarb    # Acute on chronic systolic CHF with bilateral pleural effusions and ascites s/p biventricular AICD  -  Transthoracic Echocardiogram (06.04.20 @ 12:04) >Left ventricular ejection fraction, by visual estimation, is <20%.Grade I diastolic dysfunction). Moderate tricuspid regurgitation. borderline pulmonary hypertension.  -  Xray Chest 1 View- PORTABLE-Routine (06.13.20 @ 14:43) >Bilateral opacities with small effusions left greater than right unchanged since prior  - monitor I/o, daily weight, restrict fluids  - c/w lasix, coreg, digoxin  - losartan and aldactone held due to MICHAEL on CKD4   - Consult heart failure specialist - INCREASED lasix, dc midodrine as per rec     # Elevated troponin sec to Type 2 MI, H/o CAD s/p CABGx5  - Troponin T, Serum: 0.12: Critical value: ng/mL (06.04.20 @ 11:12)  - 12 Lead ECG (06.10.20 @ 07:57) >Ventricular-paced rhythm. Biventricular pacemaker detected  - c/w ASA, statin, coreg, digoxin    # DM type 2 with hypoglycemia  - A1C- 10.6 %  - monitor FS - do not use insulin unless needed prior to meals     # Acute on chronic normocytic anemia- multifactorial  - S/p 2 units on 6/10 and 2 unit 6/12  - evaluated by GI - outpt EGD/colonoscopy    # GI prophylaxis - protonix  # DVT prophylaxis - heparin subq     Current workup in progress:  - fu labs  - fu EP  - fu ABG    SIGN OUT AT 06-19-20 @ 04:06 GIVEN TO: 1016 ICU UPGRADE NOTE:    80y Male transferred from floor to CCU    Patient is a 80y old Male who presents with a chief complaint of necrotizing fascitis (18 Jun 2020 10:24)    The patient is currently admitted for the primary diagnosis of Necrotizing fasciitis s/p delayed primary closure of LLE    The patient was intubated  and (was/was never) on pressors for (days).    Indwelling vascular catheters: Right arm PICC, 2 left peripheral 18G,     Urinary Catheter: condom cath    SEDATION: versed + Fentanyl    Disposition: CCU    Code Status: Full    Rapid Response and Code blue: patient's monitor showed bradycardia, he was unresponsive was found to be in PEA. As per ACLS protocol , 3 rounds of CPR with 3 doses of epinephrine, 1 dose sodium bicarb, 1 dose calcium gluconate was give. ROSC achieved in 6minutes. Patient was intubated, sedated on versed and fentanyl.  STAT cbc, cmp, lactate, cardiac enzymes were sent. STAT EKG showed ventricular paced rhythm, STAT cxr verfied ET tube in place.    VITALS: BP: 156/74, saturation 99% on room air, temp: 99.7F  (axillary) FS: 216      80 M with PMHx of anemia, ascites 2/2 CHF, CHF s/p biventricular AICD, CKD, CAD s/p CABGx5 on ASA, CKD, DM s/p R great toe amputation, HLD, HTN, hypothyroidism, PAD s/p R femoral stent who presents with altered mental status x 1 day and worsening left foot DFU with sepsis and metabolic encephalopathy which was resolved. Was on Augmentin as outpatient.     #cardiac arrest s/p ROSC in 6min s/p MV on sedation  - current vent settings: 16/450/100%FiO2/peep of 5  - fu ABG STAT  - STAT CXR : ET in place  - on versed and fentanyl  - EP fu for device interrogation      # Polymicrobial bacteremia secondary to necrotizing fascitis in left foot infection   - s/p delayed primary closure with possible L BKA  -  Xray Foot AP + Lateral + Oblique, Left (06.08.20 @ 16:51) >Status post hallux metatarsal and great toe amputation sparing 1 cm of the base. Otherwise intact tarsal metatarsal alignment. Previously noted subcutaneous emphysema within soft tissues has been resected. No proximal residual gas. Vascular calcification sand hind foot neuropathic osteoarthropathy  - VA Duplex Lower Extrem Arterial, Bilat- Normal arterial flow in bilateral lower extremities.  - s/p multiple debridements with podiatry   - ESR 79, CRP 23   - blood cultures 6/4 : Proteus ESBL and GBS. Blood cultures negative since 6/6, most recent 6/16 NGTD   - wound cultures 6/4: E. Coli, Proteus, MRSA   - Continue w/ Local Wound Care; wash with soap and water, apply Dakins wet to dry dsd abd kerlix ace TID  - PICC Line in place 6/15/2020 by IR  - continue meropenem & vancomycin - check vanc level every morning   - Decision made for Amputation Surgery LLE today - Surgery this Friday 6/19/2020  - Cardiology risk stratification in cardio note     #Slurred speech - CT head negative  #Hypothermia/bradycardia/hypoglycemia - likely from sepsis?     # Sepsis POA, lactic acidosis resolved  # Metabolic encephalopathy sec to sepsis     # Acute kidney injury on CKD stage 4 sec to ATN, metabolic acidosis, hyponatremia  - no HD currently as per nephro    # Hyperkalemia  - baseline creat 2.2  - US Kidney and Bladder (06.10.20 @ 22:59) >Normal-appearing kidneys. Bilateral pleural effusions with ascites.  - losartan, aldactone held  - c/w lokelema dose decreased to 5mg, phoslo, calcitriol   - c/w sodium bicarb    # Acute on chronic systolic CHF with bilateral pleural effusions and ascites s/p biventricular AICD  -  Transthoracic Echocardiogram (06.04.20 @ 12:04) >Left ventricular ejection fraction, by visual estimation, is <20%.Grade I diastolic dysfunction). Moderate tricuspid regurgitation. borderline pulmonary hypertension.  -  Xray Chest 1 View- PORTABLE-Routine (06.13.20 @ 14:43) >Bilateral opacities with small effusions left greater than right unchanged since prior  - monitor I/o, daily weight, restrict fluids  - c/w lasix, coreg, digoxin  - losartan and aldactone held due to MICHAEL on CKD4   - Consult heart failure specialist - INCREASED lasix, dc midodrine as per rec     # Elevated troponin sec to Type 2 MI, H/o CAD s/p CABGx5  - Troponin T, Serum: 0.12: Critical value: ng/mL (06.04.20 @ 11:12)  - 12 Lead ECG (06.10.20 @ 07:57) >Ventricular-paced rhythm. Biventricular pacemaker detected  - c/w ASA, statin, coreg, digoxin    # DM type 2 with hypoglycemia  - A1C- 10.6 %  - monitor FS - do not use insulin unless needed prior to meals     # Acute on chronic normocytic anemia- multifactorial  - S/p 2 units on 6/10 and 2 unit 6/12  - evaluated by GI - outpt EGD/colonoscopy    # GI prophylaxis - protonix  # DVT prophylaxis - heparin subq     Current workup in progress:  - fu labs  - fu EP  - fu ABG    SIGN OUT AT 06-19-20 @ 04:06 GIVEN TO: 1016 ICU UPGRADE NOTE:    80y Male transferred from floor to CCU    Patient is a 80y old Male who presents with a chief complaint of necrotizing fascitis (18 Jun 2020 10:24)    The patient is currently admitted for the primary diagnosis of Necrotizing fasciitis s/p delayed primary closure of LLE    The patient was intubated  on 6/19/2020     Indwelling vascular catheters: Right arm PICC, 2 left peripheral 18G,     Urinary Catheter: condom cath    SEDATION: versed + Fentanyl    Disposition: CCU    Code Status: Full    Rapid Response and Code blue: patient's monitor showed bradycardia, he was unresponsive was found to be in PEA. As per ACLS protocol , 3 rounds of CPR with 3 doses of epinephrine, 1 dose sodium bicarb, 1 dose calcium gluconate was give. ROSC achieved in 6minutes. Patient was intubated, sedated on versed and fentanyl.  STAT cbc, cmp, lactate, cardiac enzymes were sent. STAT EKG showed ventricular paced rhythm, STAT cxr verfied ET tube in place.    VITALS: BP: 156/74, saturation 99% on room air, temp: 99.7F  (axillary) FS: 216      80 M with PMHx of anemia, ascites 2/2 CHF, CHF s/p biventricular AICD, CKD, CAD s/p CABGx5 on ASA, CKD, DM s/p R great toe amputation, HLD, HTN, hypothyroidism, PAD s/p R femoral stent who presents with altered mental status x 1 day and worsening left foot DFU with sepsis and metabolic encephalopathy which was resolved. Was on Augmentin as outpatient.     #cardiac arrest s/p ROSC in 6min s/p MV on sedation  - current vent settings: 16/450/100%FiO2/peep of 5  - fu ABG STAT  - STAT CXR : ET in place  - on versed and fentanyl  - EP fu for device interrogation      # Polymicrobial bacteremia secondary to necrotizing fascitis in left foot infection   - s/p delayed primary closure with possible L BKA  -  Xray Foot AP + Lateral + Oblique, Left (06.08.20 @ 16:51) >Status post hallux metatarsal and great toe amputation sparing 1 cm of the base. Otherwise intact tarsal metatarsal alignment. Previously noted subcutaneous emphysema within soft tissues has been resected. No proximal residual gas. Vascular calcification sand hind foot neuropathic osteoarthropathy  - VA Duplex Lower Extrem Arterial, Bilat- Normal arterial flow in bilateral lower extremities.  - s/p multiple debridements with podiatry   - ESR 79, CRP 23   - blood cultures 6/4 : Proteus ESBL and GBS. Blood cultures negative since 6/6, most recent 6/16 NGTD   - wound cultures 6/4: E. Coli, Proteus, MRSA   - Continue w/ Local Wound Care; wash with soap and water, apply Dakins wet to dry dsd abd kerlix ace TID  - PICC Line in place 6/15/2020 by IR  - continue meropenem & vancomycin - check vanc level every morning   - Decision made for Amputation Surgery LLE today - Surgery this Friday 6/19/2020  - Cardiology risk stratification in cardio note     #Slurred speech - CT head negative  #Hypothermia/bradycardia/hypoglycemia - likely from sepsis?     # Sepsis POA, lactic acidosis resolved  # Metabolic encephalopathy sec to sepsis     # Acute kidney injury on CKD stage 4 sec to ATN, metabolic acidosis, hyponatremia  - no HD currently as per nephro    # Hyperkalemia  - baseline creat 2.2  - US Kidney and Bladder (06.10.20 @ 22:59) >Normal-appearing kidneys. Bilateral pleural effusions with ascites.  - losartan, aldactone held  - c/w lokelema dose decreased to 5mg, phoslo, calcitriol   - c/w sodium bicarb    # Acute on chronic systolic CHF with bilateral pleural effusions and ascites s/p biventricular AICD  -  Transthoracic Echocardiogram (06.04.20 @ 12:04) >Left ventricular ejection fraction, by visual estimation, is <20%.Grade I diastolic dysfunction). Moderate tricuspid regurgitation. borderline pulmonary hypertension.  -  Xray Chest 1 View- PORTABLE-Routine (06.13.20 @ 14:43) >Bilateral opacities with small effusions left greater than right unchanged since prior  - monitor I/o, daily weight, restrict fluids  - c/w lasix, coreg, digoxin  - losartan and aldactone held due to MICHAEL on CKD4   - Consult heart failure specialist - INCREASED lasix, dc midodrine as per rec     # Elevated troponin sec to Type 2 MI, H/o CAD s/p CABGx5  - Troponin T, Serum: 0.12: Critical value: ng/mL (06.04.20 @ 11:12)  - 12 Lead ECG (06.10.20 @ 07:57) >Ventricular-paced rhythm. Biventricular pacemaker detected  - c/w ASA, statin, coreg, digoxin    # DM type 2 with hypoglycemia  - A1C- 10.6 %  - monitor FS - do not use insulin unless needed prior to meals     # Acute on chronic normocytic anemia- multifactorial  - S/p 2 units on 6/10 and 2 unit 6/12  - evaluated by GI - outpt EGD/colonoscopy    # GI prophylaxis - protonix  # DVT prophylaxis - heparin subq     Current workup in progress:  - fu labs  - fu EP  - fu ABG    SIGN OUT AT 06-19-20 @ 04:06 GIVEN TO: 1016

## 2020-06-19 NOTE — CONSULT NOTE ADULT - REASON FOR ADMISSION
necrotizing fascitis

## 2020-06-19 NOTE — PROGRESS NOTE ADULT - SUBJECTIVE AND OBJECTIVE BOX
Patient is a 80y old  Male who presents with a chief complaint of necrotizing fascitis (19 Jun 2020 07:47)        Over Night Events: s/p debridement yesterday of left foot, code blue this morning, patient was bradycardic then PEA, s/p 10min of CPR, epi, bicarb and calcium gluc given. On MV, sedated with versed and fentanyl, levo 0.06    ROS:     All ROS are negative except HPI         PHYSICAL EXAM    ICU Vital Signs Last 24 Hrs  T(C): 37.7 (19 Jun 2020 05:00), Max: 37.7 (19 Jun 2020 05:00)  T(F): 99.8 (19 Jun 2020 05:00), Max: 99.8 (19 Jun 2020 05:00)  HR: 68 (19 Jun 2020 08:00) (68 - 90)  BP: 111/57 (19 Jun 2020 08:00) (103/51 - 126/64)  BP(mean): 77 (19 Jun 2020 08:00) (69 - 84)  RR: 9 (19 Jun 2020 08:00) (9 - 20)  SpO2: 100% (19 Jun 2020 08:00) (93% - 100%)      CONSTITUTIONAL:   Ill appearing.     ENT:   Airway patent, +ett    EYES:   Pupils equal,   Round and reactive to light.    CARDIAC:   Normal rate,   No edema      RESPIRATORY:   No wheezing  Bilateral BS  Normal chest expansion  Not tachypneic,  No use of accessory muscles    GASTROINTESTINAL:  Abdomen soft,   Non-tender,   No guarding,   + BS    MUSCULOSKELETAL:   left foot with dressing and wound    NEUROLOGICAL:   Alert and oriented   No motor  deficits.  pertinent DTRs normal    SKIN:   No evidence of rash.      06-18-20 @ 07:01  -  06-19-20 @ 07:00  --------------------------------------------------------  IN:    fentaNYL Infusion.: 20.6 mL    midazolam Infusion: 6.9 mL    norepinephrine Infusion: 14.1 mL    Oral Fluid: 120 mL  Total IN: 161.5 mL    OUT:    Voided: 70 mL  Total OUT: 70 mL    Total NET: 91.5 mL          LABS:                            8.1    14.32 )-----------( 155      ( 19 Jun 2020 03:48 )             24.5                                               06-19    130<L>  |  88<L>  |  99<HH>  ----------------------------<  182<H>  5.6<H>   |  20  |  4.9<HH>    Ca    11.0<H>      19 Jun 2020 03:48  Phos  8.1     06-18  Mg     2.6     06-19    TPro  5.2<L>  /  Alb  2.3<L>  /  TBili  0.5  /  DBili  x   /  AST  33  /  ALT  8   /  AlkPhos  187<H>  06-19      PT/INR - ( 18 Jun 2020 07:01 )   PT: 14.30 sec;   INR: 1.24 ratio         PTT - ( 18 Jun 2020 07:01 )  PTT:32.9 sec                                           CARDIAC MARKERS ( 19 Jun 2020 03:48 )  x     / 0.16 ng/mL / 91 U/L / x     / 4.1 ng/mL                                            LIVER FUNCTIONS - ( 19 Jun 2020 03:48 )  Alb: 2.3 g/dL / Pro: 5.2 g/dL / ALK PHOS: 187 U/L / ALT: 8 U/L / AST: 33 U/L / GGT: x                                                  Culture - Blood (collected 17 Jun 2020 04:30)  Source: .Blood Blood-Venous  Preliminary Report (18 Jun 2020 13:01):    No growth to date.                                                   Mode: AC/ CMV (Assist Control/ Continuous Mandatory Ventilation)  RR (machine): 12  TV (machine): 450  FiO2: 50  PEEP: 10  ITime: 1  MAP: 15  PIP: 27                                      ABG - ( 19 Jun 2020 06:55 )  pH, Arterial: 7.50  pH, Blood: x     /  pCO2: 28    /  pO2: 305   / HCO3: 22    / Base Excess: -1.2  /  SaO2: 100                 MEDICATIONS  (STANDING):  aspirin enteric coated 81 milliGRAM(s) Oral daily  atorvastatin 80 milliGRAM(s) Oral at bedtime  buMETAnide Infusion 0.1 mG/Hr (0.5 mL/Hr) IV Continuous <Continuous>  buMETAnide IVPB 1 milliGRAM(s) IV Intermittent once  calcitriol   Capsule 0.25 MICROGram(s) Oral daily  calcium acetate 1334 milliGRAM(s) Oral three times a day with meals  carvedilol 3.125 milliGRAM(s) Oral every 12 hours  chlorhexidine 0.12% Liquid 15 milliLiter(s) Oral Mucosa two times a day  chlorhexidine 4% Liquid 1 Application(s) Topical <User Schedule>  dextrose 50% Injectable 12.5 Gram(s) IV Push once  digoxin     Tablet 0.125 milliGRAM(s) Oral every other day  fentaNYL   Infusion. 0.5 MICROgram(s)/kG/Hr (3.39 mL/Hr) IV Continuous <Continuous>  folic acid 1 milliGRAM(s) Oral daily  heparin   Injectable 5000 Unit(s) SubCutaneous every 8 hours  meropenem  IVPB 500 milliGRAM(s) IV Intermittent every 12 hours  metolazone 2.5 milliGRAM(s) Oral daily  midazolam Infusion 0.02 mG/kG/Hr (1.36 mL/Hr) IV Continuous <Continuous>  norepinephrine Infusion 0.05 MICROgram(s)/kG/Min (6.36 mL/Hr) IV Continuous <Continuous>  pantoprazole   Suspension 40 milliGRAM(s) Oral daily  sodium bicarbonate 650 milliGRAM(s) Oral every 6 hours  sodium bicarbonate  Infusion 0.131 mEq/kG/Hr (60 mL/Hr) IV Continuous <Continuous>  sodium zirconium cyclosilicate 5 Gram(s) Oral two times a day  vancomycin  IVPB 500 milliGRAM(s) IV Intermittent every 24 hours  vancomycin  IVPB        MEDICATIONS  (PRN):  acetaminophen   Tablet .. 650 milliGRAM(s) Oral every 6 hours PRN Mild Pain (1 - 3)  aluminum hydroxide/magnesium hydroxide/simethicone Suspension 30 milliLiter(s) Oral every 6 hours PRN Dyspepsia  dextrose 40% Gel 15 Gram(s) Oral once PRN Blood Glucose LESS THAN 70 milliGRAM(s)/deciliter  glucagon  Injectable 1 milliGRAM(s) IntraMuscular once PRN Glucose LESS THAN 70 milligrams/deciliter  HYDROmorphone  Injectable 0.5 milliGRAM(s) IV Push every 3 hours PRN Severe Pain (7 - 10)      New X-rays reviewed:                                                                                  ECHO    CXR interpreted by me: Patient is a 80y old  Male who presents with a chief complaint of necrotizing fascitis (19 Jun 2020 07:47)        Over Night Events: s/p debridement yesterday of left foot, code blue this morning, patient was bradycardic then PEA, s/p 10min of CPR, epi, bicarb and calcium gluc given. On MV, sedated with versed and fentanyl, levo 0.06    ROS:     All ROS are negative except HPI         PHYSICAL EXAM    ICU Vital Signs Last 24 Hrs  T(C): 37.7 (19 Jun 2020 05:00), Max: 37.7 (19 Jun 2020 05:00)  T(F): 99.8 (19 Jun 2020 05:00), Max: 99.8 (19 Jun 2020 05:00)  HR: 68 (19 Jun 2020 08:00) (68 - 90)  BP: 111/57 (19 Jun 2020 08:00) (103/51 - 126/64)  BP(mean): 77 (19 Jun 2020 08:00) (69 - 84)  RR: 9 (19 Jun 2020 08:00) (9 - 20)  SpO2: 100% (19 Jun 2020 08:00) (93% - 100%)      CONSTITUTIONAL:   Ill appearing.     ENT:   Airway patent, +ett    EYES:   Pupils equal,   Round and reactive to light.    CARDIAC:   Normal rate,   No edema      RESPIRATORY:   No wheezing  Bilateral BS  Normal chest expansion  Not tachypneic,  No use of accessory muscles    GASTROINTESTINAL:  Abdomen soft,   Non-tender,   No guarding,   + BS    MUSCULOSKELETAL:   left foot with dressing and wound    NEUROLOGICAL:   Alert and oriented   No motor  deficits.  pertinent DTRs normal    SKIN:   No evidence of rash.      06-18-20 @ 07:01  -  06-19-20 @ 07:00  --------------------------------------------------------  IN:    fentaNYL Infusion.: 20.6 mL    midazolam Infusion: 6.9 mL    norepinephrine Infusion: 14.1 mL    Oral Fluid: 120 mL  Total IN: 161.5 mL    OUT:    Voided: 70 mL  Total OUT: 70 mL    Total NET: 91.5 mL          LABS:                            8.1    14.32 )-----------( 155      ( 19 Jun 2020 03:48 )             24.5                                               06-19    130<L>  |  88<L>  |  99<HH>  ----------------------------<  182<H>  5.6<H>   |  20  |  4.9<HH>    Ca    11.0<H>      19 Jun 2020 03:48  Phos  8.1     06-18  Mg     2.6     06-19    TPro  5.2<L>  /  Alb  2.3<L>  /  TBili  0.5  /  DBili  x   /  AST  33  /  ALT  8   /  AlkPhos  187<H>  06-19      PT/INR - ( 18 Jun 2020 07:01 )   PT: 14.30 sec;   INR: 1.24 ratio         PTT - ( 18 Jun 2020 07:01 )  PTT:32.9 sec                                           CARDIAC MARKERS ( 19 Jun 2020 03:48 )  x     / 0.16 ng/mL / 91 U/L / x     / 4.1 ng/mL                                            LIVER FUNCTIONS - ( 19 Jun 2020 03:48 )  Alb: 2.3 g/dL / Pro: 5.2 g/dL / ALK PHOS: 187 U/L / ALT: 8 U/L / AST: 33 U/L / GGT: x                                                  Culture - Blood (collected 17 Jun 2020 04:30)  Source: .Blood Blood-Venous  Preliminary Report (18 Jun 2020 13:01):    No growth to date.                                                   Mode: AC/ CMV (Assist Control/ Continuous Mandatory Ventilation)  RR (machine): 12  TV (machine): 450  FiO2: 50  PEEP: 10  ITime: 1  MAP: 15  PIP: 27                                      ABG - ( 19 Jun 2020 06:55 )  pH, Arterial: 7.50  pH, Blood: x     /  pCO2: 28    /  pO2: 305   / HCO3: 22    / Base Excess: -1.2  /  SaO2: 100                 MEDICATIONS  (STANDING):  aspirin enteric coated 81 milliGRAM(s) Oral daily  atorvastatin 80 milliGRAM(s) Oral at bedtime  buMETAnide Infusion 0.1 mG/Hr (0.5 mL/Hr) IV Continuous <Continuous>  buMETAnide IVPB 1 milliGRAM(s) IV Intermittent once  calcitriol   Capsule 0.25 MICROGram(s) Oral daily  calcium acetate 1334 milliGRAM(s) Oral three times a day with meals  carvedilol 3.125 milliGRAM(s) Oral every 12 hours  chlorhexidine 0.12% Liquid 15 milliLiter(s) Oral Mucosa two times a day  chlorhexidine 4% Liquid 1 Application(s) Topical <User Schedule>  dextrose 50% Injectable 12.5 Gram(s) IV Push once  digoxin     Tablet 0.125 milliGRAM(s) Oral every other day  fentaNYL   Infusion. 0.5 MICROgram(s)/kG/Hr (3.39 mL/Hr) IV Continuous <Continuous>  folic acid 1 milliGRAM(s) Oral daily  heparin   Injectable 5000 Unit(s) SubCutaneous every 8 hours  meropenem  IVPB 500 milliGRAM(s) IV Intermittent every 12 hours  metolazone 2.5 milliGRAM(s) Oral daily  midazolam Infusion 0.02 mG/kG/Hr (1.36 mL/Hr) IV Continuous <Continuous>  norepinephrine Infusion 0.05 MICROgram(s)/kG/Min (6.36 mL/Hr) IV Continuous <Continuous>  pantoprazole   Suspension 40 milliGRAM(s) Oral daily  sodium bicarbonate 650 milliGRAM(s) Oral every 6 hours  sodium bicarbonate  Infusion 0.131 mEq/kG/Hr (60 mL/Hr) IV Continuous <Continuous>  sodium zirconium cyclosilicate 5 Gram(s) Oral two times a day  vancomycin  IVPB 500 milliGRAM(s) IV Intermittent every 24 hours  vancomycin  IVPB        MEDICATIONS  (PRN):  acetaminophen   Tablet .. 650 milliGRAM(s) Oral every 6 hours PRN Mild Pain (1 - 3)  aluminum hydroxide/magnesium hydroxide/simethicone Suspension 30 milliLiter(s) Oral every 6 hours PRN Dyspepsia  dextrose 40% Gel 15 Gram(s) Oral once PRN Blood Glucose LESS THAN 70 milliGRAM(s)/deciliter  glucagon  Injectable 1 milliGRAM(s) IntraMuscular once PRN Glucose LESS THAN 70 milligrams/deciliter  HYDROmorphone  Injectable 0.5 milliGRAM(s) IV Push every 3 hours PRN Severe Pain (7 - 10)      CXR interpreted by me:  ett and og ok, right picc line, bilateral opacitites Patient is a 80y old  Male who presents with a chief complaint of necrotizing fascitis (19 Jun 2020 07:47)        Over Night Events: s/p debridement yesterday of left foot, code blue this morning, patient was bradycardic then PEA, s/p 10min of CPR, epi, bicarb and calcium gluc given. On MV, sedated with versed and fentanyl, levo 0.06. all over 6 min    ROS:     All ROS are negative except HPI         PHYSICAL EXAM    ICU Vital Signs Last 24 Hrs  T(C): 37.7 (19 Jun 2020 05:00), Max: 37.7 (19 Jun 2020 05:00)  T(F): 99.8 (19 Jun 2020 05:00), Max: 99.8 (19 Jun 2020 05:00)  HR: 68 (19 Jun 2020 08:00) (68 - 90)  BP: 111/57 (19 Jun 2020 08:00) (103/51 - 126/64)  BP(mean): 77 (19 Jun 2020 08:00) (69 - 84)  RR: 9 (19 Jun 2020 08:00) (9 - 20)  SpO2: 100% (19 Jun 2020 08:00) (93% - 100%)      CONSTITUTIONAL:   Ill appearing.     ENT:   Airway patent, +ett    EYES:   Pupils equal,   Round and reactive to light.    CARDIAC:   Normal rate,   No edema      RESPIRATORY:   bl crackles    GASTROINTESTINAL:  Abdomen soft,   Non-tender,   No guarding,   + BS    MUSCULOSKELETAL:   left foot with dressing and wound    NEUROLOGICAL:   sedated fentanyl./ versed, l pupil reactive, brainstem activity present    SKIN:   No evidence of rash.      06-18-20 @ 07:01  -  06-19-20 @ 07:00  --------------------------------------------------------  IN:    fentaNYL Infusion.: 20.6 mL    midazolam Infusion: 6.9 mL    norepinephrine Infusion: 14.1 mL    Oral Fluid: 120 mL  Total IN: 161.5 mL    OUT:    Voided: 70 mL  Total OUT: 70 mL    Total NET: 91.5 mL          LABS:                            8.1    14.32 )-----------( 155      ( 19 Jun 2020 03:48 )             24.5                                               06-19    130<L>  |  88<L>  |  99<HH>  ----------------------------<  182<H>  5.6<H>   |  20  |  4.9<HH>    Ca    11.0<H>      19 Jun 2020 03:48  Phos  8.1     06-18  Mg     2.6     06-19    TPro  5.2<L>  /  Alb  2.3<L>  /  TBili  0.5  /  DBili  x   /  AST  33  /  ALT  8   /  AlkPhos  187<H>  06-19      PT/INR - ( 18 Jun 2020 07:01 )   PT: 14.30 sec;   INR: 1.24 ratio         PTT - ( 18 Jun 2020 07:01 )  PTT:32.9 sec                                           CARDIAC MARKERS ( 19 Jun 2020 03:48 )  x     / 0.16 ng/mL / 91 U/L / x     / 4.1 ng/mL                                            LIVER FUNCTIONS - ( 19 Jun 2020 03:48 )  Alb: 2.3 g/dL / Pro: 5.2 g/dL / ALK PHOS: 187 U/L / ALT: 8 U/L / AST: 33 U/L / GGT: x                                                  Culture - Blood (collected 17 Jun 2020 04:30)  Source: .Blood Blood-Venous  Preliminary Report (18 Jun 2020 13:01):    No growth to date.                                                   Mode: AC/ CMV (Assist Control/ Continuous Mandatory Ventilation)  RR (machine): 12  TV (machine): 450  FiO2: 50  PEEP: 10  ITime: 1  MAP: 15  PIP: 27                                      ABG - ( 19 Jun 2020 06:55 )  pH, Arterial: 7.50  pH, Blood: x     /  pCO2: 28    /  pO2: 305   / HCO3: 22    / Base Excess: -1.2  /  SaO2: 100                 MEDICATIONS  (STANDING):  aspirin enteric coated 81 milliGRAM(s) Oral daily  atorvastatin 80 milliGRAM(s) Oral at bedtime  buMETAnide Infusion 0.1 mG/Hr (0.5 mL/Hr) IV Continuous <Continuous>  buMETAnide IVPB 1 milliGRAM(s) IV Intermittent once  calcitriol   Capsule 0.25 MICROGram(s) Oral daily  calcium acetate 1334 milliGRAM(s) Oral three times a day with meals  carvedilol 3.125 milliGRAM(s) Oral every 12 hours  chlorhexidine 0.12% Liquid 15 milliLiter(s) Oral Mucosa two times a day  chlorhexidine 4% Liquid 1 Application(s) Topical <User Schedule>  dextrose 50% Injectable 12.5 Gram(s) IV Push once  digoxin     Tablet 0.125 milliGRAM(s) Oral every other day  fentaNYL   Infusion. 0.5 MICROgram(s)/kG/Hr (3.39 mL/Hr) IV Continuous <Continuous>  folic acid 1 milliGRAM(s) Oral daily  heparin   Injectable 5000 Unit(s) SubCutaneous every 8 hours  meropenem  IVPB 500 milliGRAM(s) IV Intermittent every 12 hours  metolazone 2.5 milliGRAM(s) Oral daily  midazolam Infusion 0.02 mG/kG/Hr (1.36 mL/Hr) IV Continuous <Continuous>  norepinephrine Infusion 0.05 MICROgram(s)/kG/Min (6.36 mL/Hr) IV Continuous <Continuous>  pantoprazole   Suspension 40 milliGRAM(s) Oral daily  sodium bicarbonate 650 milliGRAM(s) Oral every 6 hours  sodium bicarbonate  Infusion 0.131 mEq/kG/Hr (60 mL/Hr) IV Continuous <Continuous>  sodium zirconium cyclosilicate 5 Gram(s) Oral two times a day  vancomycin  IVPB 500 milliGRAM(s) IV Intermittent every 24 hours  vancomycin  IVPB        MEDICATIONS  (PRN):  acetaminophen   Tablet .. 650 milliGRAM(s) Oral every 6 hours PRN Mild Pain (1 - 3)  aluminum hydroxide/magnesium hydroxide/simethicone Suspension 30 milliLiter(s) Oral every 6 hours PRN Dyspepsia  dextrose 40% Gel 15 Gram(s) Oral once PRN Blood Glucose LESS THAN 70 milliGRAM(s)/deciliter  glucagon  Injectable 1 milliGRAM(s) IntraMuscular once PRN Glucose LESS THAN 70 milligrams/deciliter  HYDROmorphone  Injectable 0.5 milliGRAM(s) IV Push every 3 hours PRN Severe Pain (7 - 10)      CXR interpreted by me:  ett and og ok, right picc line, bilateral opacitites

## 2020-06-19 NOTE — CONSULT NOTE ADULT - CONSULT REQUESTED BY NAME
Dr. Miller
ED
Medical Team
Medicine
Medicine
Morgan Lincoln
ed
medicine
podiatry
primary team
private md
ICU

## 2020-06-19 NOTE — CONSULT NOTE ADULT - SUBJECTIVE AND OBJECTIVE BOX
1.Presentation: AMS and worsening Diabetic foot ulcer      2. Today's Acute Problems:  -->Acute hypoxic respiratory failure secondary to cardiac arrest Intubated 6/19  -->Metabolic Encephalopathy  -->Sepsis  -->Acute kidney injury  -->Acute on chronic systolic CHF with bilateral pleural effusions and ascites s/p biventricular AICD       (TTE (06/04/2020) showing EF <20%)  -->DM type 2 with hypoglycemia (hbaic 10.6%)      3.History:  80 M with PMHx of anemia, ascites 2/2 CHF, CHF s/p biventricular AICD, CKD, CAD s/p CABGx5 on ASA, CKD, DM s/p R great toe amputation, HLD, HTN, hypothyroidism, PAD s/p R femoral stent who presented with altered mental status x 1 day and worsening left foot DFU with sepsis and metabolic encephalopathy.  Patient was downgraded to floor from ICU on 06/09. Patient was a code blue with ROSC achieved in 6 min overnight on 06/19 and was intubated and upgraded to CCU.         Past Medical & Surgical History:  Chronic kidney disease  Anemia  Ascites  PAD (peripheral artery disease)  Hyperlipidemia  Hypothyroidism  Hypertension  Coronary artery disease  CHF (congestive heart failure)  Diabetes mellitus  Biventricular ICD (implantable cardioverter-defibrillator) in place  Amputation of toe of right foot  S/P arterial stent  S/P CABG x 5    Yesterday's Plan: N/A      4.Last 24 hour updates:  -Patient was a code blue with ROSC achieved in 6 min overnight on 06/19 and was intubated and upgraded to CCU.  -Off sedation since 9am        5.Medications:  aspirin enteric coated 81 milliGRAM(s) Oral daily  atorvastatin 80 milliGRAM(s) Oral at bedtime  buMETAnide Infusion 1 mG/Hr IV Continuous <Continuous>  calcitriol   Capsule 0.25 MICROGram(s) Oral daily  calcium acetate 1334 milliGRAM(s) Oral three times a day with meals  chlorhexidine 0.12% Liquid 15 milliLiter(s) Oral Mucosa two times a day  chlorhexidine 4% Liquid 1 Application(s) Topical <User Schedule>  dexMEDEtomidine Infusion 1.5 MICROgram(s)/kG/Hr IV Continuous <Continuous>  dextrose 50% Injectable 12.5 Gram(s) IV Push once  folic acid 1 milliGRAM(s) Oral daily  heparin   Injectable 5000 Unit(s) SubCutaneous every 8 hours  meropenem  IVPB 500 milliGRAM(s) IV Intermittent every 12 hours  metolazone 2.5 milliGRAM(s) Oral daily  norepinephrine Infusion 0.05 MICROgram(s)/kG/Min IV Continuous <Continuous>  pantoprazole   Suspension 40 milliGRAM(s) Oral daily  sodium bicarbonate 650 milliGRAM(s) Oral every 6 hours  sodium zirconium cyclosilicate 10 Gram(s) Oral two times a day      6.Ancillary Management  Chest PT[]  Head of bed >35 [x]  Out of bed to chair []  PT/OT/ST []  Spirometry[]  DVT prophylaxis [x]      7.Neuro Exam:  Mental status: Intubated , not sedated, not following commands, opens eye spontaneously and makes eye contact when called his name  CN: Right pupil  is irregular and not reactive(baseline), left pupil 2mm sluggish response, eyes midline,         +Gag + blink  Motor: Moves UE>LE   Sensory : responds to deep pain UE>LE        Last CTH:< from: CT Head No Cont (06.17.20 @ 10:15) >  FINDINGS:    The ventricles and sulci are appropriate for the age.    There is no evidence for intracranial hemorrhage, significant space occupying process or recent territorial infarction.    Gray-white differentiation is maintained. There are mild microvascular ischemic changes demonstrated.    Bones and sinuses are unremarkable.    IMPRESSION:    Unremarkable study    < end of copied text >        Last EEG: Pending results of REEG            8.CVS:  Vital Signs Last 24 Hrs  T(C): 34.4 (19 Jun 2020 20:00), Max: 37.7 (19 Jun 2020 05:00)  T(F): 94 (19 Jun 2020 20:00), Max: 99.8 (19 Jun 2020 05:00)  HR: 68 (19 Jun 2020 21:00) (68 - 69)  BP: 112/58 (19 Jun 2020 21:00) (104/52 - 119/56)  BP(mean): 78 (19 Jun 2020 21:00) (69 - 88)  RR: 11 (19 Jun 2020 21:00) (0 - 17)  SpO2: 100% (19 Jun 2020 21:00) (93% - 100%)  Last Echo:        Last EKG:  < from: 12 Lead ECG (06.19.20 @ 07:48) >  Ventricular Rate 70 BPM    Atrial Rate 67 BPM    P-R Interval 208 ms    QRS Duration 144 ms    Q-T Interval 390 ms    QTC Calculation(Bezet) 421 ms    R Axis -58 degrees    T Axis 3 degrees    Diagnosis Line *** Poor data quality, interpretation may be adversely affected  AV dual-paced rhythm  Abnormal ECG    Confirmed by JAYLON FARLEY MD ([id number]) on 6/19/2020 8:23:38 AM            9.Respiratory:  ABG:ABG - ( 19 Jun 2020 14:53 )  pH, Arterial: 7.49  pH, Blood: x     /  pCO2: 32    /  pO2: 60    / HCO3: 24    / Base Excess: 1.3   /  SaO2: 91              Chest Xray:  < from: Xray Chest 1 View- PORTABLE-Urgent (06.19.20 @ 06:16) >  Findings:    Support devices: Multi chamber left-sided ICD. Telemetry leads are seen. Right and PICC line. The patient is intubated. Enteric catheter extends below the hemidiaphragm.    Cardiac/mediastinum/hilum: Patient is status post median sternotomy. The aorta is ectatic.    Lung parenchyma/Pleura: Bilateral opacifications and effusions.    Skeleton/soft tissues: Unchanged.    Impression:      Bilateral opacifications and effusions. Support devices as described.    Follow-up as needed.    < end of copied text >    Mode: AC/ CMV (Assist Control/ Continuous Mandatory Ventilation)  RR (machine): 12  TV (machine): 450  FiO2: 40  PEEP: 5  ITime: 1  MAP: 15  PIP: 27          10.GI:  Prophylaxis    GI: Protonix 40 mg q 24    LIVER FUNCTIONS - ( 19 Jun 2020 09:00 )  Alb: 2.0 g/dL / Pro: 4.9 g/dL / ALK PHOS: 176 U/L / ALT: 7 U/L / AST: 32 U/L / GGT: x                 11.Renal/Fluids/Electrolytes:    06-19    128<L>  |  89<L>  |  107<HH>  ----------------------------<  164<H>  4.4   |  21  |  4.6<HH>    Ca    7.6<L>      19 Jun 2020 11:13  Phos  8.1     06-18  Mg     2.4     06-19    TPro  4.9<L>  /  Alb  2.0<L>  /  TBili  0.5  /  DBili  x   /  AST  32  /  ALT  7   /  AlkPhos  176<H>  06-19          I&O's Detail    18 Jun 2020 07:01  -  19 Jun 2020 07:00  --------------------------------------------------------  IN:    fentaNYL Infusion.: 20.6 mL    midazolam Infusion: 6.9 mL    norepinephrine Infusion: 14.1 mL    Oral Fluid: 120 mL  Total IN: 161.5 mL    OUT:    Voided: 70 mL  Total OUT: 70 mL    Total NET: 91.5 mL      19 Jun 2020 07:01  -  19 Jun 2020 22:10  --------------------------------------------------------  IN:    bumetanide Infusion: 55 mL    IV PiggyBack: 150 mL    norepinephrine Infusion: 58.4 mL  Total IN: 263.4 mL    OUT:    Indwelling Catheter - Urethral: 500 mL    Voided: 110 mL  Total OUT: 610 mL    Total NET: -346.6 mL          13.Hematology:                        7.3    16.18 )-----------( 169      ( 19 Jun 2020 09:00 )             21.0     PT/INR - ( 19 Jun 2020 09:00 )   PT: 15.70 sec;   INR: 1.37 ratio      PTT - ( 19 Jun 2020 09:00 )  PTT:34.6 sec          DVT Prophylaxis  Lovenox[]   Heparin[x]   Venodyne []   SCD's[x] 1.Presentation: AMS and worsening Diabetic foot ulcer      2. Today's Acute Problems:  -->Acute hypoxic respiratory failure secondary to cardiac arrest Intubated 6/19  -->Metabolic Encephalopathy  -->Sepsis  -->Acute kidney injury  -->Acute on chronic systolic CHF with bilateral pleural effusions and ascites s/p biventricular AICD       (TTE (06/04/2020) showing EF <20%)  -->DM type 2 with hypoglycemia (hbaic 10.6%)      3.History:  80 M with PMHx of anemia, ascites 2/2 CHF, CHF s/p biventricular AICD, CKD, CAD s/p CABGx5 on ASA, CKD, DM s/p R great toe amputation, HLD, HTNhypothyroidism, PAD s/p R femoral stent who presented with altered mental status x 1 day and worsening left foot DFU with sepsis and metabolic encephalopathy.Patient was downgraded to floor from ICU on 06/09. Patient was a code blue with ROSC achieved in 6 min overnight on 06/19 and was intubated and upgraded to CCU.         Past Medical & Surgical History:  Chronic kidney disease  Anemia  Ascites  PAD (peripheral artery disease)  Hyperlipidemia  Hypothyroidism  Hypertension  Coronary artery disease  CHF (congestive heart failure)  Diabetes mellitus  Biventricular ICD (implantable cardioverter-defibrillator) in place  Amputation of toe of right foot  S/P arterial stent  S/P CABG x 5    Yesterday's Plan: N/A      4.Last 24 hour updates:  -Patient was a code blue with ROSC achieved in 6 min overnight on 06/19 and was intubated and upgraded to CCU.  -Off sedation since 9am        5.Medications:  aspirin enteric coated 81 milliGRAM(s) Oral daily  atorvastatin 80 milliGRAM(s) Oral at bedtime  buMETAnide Infusion 1 mG/Hr IV Continuous <Continuous>  calcitriol   Capsule 0.25 MICROGram(s) Oral daily  calcium acetate 1334 milliGRAM(s) Oral three times a day with meals  chlorhexidine 0.12% Liquid 15 milliLiter(s) Oral Mucosa two times a day  chlorhexidine 4% Liquid 1 Application(s) Topical <User Schedule>  dexMEDEtomidine Infusion 1.5 MICROgram(s)/kG/Hr IV Continuous <Continuous>  dextrose 50% Injectable 12.5 Gram(s) IV Push once  folic acid 1 milliGRAM(s) Oral daily  heparin   Injectable 5000 Unit(s) SubCutaneous every 8 hours  meropenem  IVPB 500 milliGRAM(s) IV Intermittent every 12 hours  metolazone 2.5 milliGRAM(s) Oral daily  norepinephrine Infusion 0.05 MICROgram(s)/kG/Min IV Continuous <Continuous>  pantoprazole   Suspension 40 milliGRAM(s) Oral daily  sodium bicarbonate 650 milliGRAM(s) Oral every 6 hours  sodium zirconium cyclosilicate 10 Gram(s) Oral two times a day      6.Ancillary Management  Chest PT[]  Head of bed >35 [x]  Out of bed to chair []  PT/OT/ST []  Spirometry[]  DVT prophylaxis [x]      7.Neuro Exam:  Mental status: Intubated , not sedated, not following commands, opens eye spontaneously and makes eye contact when called his name  CN: Right pupil  is irregular and not reactive(baseline), left pupil 2mm sluggish response, eyes midline,         +Gag + blink  Motor: Moves UE>LE   Sensory : responds to deep pain UE>LE        Last CTH:< from: CT Head No Cont (06.17.20 @ 10:15) >  FINDINGS:    The ventricles and sulci are appropriate for the age.    There is no evidence for intracranial hemorrhage, significant space occupying process or recent territorial infarction.    Gray-white differentiation is maintained. There are mild microvascular ischemic changes demonstrated.    Bones and sinuses are unremarkable.    IMPRESSION:    Unremarkable study    < end of copied text >        Last EEG: Pending results of REEG            8.CVS:  Vital Signs Last 24 Hrs  T(C): 34.4 (19 Jun 2020 20:00), Max: 37.7 (19 Jun 2020 05:00)  T(F): 94 (19 Jun 2020 20:00), Max: 99.8 (19 Jun 2020 05:00)  HR: 68 (19 Jun 2020 21:00) (68 - 69)  BP: 112/58 (19 Jun 2020 21:00) (104/52 - 119/56)  BP(mean): 78 (19 Jun 2020 21:00) (69 - 88)  RR: 11 (19 Jun 2020 21:00) (0 - 17)  SpO2: 100% (19 Jun 2020 21:00) (93% - 100%)  Last Echo:        Last EKG:  < from: 12 Lead ECG (06.19.20 @ 07:48) >  Ventricular Rate 70 BPM    Atrial Rate 67 BPM    P-R Interval 208 ms    QRS Duration 144 ms    Q-T Interval 390 ms    QTC Calculation(Bezet) 421 ms    R Axis -58 degrees    T Axis 3 degrees    Diagnosis Line *** Poor data quality, interpretation may be adversely affected  AV dual-paced rhythm  Abnormal ECG    Confirmed by JAYLON FARLEY MD ([id number]) on 6/19/2020 8:23:38 AM            9.Respiratory:  ABG:ABG - ( 19 Jun 2020 14:53 )  pH, Arterial: 7.49  pH, Blood: x     /  pCO2: 32    /  pO2: 60    / HCO3: 24    / Base Excess: 1.3   /  SaO2: 91              Chest Xray:  < from: Xray Chest 1 View- PORTABLE-Urgent (06.19.20 @ 06:16) >  Findings:    Support devices: Multi chamber left-sided ICD. Telemetry leads are seen. Right and PICC line. The patient is intubated. Enteric catheter extends below the hemidiaphragm.    Cardiac/mediastinum/hilum: Patient is status post median sternotomy. The aorta is ectatic.    Lung parenchyma/Pleura: Bilateral opacifications and effusions.    Skeleton/soft tissues: Unchanged.    Impression:      Bilateral opacifications and effusions. Support devices as described.    Follow-up as needed.    < end of copied text >    Mode: AC/ CMV (Assist Control/ Continuous Mandatory Ventilation)  RR (machine): 12  TV (machine): 450  FiO2: 40  PEEP: 5  ITime: 1  MAP: 15  PIP: 27          10.GI:  Prophylaxis    GI: Protonix 40 mg q 24    LIVER FUNCTIONS - ( 19 Jun 2020 09:00 )  Alb: 2.0 g/dL / Pro: 4.9 g/dL / ALK PHOS: 176 U/L / ALT: 7 U/L / AST: 32 U/L / GGT: x                 11.Renal/Fluids/Electrolytes:    06-19    128<L>  |  89<L>  |  107<HH>  ----------------------------<  164<H>  4.4   |  21  |  4.6<HH>    Ca    7.6<L>      19 Jun 2020 11:13  Phos  8.1     06-18  Mg     2.4     06-19    TPro  4.9<L>  /  Alb  2.0<L>  /  TBili  0.5  /  DBili  x   /  AST  32  /  ALT  7   /  AlkPhos  176<H>  06-19          I&O's Detail    18 Jun 2020 07:01  -  19 Jun 2020 07:00  --------------------------------------------------------  IN:    fentaNYL Infusion.: 20.6 mL    midazolam Infusion: 6.9 mL    norepinephrine Infusion: 14.1 mL    Oral Fluid: 120 mL  Total IN: 161.5 mL    OUT:    Voided: 70 mL  Total OUT: 70 mL    Total NET: 91.5 mL      19 Jun 2020 07:01  -  19 Jun 2020 22:10  --------------------------------------------------------  IN:    bumetanide Infusion: 55 mL    IV PiggyBack: 150 mL    norepinephrine Infusion: 58.4 mL  Total IN: 263.4 mL    OUT:    Indwelling Catheter - Urethral: 500 mL    Voided: 110 mL  Total OUT: 610 mL    Total NET: -346.6 mL          13.Hematology:                        7.3    16.18 )-----------( 169      ( 19 Jun 2020 09:00 )             21.0     PT/INR - ( 19 Jun 2020 09:00 )   PT: 15.70 sec;   INR: 1.37 ratio      PTT - ( 19 Jun 2020 09:00 )  PTT:34.6 sec          DVT Prophylaxis  Lovenox[]   Heparin[x]   Venodyne []   SCD's[x]

## 2020-06-19 NOTE — PROGRESS NOTE ADULT - ATTENDING COMMENTS
patient seen and examined independently   agree with above note with the following additions/corrections     Gen: NAD  CV: Regular rate & regular rhythm  pulm: decreased BS at bases, no fremitus  GI: Soft, BS present, not tender   Ext: L foot dressings    A/P  #Polymicrobial bacteremia secondary to Necrotizing L foot infection  s/p Amputation of first metatarsal 08-Jun-2020 6/8 OR cx   Rare Proteus mirabilis  Rare Streptococcus agalactiae (Group B)  6/4 BCX with GBS and Proteus ESBL  s/p bedside I&D  WCX with MRSA (R Clinda), ESBL proteus, Ecoli  per ID c/w dapto and meropenem   further debridement with podiatry today     #MICHAEL on CKD misael KNIGHT in the setting of sepsis which was present on admission/hyperkalemia / hyponatremia/hyperphosphatemia   spoke to renal and recommended to hold lasix for now   get renal US   on lokelma 10 BID for hyperkalemia   on phoslow monitor phosphate daily   c/w sodium bicarb po     #Chronic systolic chf with EF less than 20% has AICD   on coreg   lying flat with no sob   on coreg low dose 3.125 BID BP will not tolerate higher dose   on digoxin every other day   can not do ACE/ARB because of MICHAEL   seen by cardiology and no further intervention as inpatient    #history of CAD s/p CABG    #DM on insulin keep -180     #hypotension am cortisol normal. will start midodrine 5 TID     poor prognosis  will need to discuss goals of care
patient seen and examined independently   agree with above note with the following additions/corrections     Gen: NAD  CV: Regular rate & regular rhythm  pulm: decreased BS at bases, no fremitus  GI: Soft, BS present, not tender   Ext: L foot dressings    A/P  #Polymicrobial bacteremia secondary to Necrotizing L foot infection  s/p Delayed primary closure of foot 10-Tin-2020 17:11:35 left foot  s/p Amputation of first metatarsal 08-Jun-2020 6/8 OR cx   Rare Proteus mirabilis  Rare Streptococcus agalactiae (Group B)  6/4 BCX with GBS and Proteus ESBL  s/p bedside I&D  WCX with MRSA (R Clinda), ESBL proteus, Ecoli  per ID c/w dapto and meropenem     #MICHAEL on CKD misael KNIGHT in the setting of sepsis which was present on admission/hyperkalemia / hyponatremia/hyperphosphatemia   spoke to renal and recommended to hold lasix for now   get renal US   on lokelma 10 BID for hyperkalemia   on phoslow monitor phosphate daily   c/w sodium bicarb po   sodium stable no lasix per renal     #Chronic systolic chf with EF less than 20% has AICD   on coreg   lying flat with no sob   on coreg low dose 3.125 BID BP will not tolerate higher dose   on digoxin every other day   can not do ACE/ARB because of MICHAEL   seen by cardiology and no further intervention as inpatient    #history of CAD s/p CABG    #DM on insulin keep -180     #hypotension am cortisol normal. improved on midodrine     poor prognosis      #Progress Note Handoff  Pending (specify):  ID follow up, podiatry follow up   Family discussion:patient   Disposition:likely snf
Patient seen and examined at bedside s/p debridement. Patient reported 8/10 pain at debridement site. Will start Dilaudid PRN. Plan for possible BKA tomorrow. C/w Vanc, roxanna. Check daily random vanc level, and dose if less than 15. On PO bicarb for metabolic acidosis. Monitor UOP, place condom cath. Nader hugger for hypothermia, likely related to critical illness. Moderate bilateral pleural effusions, c/w IV diuresis. No plan for HD as per nephro currently.
seen with cordell. MICHAEL with DFU. on ATB. monitor labs. no RRT at this point.
PATIENT SEEN AND EXAMINED, AGREE WITH ABOVE, SEPSIS BACTEREMIA, IV ABX, CARDIO F/UP, POOR PROGNOSIS
Patient seen and examined at the bed side.       Hypothermia. Bradycardia - Doubt Myxedema. Possible environmental vs sepsis. However patient is on antibiotics.   Follow repeat blood culture.   Surgery rescheduled to  06/19 because of patient status change
patient seen and examined, agree with above, G- BACTEREMIA/ NF/ S/P DEBR, RENAL FAILURE, CHF, poor prognosis
Patient seen and examined, agree with above, CP arrest in a patient with multiple co morbidities/ sat, EEG, Neuro, abx, EPS f/up, IV ABX, all over very poor prognosis

## 2020-06-19 NOTE — CONSULT NOTE ADULT - CONSULT REASON
Left DFU, PAD
MICHAEL
Pre op clearance
S/p cardiac arrest R/O Anoxic brain injury
anemia
heart failure
left foot
left foot wound
necrotizing fasciitis
rehab consult
sepsis
wound left foot

## 2020-06-19 NOTE — CONSULT NOTE ADULT - ASSESSMENT
Impression:  80 M with PMHx of anemia, ascites 2/2 CHF, CHF s/p biventricular AICD, CKD, CAD s/p CABGx5 on ASA, CKD, DM s/p R great toe amputation, HLD, HTN, hypothyroidism, PAD s/p R femoral stent  p.w AMS  and worsening left foot DFU with sepsis and metabolic encephalopathy. Patient S/P Cardiac arrest with  ROSC achieved in 6 min  on 06/19. Today, patient continues to be intubated, has been off sedation since 9am , noted eye opening when called his name , does make eye contact but not following any commands at this time. CTH negative for acute intracranial findings.          Suggestions:  -Correct underlying metabolic causes   -Obtain REEG  -Continue current medical management      Disposition: Continue CCU monitoring Impression:  80 M with PMHx of anemia, ascites 2/2 CHF, CHF s/p biventricular AICD, CKD, CAD s/p CABGx5 on ASA, CKD, DM s/p R great toe amputation, HLD, HTN, hypothyroidism, PAD s/p R femoral stent  p.w AMS  and worsening left foot DFU with sepsis and metabolic encephalopathy. Patient S/P Cardiac arrest with  ROSC achieved in 6 min  on 06/19. Today, patient continues to be intubated, has been off sedation since 9am , noted eye opening when called his name , does make eye contact but not following any commands at this time. CTH negative for acute intracranial findings.    Suggestions:  -Correct underlying metabolic causes   -Obtain REEG  -Continue current medical management      Disposition: Continue CCU monitoring

## 2020-06-19 NOTE — AIRWAY PLACEMENT NOTE ADULT - POST AIRWAY PLACEMENT ASSESSMENT:
CXR pending/chest excursion noted/breath sounds bilateral/breath sounds equal/positive end tidal CO2 noted

## 2020-06-19 NOTE — CONSULT NOTE ADULT - CONSULT REQUESTED DATE/TIME
04-Jun-2020 00:00
04-Jun-2020 08:56
04-Jun-2020 09:18
04-Jun-2020 11:10
04-Jun-2020 11:58
06-Jun-2020 11:45
11-Jun-2020 14:23
12-Jun-2020 16:29
15-Tin-2020 18:30
17-Jun-2020 14:18
19-Jun-2020 22:08
05-Jun-2020

## 2020-06-19 NOTE — PROGRESS NOTE ADULT - SUBJECTIVE AND OBJECTIVE BOX
Patient is a 80y old  Male who presents with a chief complaint of necrotizing fascitis (19 Jun 2020 08:17)      SUBJ:  Patient seen and examined. Events noted. S/p PEA arrest lat night. AICD interrogated by the cardiology fellow- no arrhythmia during code, patient remained V-paced throughout the code. Circulation achieved after 6 minutes of CPR. Patient is now intubated, on Levophed. Oliguric.      MEDICATIONS  (STANDING):  aspirin enteric coated 81 milliGRAM(s) Oral daily  atorvastatin 80 milliGRAM(s) Oral at bedtime  calcitriol   Capsule 0.25 MICROGram(s) Oral daily  calcium acetate 1334 milliGRAM(s) Oral three times a day with meals  chlorhexidine 0.12% Liquid 15 milliLiter(s) Oral Mucosa two times a day  chlorhexidine 4% Liquid 1 Application(s) Topical <User Schedule>  dexMEDEtomidine Infusion 1.5 MICROgram(s)/kG/Hr (25.7 mL/Hr) IV Continuous <Continuous>  dextrose 50% Injectable 12.5 Gram(s) IV Push once  folic acid 1 milliGRAM(s) Oral daily  furosemide   Injectable 80 milliGRAM(s) IV Push two times a day  heparin   Injectable 5000 Unit(s) SubCutaneous every 8 hours  meropenem  IVPB 500 milliGRAM(s) IV Intermittent every 12 hours  metolazone 2.5 milliGRAM(s) Oral daily  norepinephrine Infusion 0.05 MICROgram(s)/kG/Min (6.36 mL/Hr) IV Continuous <Continuous>  pantoprazole   Suspension 40 milliGRAM(s) Oral daily  sodium bicarbonate 650 milliGRAM(s) Oral every 6 hours  sodium zirconium cyclosilicate 10 Gram(s) Oral two times a day  vancomycin  IVPB 500 milliGRAM(s) IV Intermittent every 24 hours  vancomycin  IVPB        MEDICATIONS  (PRN):  acetaminophen   Tablet .. 650 milliGRAM(s) Oral every 6 hours PRN Mild Pain (1 - 3)  aluminum hydroxide/magnesium hydroxide/simethicone Suspension 30 milliLiter(s) Oral every 6 hours PRN Dyspepsia  dextrose 40% Gel 15 Gram(s) Oral once PRN Blood Glucose LESS THAN 70 milliGRAM(s)/deciliter  glucagon  Injectable 1 milliGRAM(s) IntraMuscular once PRN Glucose LESS THAN 70 milligrams/deciliter  HYDROmorphone  Injectable 0.5 milliGRAM(s) IV Push every 3 hours PRN Severe Pain (7 - 10)            Vital Signs Last 24 Hrs  T(C): 37.7 (19 Jun 2020 05:00), Max: 37.7 (19 Jun 2020 05:00)  T(F): 99.8 (19 Jun 2020 05:00), Max: 99.8 (19 Jun 2020 05:00)  HR: 68 (19 Jun 2020 08:00) (68 - 70)  BP: 111/57 (19 Jun 2020 08:00) (103/51 - 126/64)  BP(mean): 77 (19 Jun 2020 08:00) (69 - 84)  RR: 9 (19 Jun 2020 08:00) (9 - 20)  SpO2: 100% (19 Jun 2020 08:00) (93% - 100%)      PHYSICAL EXAM:    GEN: Intubated, sedated  HEENT: NC/AT, ETT in place  Neck: No JVD  CV: Reg, S1-S2, 2/6 systolic murmur  Lungs: b/l vent sounds  Abd: Soft, non-tender  Ext: mild edema      I&O's Summary    18 Jun 2020 07:01  -  19 Jun 2020 07:00  --------------------------------------------------------  IN: 161.5 mL / OUT: 70 mL / NET: 91.5 mL    19 Jun 2020 07:01  -  19 Jun 2020 10:30  --------------------------------------------------------  IN: 7.8 mL / OUT: 20 mL / NET: -12.2 mL    	    TELEMETRY: paced    ECG:  < from: 12 Lead ECG (06.19.20 @ 07:48) >  AV dual-paced rhythm  Abnormal ECG    < end of copied text >    TTE:  < from: Transthoracic Echocardiogram (06.04.20 @ 12:04) >  Summary:   1. Left ventricular ejection fraction, by visual estimation, is <20%.   2. Spectral Doppler shows impaired relaxation pattern of left ventricular myocardial filling (Grade I diastolic dysfunction).   3. Mitral annular calcification.   4. Moderate tricuspid regurgitation.   5. Estimated pulmonary artery systolic pressure is 37.6 mmHg assuming a right atrial pressure of 10 mmHg, which is consistent with borderline pulmonary hypertension.    PHYSICIAN INTERPRETATION:  Left Ventricle: The left ventricular internal cavity size is mildly increased. Left ventricular wall thickness is normal. Left ventricular ejection fraction, by visual estimation, is <20%. Spectral Doppler shows impaired relaxation pattern of left ventricular myocardial filling (Grade I diastolic dysfunction).  Right Ventricle: Normal right ventricular size and function.  Left Atrium: Mildly enlarged left atrium.  Right Atrium: Mildly enlarged right atrium.  Pericardium: There is no evidence of pericardial effusion.  Mitral Valve: Structurally normal mitral valve, with normal leaflet excursion. There is mitral annular calcification.  Tricuspid Valve: Structurally normal tricuspid valve, with normal leaflet excursion. Moderate tricuspid regurgitation is visualized. Estimated pulmonary artery systolic pressure is 37.6 mmHg assuming a right atrial pressure of 10 mmHg, which is consistent with borderline pulmonary hypertension.  Aortic Valve: The aortic valve was not well visualized. The aortic valve is trileaflet. No evidence of aortic stenosis. No evidence of aortic valve regurgitation is seen.  Aorta: The aortic root is normal in size and structure.  Venous: The inferior vena cava was normal sized, with respiratory size variation less than 50%, consistent with elevated right atrial pressure.  Additional Comments: A pacer wire is visualized in the right ventricle.       < end of copied text >      LABS:                        7.3    16.18 )-----------( 169      ( 19 Jun 2020 09:00 )             21.0     06-19    127<L>  |  88<L>  |  105<HH>  ----------------------------<  162<H>  4.5   |  23  |  4.8<HH>    Ca    7.6<L>      19 Jun 2020 09:00  Phos  8.1     06-18  Mg     2.4     06-19    TPro  4.9<L>  /  Alb  2.0<L>  /  TBili  0.5  /  DBili  x   /  AST  32  /  ALT  7   /  AlkPhos  176<H>  06-19    CARDIAC MARKERS ( 19 Jun 2020 09:00 )  x     / <0.01 ng/mL / x     / x     / <1.0 ng/mL  CARDIAC MARKERS ( 19 Jun 2020 03:48 )  x     / 0.16 ng/mL / 91 U/L / x     / 4.1 ng/mL      PT/INR - ( 19 Jun 2020 09:00 )   PT: 15.70 sec;   INR: 1.37 ratio         PTT - ( 19 Jun 2020 09:00 )  PTT:34.6 sec      BNP  RADIOLOGY & ADDITIONAL STUDIES:  < from: Xray Chest 1 View- PORTABLE-Urgent (06.19.20 @ 06:16) >  Findings:    Support devices: Multi chamber left-sided ICD. Telemetry leads are seen. Right and PICC line. The patient is intubated. Enteric catheter extends below the hemidiaphragm.    Cardiac/mediastinum/hilum: Patient is status post median sternotomy. The aorta is ectatic.    Lung parenchyma/Pleura: Bilateral opacifications and effusions.    Skeleton/soft tissues: Unchanged.    Impression:      Bilateral opacifications and effusions. Support devices as described.    Follow-up as needed.    < end of copied text >      IMPRESSION AND PLAN:

## 2020-06-19 NOTE — ANESTHESIA FOLLOW-UP NOTE - NSEVALATIONFT_GEN_ALL_CORE
pt coded and intubated during the morning hours. pt transferred to CCU, currently stable with low dose levo gtt.

## 2020-06-19 NOTE — PROGRESS NOTE ADULT - SUBJECTIVE AND OBJECTIVE BOX
WILL VIEYRA 80y Male  MRN#: [id number]   Hospital Day: 15d    SUBJECTIVE  Patient is a 80y old Male who presents with a chief complaint of necrotizing fascitis (19 Jun 2020 08:17)  Currently admitted to medicine with the primary diagnosis of Necrotizing fasciitis    OBJECTIVE  PAST MEDICAL & SURGICAL HISTORY  Chronic kidney disease  Anemia  Ascites  PAD (peripheral artery disease)  Hyperlipidemia  Hypothyroidism  Hypertension  Coronary artery disease  CHF (congestive heart failure)  Diabetes mellitus  Biventricular ICD (implantable cardioverter-defibrillator) in place  Amputation of toe of right foot  S/P arterial stent  S/P CABG x 5    ALLERGIES:  Byetta (Stomach Upset)  linezolid (Rash; Urticaria; Flushing)  melatonin (Other)    MEDICATIONS:  STANDING MEDICATIONS  aspirin enteric coated 81 milliGRAM(s) Oral daily  atorvastatin 80 milliGRAM(s) Oral at bedtime  calcitriol   Capsule 0.25 MICROGram(s) Oral daily  calcium acetate 1334 milliGRAM(s) Oral three times a day with meals  chlorhexidine 0.12% Liquid 15 milliLiter(s) Oral Mucosa two times a day  chlorhexidine 4% Liquid 1 Application(s) Topical <User Schedule>  dexMEDEtomidine Infusion 1.5 MICROgram(s)/kG/Hr IV Continuous <Continuous>  dextrose 50% Injectable 12.5 Gram(s) IV Push once  folic acid 1 milliGRAM(s) Oral daily  furosemide   Injectable 80 milliGRAM(s) IV Push two times a day  heparin   Injectable 5000 Unit(s) SubCutaneous every 8 hours  meropenem  IVPB 500 milliGRAM(s) IV Intermittent every 12 hours  metolazone 2.5 milliGRAM(s) Oral daily  norepinephrine Infusion 0.05 MICROgram(s)/kG/Min IV Continuous <Continuous>  pantoprazole   Suspension 40 milliGRAM(s) Oral daily  sodium bicarbonate 650 milliGRAM(s) Oral every 6 hours  sodium zirconium cyclosilicate 10 Gram(s) Oral two times a day  vancomycin  IVPB 500 milliGRAM(s) IV Intermittent every 24 hours  vancomycin  IVPB        PRN MEDICATIONS  acetaminophen   Tablet .. 650 milliGRAM(s) Oral every 6 hours PRN  aluminum hydroxide/magnesium hydroxide/simethicone Suspension 30 milliLiter(s) Oral every 6 hours PRN  dextrose 40% Gel 15 Gram(s) Oral once PRN  glucagon  Injectable 1 milliGRAM(s) IntraMuscular once PRN  HYDROmorphone  Injectable 0.5 milliGRAM(s) IV Push every 3 hours PRN      VITAL SIGNS: Last 24 Hours  T(C): 37.7 (19 Jun 2020 05:00), Max: 37.7 (19 Jun 2020 05:00)  T(F): 99.8 (19 Jun 2020 05:00), Max: 99.8 (19 Jun 2020 05:00)  HR: 68 (19 Jun 2020 08:00) (68 - 70)  BP: 111/57 (19 Jun 2020 08:00) (103/51 - 126/64)  BP(mean): 77 (19 Jun 2020 08:00) (69 - 84)  RR: 9 (19 Jun 2020 08:00) (9 - 20)  SpO2: 100% (19 Jun 2020 08:00) (93% - 100%)    LABS:                        7.3    16.18 )-----------( 169      ( 19 Jun 2020 09:00 )             21.0     06-19    127<L>  |  88<L>  |  105<HH>  ----------------------------<  162<H>  4.5   |  23  |  4.8<HH>    Ca    7.6<L>      19 Jun 2020 09:00  Phos  8.1     06-18  Mg     2.4     06-19    TPro  4.9<L>  /  Alb  2.0<L>  /  TBili  0.5  /  DBili  x   /  AST  32  /  ALT  7   /  AlkPhos  176<H>  06-19    PT/INR - ( 19 Jun 2020 09:00 )   PT: 15.70 sec;   INR: 1.37 ratio         PTT - ( 19 Jun 2020 09:00 )  PTT:34.6 sec    ABG - ( 19 Jun 2020 06:55 )  pH, Arterial: 7.50  pH, Blood: x     /  pCO2: 28    /  pO2: 305   / HCO3: 22    / Base Excess: -1.2  /  SaO2: 100               Lactate, Blood: 2.4 mmol/L <H> (06-19-20 @ 09:00)  Troponin T, Serum: <0.01 ng/mL (06-19-20 @ 09:00)  Creatine Kinase, Serum: 91 U/L (06-19-20 @ 03:48)  Troponin T, Serum: 0.16 ng/mL <HH> (06-19-20 @ 03:48)  Lactate, Blood: 8.5 mmol/L <HH> (06-19-20 @ 03:48)      Culture - Blood (collected 17 Jun 2020 04:30)  Source: .Blood Blood-Venous  Preliminary Report (18 Jun 2020 13:01):    No growth to date.      CARDIAC MARKERS ( 19 Jun 2020 09:00 )  x     / <0.01 ng/mL / x     / x     / <1.0 ng/mL  CARDIAC MARKERS ( 19 Jun 2020 03:48 )  x     / 0.16 ng/mL / 91 U/L / x     / 4.1 ng/mL    PHYSICAL EXAM:  CONSTITUTIONAL: Intubated, ET +  HEAD: Atraumatic, normocephalic  PULMONARY: Crackles b/l  CARDIOVASCULAR: Regular rate and rhythm  GASTROINTESTINAL: Distended   MUSCULOSKELETAL: b/l LE edema   NEUROLOGY: Not responding to commands

## 2020-06-19 NOTE — PROGRESS NOTE ADULT - SUBJECTIVE AND OBJECTIVE BOX
WILL VIEYRA  80y, Male  Allergy: Byetta (Stomach Upset)  linezolid (Rash; Urticaria; Flushing)  melatonin (Other)      LOS  15d    CHIEF COMPLAINT: necrotizing fascitis (19 Jun 2020 10:43)      INTERVAL EVENTS/HPI  - T(F): , Max: 99.8 (06-19-20 @ 05:00), code blue with ROSC  - WBC Count: 16.18 (06-19-20 @ 09:00)  WBC Count: 14.32 (06-19-20 @ 03:48)  - Creatinine, Serum: 4.8 (06-19-20 @ 09:00)  Creatinine, Serum: 4.9 (06-19-20 @ 03:48)         ROS  cannot obtain secondary to patient's sedation and/or mental status    VITALS:  T(F): 99.5, Max: 99.8 (06-19-20 @ 05:00)  HR: 68  BP: 104/56  RR: 12Vital Signs Last 24 Hrs  T(C): 37.5 (19 Jun 2020 08:00), Max: 37.7 (19 Jun 2020 05:00)  T(F): 99.5 (19 Jun 2020 08:00), Max: 99.8 (19 Jun 2020 05:00)  HR: 68 (19 Jun 2020 10:00) (68 - 70)  BP: 104/56 (19 Jun 2020 10:00) (103/51 - 126/64)  BP(mean): 73 (19 Jun 2020 10:00) (69 - 84)  RR: 12 (19 Jun 2020 10:00) (9 - 20)  SpO2: 100% (19 Jun 2020 10:00) (93% - 100%)    PHYSICAL EXAM:  Gen: intubated  HEENT: Normocephalic, atraumatic  Neck: supple, no lymphadenopathy  CV: Regular rate & regular rhythm  Lungs: decreased BS at bases, no fremitus  Abdomen: Soft, BS present  Ext: Warm, well perfused, LE dressings  Neuro: sedated  Skin: no rash, no erythema  Lines: no phlebitis    FH: Non-contributory  Social Hx: Non-contributory    TESTS & MEASUREMENTS:                        7.3    16.18 )-----------( 169      ( 19 Jun 2020 09:00 )             21.0     06-19    127<L>  |  88<L>  |  105<HH>  ----------------------------<  162<H>  4.5   |  23  |  4.8<HH>    Ca    7.6<L>      19 Jun 2020 09:00  Phos  8.1     06-18  Mg     2.4     06-19    TPro  4.9<L>  /  Alb  2.0<L>  /  TBili  0.5  /  DBili  x   /  AST  32  /  ALT  7   /  AlkPhos  176<H>  06-19    eGFR if Non African American: 11 mL/min/1.73M2 (06-19-20 @ 09:00)  eGFR if : 12 mL/min/1.73M2 (06-19-20 @ 09:00)  eGFR if Non African American: 10 mL/min/1.73M2 (06-19-20 @ 03:48)  eGFR if : 12 mL/min/1.73M2 (06-19-20 @ 03:48)    LIVER FUNCTIONS - ( 19 Jun 2020 09:00 )  Alb: 2.0 g/dL / Pro: 4.9 g/dL / ALK PHOS: 176 U/L / ALT: 7 U/L / AST: 32 U/L / GGT: x               Culture - Blood (collected 06-17-20 @ 04:30)  Source: .Blood Blood-Venous  Preliminary Report (06-18-20 @ 13:01):    No growth to date.    Culture - Blood (collected 06-16-20 @ 07:31)  Source: .Blood None  Preliminary Report (06-17-20 @ 12:01):    No growth to date.    Culture - Tissue with Gram Stain (collected 06-15-20 @ 12:53)  Source: .Tissue None  Gram Stain (06-15-20 @ 23:04):    No polymorphonuclear leukocytes per low power field    No organisms seen per oil power field  Preliminary Report (06-16-20 @ 16:24):    No growth  sistance during prolonged therapy      -  Ciprofloxacin: R >2      -  Ertapenem: S <=0.5      -  Gentamicin: R >8      -  Levofloxacin: R >4      -  Meropenem: S <=1      -  Piperacillin/Tazobactam: R <=8      -  Tobramycin: R >8      -  Trimethoprim/Sulfamethoxazole: R >2/38      Method Type: RON  Organism: Blood Culture PCR (06-06-20 @ 16:11)      -  Acinetobacter baumanii: Nondet      -  Candida albicans: Nondet      -  Candida glabrata: Nondet      -  Candida krusei: Nondet      -  Candida parapsilosis: Nondet      -  Candida tropicalis: Nondet      -  Coagulase negative Staphylococcus: Nondet      -  Enterobacter cloacae complex: Nondet      -  Enterococcus species: Nondet      -  Escherichia coli: Nondet      -  Haemophilus influenzae: Nondet      -  Klebsiella oxytoca: Nondet      -  Klebsiella pneumoniae: Nondet      -  Listeria monocytogenes: Nondet      -  Methicillin resistant Staphylococcus aureus (MRSA): Nondet      -  Multidrug (KPC pos) resistant organism: Nondet      -  Neisseria meningitidis: Nondet      -  Pseudomonas aeruginosa: Nondet      -  Serratia marcescens: Nondet      -  Staphylococcus aureus: Nondet      -  Streptococcus agalactiae (Group B): Nondet      -  Streptococcus pneumoniae: Nondet      -  Streptococcus pyogenes (Group A): Nondet      -  Streptococcus sp. (Not Grp A, B or S pneumoniae): Nondet      -  Vancomycin resistant Enterococcus sp.: Nondet      Method Type: PCR    Culture - Blood (collected 06-04-20 @ 04:12)  Source: .Blood Blood-Peripheral  Gram Stain (06-05-20 @ 21:20):    Growth in anaerobic bottle: Gram positive cocci in pairs    Growth in aerobic bottle: Gram Negative Rods  Final Report (06-06-20 @ 17:01):    Growth in anaerobic bottle: Streptococcus agalactiae (Group B)    Growth in aerobic bottle: Proteus mirabilis ESBL    See previous culture [id number]    "Due to technical problems, Proteus sp. will Not be reported as part of    the BCID panel until further notice"    ***Blood Panel PCR results on this specimen are available    approximately 3 hours after the Gram stain result.***    Gram stain, PCR, and/or culture results may not always    correspond due to difference in methodologies.    ************************************************************    This PCR assay was performed using [employer].    The following targets are tested for: Enterococcus,    vancomycin resistant enterococci, Listeria monocytogenes,    coagulase negative staphylococci, S. aureus,    methicillin resistant S. aureus, Streptococcus agalactiae    (Group B), S. pneumoniae, S. pyogenes (Group A),    Acinetobacter baumannii, Enterobacter cloacae, E. coli,    Klebsiella oxytoca, K. pneumoniae, Proteus sp.,    Serratia marcescens, Haemophilus influenzae,    Neisseria meningitidis, Pseudomonas aeruginosa, Candida    albicans, C. glabrata, C krusei, C parapsilosis,    C. tropicalis and the KPC resistance gene.  Organism: Blood Culture PCR  Streptococcus agalactiae (Group B)  Streptococcus agalactiae (Group B) (06-06-20 @ 17:01)  Organism: Streptococcus agalactiae (Group B) (06-06-20 @ 17:01)      -  Ceftriaxone: S 0.064      -  Penicillin: S 0.125 Predicts results for ampicillin, amoxicillin, amoxicillin/clavulanate, ampicillin/sulbactam, 1st, 2nd and 3rd generation cephalosporins and carbapenems.      Method Type: ETEST  Organism: Streptococcus agalactiae (Group B) (06-06-20 @ 17:01)      -  Clindamycin: S      -  Levofloxacin: S      -  Vancomycin: S      Method Type: KB  Organism: Blood Culture PCR (06-06-20 @ 17:01)      -  Streptococcus agalactiae (Group B): Detec      Method Type: PCR        Lactate, Blood: 2.4 mmol/L (06-19-20 @ 09:00)  Lactate, Blood: 8.5 mmol/L (06-19-20 @ 03:48)      INFECTIOUS DISEASES TESTING  MRSA PCR Result.: Positive (06-19-20 @ 06:01)  COVID-19 PCR: NotDetec (06-04-20 @ 05:27)      INFLAMMATORY MARKERS  Sedimentation Rate, Erythrocyte: 79 mm/Hr (06-05-20 @ 04:14)  C-Reactive Protein, Serum: 23.70 mg/dL (06-05-20 @ 04:14)      RADIOLOGY & ADDITIONAL TESTS:  I have personally reviewed the last available Chest xray  CXR  Xray Chest 1 View- PORTABLE-Urgent:   EXAM:  XR CHEST PORTABLE URGENT 1V            PROCEDURE DATE:  06/19/2020            INTERPRETATION:  Clinical History / Reason for exam: Catheter placement.    Comparison : Chest radiograph earlier in the same day.    Technique/Positioning: Frontal portable.    Findings:    Support devices: Multi chamber left-sided ICD. Telemetry leads are seen. Right and PICC line. The patient is intubated. Enteric catheter extends below the hemidiaphragm.    Cardiac/mediastinum/hilum: Patient is status post median sternotomy. The aorta is ectatic.    Lung parenchyma/Pleura: Bilateral opacifications and effusions.    Skeleton/soft tissues: Unchanged.    Impression:      Bilateral opacifications and effusions. Support devices as described.    Follow-up as needed.                  AMILCAR NOGUERA M.D., ATTENDING RADIOLOGIST  This document has been electronically signed. Jun 19 2020  8:27AM             (06-19-20 @ 06:16)      CT      CARDIOLOGY TESTING  12 Lead ECG:   Ventricular Rate 70 BPM    Atrial Rate 67 BPM    P-R Interval 208 ms    QRS Duration 144 ms    Q-T Interval 390 ms    QTC Calculation(Bezet) 421 ms    R Axis -58 degrees    T Axis 3 degrees    Diagnosis Line *** Poor data quality, interpretation may be adversely affected  AV dual-paced rhythm  Abnormal ECG    Confirmed by JAYLON FARLEY MD ([id number]) on 6/19/2020 8:23:38 AM (06-19-20 @ 07:48)  12 Lead ECG:   Ventricular Rate 112 BPM    Atrial Rate 32 BPM    QRS Duration 156 ms    Q-T Interval 392 ms    QTC Calculation(Bezet) 535 ms    R Axis -22 degrees    T Axis 19 degrees    Diagnosis Line Ventricular-paced rhythm  Biventricular pacemaker detected  Abnormal ECG    Confirmed by JAYLON FARLEY MD ([id number]) on 6/19/2020 8:23:34 AM (06-19-20 @ 03:42)      MEDICATIONS  aspirin enteric coated 81  atorvastatin 80  buMETAnide Infusion 1  buMETAnide Injectable 2  calcitriol   Capsule 0.25  calcium acetate 1334  chlorhexidine 0.12% Liquid 15  chlorhexidine 4% Liquid 1  dexMEDEtomidine Infusion 1.5  dextrose 50% Injectable 12.5  folic acid 1  heparin   Injectable 5000  meropenem  IVPB 500  metolazone 2.5  norepinephrine Infusion 0.05  pantoprazole   Suspension 40  sodium bicarbonate 650  sodium zirconium cyclosilicate 10  vancomycin  IVPB 500  vancomycin  IVPB       WEIGHT  Weight (kg): 68.5 (06-19-20 @ 10:29)  Creatinine, Serum: 4.8 mg/dL (06-19-20 @ 09:00)  Creatinine, Serum: 4.9 mg/dL (06-19-20 @ 03:48)      ANTIBIOTICS:  meropenem  IVPB 500 milliGRAM(s) IV Intermittent every 12 hours  vancomycin  IVPB 500 milliGRAM(s) IV Intermittent every 24 hours  vancomycin  IVPB          All available historical records have been reviewed

## 2020-06-19 NOTE — PROGRESS NOTE ADULT - ASSESSMENT
INTERVAL HPI AND OVERNIGHT EVENTS:  Patient was examined and seen at bedside. This morning he is resting comfortably in bed and reports no issues or overnight events.    #Misc  - DVT Prophylaxis:  - Diet:  - GI Prophylaxis:  - Activity:  - IV Fluids:  - Code Status:    Dispo: 80 M with PMHx of anemia, ascites 2/2 CHF, CHF s/p biventricular AICD, CKD, CAD s/p CABGx5 on ASA, CKD, DM s/p R great toe amputation, HLD, HTN, hypothyroidism, PAD s/p R femoral stent who presented with altered mental status x 1 day and worsening left foot DFU with sepsis and metabolic encephalopathy.  Patient was downgraded to floor from ICU on 06/09. Patient was a code blue with ROSC achieved in 6 min overnight on 06/19 and was intubated and upgraded to CCU.    #Cardiac arrest with ROSC in 6 min  #Intubated on 06/19 due to acute hypoxic respiratory failure secondary to cardiac arrest  - Vent management as per pulm  - Off sedation for now. Will try to access mental status     #Polymicrobial bacteremia secondary to necrotizing fascitis in left foot infection   - s/p delayed primary closure with possible L BKA. So far family refusing BKA.  -  X-ray Foot AP + Lateral + Oblique, Left (06/08) showing status post hallux metatarsal and great toe amputation sparing 1 cm of the base. Previously noted subcutaneous emphysema within soft tissues has been resected. No proximal residual gas.   - VA Duplex Lower Extremity Arterial, Bilateral showing Normal arterial flow in bilateral lower extremities.  - s/p multiple debridements with podiatry. Last one on 06/18      - ESR 79, CRP 23   - blood cultures 6/4 : Proteus ESBL and GBS. Blood cultures negative since 6/6, most recent 6/16 NGTD   - wound cultures 6/4: E. Coli, Proteus, MRSA   - Continue w/ Local Wound Care; wash with soap and water, apply Dakins wet to dry dsd abd kerlix ace TID  - PICC Line in place 6/15/2020 by IR  - continue meropenem & vancomycin - check vanc level every morning   - Decision made for Amputation Surgery LLE today - Surgery this Friday 6/19/2020  - Cardiology risk stratification in cardio note     #Slurred speech - CT head negative  #Hypothermia/bradycardia/hypoglycemia - likely from sepsis?     # Sepsis POA, lactic acidosis resolved  # Metabolic encephalopathy sec to sepsis     # Acute kidney injury on CKD stage 4 sec to ATN, metabolic acidosis, hyponatremia  - no HD currently as per nephro    # Hyperkalemia  - baseline creat 2.2  - US Kidney and Bladder (06.10.20 @ 22:59) >Normal-appearing kidneys. Bilateral pleural effusions with ascites.  - losartan, aldactone held  - c/w lokelema dose decreased to 5mg, phoslo, calcitriol   - c/w sodium bicarb    # Acute on chronic systolic CHF with bilateral pleural effusions and ascites s/p biventricular AICD  -  Transthoracic Echocardiogram (06.04.20 @ 12:04) >Left ventricular ejection fraction, by visual estimation, is <20%.Grade I diastolic dysfunction). Moderate tricuspid regurgitation. borderline pulmonary hypertension.  -  Xray Chest 1 View- PORTABLE-Routine (06.13.20 @ 14:43) >Bilateral opacities with small effusions left greater than right unchanged since prior  - monitor I/o, daily weight, restrict fluids  - c/w lasix, coreg, digoxin  - losartan and aldactone held due to MICHAEL on CKD4   - Consult heart failure specialist - INCREASED lasix, dc midodrine as per rec     # Elevated troponin sec to Type 2 MI, H/o CAD s/p CABGx5  - Troponin T, Serum: 0.12: Critical value: ng/mL (06.04.20 @ 11:12)  - 12 Lead ECG (06.10.20 @ 07:57) >Ventricular-paced rhythm. Biventricular pacemaker detected  - c/w ASA, statin, coreg, digoxin    # DM type 2 with hypoglycemia  - A1C- 10.6 %  - monitor FS - do not use insulin unless needed prior to meals     # Acute on chronic normocytic anemia- multifactorial  - S/p 2 units on 6/10 and 2 unit 6/12  - evaluated by GI - outpt EGD/colonoscopy    # GI prophylaxis - protonix  # DVT prophylaxis - heparin subq     Current workup in progress:  - fu labs  - fu EP  - fu ABG      INTERVAL HPI AND OVERNIGHT EVENTS:  Patient was examined and seen at bedside. This morning he is resting comfortably in bed and reports no issues or overnight events.    #Misc  - DVT Prophylaxis:  - Diet:  - GI Prophylaxis:  - Activity:  - IV Fluids:  - Code Status:    Dispo: 80 M with PMHx of anemia, ascites 2/2 CHF, CHF s/p biventricular AICD, CKD, CAD s/p CABGx5 on ASA, CKD, DM s/p R great toe amputation, HLD, HTN, hypothyroidism, PAD s/p R femoral stent who presented with altered mental status x 1 day and worsening left foot DFU with sepsis and metabolic encephalopathy.  Patient was downgraded to floor from ICU on 06/09. Patient was a code blue with ROSC achieved in 6 min overnight on 06/19 and was intubated and upgraded to CCU.    INTERVAL HPI AND OVERNIGHT EVENTS:  Patient was examined and seen at bedside. This morning he is resting comfortably in bed. He is on levophed at 0.03 mcg/kg.    #Cardiac arrest with ROSC in 6 min  #Intubated on 06/19 due to acute hypoxic respiratory failure secondary to cardiac arrest  #Suspected Cardiogenic vs septic shock  - Vent management as per pulm. Repeat ABG at noon. Currently at RR 12, TV (machine): 450, FiO2: 40, PEEP: 5  - Off sedation for now. Will try to access mental status     #Polymicrobial bacteremia secondary to necrotizing fascitis in left foot infection   #Sepsis present on admission  #Metabolic encephalopathy sec to sepsis   - s/p delayed primary closure with possible L BKA. So far family refusing BKA.  -  X-ray Foot AP + Lateral + Oblique, Left (06/08) showing status post hallux metatarsal and great toe amputation sparing 1 cm of the base. Previously noted subcutaneous emphysema within soft tissues has been resected. No proximal residual gas.   - VA Duplex Lower Extremity Arterial, Bilateral showing Normal arterial flow in bilateral lower extremities.  - s/p multiple debridements with podiatry. Last one on 06/18  - blood cultures negative since 06/06  - wound cultures from 06/08 showing E. Coli, Proteus, MRSA   - Continue w/ Local Wound Care as per burn  - PICC Line in place 6/15/2020 by IR  - Decision made for Amputation Surgery on Friday 6/19/2020  - Cardiology risk stratification in cardio note   PLAN  - continue meropenem & vancomycin - check vancomycin level every morning     #Acute kidney injury on CKD stage IV sec to ATN, Baseline creatinine 2.2  #hyponatremia  #Hyperkalemia  - US Kidney and Bladder (06/10/2020) showing Normal-appearing kidneys. Bilateral pleural effusions with ascites.  - Holding losartan, aldactone held  - c/w Lokelema 10mg PO BID, Phoslo 1334mg PO TID, Calcitriol 0.25mg PO QD, Sodium bicarb 650mg PO Q6  - Pending nephrology follow up. Patient might need dialysis due to fluid overload     #Acute on chronic systolic CHF with bilateral pleural effusions and ascites s/p biventricular AICD  -  TTE (06/04/2020) showing EF <20%. GIDD. Moderate tricuspid regurgitation. Borderline pulmonary hypertension  - Bumex 2mg IV push and will start infusion at 1mg/hour. Metolazone 2.5mg PO QD  - Holding Coreg and Digoxin for now. Holding losartan and aldactone MICHAEL on CKD4     #Slurred speech  #Suspected hypoxic brain injury secondary to cardiac arrest   - CT head negative  - Will ascess more when extubated  - Pending neurology eval    #DM type 2 with hypoglycemia  - A1C 10.6 %  - monitor FS    #Acute on chronic normocytic anemia, multifactorial  - S/p 2 units on 06/10 and 2 unit 06/12  - Evaluated by GI. outpatient EGD/colonoscopy    #Misc  - DVT Prophylaxis: Heparin  - Diet: Tube Feeds  - GI Prophylaxis: Protonix  - Activity: Bedrest  - Code Status: DNI/DNR    Dispo: Still acute. Daughter considering palliative. Will discuss further with daughter

## 2020-06-20 NOTE — PROVIDER CONTACT NOTE (OTHER) - BACKGROUND
PT bp is decreasing at times and rn titrating levophed up. PT urine output decreasing less than 30cc/hr.

## 2020-06-20 NOTE — PROGRESS NOTE ADULT - ASSESSMENT
80 M with PMHx of anemia, ascites 2/2 CHF, CHF s/p biventricular AICD, CKD, CAD s/p CABGx5 on ASA, CKD, DM s/p R great toe amputation, HLD, HTN, hypothyroidism, PAD s/p R femoral stent who presented with altered mental status x 1 day and worsening left foot DFU with sepsis and metabolic encephalopathy.  Patient was downgraded to floor from ICU on 06/09. Patient was a code blue with ROSC achieved in 6 min overnight on 06/19 and was intubated and upgraded to CCU.    INTERVAL HPI AND OVERNIGHT EVENTS:  Patient was examined and seen at bedside. This morning he is resting comfortably in bed. He is on levophed at 0.03 mcg/kg. Patient is awake and is following commands and moving all extremities. He was off sedation    #Cardiac arrest with ROSC in 6 min  #Intubated on 06/19 due to acute hypoxic respiratory failure secondary to cardiac arrest  #Suspected Cardiogenic vs septic shock  - Vent management as per pulm.   RR (machine): 12  TV (machine): 420  FiO2: 35  PEEP: 5  ITime: 1  MAP: 10  PIP: 20    - Off sedation for now. Will try to access mental status     #Polymicrobial bacteremia secondary to necrotizing fascitis in left foot infection   #Sepsis present on admission  #Metabolic encephalopathy sec to sepsis   - s/p delayed primary closure with possible L BKA. So far family refusing BKA.  -  X-ray Foot AP + Lateral + Oblique, Left (06/08) showing status post hallux metatarsal and great toe amputation sparing 1 cm of the base. Previously noted subcutaneous emphysema within soft tissues has been resected. No proximal residual gas.   - VA Duplex Lower Extremity Arterial, Bilateral showing Normal arterial flow in bilateral lower extremities.  - s/p multiple debridements with podiatry. Last one on 06/18  - blood cultures negative since 06/06  - wound cultures from 06/08 showing E. Coli, Proteus, MRSA   - Continue w/ Local Wound Care as per burn  - PICC Line in place 6/15/2020 by IR  - Decision made for Amputation Surgery on Friday 6/19/2020  - Cardiology risk stratification in cardio note   PLAN  - continue meropenem & vancomycin - check vancomycin level every morning     #Acute kidney injury on CKD stage IV sec to ATN, Baseline creatinine 2.2  #hyponatremia  #Hyperkalemia  - US Kidney and Bladder (06/10/2020) showing Normal-appearing kidneys. Bilateral pleural effusions with ascites.  - Holding losartan, aldactone held  - c/w Lokelema 10mg PO BID, Phoslo 1334mg PO TID, Calcitriol 0.25mg PO QD, Sodium bicarb 650mg PO Q6  - Pending nephrology follow up. Patient might need dialysis due to fluid overload     #Acute on chronic systolic CHF with bilateral pleural effusions and ascites s/p biventricular AICD  -  TTE (06/04/2020) showing EF <20%. GIDD. Moderate tricuspid regurgitation. Borderline pulmonary hypertension  - Bumex 2mg IV push and will start infusion at 1mg/hour. Metolazone 2.5mg PO QD  - Holding Coreg and Digoxin for now. Holding losartan and aldactone MICHAEL on CKD4     #Slurred speech  #Suspected hypoxic brain injury secondary to cardiac arrest   - CT head negative  - Will ascess more when extubated  - Pending neurology eval    #DM type 2 with hypoglycemia  - A1C 10.6 %  - monitor FS    #Acute on chronic normocytic anemia, multifactorial  - S/p 2 units on 06/10 and 2 unit 06/12  - Evaluated by GI. outpatient EGD/colonoscopy    #Misc  - DVT Prophylaxis: Heparin  - Diet: Tube Feeds  - GI Prophylaxis: Protonix  - Activity: Bedrest  - Code Status: DNI/DNR    Dispo: Still acute. Daughter considering palliative. Will discuss further with daughter 80 M with PMHx of anemia, ascites 2/2 CHF, CHF s/p biventricular AICD, CKD, CAD s/p CABGx5 on ASA, CKD, DM s/p R great toe amputation, HLD, HTN, hypothyroidism, PAD s/p R femoral stent who presented with altered mental status x 1 day and worsening left foot DFU with sepsis and metabolic encephalopathy.  Patient was downgraded to floor from ICU on 06/09. Patient was a code blue with ROSC achieved in 6 min overnight on 06/19 and was intubated and upgraded to CCU.    INTERVAL HPI AND OVERNIGHT EVENTS:  Patient was examined and seen at bedside. This morning he is resting comfortably in bed. He is on levophed at 0.03 mcg/kg. Patient is awake and is following commands and moving all extremities. He was off sedation    #Cardiac arrest with ROSC in 6 min  #Intubated on 06/19 due to acute hypoxic respiratory failure secondary to cardiac arrest  #Suspected Cardiogenic vs septic shock  - Vent management as per pulm: RR (machine): 12. TV (machine): 420. FiO2: 35. PEEP: 5  - Placed on precedex for sedation for now   - Placed patient on CPAP this morning. Patient could be extubated but will hold off for now since X-ray worse and patient tired after 30min of CPAP. Considering patient is DNI/DNR now will give one more day and possible extubation tomorrow    #Polymicrobial bacteremia secondary to necrotizing fascitis in left foot infection   #Sepsis present on admission  #Metabolic encephalopathy sec to sepsis   - s/p delayed primary closure with possible L BKA. So far family refusing BKA.  -  X-ray Foot AP + Lateral + Oblique, Left (06/08) showing status post hallux metatarsal and great toe amputation sparing 1 cm of the base. Previously noted subcutaneous emphysema within soft tissues has been resected. No proximal residual gas.   - VA Duplex Lower Extremity Arterial, Bilateral showing Normal arterial flow in bilateral lower extremities.  - s/p multiple debridements with podiatry. Last one on 06/18  - blood cultures negative since 06/06  - wound cultures from 06/08 showing E. Coli, Proteus, MRSA   - Continue w/ Local Wound Care as per burn  - PICC Line in place 6/15/2020 by IR  - Decision made for Amputation Surgery on Friday 6/19/2020  - Cardiology risk stratification in cardio note   PLAN  - continue meropenem 500mg IVPB Q12  - vancomycin level 16.4. Will hold vancomycin for today    #Acute kidney injury on CKD stage IV sec to ATN, Baseline creatinine 2.2  #hyponatremia  #Hyperkalemia  - US Kidney and Bladder (06/10/2020) showing Normal-appearing kidneys. Bilateral pleural effusions with ascites.  - Holding losartan, aldactone held  - c/w Lokelema 10mg PO BID, Phoslo 1334mg PO TID, Calcitriol 0.25mg PO QD, Sodium bicarb 650mg PO Q6  - Nephrology consult appreciated. No need for dialysis for now. Patient almost 700ml negative today. Will continue bumex drip. Keep negative balance. Will try to get rid of as much fluid as possible before extubation     #Acute on chronic systolic CHF with bilateral pleural effusions and ascites s/p biventricular AICD  -  TTE (06/04/2020) showing EF <20%. GIDD. Moderate tricuspid regurgitation. Borderline pulmonary hypertension  - Bumex 2mg IV push and will start infusion at 1mg/hour. Metolazone 2.5mg PO QD  - Holding Coreg and Digoxin for now. Holding losartan and aldactone MICHAEL on CKD4     #Slurred speech  #Suspected hypoxic brain injury secondary to cardiac arrest   - CT head negative  - Will ascess more when extubated  - Pending neurology eval  - Will get REEG    #DM type 2 with hypoglycemia  - A1C 10.6 %  - monitor FS    #Acute on chronic normocytic anemia, multifactorial  - S/p 2 units on 06/10 and 2 unit 06/12  - Evaluated by GI. outpatient EGD/colonoscopy    #Misc  - DVT Prophylaxis: Heparin  - Diet: Tube Feeds  - GI Prophylaxis: Protonix  - Activity: Bedrest  - Code Status: DNI/DNR    Dispo: Still acute. Daughter considering palliative. Will discuss further with daughter. Possible extubation tomorrow pending resolution of fluid overload.

## 2020-06-20 NOTE — PROGRESS NOTE ADULT - ASSESSMENT
ASSESSMENT  80 M with PMHx of anemia, ascites 2/2 CHF, CHF s/p biventricular AICD, CKD, CAD s/p CABGx5 on ASA, CKD, DM s/p R great toe amputation, HLD, HTN, hypothyroidism, PAD s/p R femoral stent who presents with altered mental status x 1 day and worsening left foot DFU. Was on augmentin as outpatient.    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 Findings concerning for necrotizing soft tissue infection.  #Elevated trop/BNP  #Lactic acidosis Blood Gas Venous - Lactate: 2.5 mmoL/L (06-04-20 @ 04:51)  #Hyponatremia   #CXR Bilateral lung opacities, right greater than left. No pneumothorax.  #DM   #Abx allergy: linezolid (Rash; Urticaria; Flushing)    RECOMMENDATIONS  - Vancomycin Level, Random: 16.4:  (06.20.20 @ 04:30)  - No vanco dosing today. Check AM level 6/21. If <15 dose 500mg q48h IV  - Meropenem 500mg q12h IV   - PICC x 6 weeks abx from last OR end 7/27  - Appreciate Podiatry/Vascular consult  - 6/17 BCX NGTD   - trend WBC, possibly reactive secondary to PEA arrest    Spectra [id number]

## 2020-06-20 NOTE — PROGRESS NOTE ADULT - SUBJECTIVE AND OBJECTIVE BOX
WILL LEE  80y, Male  Allergy: Byetta (Stomach Upset)  linezolid (Rash; Urticaria; Flushing)  melatonin (Other)      LOS  16d    CHIEF COMPLAINT: necrotizing fascitis (20 Jun 2020 09:55)      INTERVAL EVENTS/HPI  - T(F): , Max: 97.9 (06-19-20 @ 12:00) Vancomycin Level, Random: 16.4:  (06.20.20 @ 04:30)  - WBC Count: 21.17 (06-20-20 @ 04:30) uptrending   WBC Count: 16.18 (06-19-20 @ 09:00)  - Creatinine, Serum: 4.5 (06-20-20 @ 04:30)  Creatinine, Serum: 4.6 (06-19-20 @ 11:13)     ROS  cannot obtain secondary to patient's sedation and/or mental status    VITALS:  T(F): 93.8, Max: 97.9 (06-19-20 @ 12:00)  HR: 68  BP: 104/50  RR: 11Vital Signs Last 24 Hrs  T(C): 34.3 (20 Jun 2020 08:00), Max: 36.6 (19 Jun 2020 12:00)  T(F): 93.8 (20 Jun 2020 08:00), Max: 97.9 (19 Jun 2020 12:00)  HR: 68 (20 Jun 2020 10:00) (68 - 74)  BP: 104/50 (20 Jun 2020 10:00) (84/42 - 114/58)  BP(mean): 75 (20 Jun 2020 10:00) (51 - 89)  RR: 11 (20 Jun 2020 10:00) (0 - 17)  SpO2: 100% (20 Jun 2020 10:00) (100% - 100%)    PHYSICAL EXAM:  Gen: intubated  HEENT: Normocephalic, atraumatic  Neck: supple, no lymphadenopathy  CV: Regular rate & regular rhythm  Lungs: decreased BS at bases, no fremitus  Abdomen: Soft, BS present  Ext: Warm, well perfused, LE dressings  Neuro: sedated  Skin: no rash, no erythema  Lines: no phlebitis  J      FH: Non-contributory  Social Hx: Non-contributory    TESTS & MEASUREMENTS:                        9.4    21.17 )-----------( 198      ( 20 Jun 2020 04:30 )             26.5     06-20    126<L>  |  86<L>  |  104<HH>  ----------------------------<  191<H>  3.9   |  20  |  4.5<HH>    Ca    7.1<L>      20 Jun 2020 04:30  Mg     2.3     06-20    TPro  4.4<L>  /  Alb  1.7<L>  /  TBili  0.7  /  DBili  x   /  AST  26  /  ALT  <5  /  AlkPhos  165<H>  06-20    eGFR if Non African American: 12 mL/min/1.73M2 (06-20-20 @ 04:30)  eGFR if African American: 13 mL/min/1.73M2 (06-20-20 @ 04:30)    LIVER FUNCTIONS - ( 20 Jun 2020 04:30 )  Alb: 1.7 g/dL / Pro: 4.4 g/dL / ALK PHOS: 165 U/L / ALT: <5 U/L / AST: 26 U/L / GGT: x               Culture - Blood (collected 06-18-20 @ 07:01)  Source: .Blood None  Preliminary Report (06-19-20 @ 12:01):    No growth to date.    Culture - Blood (collected 06-17-20 @ 04:30)  Source: .Blood Blood-Venous  Preliminary Report (06-18-20 @ 13:01):    No growth to date.  thod Type: RON    Culture - Surgical Swab (collected 06-05-20 @ 19:00)  Source: .Surgical Swab None  Final Report (06-10-20 @ 16:59):    Few Escherichia coli    Few Proteus mirabilis ESBL    Few Methicillin resistant Staphylococcus aureus    See previous culture [id number]  Organism: Escherichia coli  Proteus mirabilis ESBL (06-10-20 @ 16:59)  Organism: Proteus mirabilis ESBL (06-10-20 @ 16:59)      Method Type: RON  Organism: Escherichia coli (06-10-20 @ 16:59)      Method Type: RON    Culture - Abscess with Gram Stain (collected 06-04-20 @ 07:47)  Source: .Abscess left foot  Final Report (06-06-20 @ 19:03):    Numerous Escherichia coli    Numerous Proteus mirabilis ESBL  Organism: Escherichia coli  Proteus mirabilis ESBL (06-06-20 @ 19:03)  Organism: Proteus mirabilis ESBL (06-06-20 @ 19:03)      -  Amikacin: S <=16      -  Amoxicillin/Clavulanic Acid: S <=8/4      -  Ampicillin: R >16 These ampicillin results predict results for amoxicillin      -  Ampicillin/Sulbactam: R <=4/2 Enterobacter, Citrobacter, and Serratia may develop resistance during prolonged therapy (3-4 days)      -  Aztreonam: R <=4      -  Cefazolin: R >16 Enterobacter, Citrobacter, and Serratia may develop resistance during prolonged therapy (3-4 days)      -  Cefepime: R 8      -  Cefoxitin: S <=8      -  Ceftriaxone: R >32 Enterobacter, Citrobacter, and Serratia may develop resistance during prolonged therapy      -  Ciprofloxacin: R >2      -  Ertapenem: S <=0.5      -  Gentamicin: R >8      -  Levofloxacin: R >4      -  Meropenem: S <=1      -  Piperacillin/Tazobactam: R <=8      -  Tobramycin: R >8      -  Trimethoprim/Sulfamethoxazole: R >2/38      Method Type: RON  Organism: Escherichia coli (06-06-20 @ 19:03)      -  Amikacin: S <=16      -  Amoxicillin/Clavulanic Acid: S <=8/4      -  Ampicillin: R >16 These ampicillin results predict results for amoxicillin      -  Ampicillin/Sulbactam: I 16/8 Enterobacter, Citrobacter, and Serratia may develop resistance during prolonged therapy (3-4 days)      -  Aztreonam: S <=4      -  Cefazolin: S <=2 Enterobacter, Citrobacter, and Serratia may develop resistance during prolonged therapy (3-4 days)      -  Cefepime: S <=2      -  Cefoxitin: S <=8      -  Ceftriaxone: S <=1 Enterobacter, Citrobacter, and Serratia may develop resistance during prolonged therapy      -  Ciprofloxacin: S <=0.25      -  Ertapenem: S <=0.5      -  Gentamicin: S <=2      -  Imipenem: S <=1      -  Levofloxacin: S <=0.5      -  Meropenem: S <=1      -  Piperacillin/Tazobactam: S <=8      -  Tobramycin: S <=2      -  Trimethoprim/Sulfamethoxazole: S <=0.5/9.5      Method Type: RON    Culture - Blood (collected 06-04-20 @ 04:12)  Source: .Blood Blood-Peripheral  Gram Stain (06-04-20 @ 22:09):    Growth in anaerobic bottle: Gram Negative Rods  Final Report (06-06-20 @ 16:11):    Growth in anaerobic bottle: Proteus mirabilis ESBL    "Due to technical problems, Proteus sp. will Not be reported as part of    the BCID panel until further notice"    ***Blood Panel PCR results on this specimen are available    approximately 3 hours after the Gram stain result.***    Gram stain, PCR, and/or culture results may not always    correspond due to difference in methodologies.    ************************************************************    This PCR assay was performed using [employer].    The following targets are tested for: Enterococcus,    vancomycin resistant enterococci, Listeria monocytogenes,    coagulase negative staphylococci, S. aureus,    methicillin resistant S. aureus, Streptococcus agalactiae    (Group B), S. pneumoniae, S. pyogenes (Group A),    Acinetobacter baumannii, Enterobacter cloacae, E. coli,    Klebsiella oxytoca, K. pneumoniae, Proteus sp.,    Serratia marcescens, Haemophilus influenzae,    Neisseria meningitidis, Pseudomonas aeruginosa, Candida    albicans, C. glabrata, C krusei, C parapsilosis,    C. tropicalis and the KPC resistance gene.  Organism: Blood Culture PCR  Gram Negative Rods (06-06-20 @ 16:11)  Organism: Gram Negative Rods (06-06-20 @ 16:11)      -  Amikacin: S <=16      -  Ampicillin: R >16 These ampicillin results predict results for amoxicillin      -  Ampicillin/Sulbactam: R <=4/2 Enterobacter, Citrobacter, and Serratia may develop resistance during prolonged therapy (3-4 days)      -  Aztreonam: R <=4      -  Cefazolin: R >16 Enterobacter, Citrobacter, and Serratia may develop resistance during prolonged therapy (3-4 days)      -  Cefepime: R 8      -  Cefoxitin: S <=8      -  Ceftriaxone: R >32 Enterobacter, Citrobacter, and Serratia may develop resistance during prolonged therapy      -  Ciprofloxacin: R >2      -  Ertapenem: S <=0.5      -  Gentamicin: R >8      -  Levofloxacin: R >4      -  Meropenem: S <=1      -  Piperacillin/Tazobactam: R <=8      -  Tobramycin: R >8      -  Trimethoprim/Sulfamethoxazole: R >2/38      Method Type: RON  Organism: Blood Culture PCR (06-06-20 @ 16:11)      -  Acinetobacter baumanii: Nondet      -  Candida albicans: Nondet      -  Candida glabrata: Nondet      -  Candida krusei: Nondet      -  Candida parapsilosis: Nondet      -  Candida tropicalis: Nondet      -  Coagulase negative Staphylococcus: Nondet      -  Enterobacter cloacae complex: Nondet      -  Enterococcus species: Nondet      -  Escherichia coli: Nondet      -  Haemophilus influenzae: Nondet      -  Klebsiella oxytoca: Nondet      -  Klebsiella pneumoniae: Nondet      -  Listeria monocytogenes: Nondet      -  Methicillin resistant Staphylococcus aureus (MRSA): Nondet      -  Multidrug (KPC pos) resistant organism: Nondet      -  Neisseria meningitidis: Nondet      -  Pseudomonas aeruginosa: Nondet      -  Serratia marcescens: Nondet      -  Staphylococcus aureus: Nondet      -  Streptococcus agalactiae (Group B): Nondet      -  Streptococcus pneumoniae: Nondet      -  Streptococcus pyogenes (Group A): Nondet      -  Streptococcus sp. (Not Grp A, B or S pneumoniae): Nondet      -  Vancomycin resistant Enterococcus sp.: Nondet      Method Type: PCR    Culture - Blood (collected 06-04-20 @ 04:12)  Source: .Blood Blood-Peripheral  Gram Stain (06-05-20 @ 21:20):    Growth in anaerobic bottle: Gram positive cocci in pairs    Growth in aerobic bottle: Gram Negative Rods  Final Report (06-06-20 @ 17:01):    Growth in anaerobic bottle: Streptococcus agalactiae (Group B)    Growth in aerobic bottle: Proteus mirabilis ESBL    See previous culture [id number]    "Due to technical problems, Proteus sp. will Not be reported as part of    the BCID panel until further notice"    ***Blood Panel PCR results on this specimen are available    approximately 3 hours after the Gram stain result.***    Gram stain, PCR, and/or culture results may not always    correspond due to difference in methodologies.    ************************************************************    This PCR assay was performed using [employer].    The following targets are tested for: Enterococcus,    vancomycin resistant enterococci, Listeria monocytogenes,    coagulase negative staphylococci, S. aureus,    methicillin resistant S. aureus, Streptococcus agalactiae    (Group B), S. pneumoniae, S. pyogenes (Group A),    Acinetobacter baumannii, Enterobacter cloacae, E. coli,    Klebsiella oxytoca, K. pneumoniae, Proteus sp.,    Serratia marcescens, Haemophilus influenzae,    Neisseria meningitidis, Pseudomonas aeruginosa, Candida    albicans, C. glabrata, C krusei, C parapsilosis,    C. tropicalis and the KPC resistance gene.  Organism: Blood Culture PCR  Streptococcus agalactiae (Group B)  Streptococcus agalactiae (Group B) (06-06-20 @ 17:01)  Organism: Streptococcus agalactiae (Group B) (06-06-20 @ 17:01)      -  Ceftriaxone: S 0.064      -  Penicillin: S 0.125 Predicts results for ampicillin, amoxicillin, amoxicillin/clavulanate, ampicillin/sulbactam, 1st, 2nd and 3rd generation cephalosporins and carbapenems.      Method Type: ETEST  Organism: Streptococcus agalactiae (Group B) (06-06-20 @ 17:01)      -  Clindamycin: S      -  Levofloxacin: S      -  Vancomycin: S      Method Type: KB  Organism: Blood Culture PCR (06-06-20 @ 17:01)      -  Streptococcus agalactiae (Group B): Detec      Method Type: PCR        Lactate, Blood: 1.7 mmol/L (06-19-20 @ 11:13)  Lactate, Blood: 2.4 mmol/L (06-19-20 @ 09:00)  Lactate, Blood: 8.5 mmol/L (06-19-20 @ 03:48)      INFECTIOUS DISEASES TESTING  MRSA PCR Result.: Positive (06-19-20 @ 06:01)  COVID-19 PCR: NotDetec (06-04-20 @ 05:27)      INFLAMMATORY MARKERS  Sedimentation Rate, Erythrocyte: 79 mm/Hr (06-05-20 @ 04:14)  C-Reactive Protein, Serum: 23.70 mg/dL (06-05-20 @ 04:14)      RADIOLOGY & ADDITIONAL TESTS:  I have personally reviewed the last available Chest xray  CXR  Xray Chest 1 View- PORTABLE-Urgent:   EXAM:  XR CHEST PORTABLE URGENT 1V            PROCEDURE DATE:  06/19/2020            INTERPRETATION:  Clinical History / Reason for exam: Catheter placement.    Comparison : Chest radiograph earlier in the same day.    Technique/Positioning: Frontal portable.    Findings:    Support devices: Multi chamber left-sided ICD. Telemetry leads are seen. Right and PICC line. The patient is intubated. Enteric catheter extends below the hemidiaphragm.    Cardiac/mediastinum/hilum: Patient is status post median sternotomy. The aorta is ectatic.    Lung parenchyma/Pleura: Bilateral opacifications and effusions.    Skeleton/soft tissues: Unchanged.    Impression:      Bilateral opacifications and effusions. Support devices as described.    Follow-up as needed.                  AMILCAR NOGUERA M.D., ATTENDING RADIOLOGIST  This document has been electronically signed. Jun 19 2020  8:27AM             (06-19-20 @ 06:16)      CT      CARDIOLOGY TESTING  12 Lead ECG:   Ventricular Rate 70 BPM    Atrial Rate 67 BPM    P-R Interval 208 ms    QRS Duration 144 ms    Q-T Interval 390 ms    QTC Calculation(Bezet) 421 ms    R Axis -58 degrees    T Axis 3 degrees    Diagnosis Line *** Poor data quality, interpretation may be adversely affected  AV dual-paced rhythm  Abnormal ECG    Confirmed by JAYLON FARLEY MD ([id number]) on 6/19/2020 8:23:38 AM (06-19-20 @ 07:48)  12 Lead ECG:   Ventricular Rate 112 BPM    Atrial Rate 32 BPM    QRS Duration 156 ms    Q-T Interval 392 ms    QTC Calculation(Bezet) 535 ms    R Axis -22 degrees    T Axis 19 degrees    Diagnosis Line Ventricular-paced rhythm  Biventricular pacemaker detected  Abnormal ECG    Confirmed by JAYLON FARLEY MD ([id number]) on 6/19/2020 8:23:34 AM (06-19-20 @ 03:42)      MEDICATIONS  aspirin enteric coated 81  atorvastatin 80  buMETAnide Infusion 1  calcitriol   Capsule 0.25  calcium acetate 1334  chlorhexidine 0.12% Liquid 15  chlorhexidine 4% Liquid 1  dexMEDEtomidine Infusion 1.5  dextrose 50% Injectable 12.5  folic acid 1  heparin   Injectable 5000  meropenem  IVPB 500  metolazone 2.5  norepinephrine Infusion 0.05  pantoprazole   Suspension 40  sodium bicarbonate 650  sodium zirconium cyclosilicate 10      WEIGHT  Weight (kg): 68.5 (06-19-20 @ 10:29)  Creatinine, Serum: 4.5 mg/dL (06-20-20 @ 04:30)      ANTIBIOTICS:  meropenem  IVPB 500 milliGRAM(s) IV Intermittent every 12 hours      All available historical records have been reviewed

## 2020-06-20 NOTE — PROGRESS NOTE ADULT - SUBJECTIVE AND OBJECTIVE BOX
Patient is a 80y old  Male who presents with a chief complaint of necrotizing fascitis (20 Jun 2020 07:41)      Over Night Events:  Patient seen and examined.   still vented no levo and on  diuretic Iv drip     ROS:  See HPI    PHYSICAL EXAM    ICU Vital Signs Last 24 Hrs  T(C): 34.3 (20 Jun 2020 08:00), Max: 36.6 (19 Jun 2020 12:00)  T(F): 93.8 (20 Jun 2020 08:00), Max: 97.9 (19 Jun 2020 12:00)  HR: 70 (20 Jun 2020 08:00) (68 - 70)  BP: 110/57 (20 Jun 2020 08:00) (84/42 - 114/58)  BP(mean): 89 (20 Jun 2020 08:00) (51 - 89)  ABP: --  ABP(mean): --  RR: 17 (20 Jun 2020 08:00) (0 - 17)  SpO2: 100% (20 Jun 2020 08:00) (100% - 100%)      General: awake   HEENT:     et tube            Lymph Nodes: NO cervical LN   Lungs: Bilateral crackles   Cardiovascular: Regular   Abdomen: Soft, Positive BS  Extremities: No clubbing   Skin: warm   Neurological: no focal   Musculoskeletal: move all ext     I&O's Detail    19 Jun 2020 07:01  -  20 Jun 2020 07:00  --------------------------------------------------------  IN:    bumetanide Infusion: 100 mL    IV PiggyBack: 150 mL    norepinephrine Infusion: 93 mL  Total IN: 343 mL    OUT:    Indwelling Catheter - Urethral: 950 mL    Voided: 110 mL  Total OUT: 1060 mL    Total NET: -717 mL      20 Jun 2020 07:01  -  20 Jun 2020 08:51  --------------------------------------------------------  IN:    bumetanide Infusion: 5 mL    norepinephrine Infusion: 5 mL  Total IN: 10 mL    OUT:    Indwelling Catheter - Urethral: 50 mL  Total OUT: 50 mL    Total NET: -40 mL          LABS:                          9.4    21.17 )-----------( 198      ( 20 Jun 2020 04:30 )             26.5         20 Jun 2020 04:30    126    |  86     |  104    ----------------------------<  191    3.9     |  20     |  4.5      Ca    7.1        20 Jun 2020 04:30  Mg     2.3       20 Jun 2020 04:30    TPro  4.4    /  Alb  1.7    /  TBili  0.7    /  DBili  x      /  AST  26     /  ALT  <5     /  AlkPhos  165    20 Jun 2020 04:30  Amylase x     lipase x                                                 PT/INR - ( 19 Jun 2020 09:00 )   PT: 15.70 sec;   INR: 1.37 ratio         PTT - ( 19 Jun 2020 09:00 )  PTT:34.6 sec                                           Lactate, Blood: 1.7 mmol/L (06-19-20 @ 11:13)  Lactate, Blood: 2.4 mmol/L (06-19-20 @ 09:00)  Lactate, Blood: 8.5 mmol/L (06-19-20 @ 03:48)    CARDIAC MARKERS ( 19 Jun 2020 09:00 )  x     / <0.01 ng/mL / x     / x     / <1.0 ng/mL  CARDIAC MARKERS ( 19 Jun 2020 03:48 )  x     / 0.16 ng/mL / 91 U/L / x     / 4.1 ng/mL                                                        Culture - Blood (collected 18 Jun 2020 07:01)  Source: .Blood None  Preliminary Report (19 Jun 2020 12:01):    No growth to date.                                                   Mode: AC/ CMV (Assist Control/ Continuous Mandatory Ventilation)  RR (machine): 12  TV (machine): 450  FiO2: 40  PEEP: 5  MAP: 8  PIP: 22                                      ABG - ( 20 Jun 2020 04:43 )  pH, Arterial: 7.49  pH, Blood: x     /  pCO2: 29    /  pO2: 112   / HCO3: 22    / Base Excess: -0.9  /  SaO2: 99                  MEDICATIONS  (STANDING):  aspirin enteric coated 81 milliGRAM(s) Oral daily  atorvastatin 80 milliGRAM(s) Oral at bedtime  buMETAnide Infusion 1 mG/Hr (5 mL/Hr) IV Continuous <Continuous>  calcitriol   Capsule 0.25 MICROGram(s) Oral daily  calcium acetate 1334 milliGRAM(s) Oral three times a day with meals  chlorhexidine 0.12% Liquid 15 milliLiter(s) Oral Mucosa two times a day  chlorhexidine 4% Liquid 1 Application(s) Topical <User Schedule>  dexMEDEtomidine Infusion 1.5 MICROgram(s)/kG/Hr (25.7 mL/Hr) IV Continuous <Continuous>  dextrose 50% Injectable 12.5 Gram(s) IV Push once  folic acid 1 milliGRAM(s) Oral daily  heparin   Injectable 5000 Unit(s) SubCutaneous every 8 hours  meropenem  IVPB 500 milliGRAM(s) IV Intermittent every 12 hours  metolazone 2.5 milliGRAM(s) Oral daily  norepinephrine Infusion 0.05 MICROgram(s)/kG/Min (6.36 mL/Hr) IV Continuous <Continuous>  pantoprazole   Suspension 40 milliGRAM(s) Oral daily  sodium bicarbonate 650 milliGRAM(s) Oral every 6 hours  sodium zirconium cyclosilicate 10 Gram(s) Oral two times a day    MEDICATIONS  (PRN):  acetaminophen   Tablet .. 650 milliGRAM(s) Oral every 6 hours PRN Mild Pain (1 - 3)  aluminum hydroxide/magnesium hydroxide/simethicone Suspension 30 milliLiter(s) Oral every 6 hours PRN Dyspepsia  dextrose 40% Gel 15 Gram(s) Oral once PRN Blood Glucose LESS THAN 70 milliGRAM(s)/deciliter  glucagon  Injectable 1 milliGRAM(s) IntraMuscular once PRN Glucose LESS THAN 70 milligrams/deciliter          Xrays:  TLC:  OG:  ET tube:                                                                                    b/l effusion increase    ECHO:  [facility] ICU:

## 2020-06-20 NOTE — PROGRESS NOTE ADULT - SUBJECTIVE AND OBJECTIVE BOX
Nephrology progress note    THIS IS AN INCOMPLETE NOTE . FULL NOTE TO FOLLOW SHORTLY    Patient is seen and examined, events over the last 24 h noted .    Allergies:  Byetta (Stomach Upset)  linezolid (Rash; Urticaria; Flushing)  melatonin (Other)    Hospital Medications:   MEDICATIONS  (STANDING):  aspirin enteric coated 81 milliGRAM(s) Oral daily  atorvastatin 80 milliGRAM(s) Oral at bedtime  buMETAnide Infusion 1 mG/Hr (5 mL/Hr) IV Continuous <Continuous>  calcitriol   Capsule 0.25 MICROGram(s) Oral daily  calcium acetate 1334 milliGRAM(s) Oral three times a day with meals  chlorhexidine 0.12% Liquid 15 milliLiter(s) Oral Mucosa two times a day  chlorhexidine 4% Liquid 1 Application(s) Topical <User Schedule>  dexMEDEtomidine Infusion 1.5 MICROgram(s)/kG/Hr (25.7 mL/Hr) IV Continuous <Continuous>  dextrose 50% Injectable 12.5 Gram(s) IV Push once  folic acid 1 milliGRAM(s) Oral daily  heparin   Injectable 5000 Unit(s) SubCutaneous every 8 hours  meropenem  IVPB 500 milliGRAM(s) IV Intermittent every 12 hours  metolazone 2.5 milliGRAM(s) Oral daily  norepinephrine Infusion 0.05 MICROgram(s)/kG/Min (6.36 mL/Hr) IV Continuous <Continuous>  pantoprazole   Suspension 40 milliGRAM(s) Oral daily  sodium bicarbonate 650 milliGRAM(s) Oral every 6 hours  sodium zirconium cyclosilicate 10 Gram(s) Oral two times a day        VITALS:  T(F): 95.1 (06-20-20 @ 04:00), Max: 99.5 (06-19-20 @ 08:00)  HR: 68 (06-20-20 @ 07:00)  BP: 84/42 (06-20-20 @ 07:00)  RR: 12 (06-20-20 @ 07:00)  SpO2: 100% (06-20-20 @ 07:00)  Wt(kg): --    06-18 @ 07:01  -  06-19 @ 07:00  --------------------------------------------------------  IN: 161.5 mL / OUT: 70 mL / NET: 91.5 mL    06-19 @ 07:01  -  06-20 @ 07:00  --------------------------------------------------------  IN: 343 mL / OUT: 1060 mL / NET: -717 mL      19 Jun 2020 07:01  -  20 Jun 2020 07:00  --------------------------------------------------------  IN:    bumetanide Infusion: 100 mL    IV PiggyBack: 150 mL    norepinephrine Infusion: 93 mL  Total IN: 343 mL    OUT:    Indwelling Catheter - Urethral: 950 mL    Voided: 110 mL  Total OUT: 1060 mL    Total NET: -717 mL        Height (cm): 167.64 (06-19 @ 10:29)  Weight (kg): 68.5 (06-19 @ 10:29)  BMI (kg/m2): 24.4 (06-19 @ 10:29)  BSA (m2): 1.78 (06-19 @ 10:29)    PHYSICAL EXAM:  Constitutional: NAD  HEENT: anicteric sclera, oropharynx clear, MMM  Neck: No JVD  Respiratory: CTAB, no wheezes, rales or rhonchi  Cardiovascular: S1, S2, RRR  Gastrointestinal: BS+, soft, NT/ND  Extremities: No cyanosis or clubbing. No peripheral edema  :  No arana.   Skin: No rashes    LABS:  06-20    126<L>  |  86<L>  |  104<HH>  ----------------------------<  191<H>  3.9   |  20  |  4.5<HH>  Creatinine Trend: 4.5<--, 4.6<--, 4.8<--, 4.9<--, 4.5<--, 4.3<--  Ca    7.1<L>      20 Jun 2020 04:30  Mg     2.3     06-20    TPro  4.4<L>  /  Alb  1.7<L>  /  TBili  0.7  /  DBili      /  AST  26  /  ALT  <5  /  AlkPhos  165<H>  06-20                          9.4    21.17 )-----------( 198      ( 20 Jun 2020 04:30 )             26.5       Urine Studies:      RADIOLOGY & ADDITIONAL STUDIES: Nephrology progress note  Patient is seen and examined, events over the last 24 h noted .    Allergies:  Byetta (Stomach Upset)  linezolid (Rash; Urticaria; Flushing)  melatonin (Other)    Hospital Medications:   MEDICATIONS  (STANDING):  aspirin enteric coated 81 milliGRAM(s) Oral daily  atorvastatin 80 milliGRAM(s) Oral at bedtime  buMETAnide Infusion 1 mG/Hr (5 mL/Hr) IV Continuous <Continuous>  calcitriol   Capsule 0.25 MICROGram(s) Oral daily  calcium acetate 1334 milliGRAM(s) Oral three times a day with meals  dexMEDEtomidine Infusion 1.5 MICROgram(s)/kG/Hr (25.7 mL/Hr) IV Continuous <Continuous>  dextrose 50% Injectable 12.5 Gram(s) IV Push once  folic acid 1 milliGRAM(s) Oral daily  heparin   Injectable 5000 Unit(s) SubCutaneous every 8 hours  meropenem  IVPB 500 milliGRAM(s) IV Intermittent every 12 hours  metolazone 2.5 milliGRAM(s) Oral daily  norepinephrine Infusion 0.05 MICROgram(s)/kG/Min (6.36 mL/Hr) IV Continuous <Continuous>  pantoprazole   Suspension 40 milliGRAM(s) Oral daily  sodium bicarbonate 650 milliGRAM(s) Oral every 6 hours  sodium zirconium cyclosilicate 10 Gram(s) Oral two times a day        VITALS:  T(F): 95.1 (06-20-20 @ 04:00), Max: 99.5 (06-19-20 @ 08:00)  HR: 68 (06-20-20 @ 07:00)  BP: 84/42 (06-20-20 @ 07:00)  RR: 12 (06-20-20 @ 07:00)  SpO2: 100% (06-20-20 @ 07:00)      06-18 @ 07:01  -  06-19 @ 07:00  --------------------------------------------------------  IN: 161.5 mL / OUT: 70 mL / NET: 91.5 mL    06-19 @ 07:01  -  06-20 @ 07:00  --------------------------------------------------------  IN: 343 mL / OUT: 1060 mL / NET: -717 mL      19 Jun 2020 07:01  -  20 Jun 2020 07:00  --------------------------------------------------------  IN:    bumetanide Infusion: 100 mL    IV PiggyBack: 150 mL    norepinephrine Infusion: 93 mL  Total IN: 343 mL    OUT:    Indwelling Catheter - Urethral: 950 mL    Voided: 110 mL  Total OUT: 1060 mL    Total NET: -717 mL        Height (cm): 167.64 (06-19 @ 10:29)  Weight (kg): 68.5 (06-19 @ 10:29)  BMI (kg/m2): 24.4 (06-19 @ 10:29)  BSA (m2): 1.78 (06-19 @ 10:29)    PHYSICAL EXAM:    Constitutional: intubated on MV   HEENT: anicteric sclera, oropharynx clear, MMM  Neck: positive JVD / ETT   Respiratory: CTAB, no wheezes, rales or rhonchi  Cardiovascular: S1, S2, RRR  Gastrointestinal: BS+, soft, NT/ND  Extremities: No cyanosis or clubbing. No peripheral edema/ left foot in dressing  :  No arana.   Skin: No rashes    LABS:  06-20    126<L>  |  86<L>  |  104<HH>  ----------------------------<  191<H>  3.9   |  20  |  4.5<HH>    Creatinine Trend: 4.5<--, 4.6<--, 4.8<--, 4.9<--, 4.5<--, 4.3<--    Ca    7.1<L>      20 Jun 2020 04:30  Mg     2.3     06-20    TPro  4.4<L>  /  Alb  1.7<L>  /  TBili  0.7  /  DBili      /  AST  26  /  ALT  <5  /  AlkPhos  165<H>  06-20                          9.4    21.17 )-----------( 198      ( 20 Jun 2020 04:30 )             26.5       Urine Studies:      RADIOLOGY & ADDITIONAL STUDIES:

## 2020-06-20 NOTE — PROGRESS NOTE ADULT - SUBJECTIVE AND OBJECTIVE BOX
WILL VIEYRA 80y Male  MRN#: [id number]   Hospital Day: 16d    SUBJECTIVE  Patient is a 80y old Male who presents with a chief complaint of necrotizing fascitis (20 Jun 2020 08:50)    OBJECTIVE  PAST MEDICAL & SURGICAL HISTORY  Chronic kidney disease  Anemia  Ascites  PAD (peripheral artery disease)  Hyperlipidemia  Hypothyroidism  Hypertension  Coronary artery disease  CHF (congestive heart failure)  Diabetes mellitus  Biventricular ICD (implantable cardioverter-defibrillator) in place  Amputation of toe of right foot  S/P arterial stent  S/P CABG x 5    ALLERGIES:  Byetta (Stomach Upset)  linezolid (Rash; Urticaria; Flushing)  melatonin (Other)    MEDICATIONS:  STANDING MEDICATIONS  aspirin enteric coated 81 milliGRAM(s) Oral daily  atorvastatin 80 milliGRAM(s) Oral at bedtime  buMETAnide Infusion 1 mG/Hr IV Continuous <Continuous>  calcitriol   Capsule 0.25 MICROGram(s) Oral daily  calcium acetate 1334 milliGRAM(s) Oral three times a day with meals  chlorhexidine 0.12% Liquid 15 milliLiter(s) Oral Mucosa two times a day  chlorhexidine 4% Liquid 1 Application(s) Topical <User Schedule>  dexMEDEtomidine Infusion 1.5 MICROgram(s)/kG/Hr IV Continuous <Continuous>  dextrose 50% Injectable 12.5 Gram(s) IV Push once  folic acid 1 milliGRAM(s) Oral daily  heparin   Injectable 5000 Unit(s) SubCutaneous every 8 hours  meropenem  IVPB 500 milliGRAM(s) IV Intermittent every 12 hours  metolazone 2.5 milliGRAM(s) Oral daily  norepinephrine Infusion 0.05 MICROgram(s)/kG/Min IV Continuous <Continuous>  pantoprazole   Suspension 40 milliGRAM(s) Oral daily  sodium bicarbonate 650 milliGRAM(s) Oral every 6 hours  sodium zirconium cyclosilicate 10 Gram(s) Oral two times a day    PRN MEDICATIONS  acetaminophen   Tablet .. 650 milliGRAM(s) Oral every 6 hours PRN  aluminum hydroxide/magnesium hydroxide/simethicone Suspension 30 milliLiter(s) Oral every 6 hours PRN  dextrose 40% Gel 15 Gram(s) Oral once PRN  glucagon  Injectable 1 milliGRAM(s) IntraMuscular once PRN      VITAL SIGNS: Last 24 Hours  T(C): 34.3 (20 Jun 2020 08:00), Max: 36.6 (19 Jun 2020 12:00)  T(F): 93.8 (20 Jun 2020 08:00), Max: 97.9 (19 Jun 2020 12:00)  HR: 68 (20 Jun 2020 09:00) (68 - 74)  BP: 110/57 (20 Jun 2020 08:00) (84/42 - 114/58)  BP(mean): 89 (20 Jun 2020 08:00) (51 - 89)  RR: 12 (20 Jun 2020 09:00) (0 - 17)  SpO2: 100% (20 Jun 2020 09:00) (100% - 100%)    LABS:                        9.4    21.17 )-----------( 198      ( 20 Jun 2020 04:30 )             26.5     06-20    126<L>  |  86<L>  |  104<HH>  ----------------------------<  191<H>  3.9   |  20  |  4.5<HH>    Ca    7.1<L>      20 Jun 2020 04:30  Mg     2.3     06-20    TPro  4.4<L>  /  Alb  1.7<L>  /  TBili  0.7  /  DBili  x   /  AST  26  /  ALT  <5  /  AlkPhos  165<H>  06-20    PT/INR - ( 19 Jun 2020 09:00 )   PT: 15.70 sec;   INR: 1.37 ratio         PTT - ( 19 Jun 2020 09:00 )  PTT:34.6 sec    ABG - ( 20 Jun 2020 09:01 )  pH, Arterial: 7.54  pH, Blood: x     /  pCO2: 26    /  pO2: 133   / HCO3: 22    / Base Excess: 0.4   /  SaO2: 99                Lactate, Blood: 1.7 mmol/L (06-19-20 @ 11:13)      Culture - Blood (collected 18 Jun 2020 07:01)  Source: .Blood None  Preliminary Report (19 Jun 2020 12:01):    No growth to date.      CARDIAC MARKERS ( 19 Jun 2020 09:00 )  x     / <0.01 ng/mL / x     / x     / <1.0 ng/mL  CARDIAC MARKERS ( 19 Jun 2020 03:48 )  x     / 0.16 ng/mL / 91 U/L / x     / 4.1 ng/mL      PHYSICAL EXAM:  CONSTITUTIONAL: Intubated, ET +  HEAD: Atraumatic, normocephalic  PULMONARY: Crackles b/l  CARDIOVASCULAR: Regular rate and rhythm  GASTROINTESTINAL: Distended   MUSCULOSKELETAL: b/l LE edema   NEUROLOGY: Following commands and moving all extremities

## 2020-06-20 NOTE — PROGRESS NOTE ADULT - ASSESSMENT
IMPRESSION:    Acute respiratory failure  Cardiac arrest 6 min ro cardiac event  Volume overload / large bilateral pleural effusions simple fluid on lung sono  MICHAEL on CKD oligo-anuric  HO HFrEF EF 20% s/p AICD  Sepsis present on admission due left foot necrotizing fascitis s/p debridement  Polymicrobial bacteremia now bcx negative    PLAN:    CNS: SAT, precedex if agitated, Neuro eval, EEG    HEENT:  Oral care    PULMONARY:  HOB @ 45 degrees. Vent changes: peep 5, fio2 35%, rate 12, lower Tv to 420   POSSIBKLE WEANING TRAIL NADEGE  TODAY CXR WORSE AND MOST LIKELY FAMILY WANT HIM TO BE dni WILL OPTIMIZE PRIOR TO EXTUBATION   CARDIOVASCULAR: Goal MAP >65, titrate down levo, Iv Bumex    GI: GI prophylaxis. OG feeds    RENAL:  F/u  lytes.  Correct as needed.  Accurate I/O, renal follow up, repeat bmp now    INFECTIOUS DISEASE: vanc and merrem, vanc tough before 4th dose, ID following, f/up cultures  follow vaNCO TROUPH   HEMATOLOGICAL:  DVT prophylaxis.    ENDOCRINE:  Follow up FS.  Insulin protocol if needed.    MUSCULOSKELETAL: Bedrest, f/up podiatry    LINES/ARANA: right picc line, arana    CODE STATUS: DNR    DISPOSITION: Pt requires continued monitoring in the MICU

## 2020-06-20 NOTE — PROGRESS NOTE ADULT - ASSESSMENT
Sepsis / bacteremia / necrotising fascitis / MICHAEL/ stage 4 ckd/ hyponatremia     # MICHAEL on CKD 4 Now s/p Cardiac arrest early AM 6/19  # creatinine stable now   # continue MUmex drip and metolazone for now / follow UO if no improvement in CXR / ventilation may offer RRT in AM if BP is stable  # family was reluctant to start RRT yesterday   # cont Levo Keep MAP > 65  # phos noted,  corrected calcium around 8,  on  phoslo 2/2/2,  and  calcitriol 0.25 q 24,  pth 187   # acidosis , improved   po sodium bicarb 650 mg q 6 hr.  # Case discussed with MICu team and cardiology

## 2020-06-21 NOTE — PROGRESS NOTE ADULT - SUBJECTIVE AND OBJECTIVE BOX
Podiatry Progress Note    Subjective:   WILL VIEYRA is a pleasant well-nourished, well developed 80y Male in no acute distress, alert awake, and oriented to person, place and time.  Patient is a 80y old  Male who presents with a chief complaint of necrotizing fascitis (21 Jun 2020 11:39)      PAST MEDICAL & SURGICAL HISTORY:  Chronic kidney disease  Anemia  Ascites  PAD (peripheral artery disease)  Hyperlipidemia  Hypothyroidism  Hypertension  Coronary artery disease  CHF (congestive heart failure)  Diabetes mellitus  Biventricular ICD (implantable cardioverter-defibrillator) in place  Amputation of toe of right foot  S/P arterial stent  S/P CABG x 5       Objective:  Vital Signs Last 24 Hrs  T(C): 36.1 (21 Jun 2020 08:00), Max: 36.4 (20 Jun 2020 23:00)  T(F): 97 (21 Jun 2020 08:00), Max: 97.6 (20 Jun 2020 23:00)  HR: 78 (21 Jun 2020 11:00) (74 - 80)  BP: 99/43 (21 Jun 2020 11:00) (99/43 - 119/55)  BP(mean): 61 (21 Jun 2020 11:00) (58 - 90)  RR: 10 (21 Jun 2020 11:00) (8 - 15)  SpO2: 100% (21 Jun 2020 11:00) (100% - 100%)                        8.5    16.63 )-----------( 203      ( 21 Jun 2020 07:21 )             24.1                 06-21    129<L>  |  86<L>  |  106<HH>  ----------------------------<  189<H>  3.9   |  18  |  4.1<HH>    Ca    7.4<L>      21 Jun 2020 07:21  Mg     2.3     06-21    TPro  4.8<L>  /  Alb  2.0<L>  /  TBili  0.5  /  DBili  x   /  AST  26  /  ALT  <5  /  AlkPhos  161<H>  06-21    Derm:   Open Surgical Wound; Medial Aspect of 1st Ray; w/ Sx Incision Plantar Aspect; Just Proximal to the Calcaneus   Sutures Intact on the Plantar Proximal Aspect of Midfoot;  Mildly Necrotic Wound Edges; w/ 70% Granular Wound Base;   No Active Bleeds Noted;     Vascular: DP and PT Pulses Diminished; Foot is Warm to Warm to the touch   Neuro: Protective Sensation Mildly Intact;     Assessment:   s/p Debridement w/ 1st Ray Resection; Left Foot; 6/8;   s/p Debridement of Left Foot; 6/10;   Open Surgical Site of Medial Aspect of Left Foot; Bone Exposed;   Sutures Intact on the Proximal Aspect of Midfoot     Plan:  Chart reviewed and Patient evaluated  Local Wound Care; Wound Flushed w/ Normal Saline; Applied Delmis / Hydrogel / DSD / Kerlix   Will Continue to Monitor Wound Site While Patient Admitted; Q48 Dressing Changes;  Podiatry Following;  Discussed Plan w/ Dr. Clayton    Podiatry

## 2020-06-21 NOTE — PROGRESS NOTE ADULT - SUBJECTIVE AND OBJECTIVE BOX
Nephrology progress note    THIS IS AN INCOMPLETE NOTE . FULL NOTE TO FOLLOW SHORTLY    Patient is seen and examined, events over the last 24 h noted .    Allergies:  Byetta (Stomach Upset)  linezolid (Rash; Urticaria; Flushing)  melatonin (Other)    Hospital Medications:   MEDICATIONS  (STANDING):  aspirin enteric coated 81 milliGRAM(s) Oral daily  atorvastatin 80 milliGRAM(s) Oral at bedtime  buMETAnide Infusion 1 mG/Hr (5 mL/Hr) IV Continuous <Continuous>  calcitriol   Capsule 0.25 MICROGram(s) Oral daily  calcium acetate 1334 milliGRAM(s) Oral three times a day with meals  chlorhexidine 0.12% Liquid 15 milliLiter(s) Oral Mucosa two times a day  chlorhexidine 4% Liquid 1 Application(s) Topical <User Schedule>  dexMEDEtomidine Infusion 1.5 MICROgram(s)/kG/Hr (25.7 mL/Hr) IV Continuous <Continuous>  dextrose 50% Injectable 12.5 Gram(s) IV Push once  folic acid 1 milliGRAM(s) Oral daily  heparin   Injectable 5000 Unit(s) SubCutaneous every 8 hours  meropenem  IVPB 500 milliGRAM(s) IV Intermittent every 12 hours  metolazone 2.5 milliGRAM(s) Oral daily  norepinephrine Infusion 0.05 MICROgram(s)/kG/Min (6.36 mL/Hr) IV Continuous <Continuous>  pantoprazole   Suspension 40 milliGRAM(s) Oral daily  sodium bicarbonate 650 milliGRAM(s) Oral every 6 hours  sodium zirconium cyclosilicate 10 Gram(s) Oral two times a day        VITALS:  T(F): 97.3 (06-21-20 @ 04:00), Max: 97.6 (06-20-20 @ 23:00)  HR: 76 (06-21-20 @ 06:00)  BP: 109/51 (06-21-20 @ 06:00)  RR: 12 (06-21-20 @ 06:00)  SpO2: 100% (06-21-20 @ 06:00)  Wt(kg): --    06-19 @ 07:01  -  06-20 @ 07:00  --------------------------------------------------------  IN: 343 mL / OUT: 1060 mL / NET: -717 mL    06-20 @ 07:01  -  06-21 @ 07:00  --------------------------------------------------------  IN: 759 mL / OUT: 890 mL / NET: -131 mL      20 Jun 2020 07:01  -  21 Jun 2020 07:00  --------------------------------------------------------  IN:    bumetanide Infusion: 120 mL    dexmedetomidine Infusion: 232 mL    IV PiggyBack: 50 mL    norepinephrine Infusion: 357 mL  Total IN: 759 mL    OUT:    Indwelling Catheter - Urethral: 890 mL  Total OUT: 890 mL    Total NET: -131 mL            PHYSICAL EXAM:  Constitutional: NAD  HEENT: anicteric sclera, oropharynx clear, MMM  Neck: No JVD  Respiratory: CTAB, no wheezes, rales or rhonchi  Cardiovascular: S1, S2, RRR  Gastrointestinal: BS+, soft, NT/ND  Extremities: No cyanosis or clubbing. No peripheral edema  :  No arana.   Skin: No rashes    LABS:  06-20    126<L>  |  86<L>  |  104<HH>  ----------------------------<  191<H>  3.9   |  20  |  4.5<HH>    Ca    7.1<L>      20 Jun 2020 04:30  Mg     2.3     06-20    TPro  4.4<L>  /  Alb  1.7<L>  /  TBili  0.7  /  DBili      /  AST  26  /  ALT  <5  /  AlkPhos  165<H>  06-20                          9.4    21.17 )-----------( 198      ( 20 Jun 2020 04:30 )             26.5       Urine Studies:      RADIOLOGY & ADDITIONAL STUDIES: Nephrology progress note  Patient is seen and examined, events over the last 24 h noted .  still intubated on MV  Making more urine  CXR better today     Allergies:  Byetta (Stomach Upset)  linezolid (Rash; Urticaria; Flushing)  melatonin (Other)    Hospital Medications:   MEDICATIONS  (STANDING):  aspirin enteric coated 81 milliGRAM(s) Oral daily  atorvastatin 80 milliGRAM(s) Oral at bedtime  buMETAnide Infusion 1 mG/Hr (5 mL/Hr) IV Continuous <Continuous>  calcitriol   Capsule 0.25 MICROGram(s) Oral daily  calcium acetate 1334 milliGRAM(s) Oral three times a day with meals  dexMEDEtomidine Infusion 1.5 MICROgram(s)/kG/Hr (25.7 mL/Hr) IV Continuous <Continuous>  dextrose 50% Injectable 12.5 Gram(s) IV Push once  folic acid 1 milliGRAM(s) Oral daily  heparin   Injectable 5000 Unit(s) SubCutaneous every 8 hours  meropenem  IVPB 500 milliGRAM(s) IV Intermittent every 12 hours  metolazone 2.5 milliGRAM(s) Oral daily  norepinephrine Infusion 0.05 MICROgram(s)/kG/Min (6.36 mL/Hr) IV Continuous <Continuous>  pantoprazole   Suspension 40 milliGRAM(s) Oral daily  sodium bicarbonate 650 milliGRAM(s) Oral every 6 hours  sodium zirconium cyclosilicate 10 Gram(s) Oral two times a day        VITALS:  T(F): 97.3 (06-21-20 @ 04:00), Max: 97.6 (06-20-20 @ 23:00)  HR: 76 (06-21-20 @ 06:00)  BP: 109/51 (06-21-20 @ 06:00)  RR: 12 (06-21-20 @ 06:00)  SpO2: 100% (06-21-20 @ 06:00)  Wt(kg): --    06-19 @ 07:01  -  06-20 @ 07:00  --------------------------------------------------------  IN: 343 mL / OUT: 1060 mL / NET: -717 mL    06-20 @ 07:01 - 06-21 @ 07:00  --------------------------------------------------------  IN: 759 mL / OUT: 890 mL / NET: -131 mL      20 Jun 2020 07:01 - 21 Jun 2020 07:00  --------------------------------------------------------  IN:    bumetanide Infusion: 120 mL    dexmedetomidine Infusion: 232 mL    IV PiggyBack: 50 mL    norepinephrine Infusion: 357 mL  Total IN: 759 mL    OUT:    Indwelling Catheter - Urethral: 890 mL  Total OUT: 890 mL    Total NET: -131 mL            PHYSICAL EXAM:  Constitutional: intubated on MV   HEENT: anicteric sclera, oropharynx clear, MMM  Neck: No JVD  Respiratory: CTAB, no wheezes, rales or rhonchi  Cardiovascular: S1, S2, RRR  Gastrointestinal: BS+, soft, NT/ND  Extremities: No cyanosis or clubbing. No peripheral edema  :  positive arana   Skin: No rashes    LABS:  06-21    129<L>  |  86<L>  |  106<HH>  ----------------------------<  189<H>  3.9   |  18  |  4.1<HH>    Ca    7.4<L>      21 Jun 2020 07:21  Mg     2.3     06-21    TPro  4.8<L>  /  Alb  2.0<L>  /  TBili  0.5  /  DBili  x   /  AST  26  /  ALT  <5  /  AlkPhos  161<H>  06-21 06-20    126<L>  |  86<L>  |  104<HH>  ----------------------------<  191<H>  3.9   |  20  |  4.5<HH>    Ca    7.1<L>      20 Jun 2020 04:30  Mg     2.3     06-20    TPro  4.4<L>  /  Alb  1.7<L>  /  TBili  0.7  /  DBili      /  AST  26  /  ALT  <5  /  AlkPhos  165<H>  06-20                          9.4    21.17 )-----------( 198      ( 20 Jun 2020 04:30 )             26.5       Urine Studies:      RADIOLOGY & ADDITIONAL STUDIES:

## 2020-06-21 NOTE — PROGRESS NOTE ADULT - ASSESSMENT
80 M with PMHx of anemia, ascites 2/2 CHF, CHF s/p biventricular AICD, CKD, CAD s/p CABGx5 on ASA, CKD, DM s/p R great toe amputation, HLD, HTN, hypothyroidism, PAD s/p R femoral stent who presented with altered mental status x 1 day and worsening left foot DFU with sepsis and metabolic encephalopathy.  Patient was downgraded to floor from ICU on 06/09. Patient was a code blue with ROSC achieved in 6 min overnight on 06/19 and was intubated and upgraded to CCU.    INTERVAL HPI AND OVERNIGHT EVENTS:  Patient was examined and seen at bedside. This morning he is resting comfortably in bed. On levophed at 0.09 mcg/kg. Patient recieved 2 mg of versed yesterday and today is not responding to commands. Currently on precedex only for sedation.    #Cardiac arrest with ROSC in 6 min  #Intubated on 06/19 due to acute hypoxic respiratory failure secondary to cardiac arrest  #Suspected Cardiogenic vs septic shock  - Vent management as per pulm: RR (machine): 12, TV (machine): 400, FiO2: 35, PEEP: 5  - Still on precedex for sedation. Will hold for now  - Will try CPAP again today once patient more alert. Sedation stopped during morning rounds     #Polymicrobial bacteremia secondary to necrotizing fascitis in left foot infection   #Sepsis present on admission  #Metabolic encephalopathy sec to sepsis   - s/p delayed primary closure with possible L BKA. So far family refusing BKA.  -  X-ray Foot AP + Lateral + Oblique, Left (06/08) showing status post hallux metatarsal and great toe amputation sparing 1 cm of the base. Previously noted subcutaneous emphysema within soft tissues has been resected. No proximal residual gas.   - VA Duplex Lower Extremity Arterial, Bilateral showing Normal arterial flow in bilateral lower extremities.  - s/p multiple debridements with podiatry. Last one on 06/18  - blood cultures negative since 06/06  - wound cultures from 06/08 showing E. Coli, Proteus, MRSA   - Continue w/ Local Wound Care as per burn  - PICC Line in place 6/15/2020 by IR  - Decision made for Amputation Surgery on Friday 6/19/2020  - Cardiology risk stratification in cardio note   PLAN  - continue meropenem 500mg IVPB Q12  - vancomycin level 16.4 (06/21). Will repeat vancomycin levels today and dose vancomycin as per ID. If Vanco level less than 15 will give 500mg IVPB Q48. If more than 15 will continue to hold.    #Acute kidney injury on CKD stage IV sec to ATN, Baseline creatinine 2.2  #hyponatremia  - US Kidney and Bladder (06/10/2020) showing Normal-appearing kidneys. Bilateral pleural effusions with ascites.  - Holding losartan, aldactone held  - c/w Lokelema 10mg PO BID, Phoslo 1334mg PO TID, Calcitriol 0.25mg PO QD, Sodium bicarb 650mg PO Q6  - Nephrology consult appreciated. No need for dialysis for now. Will continue bumex drip. Keep negative balance. Will try to get rid of as much fluid as possible before extubation.    #Acute on chronic systolic CHF with bilateral pleural effusions and ascites s/p biventricular AICD  -  TTE (06/04/2020) showing EF <20%. GIDD. Moderate tricuspid regurgitation. Borderline pulmonary hypertension  - Bumex 2mg IV push and infusion at 1mg/hour (06/19). Metolazone 2.5mg PO QD  - Holding Coreg and Digoxin for now. Holding losartan and aldactone MICHAEL on CKD4     #Slurred speech  #Suspected hypoxic brain injury secondary to cardiac arrest   - CT head negative  - Will access more when extubated  - Pending neurology eval  - Will get REEG    #DM type 2 with hypoglycemia  - A1C 10.6 %  - monitor FS    #Acute on chronic normocytic anemia, multifactorial  - S/p 2 units on 06/10 and 2 unit 06/12  - Evaluated by GI. outpatient EGD/colonoscopy    #Misc  - DVT Prophylaxis: Heparin  - Diet: Tube Feeds  - GI Prophylaxis: Protonix  - Activity: Bedrest  - Code Status: DNI/DNR    Dispo: Still acute. Daughter considering palliative. Will discuss further with daughter. Possible extubation tomorrow pending resolution of fluid overload.

## 2020-06-21 NOTE — PROGRESS NOTE ADULT - REASON FOR ADMISSION
necrotizing fascitis

## 2020-06-21 NOTE — PROGRESS NOTE ADULT - ASSESSMENT
Sepsis / bacteremia / necrotising fascitis / MICHAEL/ stage 4 ckd/ hyponatremia     # MICHAEL on CKD 4 Now s/p Cardiac arrest early AM 6/19  # creatinine better   # continue Bumex  drip and metolazone for now /no urgent need for RRT / will follow closely every day   # family was reluctant to start RRT on friday   # cont Levo Keep MAP > 65  # phos noted,  corrected calcium around 8,  on  phoslo 2/2/2,  and  calcitriol 0.25 q 24,  pth 187   # acidosis ,cont  po sodium bicarb 650 mg q 6 hr.  # Case discussed with MICu team

## 2020-06-21 NOTE — GOALS OF CARE CONVERSATION - ADVANCED CARE PLANNING - CONVERSATION DETAILS
Pt was extubated and in accordance with patient's and daughter's wishes he is now DNR/DNI and comfort measures only.  After discussion with rep of [employer] a magnet has been placed on it to deactivate AICD function and should be taken off every 7 hours for 30 seconds and replaced.

## 2020-06-21 NOTE — PROGRESS NOTE ADULT - SUBJECTIVE AND OBJECTIVE BOX
pt seen and examined  events noted/chart reviewed  overall condition getting better  cont with iv diuresis  weaning trials  supportive measures  will follow

## 2020-06-21 NOTE — PROGRESS NOTE ADULT - ASSESSMENT
IMPRESSION:    Acute respiratory failure  Cardiac arrest 6 min ro cardiac event  Volume overload / large bilateral pleural effusions simple fluid on lung sono  MICHAEL on CKD oligo-anuric  HO HFrEF EF 20% s/p AICD  Sepsis present on admission due left foot necrotizing fascitis s/p debridement  Polymicrobial bacteremia now bcx negative    PLAN:    CNS: hold sedation for now   if did not wake up possible ct scan   EEG   HEENT:  Oral care    PULMONARY:  HOB @ 45 degrees. V weaning trial  when he wake up     CARDIOVASCULAR: Goal MAP >65, titrate down levo, Iv Bumex    GI: GI prophylaxis. OG feeds    RENAL:  F/u  lytes.  Correct as needed.  Accurate I/O, renal follow up, repeat bmp now  ??HD  now urine improved on diuretic   follow lytes and Hco3   follow renal     INFECTIOUS DISEASE:merem, vanc tough before 4th dose, ID following, f/up cultures  follow [facility] today and adjust dose was held yesterday because was high   HEMATOLOGICAL:  DVT prophylaxis.    ENDOCRINE:  Follow up FS.  Insulin protocol if needed.    MUSCULOSKELETAL: Bedrest, f/up podiatry    LINES/ARANA: right picc line, arana    CODE STATUS: DNR    DISPOSITION: Pt requires continued monitoring in the MICU

## 2020-06-21 NOTE — PROGRESS NOTE ADULT - SUBJECTIVE AND OBJECTIVE BOX
Patient is a 80y old  Male who presents with a chief complaint of necrotizing fascitis (21 Jun 2020 07:29)      Over Night Events:  Patient seen and examined.   still vented on sedation precedex   levo 0.09  Iv bumex  ROS:  See HPI    PHYSICAL EXAM    ICU Vital Signs Last 24 Hrs  T(C): 36.3 (21 Jun 2020 04:00), Max: 36.4 (20 Jun 2020 23:00)  T(F): 97.3 (21 Jun 2020 04:00), Max: 97.6 (20 Jun 2020 23:00)  HR: 76 (21 Jun 2020 06:00) (68 - 80)  BP: 109/51 (21 Jun 2020 06:00) (86/45 - 119/55)  BP(mean): 74 (21 Jun 2020 06:00) (59 - 90)  ABP: --  ABP(mean): --  RR: 12 (21 Jun 2020 06:00) (8 - 15)  SpO2: 100% (21 Jun 2020 06:00) (100% - 100%)      General: awake   HEENT:     et tube            Lymph Nodes: NO cervical LN   Lungs: Bilateral BS  Cardiovascular: Regular   Abdomen: Soft, Positive BS  Extremities: No clubbing   Skin: warm   Neurological: no focal   Musculoskeletal: move all ext     I&O's Detail    20 Jun 2020 07:01  -  21 Jun 2020 07:00  --------------------------------------------------------  IN:    bumetanide Infusion: 120 mL    dexmedetomidine Infusion: 232 mL    IV PiggyBack: 50 mL    norepinephrine Infusion: 357 mL  Total IN: 759 mL    OUT:    Indwelling Catheter - Urethral: 890 mL  Total OUT: 890 mL    Total NET: -131 mL          LABS:                          8.5    16.63 )-----------( 203      ( 21 Jun 2020 07:21 )             24.1         20 Jun 2020 04:30    126    |  86     |  104    ----------------------------<  191    3.9     |  20     |  4.5      Ca    7.1        20 Jun 2020 04:30  Mg     2.3       20 Jun 2020 04:30                                               PT/INR - ( 19 Jun 2020 09:00 )   PT: 15.70 sec;   INR: 1.37 ratio         PTT - ( 19 Jun 2020 09:00 )  PTT:34.6 sec                                           Lactate, Blood: 1.7 mmol/L (06-19-20 @ 11:13)  Lactate, Blood: 2.4 mmol/L (06-19-20 @ 09:00)  Lactate, Blood: 8.5 mmol/L (06-19-20 @ 03:48)    CARDIAC MARKERS ( 19 Jun 2020 09:00 )  x     / <0.01 ng/mL / x     / x     / <1.0 ng/mL                                                                                                     Mode: AC/ CMV (Assist Control/ Continuous Mandatory Ventilation)  RR (machine): 12  TV (machine): 420  FiO2: 35  PEEP: 5  ITime: 1  MAP: 8  PIP: 20                                      ABG - ( 20 Jun 2020 09:01 )  pH, Arterial: 7.54  pH, Blood: x     /  pCO2: 26    /  pO2: 133   / HCO3: 22    / Base Excess: 0.4   /  SaO2: 99                  MEDICATIONS  (STANDING):  aspirin enteric coated 81 milliGRAM(s) Oral daily  atorvastatin 80 milliGRAM(s) Oral at bedtime  buMETAnide Infusion 1 mG/Hr (5 mL/Hr) IV Continuous <Continuous>  calcitriol   Capsule 0.25 MICROGram(s) Oral daily  calcium acetate 1334 milliGRAM(s) Oral three times a day with meals  chlorhexidine 0.12% Liquid 15 milliLiter(s) Oral Mucosa two times a day  chlorhexidine 4% Liquid 1 Application(s) Topical <User Schedule>  dexMEDEtomidine Infusion 1.5 MICROgram(s)/kG/Hr (25.7 mL/Hr) IV Continuous <Continuous>  dextrose 50% Injectable 12.5 Gram(s) IV Push once  folic acid 1 milliGRAM(s) Oral daily  heparin   Injectable 5000 Unit(s) SubCutaneous every 8 hours  meropenem  IVPB 500 milliGRAM(s) IV Intermittent every 12 hours  metolazone 2.5 milliGRAM(s) Oral daily  norepinephrine Infusion 0.05 MICROgram(s)/kG/Min (6.36 mL/Hr) IV Continuous <Continuous>  pantoprazole   Suspension 40 milliGRAM(s) Oral daily  sodium bicarbonate 650 milliGRAM(s) Oral every 6 hours  sodium zirconium cyclosilicate 10 Gram(s) Oral two times a day    MEDICATIONS  (PRN):  acetaminophen   Tablet .. 650 milliGRAM(s) Oral every 6 hours PRN Mild Pain (1 - 3)  aluminum hydroxide/magnesium hydroxide/simethicone Suspension 30 milliLiter(s) Oral every 6 hours PRN Dyspepsia  dextrose 40% Gel 15 Gram(s) Oral once PRN Blood Glucose LESS THAN 70 milliGRAM(s)/deciliter  glucagon  Injectable 1 milliGRAM(s) IntraMuscular once PRN Glucose LESS THAN 70 milligrams/deciliter  HYDROmorphone  Injectable 0.5 milliGRAM(s) IV Push once PRN sever pain/agitation          Xrays:  TLC:  OG:  ET tube:                                                                                    decrease b/l effusion    ECHO:  [facility] ICU:

## 2020-06-21 NOTE — PROGRESS NOTE ADULT - ASSESSMENT
ASSESSMENT  80 M with PMHx of anemia, ascites 2/2 CHF, CHF s/p biventricular AICD, CKD, CAD s/p CABGx5 on ASA, CKD, DM s/p R great toe amputation, HLD, HTN, hypothyroidism, PAD s/p R femoral stent who presents with altered mental status x 1 day and worsening left foot DFU. Was on augmentin as outpatient.    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 Findings concerning for necrotizing soft tissue infection.  #Elevated trop/BNP  #Lactic acidosis Blood Gas Venous - Lactate: 2.5 mmoL/L (06-04-20 @ 04:51)  #Hyponatremia   #CXR Bilateral lung opacities, right greater than left. No pneumothorax.  #DM   #Abx allergy: linezolid (Rash; Urticaria; Flushing)    RECOMMENDATIONS  - Vanc level 15. HOLD Vanc dosing today. vancomycin  dosing 6/16, 6/18, 6/19  - 6/22 Start Vanc 500mg q48h IV. Check level 6/24 AM prior to redosing  - Meropenem 500mg q12h IV   - PICC x 6 weeks abx from last OR end 7/27  - Appreciate Podiatry/Vascular consult  - 6/17 BCX NGTD   - trend WBC, possibly reactive secondary to PEA arrest    Spectra [id number]

## 2020-06-21 NOTE — PROGRESS NOTE ADULT - SUBJECTIVE AND OBJECTIVE BOX
WILL VIEYRA  80y, Male  Allergy: Byetta (Stomach Upset)  linezolid (Rash; Urticaria; Flushing)  melatonin (Other)      LOS  17d    CHIEF COMPLAINT: necrotizing fascitis (21 Jun 2020 09:11)      INTERVAL EVENTS/HPI  - No acute events overnight, Vanc random 15  - T(F): , Max: 97.6 (06-20-20 @ 23:00)  - WBC Count: 16.63 (06-21-20 @ 07:21)  WBC Count: 21.17 (06-20-20 @ 04:30)  - Creatinine, Serum: 4.1 (06-21-20 @ 07:21)  Creatinine, Serum: 4.5 (06-20-20 @ 04:30)       ROS  unable to obtain history secondary to patient's mental status and/or sedation     VITALS:  T(F): 97, Max: 97.6 (06-20-20 @ 23:00)  HR: 78  BP: 99/43  RR: 10Vital Signs Last 24 Hrs  T(C): 36.1 (21 Jun 2020 08:00), Max: 36.4 (20 Jun 2020 23:00)  T(F): 97 (21 Jun 2020 08:00), Max: 97.6 (20 Jun 2020 23:00)  HR: 78 (21 Jun 2020 11:00) (74 - 80)  BP: 99/43 (21 Jun 2020 11:00) (99/43 - 119/55)  BP(mean): 61 (21 Jun 2020 11:00) (58 - 90)  RR: 10 (21 Jun 2020 11:00) (8 - 15)  SpO2: 100% (21 Jun 2020 11:00) (100% - 100%)    PHYSICAL EXAM:  Gen: intubated  HEENT: Normocephalic, atraumatic  Neck: supple, no lymphadenopathy  CV: Regular rate & regular rhythm  Lungs: decreased BS at bases, no fremitus  Abdomen: Soft, BS present  Ext: Warm, well perfused, LE dressings  Neuro: sedated  Skin: no rash, no erythema  Lines: no phlebitis      FH: Non-contributory  Social Hx: Non-contributory    TESTS & MEASUREMENTS:                        8.5    16.63 )-----------( 203      ( 21 Jun 2020 07:21 )             24.1     06-21    129<L>  |  86<L>  |  106<HH>  ----------------------------<  189<H>  3.9   |  18  |  4.1<HH>    Ca    7.4<L>      21 Jun 2020 07:21  Mg     2.3     06-21    TPro  4.8<L>  /  Alb  2.0<L>  /  TBili  0.5  /  DBili  x   /  AST  26  /  ALT  <5  /  AlkPhos  161<H>  06-21    eGFR if Non African American: 13 mL/min/1.73M2 (06-21-20 @ 07:21)  eGFR if African American: 15 mL/min/1.73M2 (06-21-20 @ 07:21)    LIVER FUNCTIONS - ( 21 Jun 2020 07:21 )  Alb: 2.0 g/dL / Pro: 4.8 g/dL / ALK PHOS: 161 U/L / ALT: <5 U/L / AST: 26 U/L / GGT: x               Culture - Blood (collected 06-18-20 @ 07:01)  Source: .Blood None  Preliminary Report (06-19-20 @ 12:01):    No growth to date.    Culture - Blood (collected 06-17-20 @ 04:30)  Source: .Blood Blood-Venous  Preliminary Report (06-18-20 @ 13:01):    No growth to date.    Culture - Blood (collected 06-16-20 @ 07:31)  Source: .Blood None  Preliminary Report (06-17-20 @ 12:01):    No growth to date.    Culture - Tissue with Gram Stain (collected 06-15-20 @ 12:53)  Source: .Tissue None  Gram Stain (06-15-20 @ 23:04):    No polymorphonuclear leukocytes per low power field    No organisms seen per oil power field  Final Report (06-20-20 @ 21:06):    No growth at 5 days      -  Ertapenem: S <=0.5      -  Gentamicin: R >8      -  Levofloxacin: R >4      -  Meropenem: S <=1      -  Piperacillin/Tazobactam: R <=8      -  Tobramycin: R >8      -  Trimethoprim/Sulfamethoxazole: R >2/38      Method Type: RON  Organism: Escherichia coli (06-06-20 @ 19:03)      -  Amikacin: S <=16      -  Amoxicillin/Clavulanic Acid: S <=8/4      -  Ampicillin: R >16 These ampicillin results predict results for amoxicillin      -  Ampicillin/Sulbactam: I 16/8 Enterobacter, Citrobacter, and Serratia may develop resistance during prolonged therapy (3-4 days)      -  Aztreonam: S <=4      -  Cefazolin: S <=2 Enterobacter, Citrobacter, and Serratia may develop resistance during prolonged therapy (3-4 days)      -  Cefepime: S <=2      -  Cefoxitin: S <=8      -  Ceftriaxone: S <=1 Enterobacter, Citrobacter, and Serratia may develop resistance during prolonged therapy      -  Ciprofloxacin: S <=0.25      -  Ertapenem: S <=0.5      -  Gentamicin: S <=2      -  Imipenem: S <=1      -  Levofloxacin: S <=0.5      -  Meropenem: S <=1      -  Piperacillin/Tazobactam: S <=8      -  Tobramycin: S <=2      -  Trimethoprim/Sulfamethoxazole: S <=0.5/9.5      Method Type: RON    Culture - Blood (collected 06-04-20 @ 04:12)  Source: .Blood Blood-Peripheral  Gram Stain (06-04-20 @ 22:09):    Growth in anaerobic bottle: Gram Negative Rods  Final Report (06-06-20 @ 16:11):    Growth in anaerobic bottle: Proteus mirabilis ESBL    "Due to technical problems, Proteus sp. will Not be reported as part of    the BCID panel until further notice"    ***Blood Panel PCR results on this specimen are available    approximately 3 hours after the Gram stain result.***    Gram stain, PCR, and/or culture results may not always    correspond due to difference in methodologies.    ************************************************************    This PCR assay was performed using [employer].    The following targets are tested for: Enterococcus,    vancomycin resistant enterococci, Listeria monocytogenes,    coagulase negative staphylococci, S. aureus,    methicillin resistant S. aureus, Streptococcus agalactiae    (Group B), S. pneumoniae, S. pyogenes (Group A),    Acinetobacter baumannii, Enterobacter cloacae, E. coli,    Klebsiella oxytoca, K. pneumoniae, Proteus sp.,    Serratia marcescens, Haemophilus influenzae,    Neisseria meningitidis, Pseudomonas aeruginosa, Candida    albicans, C. glabrata, C krusei, C parapsilosis,    C. tropicalis and the KPC resistance gene.  Organism: Blood Culture PCR  Gram Negative Rods (06-06-20 @ 16:11)  Organism: Gram Negative Rods (06-06-20 @ 16:11)      -  Amikacin: S <=16      -  Ampicillin: R >16 These ampicillin results predict results for amoxicillin      -  Ampicillin/Sulbactam: R <=4/2 Enterobacter, Citrobacter, and Serratia may develop resistance during prolonged therapy (3-4 days)      -  Aztreonam: R <=4      -  Cefazolin: R >16 Enterobacter, Citrobacter, and Serratia may develop resistance during prolonged therapy (3-4 days)      -  Cefepime: R 8      -  Cefoxitin: S <=8      -  Ceftriaxone: R >32 Enterobacter, Citrobacter, and Serratia may develop resistance during prolonged therapy      -  Ciprofloxacin: R >2      -  Ertapenem: S <=0.5      -  Gentamicin: R >8      -  Levofloxacin: R >4      -  Meropenem: S <=1      -  Piperacillin/Tazobactam: R <=8      -  Tobramycin: R >8      -  Trimethoprim/Sulfamethoxazole: R >2/38      Method Type: RON  Organism: Blood Culture PCR (06-06-20 @ 16:11)      -  Acinetobacter baumanii: Nondet      -  Candida albicans: Nondet      -  Candida glabrata: Nondet      -  Candida krusei: Nondet      -  Candida parapsilosis: Nondet      -  Candida tropicalis: Nondet      -  Coagulase negative Staphylococcus: Nondet      -  Enterobacter cloacae complex: Nondet      -  Enterococcus species: Nondet      -  Escherichia coli: Nondet      -  Haemophilus influenzae: Nondet      -  Klebsiella oxytoca: Nondet      -  Klebsiella pneumoniae: Nondet      -  Listeria monocytogenes: Nondet      -  Methicillin resistant Staphylococcus aureus (MRSA): Nondet      -  Multidrug (KPC pos) resistant organism: Nondet      -  Neisseria meningitidis: Nondet      -  Pseudomonas aeruginosa: Nondet      -  Serratia marcescens: Nondet      -  Staphylococcus aureus: Nondet      -  Streptococcus agalactiae (Group B): Nondet      -  Streptococcus pneumoniae: Nondet      -  Streptococcus pyogenes (Group A): Nondet      -  Streptococcus sp. (Not Grp A, B or S pneumoniae): Nondet      -  Vancomycin resistant Enterococcus sp.: Nondet      Method Type: PCR    Culture - Blood (collected 06-04-20 @ 04:12)  Source: .Blood Blood-Peripheral  Gram Stain (06-05-20 @ 21:20):    Growth in anaerobic bottle: Gram positive cocci in pairs    Growth in aerobic bottle: Gram Negative Rods  Final Report (06-06-20 @ 17:01):    Growth in anaerobic bottle: Streptococcus agalactiae (Group B)    Growth in aerobic bottle: Proteus mirabilis ESBL    See previous culture [id number]    "Due to technical problems, Proteus sp. will Not be reported as part of    the BCID panel until further notice"    ***Blood Panel PCR results on this specimen are available    approximately 3 hours after the Gram stain result.***    Gram stain, PCR, and/or culture results may not always    correspond due to difference in methodologies.    ************************************************************    This PCR assay was performed using [employer].    The following targets are tested for: Enterococcus,    vancomycin resistant enterococci, Listeria monocytogenes,    coagulase negative staphylococci, S. aureus,    methicillin resistant S. aureus, Streptococcus agalactiae    (Group B), S. pneumoniae, S. pyogenes (Group A),    Acinetobacter baumannii, Enterobacter cloacae, E. coli,    Klebsiella oxytoca, K. pneumoniae, Proteus sp.,    Serratia marcescens, Haemophilus influenzae,    Neisseria meningitidis, Pseudomonas aeruginosa, Candida    albicans, C. glabrata, C krusei, C parapsilosis,    C. tropicalis and the KPC resistance gene.  Organism: Blood Culture PCR  Streptococcus agalactiae (Group B)  Streptococcus agalactiae (Group B) (06-06-20 @ 17:01)  Organism: Streptococcus agalactiae (Group B) (06-06-20 @ 17:01)      -  Ceftriaxone: S 0.064      -  Penicillin: S 0.125 Predicts results for ampicillin, amoxicillin, amoxicillin/clavulanate, ampicillin/sulbactam, 1st, 2nd and 3rd generation cephalosporins and carbapenems.      Method Type: ETEST  Organism: Streptococcus agalactiae (Group B) (06-06-20 @ 17:01)      -  Clindamycin: S      -  Levofloxacin: S      -  Vancomycin: S      Method Type: KB  Organism: Blood Culture PCR (06-06-20 @ 17:01)      -  Streptococcus agalactiae (Group B): Detec      Method Type: PCR        Lactate, Blood: 1.7 mmol/L (06-19-20 @ 11:13)  Lactate, Blood: 2.4 mmol/L (06-19-20 @ 09:00)  Lactate, Blood: 8.5 mmol/L (06-19-20 @ 03:48)      INFECTIOUS DISEASES TESTING  MRSA PCR Result.: Positive (06-19-20 @ 06:01)  COVID-19 PCR: NotDetec (06-04-20 @ 05:27)      INFLAMMATORY MARKERS  Sedimentation Rate, Erythrocyte: 79 mm/Hr (06-05-20 @ 04:14)  C-Reactive Protein, Serum: 23.70 mg/dL (06-05-20 @ 04:14)      RADIOLOGY & ADDITIONAL TESTS:  I have personally reviewed the last available Chest xray  CXR  Xray Chest 1 View- PORTABLE-Urgent:   EXAM:  XR CHEST PORTABLE URGENT 1V            PROCEDURE DATE:  06/20/2020            INTERPRETATION:  Clinical History / Reason for exam: Pleural effusions    Comparison : Chest radiograph June 20, 2020.    Technique/Positioning: Frontal chest radiographs.    Findings:    Support devices: ET tube terminates in the midtrachea. Enteric tube courses below the left hemidiaphragm. Right upper extremity PICC. Left chest wall ICD. Multiple additional telemetry wires overlie the thorax.    Cardiac/mediastinum/hilum: Unchanged    Lung parenchyma/Pleura: Unchanged bilateral pleural effusions and bilateral opacities, left greater than right. No pneumothorax.    Skeleton/soft tissues: Probable contrast in the proximal stomach. Unchanged visualized osseous structures.    Impression:      Bilateral pleural effusions and bilateral opacities, left greater than right.    Support devices, as above.     Probable contrast in the proximal stomach. Correlate for history of recent oral contrast administration.                  ROSELYN FREEMAN M.D., ATTENDING RADIOLOGIST  This document has been electronically signed. Jun 20 2020  8:21PM             (06-20-20 @ 18:55)      CT      CARDIOLOGY TESTING  12 Lead ECG:   Ventricular Rate 70 BPM    Atrial Rate 67 BPM    P-R Interval 208 ms    QRS Duration 144 ms    Q-T Interval 390 ms    QTC Calculation(Bezet) 421 ms    R Axis -58 degrees    T Axis 3 degrees    Diagnosis Line *** Poor data quality, interpretation may be adversely affected  AV dual-paced rhythm  Abnormal ECG    Confirmed by JAYLON FARLEY MD ([id number]) on 6/19/2020 8:23:38 AM (06-19-20 @ 07:48)  12 Lead ECG:   Ventricular Rate 112 BPM    Atrial Rate 32 BPM    QRS Duration 156 ms    Q-T Interval 392 ms    QTC Calculation(Bezet) 535 ms    R Axis -22 degrees    T Axis 19 degrees    Diagnosis Line Ventricular-paced rhythm  Biventricular pacemaker detected  Abnormal ECG    Confirmed by JAYLON FARLEY MD ([id number]) on 6/19/2020 8:23:34 AM (06-19-20 @ 03:42)      MEDICATIONS  aspirin enteric coated 81 Oral daily  atorvastatin 80 Oral at bedtime  buMETAnide Infusion 1 IV Continuous <Continuous>  calcitriol   Capsule 0.25 Oral daily  calcium acetate 1334 Oral three times a day with meals  chlorhexidine 0.12% Liquid 15 Oral Mucosa two times a day  chlorhexidine 4% Liquid 1 Topical <User Schedule>  dexMEDEtomidine Infusion 1.5 IV Continuous <Continuous>  dextrose 50% Injectable 12.5 IV Push once  folic acid 1 Oral daily  heparin   Injectable 5000 SubCutaneous every 8 hours  meropenem  IVPB 500 IV Intermittent every 12 hours  metolazone 2.5 Oral daily  norepinephrine Infusion 0.05 IV Continuous <Continuous>  pantoprazole   Suspension 40 Oral daily  sodium bicarbonate 650 Oral every 6 hours  sodium zirconium cyclosilicate 10 Oral two times a day      WEIGHT  Weight (kg): 68.5 (06-19-20 @ 10:29)  Creatinine, Serum: 4.1 mg/dL (06-21-20 @ 07:21)      ANTIBIOTICS:  meropenem  IVPB 500 milliGRAM(s) IV Intermittent every 12 hours      All available historical records have been reviewed

## 2020-06-21 NOTE — PROGRESS NOTE ADULT - SUBJECTIVE AND OBJECTIVE BOX
WILL VIEYRA 80y Male  MRN#: [id number]   Hospital Day: 17d    SUBJECTIVE  Patient is a 80y old Male who presents with a chief complaint of necrotizing fascitis (21 Jun 2020 08:13)    OBJECTIVE  PAST MEDICAL & SURGICAL HISTORY  Chronic kidney disease  Anemia  Ascites  PAD (peripheral artery disease)  Hyperlipidemia  Hypothyroidism  Hypertension  Coronary artery disease  CHF (congestive heart failure)  Diabetes mellitus  Biventricular ICD (implantable cardioverter-defibrillator) in place  Amputation of toe of right foot  S/P arterial stent  S/P CABG x 5    ALLERGIES:  Byetta (Stomach Upset)  linezolid (Rash; Urticaria; Flushing)  melatonin (Other)    MEDICATIONS:  STANDING MEDICATIONS  aspirin enteric coated 81 milliGRAM(s) Oral daily  atorvastatin 80 milliGRAM(s) Oral at bedtime  buMETAnide Infusion 1 mG/Hr IV Continuous <Continuous>  calcitriol   Capsule 0.25 MICROGram(s) Oral daily  calcium acetate 1334 milliGRAM(s) Oral three times a day with meals  chlorhexidine 0.12% Liquid 15 milliLiter(s) Oral Mucosa two times a day  chlorhexidine 4% Liquid 1 Application(s) Topical <User Schedule>  dexMEDEtomidine Infusion 1.5 MICROgram(s)/kG/Hr IV Continuous <Continuous>  dextrose 50% Injectable 12.5 Gram(s) IV Push once  folic acid 1 milliGRAM(s) Oral daily  heparin   Injectable 5000 Unit(s) SubCutaneous every 8 hours  meropenem  IVPB 500 milliGRAM(s) IV Intermittent every 12 hours  metolazone 2.5 milliGRAM(s) Oral daily  norepinephrine Infusion 0.05 MICROgram(s)/kG/Min IV Continuous <Continuous>  pantoprazole   Suspension 40 milliGRAM(s) Oral daily  sodium bicarbonate 650 milliGRAM(s) Oral every 6 hours  sodium zirconium cyclosilicate 10 Gram(s) Oral two times a day    PRN MEDICATIONS  acetaminophen   Tablet .. 650 milliGRAM(s) Oral every 6 hours PRN  aluminum hydroxide/magnesium hydroxide/simethicone Suspension 30 milliLiter(s) Oral every 6 hours PRN  dextrose 40% Gel 15 Gram(s) Oral once PRN  glucagon  Injectable 1 milliGRAM(s) IntraMuscular once PRN  HYDROmorphone  Injectable 0.5 milliGRAM(s) IV Push once PRN      VITAL SIGNS: Last 24 Hours  T(C): 36.1 (21 Jun 2020 08:00), Max: 36.4 (20 Jun 2020 23:00)  T(F): 97 (21 Jun 2020 08:00), Max: 97.6 (20 Jun 2020 23:00)  HR: 77 (21 Jun 2020 08:21) (68 - 80)  BP: 115/42 (21 Jun 2020 08:00) (99/48 - 119/55)  BP(mean): 65 (21 Jun 2020 08:00) (65 - 90)  RR: 12 (21 Jun 2020 08:00) (8 - 15)  SpO2: 100% (21 Jun 2020 08:21) (100% - 100%)    LABS:                        8.5    16.63 )-----------( 203      ( 21 Jun 2020 07:21 )             24.1     06-20    126<L>  |  86<L>  |  104<HH>  ----------------------------<  191<H>  3.9   |  20  |  4.5<HH>    Ca    7.1<L>      20 Jun 2020 04:30  Mg     2.3     06-20    TPro  4.4<L>  /  Alb  1.7<L>  /  TBili  0.7  /  DBili  x   /  AST  26  /  ALT  <5  /  AlkPhos  165<H>  06-20        ABG - ( 20 Jun 2020 09:01 )  pH, Arterial: 7.54  pH, Blood: x     /  pCO2: 26    /  pO2: 133   / HCO3: 22    / Base Excess: 0.4   /  SaO2: 99        PHYSICAL EXAM:  CONSTITUTIONAL: Intubated, ET +  HEAD: Atraumatic, normocephalic  PULMONARY: Crackles b/l  CARDIOVASCULAR: Regular rate and rhythm  GASTROINTESTINAL: Distended   MUSCULOSKELETAL: b/l LE edema   NEUROLOGY: Sedated. Not following commands

## 2020-06-22 NOTE — DISCHARGE NOTE FOR THE EXPIRED PATIENT - HOSPITAL COURSE
80 M with PMHx of anemia, ascites 2/2 CHF, CHF s/p biventricular AICD, CKD, CAD s/p CABGx5 on ASA, CKD, DM s/p R big toe amputation, HLD, HTN, hypothyroidism, PAD s/p R femoral stent who presents with altered mental status x 1 day and worsening left foot DFU. The pt's daughter states that he saw his L hallux which looked worse, and necrotic from the prior week. He was also forgetful. She checked his temp at home, it was 100.2F, so she gave him tylenol. She states the pt had been taking augmentin for his DFU for some time. She thinks he forgot to take his meds for several days due to the infection. She also states, the pt was recently dx with HFrEF, biventricular aicd placed 2020. She has noticed an 8 lb weight gain in the past 4 days.     In the ED: VS were stable. The pt states he took tylenol prior to coming in to the ED.  xray of the foot showed Subcutaneous gas, podiatry was c/s. Pt was given clindamycin, zosyn, vanco. Labs notable for WBC 19 with L shift, plt 126, BUN/Cr 94/3.2, trop 0.17, BNP >70k, lactate 2.5.     The patient was admitted to the ICU for management of necrotizing fascitis. Subsequently downgraded to floor from ICU on . Patient was a code blue with ROSC achieved in 6 min overnight on  and was intubated and upgraded to CCU. He was weaned off sedation but was unable to follow commands. Pt was extubated and in accordance with patient's and daughter's wishes he was made DNR/DNI and comfort measures only.  After discussion with rep of [employer] a magnet was placed on it to deactivate AICD function. The patient  due to cardiopulmonary arrest at 0149 2020.

## 2020-06-23 LAB
CULTURE RESULTS: SIGNIFICANT CHANGE UP
SPECIMEN SOURCE: SIGNIFICANT CHANGE UP

## 2020-06-24 DIAGNOSIS — E87.2 ACIDOSIS: ICD-10-CM

## 2020-06-24 DIAGNOSIS — E11.649 TYPE 2 DIABETES MELLITUS WITH HYPOGLYCEMIA WITHOUT COMA: ICD-10-CM

## 2020-06-24 DIAGNOSIS — I46.9 CARDIAC ARREST, CAUSE UNSPECIFIED: ICD-10-CM

## 2020-06-24 DIAGNOSIS — R65.21 SEVERE SEPSIS WITH SEPTIC SHOCK: ICD-10-CM

## 2020-06-24 DIAGNOSIS — I70.203 UNSPECIFIED ATHEROSCLEROSIS OF NATIVE ARTERIES OF EXTREMITIES, BILATERAL LEGS: ICD-10-CM

## 2020-06-24 DIAGNOSIS — Z16.12 EXTENDED SPECTRUM BETA LACTAMASE (ESBL) RESISTANCE: ICD-10-CM

## 2020-06-24 DIAGNOSIS — A41.50 GRAM-NEGATIVE SEPSIS, UNSPECIFIED: ICD-10-CM

## 2020-06-24 DIAGNOSIS — G93.41 METABOLIC ENCEPHALOPATHY: ICD-10-CM

## 2020-06-24 DIAGNOSIS — E87.1 HYPO-OSMOLALITY AND HYPONATREMIA: ICD-10-CM

## 2020-06-24 DIAGNOSIS — N17.0 ACUTE KIDNEY FAILURE WITH TUBULAR NECROSIS: ICD-10-CM

## 2020-06-24 DIAGNOSIS — G93.1 ANOXIC BRAIN DAMAGE, NOT ELSEWHERE CLASSIFIED: ICD-10-CM

## 2020-06-24 DIAGNOSIS — M72.6 NECROTIZING FASCIITIS: ICD-10-CM

## 2020-06-24 DIAGNOSIS — E78.5 HYPERLIPIDEMIA, UNSPECIFIED: ICD-10-CM

## 2020-06-24 DIAGNOSIS — N18.4 CHRONIC KIDNEY DISEASE, STAGE 4 (SEVERE): ICD-10-CM

## 2020-06-24 DIAGNOSIS — Z95.1 PRESENCE OF AORTOCORONARY BYPASS GRAFT: ICD-10-CM

## 2020-06-24 DIAGNOSIS — I25.10 ATHEROSCLEROTIC HEART DISEASE OF NATIVE CORONARY ARTERY WITHOUT ANGINA PECTORIS: ICD-10-CM

## 2020-06-24 DIAGNOSIS — L97.529 NON-PRESSURE CHRONIC ULCER OF OTHER PART OF LEFT FOOT WITH UNSPECIFIED SEVERITY: ICD-10-CM

## 2020-06-24 DIAGNOSIS — I21.A1 MYOCARDIAL INFARCTION TYPE 2: ICD-10-CM

## 2020-06-24 DIAGNOSIS — E11.621 TYPE 2 DIABETES MELLITUS WITH FOOT ULCER: ICD-10-CM

## 2020-06-24 DIAGNOSIS — Z78.1 PHYSICAL RESTRAINT STATUS: ICD-10-CM

## 2020-06-24 DIAGNOSIS — A41.9 SEPSIS, UNSPECIFIED ORGANISM: ICD-10-CM

## 2020-06-24 DIAGNOSIS — E11.22 TYPE 2 DIABETES MELLITUS WITH DIABETIC CHRONIC KIDNEY DISEASE: ICD-10-CM

## 2020-06-24 DIAGNOSIS — I13.0 HYPERTENSIVE HEART AND CHRONIC KIDNEY DISEASE WITH HEART FAILURE AND STAGE 1 THROUGH STAGE 4 CHRONIC KIDNEY DISEASE, OR UNSPECIFIED CHRONIC KIDNEY DISEASE: ICD-10-CM

## 2020-06-24 DIAGNOSIS — Z95.810 PRESENCE OF AUTOMATIC (IMPLANTABLE) CARDIAC DEFIBRILLATOR: ICD-10-CM

## 2020-06-24 DIAGNOSIS — Z95.820 PERIPHERAL VASCULAR ANGIOPLASTY STATUS WITH IMPLANTS AND GRAFTS: ICD-10-CM

## 2020-06-24 DIAGNOSIS — I95.9 HYPOTENSION, UNSPECIFIED: ICD-10-CM

## 2020-06-24 DIAGNOSIS — J96.01 ACUTE RESPIRATORY FAILURE WITH HYPOXIA: ICD-10-CM

## 2020-06-24 DIAGNOSIS — Z89.421 ACQUIRED ABSENCE OF OTHER RIGHT TOE(S): ICD-10-CM

## 2020-06-24 DIAGNOSIS — I27.20 PULMONARY HYPERTENSION, UNSPECIFIED: ICD-10-CM

## 2020-06-24 DIAGNOSIS — E11.51 TYPE 2 DIABETES MELLITUS WITH DIABETIC PERIPHERAL ANGIOPATHY WITHOUT GANGRENE: ICD-10-CM

## 2020-06-24 DIAGNOSIS — I50.23 ACUTE ON CHRONIC SYSTOLIC (CONGESTIVE) HEART FAILURE: ICD-10-CM

## 2020-06-26 LAB
CORTICOSTEROID BINDING GLOBULIN RESULT: 1.9 MG/DL — SIGNIFICANT CHANGE UP
CORTIS F/TOTAL MFR SERPL: 25 % — SIGNIFICANT CHANGE UP
CORTIS SERPL-MCNC: 16 UG/DL — SIGNIFICANT CHANGE UP
CORTISOL, FREE RESULT: 4 UG/DL — HIGH

## 2022-10-07 NOTE — OCCUPATIONAL THERAPY INITIAL EVALUATION ADULT - ASSISTIVE DEVICE FOR TRANSFER: STAND/SIT, REHAB EVAL
back , neck and shoulder pain   - spasms in L back noted today  - heating pad 30\" daily as needed  - activity as tolerated  - remain hydrated  - rx tizanadine 4 mg twice or thrice  daily as needed for spasms    costochondritis (inflammation in rib cartilage margins)  - rx ibuprofen 600 mg twice daily for 5- 10 days with food  - if recurrent discomfort, then resume  - expect gradual complete resolution     L breast large bruise  - resolving, fading    Tortuous aorta  - noted on CXR  - no evidence of dilated aorta    R knee pain, bruising, abrasion  - healing     - annual dilated eye exam with ophtho: [facility], [place], [place] [phone]   - Call [phone] to schedule mammogram at [facility].    - drop off stool kit next week   
rolling walker

## 2024-04-08 NOTE — PRE-ANESTHESIA EVALUATION ADULT - RESPIRATORY RATE (BREATHS/MIN)
----- Message from Shamika Leal MD sent at 4/8/2024 12:43 PM CDT -----  Please notify pt with results. Inflammatory polyps on biopsy. No evidence of colitis. Repeat colonoscopy in 3 years. Follow up in GI clinic if any ongoing GI symptoms, questions or concerns.   Thank you!    
17
15
20
11
16

## 2024-11-21 NOTE — PRE-OP CHECKLIST - BP NONINVASIVE SYSTOLIC (MM HG)
Detail Level: Zone Photo Preface (Leave Blank If You Do Not Want): Photographs were obtained today 59